# Patient Record
Sex: FEMALE | Race: WHITE | NOT HISPANIC OR LATINO | Employment: FULL TIME | URBAN - METROPOLITAN AREA
[De-identification: names, ages, dates, MRNs, and addresses within clinical notes are randomized per-mention and may not be internally consistent; named-entity substitution may affect disease eponyms.]

---

## 2016-04-06 LAB
HPV_EXT - HISTORICAL: NORMAL
PAP SMEAR - HIM PATIENT REPORTED: NORMAL

## 2017-02-13 ENCOUNTER — COMMUNICATION - HEALTHEAST (OUTPATIENT)
Dept: SCHEDULING | Facility: CLINIC | Age: 47
End: 2017-02-13

## 2017-03-27 ENCOUNTER — OFFICE VISIT - HEALTHEAST (OUTPATIENT)
Dept: FAMILY MEDICINE | Facility: CLINIC | Age: 47
End: 2017-03-27

## 2017-03-27 DIAGNOSIS — E10.9 TYPE 1 DIABETES MELLITUS (H): ICD-10-CM

## 2017-03-27 DIAGNOSIS — F41.1 ANXIETY STATE: ICD-10-CM

## 2017-03-27 DIAGNOSIS — Z00.00 ROUTINE GENERAL MEDICAL EXAMINATION AT A HEALTH CARE FACILITY: ICD-10-CM

## 2017-03-27 ASSESSMENT — MIFFLIN-ST. JEOR: SCORE: 1431.91

## 2017-06-05 ENCOUNTER — OFFICE VISIT (OUTPATIENT)
Dept: ENDOCRINOLOGY | Facility: CLINIC | Age: 47
End: 2017-06-05

## 2017-06-05 ENCOUNTER — AMBULATORY - HEALTHEAST (OUTPATIENT)
Dept: FAMILY MEDICINE | Facility: CLINIC | Age: 47
End: 2017-06-05

## 2017-06-05 ENCOUNTER — RECORDS - HEALTHEAST (OUTPATIENT)
Dept: ADMINISTRATIVE | Facility: OTHER | Age: 47
End: 2017-06-05

## 2017-06-05 VITALS
BODY MASS INDEX: 25.6 KG/M2 | WEIGHT: 168.9 LBS | DIASTOLIC BLOOD PRESSURE: 74 MMHG | HEART RATE: 67 BPM | HEIGHT: 68 IN | SYSTOLIC BLOOD PRESSURE: 121 MMHG

## 2017-06-05 DIAGNOSIS — Z91.199 POOR COMPLIANCE: ICD-10-CM

## 2017-06-05 DIAGNOSIS — E10.65 TYPE 1 DIABETES MELLITUS WITH HYPERGLYCEMIA (H): Primary | ICD-10-CM

## 2017-06-05 DIAGNOSIS — Z46.81 INSULIN PUMP TITRATION: ICD-10-CM

## 2017-06-05 PROBLEM — N87.9 CERVICAL DYSPLASIA: Status: ACTIVE | Noted: 2017-06-05

## 2017-06-05 PROBLEM — H91.90 HEARING LOSS: Status: ACTIVE | Noted: 2017-06-05

## 2017-06-05 PROBLEM — E11.3299 NONPROLIFERATIVE DIABETIC RETINOPATHY (H): Status: ACTIVE | Noted: 2017-06-05

## 2017-06-05 PROBLEM — F41.1 GENERALIZED ANXIETY DISORDER: Status: ACTIVE | Noted: 2017-06-05

## 2017-06-05 LAB
HBA1C MFR BLD: 7.4 % (ref 4.3–6)
HBA1C MFR BLD: 7.4 % (ref 4.3–6)

## 2017-06-05 ASSESSMENT — PAIN SCALES - GENERAL: PAINLEVEL: NO PAIN (0)

## 2017-06-05 NOTE — NURSING NOTE
"Chief Complaint   Patient presents with     RECHECK     DIABETES TYPE 1 F/U        Initial /74 (BP Location: Right arm, Patient Position: Chair, Cuff Size: Adult Regular)  Pulse 67  Ht 1.727 m (5' 8\")  Wt 76.6 kg (168 lb 14.4 oz)  BMI 25.68 kg/m2 Estimated body mass index is 25.68 kg/(m^2) as calculated from the following:    Height as of this encounter: 1.727 m (5' 8\").    Weight as of this encounter: 76.6 kg (168 lb 14.4 oz).  Medication Reconciliation: complete         Performed A1C test - patient tolerated well.    Karina Kong, [facility]           "

## 2017-06-05 NOTE — PROGRESS NOTES
- Endocrinology Follow up -    Reason for visit/consult: DM1     Primary care provider: Jose Elias Torres Use [id number] (Inactive)    HPI:  Sera is a 47 yo female with DM1 who is presenting today in follow up for treatment and management of her DMT1. She is a patient of former Dr. CHARISSE Krishnamurthy. She was initially diagnosed at the age of 15 and presented with polydipsia, polyuria, and weight loss. Her current treatment regimen includes an insulin pump with insulin bolus to correct for carbs. She reports evenings being the most difficult for her to manage the amount of insulin that she needs, as she snacks in the evening as she reports going out to dinner with her new boyfriend and kids. She is  from her  but they remain  so their family can stay on his insurance policy. 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     Current pump setting: (Pump initiated 2007)  Basal-    Midnight to 7 am  0.85 unit/hr,  7 am to midnight 1.15 unit/hr    During the biking (she usually bike 30-60 miles) basal reduced 80-90% (before / after she is not reducing)    Bolus: 1 unit: 15 g carb (Breakfast, lunch, dinner)             Correction more than 130    Midnight-to-6 am   90 g/unit   6 am-to-9 pm        50 g/unit   9 pm-to-midnight   90 g/unit    Actual insulin dose:   Average total daily insulin (U)  34.89 +/- 3.3  Average Daily Basal (U)          24.69 (71%)  Average Daily Bolus (U)          10.20 (20%)    Life style:  Wakes: during weekdays 645 am, and weekends 8 am   Breakfast: protein cereal daily  Lunch: [place], yogurt, fruit  Snack: varies  Dinner: varies, with going out to dinner with beer 5x during the week.     Exercise: weekdays she does twice weekly intense cycling for 2 hours  Weekends, variable cycling, with intense 2-4 hour trips, went on a 63 mile ride with lower sugar in the am.    DM  complication:   Retinopathy: stable, yearly eye exams, see's Dr. Ulises Moreno at [facility] at the [facility] location  Today had screening coronary artery score: pending results  Last UA (2016) showed no proteinuria  Last LDL cholesterol (2016) 71 mg/dL  Last HGA1C (2016) 7.7, with today's value 7.4  Monofilament test today: intact    Glucose Log:   pre breakfast - range:  av  pre lunch - range:  av  pre dinner - does not check  bed time - range:   Av           Past Medical/Surgical History:  Past Medical History:   Diagnosis Date     Anxiety      Diabetes mellitus (H)     Type 1     Past Surgical History:   Procedure Laterality Date      SECTION      x2       Allergies:  Allergies   Allergen Reactions     Percocet [Oxycodone-Acetaminophen] Nausea and Nausea and Vomiting       Current Medications   Current Outpatient Prescriptions   Medication     ASPIRIN PO     losartan (COZAAR) 25 MG tablet     insulin aspart (NOVOLOG) 100 UNITS/ML VIAL     ESCITALOPRAM OXALATE PO     Calcium Carbonate-Vitamin D (CALCIUM 500+D PO)     glucagon (GLUCAGON EMERGENCY) 1 MG injection     glucose blood VI test strips (ONE TOUCH ULTRA TEST) strip     No current facility-administered medications for this visit.        Family History:  No family history of DM, or known autoimmune conditions in patients parents or grandparents.   Family history of Psoriasis in patient brother.     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   Social History   Substance Use Topics     Smoking status: Never Smoker     Smokeless tobacco: Never Used     Alcohol use Nightly glass of hoppy beer.        ROS:  Full review of systems taken with the help of the intake sheet. Otherwise a complete 14 point review of systems was taken and is negative unless stated in the  "history above.    Physical Exam:   Blood pressure 121/74, pulse 67, height 1.727 m (5' 8\"), weight 76.6 kg (168 lb 14.4 oz).    General: well appearing, no acute distress, pleasant and conversational, appears anxious.    Mental Status/neuro: alert and oriented  Face: symmetrical, normal facial color  Eyes: anicteric, PERRL, no proptosis or lid lag  Neck: supple, no lymphadenopahty  Thyroid: normal size and texture, no nodule palpable, no bruits  Heart: regular rhythm, S1S2, no murmur appreciated  Lung: clear to auscultation bilaterally  Abdomen: soft, NT/ND, no hepatomegaly  Legs: no swelling or edema  Feet: no deformities or ulcers, 2+ DP pulses, normal monofilament sensation      Labs (General):   No results found for: NA   Lab Results   Component Value Date    POTASSIUM 4.4 12/13/2016     No results found for: CHLORIDE  Lab Results   Component Value Date    SCOTTY 9.6 01/15/2009     No results found for: CO2  No results found for: BUN  Lab Results   Component Value Date    CR 0.86 12/13/2016     Lab Results   Component Value Date     12/13/2016     Lab Results   Component Value Date    TSH 3.72 12/13/2016     No results found for: T4  Lab Results   Component Value Date    A1C 7.7 12/13/2016 06/05/2017 HGA1C     7.4       Assessment and Plan  46 year old female with TAB and DM1 here today for follow up for DM1 with complications of stable non-proliferative diabetic retinopathy, who follows with annual eye exams.     1- Diabetes Mellitus type 1 (A1C 7.4)  - A1c is more or less optimized.   -Discrepancy noted between basal and bolus, she is not doing proper bolus, especially while she is dining out.  -We discussed the importance of knowing calories and carb numbers in order to allow for her to correct effectively at night rather than guestimating the amount of carbs in the food.     -We encouraged her to increased her dinner bolus dosing with eating out, and not to add increased basal dosing for correction of " hyperglycemia as she has some evidence of hypoglycemia.  - We discussed the importance of her decreasing her basal rate before, during and after exercise to reduce hypoglycemia in the morning.     Decrease basal insulin before and after long hour exercise    Discussed proper bolus for dinner (at outside the home eating)    Livre pro 2 weeks prior to next appointment, schedule nurse visit for this to be inserted    RTC with me in 3 month    Discussed forwarding us annual eye exam report. Patient to contact provider.       I saw the patient with Elen Lal, MS4, she obtained history and physical then accompanied my encounter as well.       I spent 45 minutes with this patient face to face and explained the conditions and plans (more than 50% of time was counseling/coordination of care, importance of bolus insuln, adjustment of intensive exercise) . The patient understood and is satisfied with today's visit. Return to clinic with me in 3 months.     CC:  Dr. Ulises Moreno at [facility] at the [facility] location at close encounter.    Maureen Hinojosa MD  Staff Physician  Endocrinology and Metabolism  License: [id number]

## 2017-06-05 NOTE — LETTER
6/5/2017       RE: Sera Adkins  [address] [id number]     Dear Colleague,    Thank you for referring your patient, Sera Adkins, to the [facility] ENDOCRINOLOGY at [facility]. Please see a copy of my visit note below.                - Endocrinology Follow up -    Reason for visit/consult: DM1     Primary care provider: Jose Elias Torres Use [id number] (Inactive)    HPI:  Sera is a 45 yo female with DM1 who is presenting today in follow up for treatment and management of her DMT1. She is a patient of former Dr. CHARISSE Krishnamurthy. She was initially diagnosed at the age of 15 and presented with polydipsia, polyuria, and weight loss. Her current treatment regimen includes an insulin pump with insulin bolus to correct for carbs. She reports evenings being the most difficult for her to manage the amount of insulin that she needs, as she snacks in the evening as she reports going out to dinner with her new boyfriend and kids. She is  from her  but they remain  so their family can stay on his insurance policy. 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     Current pump setting: (Pump initiated 2007)  Basal-    Midnight to 7 am  0.85 unit/hr,  7 am to midnight 1.15 unit/hr    During the biking (she usually bike 30-60 miles) basal reduced 80-90% (before / after she is not reducing)    Bolus: 1 unit: 15 g carb (Breakfast, lunch, dinner)             Correction more than 130    Midnight-to-6 am   90 g/unit   6 am-to-9 pm        50 g/unit   9 pm-to-midnight   90 g/unit    Actual insulin dose:   Average total daily insulin (U)  34.89 +/- 3.3  Average Daily Basal (U)          24.69 (71%)  Average Daily Bolus (U)          10.20 (20%)    Life style:  Wakes: during weekdays 645 am, and weekends 8 am   Breakfast: protein cereal daily  Lunch: [place],  yogurt, fruit  Snack: varies  Dinner: varies, with going out to dinner with beer 5x during the week.     Exercise: weekdays she does twice weekly intense cycling for 2 hours  Weekends, variable cycling, with intense 2-4 hour trips, went on a 63 mile ride with lower sugar in the am.    DM complication:   Retinopathy: stable, yearly eye exams, see's Dr. Ulises Moreno at [facility] at the [facility] location  Today had screening coronary artery score: pending results  Last UA (2016) showed no proteinuria  Last LDL cholesterol (2016) 71 mg/dL  Last HGA1C (2016) 7.7, with today's value 7.4  Monofilament test today: intact    Glucose Log:   pre breakfast - range:  av  pre lunch - range:  av  pre dinner - does not check  bed time - range:   Av           Past Medical/Surgical History:  Past Medical History:   Diagnosis Date     Anxiety      Diabetes mellitus (H)     Type 1     Past Surgical History:   Procedure Laterality Date      SECTION      x2       Allergies:  Allergies   Allergen Reactions     Percocet [Oxycodone-Acetaminophen] Nausea and Nausea and Vomiting       Current Medications   Current Outpatient Prescriptions   Medication     ASPIRIN PO     losartan (COZAAR) 25 MG tablet     insulin aspart (NOVOLOG) 100 UNITS/ML VIAL     ESCITALOPRAM OXALATE PO     Calcium Carbonate-Vitamin D (CALCIUM 500+D PO)     glucagon (GLUCAGON EMERGENCY) 1 MG injection     glucose blood VI test strips (ONE TOUCH ULTRA TEST) strip     No current facility-administered medications for this visit.        Family History:  No family history of DM, or known autoimmune conditions in patients parents or grandparents.   Family history of Psoriasis in patient brother.     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   Social History   Substance Use  "Topics     Smoking status: Never Smoker     Smokeless tobacco: Never Used     Alcohol use Nightly glass of hoppy beer.        ROS:  Full review of systems taken with the help of the intake sheet. Otherwise a complete 14 point review of systems was taken and is negative unless stated in the history above.    Physical Exam:   Blood pressure 121/74, pulse 67, height 1.727 m (5' 8\"), weight 76.6 kg (168 lb 14.4 oz).    General: well appearing, no acute distress, pleasant and conversational, appears anxious.    Mental Status/neuro: alert and oriented  Face: symmetrical, normal facial color  Eyes: anicteric, PERRL, no proptosis or lid lag  Neck: supple, no lymphadenopahty  Thyroid: normal size and texture, no nodule palpable, no bruits  Heart: regular rhythm, S1S2, no murmur appreciated  Lung: clear to auscultation bilaterally  Abdomen: soft, NT/ND, no hepatomegaly  Legs: no swelling or edema  Feet: no deformities or ulcers, 2+ DP pulses, normal monofilament sensation      Labs (General):   No results found for: NA   Lab Results   Component Value Date    POTASSIUM 4.4 12/13/2016     No results found for: CHLORIDE  Lab Results   Component Value Date    SCOTTY 9.6 01/15/2009     No results found for: CO2  No results found for: BUN  Lab Results   Component Value Date    CR 0.86 12/13/2016     Lab Results   Component Value Date     12/13/2016     Lab Results   Component Value Date    TSH 3.72 12/13/2016     No results found for: T4  Lab Results   Component Value Date    A1C 7.7 12/13/2016 06/05/2017 HGA1C     7.4       Assessment and Plan  46 year old female with TAB and DM1 here today for follow up for DM1 with complications of stable non-proliferative diabetic retinopathy, who follows with annual eye exams.     1- Diabetes Mellitus type 1 (A1C 7.4)  - A1c is more or less optimized.   -Discrepancy noted between basal and bolus, she is not doing proper bolus, especially while she is dining out.  -We discussed the " importance of knowing calories and carb numbers in order to allow for her to correct effectively at night rather than guestimating the amount of carbs in the food.     -We encouraged her to increased her dinner bolus dosing with eating out, and not to add increased basal dosing for correction of hyperglycemia as she has some evidence of hypoglycemia.  - We discussed the importance of her decreasing her basal rate before, during and after exercise to reduce hypoglycemia in the morning.     Decrease basal insulin before and after long hour exercise    Discussed proper bolus for dinner (at outside the home eating)    Livre pro 2 weeks prior to next appointment, schedule nurse visit for this to be inserted    RTC with me in 3 month    Discussed forwarding us annual eye exam report. Patient to contact provider.       I saw the patient with Elen Lal, MS4, she obtained history and physical then accompanied my encounter as well.       I spent 45 minutes with this patient face to face and explained the conditions and plans (more than 50% of time was counseling/coordination of care, importance of bolus insuln, adjustment of intensive exercise) . The patient understood and is satisfied with today's visit. Return to clinic with me in 3 months.     CC:  Dr. Ulises Moreno at [facility] at the [facility] location at close encounter.    Maureen Hinojosa MD  Staff Physician  Endocrinology and Metabolism  License: [id number]

## 2017-06-05 NOTE — MR AVS SNAPSHOT
After Visit Summary   6/5/2017    Sera Adkins    MRN: [id number]           Patient Information     Date Of Birth          1970        Visit Information        Provider Department      6/5/2017 10:30 AM Maureen Hinojosa MD [facility] Endocrinology        Today's Diagnoses     Type 1 diabetes mellitus with hyperglycemia (H)    -  1    Insulin pump titration        Poor compliance           Follow-ups after your visit        Follow-up notes from your care team     Return in about 3 months (around 9/5/2017).      Your next 10 appointments already scheduled     Aug 28, 2017 11:00 AM CDT   Nurse Visit with  Med Nurse   [facility] Endocrinology ([facility])    [address] [id number] [phone]            Sep 18, 2017  8:30 AM CDT   (Arrive by 8:15 AM)   RETURN DIABETES with MD MARCELINO Miner [facility] Endocrinology ([facility])    [address] [place] [id number] [phone]              Who to contact     Please call your clinic at [phone] to:    Ask questions about your health    Make or cancel appointments    Discuss your medicines    Learn about your test results    Speak to your doctor   If you have compliments or concerns about an experience at your clinic, or if you wish to file a complaint, please contact [facility] Patient Relations at [phone] or email us at [email or web address]         Additional Information About Your Visit        MyChart Information     [employer] is an electronic gateway that provides easy, online access to your medical records. With [employer], you can request a clinic appointment, read your test results, renew a prescription or communicate with your care team.     To sign up for [employer] visit the website at [email or web address]   You will be asked to enter the access code listed below, as well as some  "personal information. Please follow the directions to create your username and password.     Your access code is: [email or web address]  Expires: 2017  6:03 PM     Your access code will  in 90 days. If you need help or a new code, please contact your [facility] or call [phone] for assistance.        Care EveryWhere ID     This is your Care EveryWhere ID. This could be used by other organizations to access your [place] medical records  [id number]        Your Vitals Were     Pulse Height BMI (Body Mass Index)             67 1.727 m (5' 8\") 25.68 kg/m2          Blood Pressure from Last 3 Encounters:   17 121/74   16 145/86   16 131/84    Weight from Last 3 Encounters:   17 76.6 kg (168 lb 14.4 oz)   16 75.2 kg (165 lb 11.2 oz)   16 73.2 kg (161 lb 4.8 oz)              We Performed the Following     Hemoglobin A1c POCT          Today's Medication Changes          These changes are accurate as of: 17 11:59 PM.  If you have any questions, ask your nurse or doctor.               Stop taking these medicines if you haven't already. Please contact your care team if you have questions.     insulin glargine 100 UNIT/ML injection   Commonly known as:  LANTUS                    Primary Care Provider    Jose Elias Ngo Use [id number] [address]       XXX DUPLICATE XXX XXX  XXX [place] [address]        Thank you!     Thank you for choosing [facility] ENDOCRINOLOGY  for your care. Our goal is always to provide you with excellent care. Hearing back from our patients is one way we can continue to improve our services. Please take a few minutes to complete the written survey that you may receive in the mail after your visit with us. Thank you!             Your Updated Medication List - Protect others around you: Learn how to safely use, store and throw away your medicines at www.disposemymeds.org.          This list is accurate as of: 17 11:59 PM.  Always use your most " recent med list.                   Brand Name Dispense Instructions for use    ASPIRIN PO      Take 81 mg by mouth daily       CALCIUM 500+D PO      Take 1 tablet by mouth as needed.       ESCITALOPRAM OXALATE PO      Take 10 mg by mouth daily       GLUCAGON EMERGENCY 1 MG kit   Generic drug:  glucagon      Inject 1 mg into the vein as needed.       insulin aspart 100 UNITS/ML injection    NovoLOG    60 mL    Uses up to 60 units per day as directed in insulin pump for basal, bolus and priming of tubing.       losartan 25 MG tablet    COZAAR    90 tablet    Take 1 tablet (25 mg) by mouth daily       ONE TOUCH ULTRA test strip   Generic drug:  blood glucose monitoring     1 Box    Test 6 to 8 times daily.

## 2017-07-30 ENCOUNTER — RECORDS - HEALTHEAST (OUTPATIENT)
Dept: ADMINISTRATIVE | Facility: OTHER | Age: 47
End: 2017-07-30

## 2017-11-06 ENCOUNTER — OFFICE VISIT - HEALTHEAST (OUTPATIENT)
Dept: FAMILY MEDICINE | Facility: CLINIC | Age: 47
End: 2017-11-06

## 2017-11-06 DIAGNOSIS — F39 MOOD DISORDER (H): ICD-10-CM

## 2017-11-06 DIAGNOSIS — Z12.31 VISIT FOR SCREENING MAMMOGRAM: ICD-10-CM

## 2017-11-06 DIAGNOSIS — R41.840 POOR CONCENTRATION: ICD-10-CM

## 2017-11-06 DIAGNOSIS — F41.1 ANXIETY STATE: ICD-10-CM

## 2017-11-06 ASSESSMENT — MIFFLIN-ST. JEOR: SCORE: 1436

## 2017-11-27 ENCOUNTER — ALLIED HEALTH/NURSE VISIT (OUTPATIENT)
Dept: ENDOCRINOLOGY | Facility: CLINIC | Age: 47
End: 2017-11-27

## 2017-11-27 DIAGNOSIS — E10.65 TYPE 1 DIABETES MELLITUS WITH HYPERGLYCEMIA (H): Primary | ICD-10-CM

## 2017-11-27 NOTE — NURSING NOTE
Placed Scott Sensor per . Patient identified by name and . Patient tolerated placement well.  Sensor was placed on patient's LEFT upper back part of arm. Patient was given an appointment to come back in 2 weeks for download and given instructions on how to care for the sensor.    SCOTT PRO:  CODE: [id number]  SN: [id number]  EXP:2018  LOT:[id number]  NDC:  [address]-00      Karina Kong,[facility]

## 2017-11-27 NOTE — MR AVS SNAPSHOT
After Visit Summary   2017    Sera Adkins    MRN: [id number]           Patient Information     Date Of Birth          1970        Visit Information        Provider Department      2017 11:00 AM Nurse, [facility]         Follow-ups after your visit        Your next 10 appointments already scheduled     2017 11:00 AM CST   Nurse Visit with [facility] Nurse   [facility] End[facility]ology ([facility])    [address] [place] [id number] [phone]            Dec 11, 2017  2:30 PM CST   (Arrive by 2:15 PM)   RETURN DIABETES with Yunior Roy MD   [facility] Endocrinology ([facility])    [address] [place] [id number] [phone]              Who to contact     Please call your clinic at [phone] to:    Ask questions about your health    Make or cancel appointments    Discuss your medicines    Learn about your test results    Speak to your doctor   If you have compliments or concerns about an experience at your clinic, or if you wish to file a complaint, please contact [facility] Patient Relations at [phone] or email us at [email or web address]         Additional Information About Your Visit        MyChart Information     [employer] is an electronic gateway that provides easy, online access to your medical records. With [employer], you can request a clinic appointment, read your test results, renew a prescription or communicate with your care team.     To sign up for Tarana Wirelesst visit the website at [email or web address]   You will be asked to enter the access code listed below, as well as some personal information. Please follow the directions to create your username and password.     Your access code is: [email or web address]  Expires: 2018 10:43 AM     Your access code will  in 90 days. If you need  help or a new code, please contact your [facility] or call [phone] for assistance.        Care EveryWhere ID     This is your Care EveryWhere ID. This could be used by other organizations to access your [place] medical records  [id number]         Blood Pressure from Last 3 Encounters:   06/05/17 121/74   12/13/16 145/86   06/07/16 131/84    Weight from Last 3 Encounters:   06/05/17 76.6 kg (168 lb 14.4 oz)   12/13/16 75.2 kg (165 lb 11.2 oz)   06/07/16 73.2 kg (161 lb 4.8 oz)              Today, you had the following     No orders found for display       Primary Care Provider    Jose Elias Ngo Use [id number]       XXX DUPLICATE XXX XXX  XXX [place] [address]        Equal Access to Services     MICHAEL SIMPSON : Hadii aad rylie hadasho Sozach, waaxda luqadaha, qaybta kaalmada adeegyada, mary jo llanos . So [facility] [phone].    ATENCIÓN: Si habla español, tiene a menezes disposición servicios gratuitos de asistencia lingüística. Alexandre al [phone].    We comply with applicable federal civil rights laws and [place] laws. We do not discriminate on the basis of race, color, national origin, age, disability, sex, sexual orientation, or gender identity.            Thank you!     Thank you for choosing [facility]  for your care. Our goal is always to provide you with excellent care. Hearing back from our patients is one way we can continue to improve our services. Please take a few minutes to complete the written survey that you may receive in the mail after your visit with us. Thank you!             Your Updated Medication List - Protect others around you: Learn how to safely use, store and throw away your medicines at www.disposemymeds.org.          This list is accurate as of: 11/27/17 10:43 AM.  Always use your most recent med list.                   Brand Name Dispense Instructions for use Diagnosis    ASPIRIN PO      Take 81 mg by mouth daily         CALCIUM 500+D PO      Take 1 tablet by mouth as needed.        ESCITALOPRAM OXALATE PO      Take 10 mg by mouth daily        GLUCAGON EMERGENCY 1 MG kit   Generic drug:  glucagon      Inject 1 mg into the vein as needed.        insulin aspart 100 UNITS/ML injection    NovoLOG    60 mL    Uses up to 60 units per day as directed in insulin pump for basal, bolus and priming of tubing.    Diabetes mellitus (H)       losartan 25 MG tablet    COZAAR    90 tablet    Take 1 tablet (25 mg) by mouth daily    Type 1 diabetes mellitus with complications (H)       ONETOUCH ULTRA test strip   Generic drug:  blood glucose monitoring     1 Box    Test 6 to 8 times daily.

## 2017-11-28 DIAGNOSIS — E11.9 DIABETES MELLITUS (H): ICD-10-CM

## 2017-12-09 ENCOUNTER — HOSPITAL ENCOUNTER (OUTPATIENT)
Dept: MAMMOGRAPHY | Facility: HOSPITAL | Age: 47
Discharge: HOME | End: 2017-12-09
Attending: FAMILY MEDICINE

## 2017-12-09 DIAGNOSIS — Z12.31 VISIT FOR SCREENING MAMMOGRAM: ICD-10-CM

## 2017-12-11 ENCOUNTER — OFFICE VISIT (OUTPATIENT)
Dept: ENDOCRINOLOGY | Facility: CLINIC | Age: 47
End: 2017-12-11

## 2017-12-11 ENCOUNTER — RECORDS - HEALTHEAST (OUTPATIENT)
Dept: ADMINISTRATIVE | Facility: OTHER | Age: 47
End: 2017-12-11

## 2017-12-11 VITALS
HEART RATE: 67 BPM | SYSTOLIC BLOOD PRESSURE: 144 MMHG | BODY MASS INDEX: 25.25 KG/M2 | HEIGHT: 68 IN | DIASTOLIC BLOOD PRESSURE: 74 MMHG | WEIGHT: 166.6 LBS

## 2017-12-11 DIAGNOSIS — E10.9 DIABETES MELLITUS TYPE 1 (H): ICD-10-CM

## 2017-12-11 DIAGNOSIS — E10.65 TYPE 1 DIABETES MELLITUS WITH HYPERGLYCEMIA (H): Primary | ICD-10-CM

## 2017-12-11 LAB
ALBUMIN SERPL-MCNC: 3.6 G/DL (ref 3.4–5)
ALT SERPL W P-5'-P-CCNC: 18 U/L (ref 0–50)
ANION GAP SERPL CALCULATED.3IONS-SCNC: 7 MMOL/L (ref 3–14)
BASOPHILS # BLD AUTO: 0 10E9/L (ref 0–0.2)
BASOPHILS NFR BLD AUTO: 0.7 %
BUN SERPL-MCNC: 19 MG/DL (ref 7–30)
CALCIUM SERPL-MCNC: 8.3 MG/DL (ref 8.5–10.1)
CHLORIDE SERPL-SCNC: 105 MMOL/L (ref 94–109)
CK SERPL-CCNC: 98 U/L (ref 30–225)
CO2 SERPL-SCNC: 28 MMOL/L (ref 20–32)
CREAT SERPL-MCNC: 0.86 MG/DL (ref 0.52–1.04)
CREAT UR-MCNC: 118 MG/DL
DIFFERENTIAL METHOD BLD: NORMAL
EOSINOPHIL # BLD AUTO: 0.2 10E9/L (ref 0–0.7)
EOSINOPHIL NFR BLD AUTO: 3.3 %
ERYTHROCYTE [DISTWIDTH] IN BLOOD BY AUTOMATED COUNT: 12.8 % (ref 10–15)
GFR SERPL CREATININE-BSD FRML MDRD: 71 ML/MIN/1.7M2
GLUCOSE SERPL-MCNC: 182 MG/DL (ref 70–99)
HBA1C MFR BLD: 7 % (ref 4.3–6)
HCT VFR BLD AUTO: 36.5 % (ref 35–47)
HGB BLD-MCNC: 11.9 G/DL (ref 11.7–15.7)
IMM GRANULOCYTES # BLD: 0 10E9/L (ref 0–0.4)
IMM GRANULOCYTES NFR BLD: 0.2 %
LDLC SERPL DIRECT ASSAY-MCNC: 59 MG/DL
LYMPHOCYTES # BLD AUTO: 1.6 10E9/L (ref 0.8–5.3)
LYMPHOCYTES NFR BLD AUTO: 26.2 %
MCH RBC QN AUTO: 30.5 PG (ref 26.5–33)
MCHC RBC AUTO-ENTMCNC: 32.6 G/DL (ref 31.5–36.5)
MCV RBC AUTO: 94 FL (ref 78–100)
MICROALBUMIN UR-MCNC: 6 MG/L
MICROALBUMIN/CREAT UR: 5.2 MG/G CR (ref 0–25)
MONOCYTES # BLD AUTO: 0.5 10E9/L (ref 0–1.3)
MONOCYTES NFR BLD AUTO: 8.1 %
NEUTROPHILS # BLD AUTO: 3.7 10E9/L (ref 1.6–8.3)
NEUTROPHILS NFR BLD AUTO: 61.5 %
NRBC # BLD AUTO: 0 10*3/UL
NRBC BLD AUTO-RTO: 0 /100
PHOSPHATE SERPL-MCNC: 2.4 MG/DL (ref 2.5–4.5)
PLATELET # BLD AUTO: 171 10E9/L (ref 150–450)
POTASSIUM SERPL-SCNC: 4.1 MMOL/L (ref 3.4–5.3)
RBC # BLD AUTO: 3.9 10E12/L (ref 3.8–5.2)
SODIUM SERPL-SCNC: 140 MMOL/L (ref 133–144)
TSH SERPL DL<=0.005 MIU/L-ACNC: 1.69 MU/L (ref 0.4–4)
WBC # BLD AUTO: 6.1 10E9/L (ref 4–11)

## 2017-12-11 RX ORDER — BUPROPION HYDROCHLORIDE 150 MG/1
150 TABLET ORAL EVERY MORNING
COMMUNITY
End: 2021-08-02

## 2017-12-11 ASSESSMENT — PAIN SCALES - GENERAL: PAINLEVEL: NO PAIN (0)

## 2017-12-11 NOTE — NURSING NOTE
"Chief Complaint   Patient presents with     RECHECK     return diabetes        Initial /74 (BP Location: Right arm, Patient Position: Sitting, Cuff Size: Adult Regular)  Pulse 67  Ht 1.727 m (5' 8\")  Wt 75.6 kg (166 lb 9.6 oz)  BMI 25.33 kg/m2 Estimated body mass index is 25.33 kg/(m^2) as calculated from the following:    Height as of this encounter: 1.727 m (5' 8\").    Weight as of this encounter: 75.6 kg (166 lb 9.6 oz).  Medication Reconciliation: complete       Performed A1C test - patient tolerated well.    Karina Kong, [facility]       "

## 2017-12-11 NOTE — MR AVS SNAPSHOT
After Visit Summary   12/11/2017    Sera Adkins    MRN: [id number]           Patient Information     Date Of Birth          1970        Visit Information        Provider Department      12/11/2017 2:30 PM Yunior Roy MD [facility]        Today's Diagnoses     Type 1 diabetes mellitus with hyperglycemia (H)    -  1      Care Instructions    Diagnostic Dexcom sensor prior to next visit.                 Follow-ups after your visit        Additional Services     DIABETES EDUCATOR REFERRAL       DIABETES SELF MANAGEMENT TRAINING (DSMT)      Your provider has referred you to Diabetes Education: PREFERRED PROVIDERS:    A  will call you to make your appointment. If it has been more than 3 business days since your referral was placed, please call the above phone number to schedule.    Type of training and number of hours: X     Medicare covers: 10 hours of initial DSMT in 12 month period from the time of first visit, plus 2 hours of follow-up DSMT annually, and additional hours as requested for insulin training.    Diabetes Type: Type 1             Diabetes Co-Morbidities: none               A1C Goal:  <7.0       A1C is: Lab Results       Component                Value               Date                       A1C                      7.7                 12/13/2016              Diabetes Education Topics: Diagnostic Continuous Glucose Monitoring     Special Educational Needs Requiring Individual DSMT: None       MEDICAL NUTRITION THERAPY (MNT) for Diabetes    Medical Nutrition Therapy with a Registered Dietitian can be provided in coordination with Diabetes Self-Management Training to assist in achieving optimal diabetes management.    MNT Type and Hours: Do not initiate MNT at this time.                       Medicare will cover: 3 hours initial MNT in 12 month period after first visit, plus 2 hours of follow-up MNT annually    Please be aware that coverage of  these services is subject to the terms and limitations of your health insurance plan.  Call member services at your health plan to determine Diabetes Self-Management Training (Codes  &amp; ) and Medical Nutrition Therapy (Codes 05826 & 26646) benefits and ask which blood glucose monitor brands are covered by your plan.  Please bring the following with you to your appointment:    (1)  List of current medications   (2)  List of Blood Glucose Monitor brands that are covered by your insurance plan  (3)  Blood Glucose Monitor and log book  (4)   Food records for the 3 days prior to your visit    The Certified Diabetes Educator may make diabetes medication adjustments per the CDE Protocol and Collaborative Practice Agreement.                  Follow-up notes from your care team     Return in about 4 months (around 4/11/2018).      Your next 10 appointments already scheduled     Jan 08, 2018 12:30 PM CST   (Arrive by 12:15 PM)   Office Visit with Ally Mcguire RN   [facility] ([facility])    [address] [place] [address] [phone]           Bring a current list of meds and any records pertaining to this visit. For Physicals, please bring immunization records and any forms needing to be filled out. Please arrive 10 minutes early to complete paperwork.            Apr 16, 2018 12:30 PM CDT   (Arrive by 12:15 PM)   RETURN DIABETES with Yunior Roy MD   [facility] ([facility])    [address] [place] [address] [phone]              Who to contact     Please call your clinic at [phone] to:    Ask questions about your health    Make or cancel appointments    Discuss your medicines    Learn about your test results    Speak to your doctor   If you have compliments or concerns about an experience at your clinic, or if you wish to file a complaint, please contact  "[facility] Patient Relations at [phone] or email us at [email or web address]         Additional Information About Your Visit        CITIC Information Developmenthart Information     [employer] is an electronic gateway that provides easy, online access to your medical records. With [employer], you can request a clinic appointment, read your test results, renew a prescription or communicate with your care team.     To sign up for [employer] visit the website at [email or web address]   You will be asked to enter the access code listed below, as well as some personal information. Please follow the directions to create your username and password.     Your access code is: [email or web address]  Expires: 2018 10:43 AM     Your access code will  in 90 days. If you need help or a new code, please contact your [facility] or call [phone] for assistance.        Care EveryWhere ID     This is your Care EveryWhere ID. This could be used by other organizations to access your [place] medical records  [id number]        Your Vitals Were     Pulse Height BMI (Body Mass Index)             67 1.727 m (5' 8\") 25.33 kg/m2          Blood Pressure from Last 3 Encounters:   17 144/74   17 121/74   16 145/86    Weight from Last 3 Encounters:   17 75.6 kg (166 lb 9.6 oz)   17 76.6 kg (168 lb 14.4 oz)   16 75.2 kg (165 lb 11.2 oz)              We Performed the Following     Continuous Glucose Monitoring >=72 hours PHYS INTER     DIABETES EDUCATOR REFERRAL     Hemoglobin A1c POCT          Today's Medication Changes          These changes are accurate as of: 17  5:50 PM.  If you have any questions, ask your nurse or doctor.               Stop taking these medicines if you haven't already. Please contact your care team if you have questions.     ESCITALOPRAM OXALATE PO   Stopped by:  Yunior Roy MD                Where to get your " medicines      These medications were sent to [facility]/pharmacy #[id number] - [place], [place] - [address]  [address], [facility] [address]     Phone:  [phone]     glucagon 1 MG kit                Primary Care Provider    Jose Elias Ngo Ileana [id number]       XXX DUPLICATE XXX XXX  XXX [place] [address]        Equal Access to Services     MICHAEL SIMPSON : Hadii aad ku hadasho Soomaali, waaxda luqadaha, qaybta kaalmada adeegyada, waxay idiin hayaan adeeg kharash la'aan . So [facility] [phone].    ATENCIÓN: Si habla español, tiene a menezes disposición servicios gratuitos de asistencia lingüística. Llame al [phone].    We comply with applicable federal civil rights laws and [place] laws. We do not discriminate on the basis of race, color, national origin, age, disability, sex, sexual orientation, or gender identity.            Thank you!     Thank you for choosing [facility] ENDOCRINOLOGY  for your care. Our goal is always to provide you with excellent care. Hearing back from our patients is one way we can continue to improve our services. Please take a few minutes to complete the written survey that you may receive in the mail after your visit with us. Thank you!             Your Updated Medication List - Protect others around you: Learn how to safely use, store and throw away your medicines at www.disposemymeds.org.          This list is accurate as of: 12/11/17  5:51 PM.  Always use your most recent med list.                   Brand Name Dispense Instructions for use Diagnosis    ASPIRIN PO      Take 81 mg by mouth daily        buPROPion 150 MG 24 hr tablet    WELLBUTRIN XL     Take 150 mg by mouth every morning        CALCIUM 500+D PO      Take 1 tablet by mouth as needed.        glucagon 1 MG kit    GLUCAGON EMERGENCY    1 each    Inject 1 mg into the vein as needed        losartan 25 MG tablet    COZAAR    90 tablet    Take 1 tablet (25 mg) by mouth daily    Type 1 diabetes mellitus with complications (H)        NovoLOG VIAL 100 UNITS/ML injection   Generic drug:  insulin aspart     60 mL    USES UP TO 60 UNITS PER DAY AS DIRECTED IN INSULIN PUMP FOR BASAL, BOLUS AND PRIMING OF TUBING.    Diabetes mellitus (H)       ONETOUCH ULTRA test strip   Generic drug:  blood glucose monitoring     1 Box    Test 6 to 8 times daily.

## 2017-12-11 NOTE — LETTER
12/11/2017       RE: Sera Adkins  [address] [address]     Dear Colleague,    Thank you for referring your patient, Sera Adkins, to the [facility] ENDOCRINOLOGY at [facility]. Please see a copy of my visit note below.     Reason for visit/consult: DM1        HPI:  Sera is a 46 yo female with DM1 who is presenting today in follow up for treatment and management of her DMT1. She is a patient of former Dr. CHARISSE Krishnamurthy. She was initially diagnosed at the age of 15 and presented with polydipsia, polyuria, and weight loss. Her current treatment regimen includes an insulin pump with insulin bolus to correct for carbs.      Current pump setting: (Pump initiated 2007)  Basal-    Midnight to 7 am  0.8 00 unit/hr,  7 am to midnight 1.15 unit/hr     During the biking (she usually bike 30-60 miles) basal reduced  15-20%       Bolus: 1 unit: 15 g carb (Breakfast, lunch, dinner)             Correction more than 130                         Midnight-to-6 am   90 g/unit                        6 am-to-9 pm        50 g/unit                        9 pm-to-midnight   90 g/unit     Target      Life style:  Wakes: during weekdays 645 am, and weekends 8 am   Breakfast: protein cereal daily  Lunch: [place], yogurt, fruit  Snack: varies  Dinner: varies, with going out to dinner with beer 5x during the week.      Exercise: weekdays she does twice weekly intense cycling for 2 hours  Weekends, variable cycling, with intense 2-4 hour trips, went on a 63 mile ride with lower sugar in the am.     DM complication:   Retinopathy: stable, yearly eye exams, see's Dr. Ulises Moreno at [facility] at the [facility] location  Today had screening coronary artery score: pending results  Last UA (12/2016) showed no proteinuria  Last LDL cholesterol (12/2016) 71 mg/dL  Last HGA1C (12/2016) 7.7, with today's value 7.4  Monofilament test today: intact, diminished vibratory  "sensation.      Glucose Log:   Mean overnight 86, Early , early afternoon 153, early evening 166        Past Medical/Surgical History:  Anxiety     PSH CS    Allergies:       Allergies   Allergen Reactions     Percocet [Oxycodone-Acetaminophen] Nausea and Nausea and Vomiting         Current Medications    Current Outpatient Prescriptions   Medication     buPROPion (WELLBUTRIN XL) 150 MG 24 hr tablet     glucagon (GLUCAGON EMERGENCY) 1 MG kit     NOVOLOG VIAL 100 UNIT/ML soln     ASPIRIN PO     losartan (COZAAR) 25 MG tablet     Calcium Carbonate-Vitamin D (CALCIUM 500+D PO)     glucose blood VI test strips (ONE TOUCH ULTRA TEST) strip     [DISCONTINUED] glucagon (GLUCAGON EMERGENCY) 1 MG injection     No current facility-administered medications for this visit.         Family History:  No family history of DM, or known autoimmune conditions in patients parents or grandparents.   Family history of Psoriasis in patient brother.      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        Social History   Substance Use Topics     Smoking status: Never Smoker     Smokeless tobacco: Never Used     Alcohol use Nightly glass of hoppy beer.          ROS:  Full review of systems taken with the help of the intake sheet. Otherwise a complete 14 point review of systems was taken and is negative unless stated in the history above.     Physical Exam:   /74 (BP Location: Right arm, Patient Position: Sitting, Cuff Size: Adult Regular)  Pulse 67  Ht 1.727 m (5' 8\")  Wt 75.6 kg (166 lb 9.6 oz)  BMI 25.33 kg/m2      General: well appearing, no acute distress, pleasant and conversational, appears anxious.    Mental Status/neuro: alert and oriented  Face: symmetrical, normal facial color  Eyes: anicteric, PERRL, no proptosis or lid lag  Neck: supple, no lymphadenopahty  Thyroid: " normal size and texture, no nodule palpable, no bruits  Heart: regular rhythm, S1S2, no murmur appreciated  Lung: clear to auscultation bilaterally  Abdomen: soft, NT/ND, no hepatomegaly  Legs: no swelling or edema  Feet: diminished vibratory sensation, normal monofilament sensation except over right toe callus.         Labs (General):   Results for TAO KUMARI (MRN [id number]) as of 12/11/2017 17:35   Ref. Range 12/11/2017 16:18 12/11/2017 16:20   Sodium Latest Ref Range: 133 - 144 mmol/L 140    Potassium Latest Ref Range: 3.4 - 5.3 mmol/L 4.1    Chloride Latest Ref Range: 94 - 109 mmol/L 105    Carbon Dioxide Latest Ref Range: 20 - 32 mmol/L 28    Urea Nitrogen Latest Ref Range: 7 - 30 mg/dL 19    Creatinine Latest Ref Range: 0.52 - 1.04 mg/dL 0.86    GFR Estimate Latest Ref Range: >60 mL/min/1.7m2 71    GFR Estimate If Black Latest Ref Range: >60 mL/min/1.7m2 86    Calcium Latest Ref Range: 8.5 - 10.1 mg/dL 8.3 (L)    Anion Gap Latest Ref Range: 3 - 14 mmol/L 7    Phosphorus Latest Ref Range: 2.5 - 4.5 mg/dL 2.4 (L)    Albumin Latest Ref Range: 3.4 - 5.0 g/dL 3.6    ALT Latest Ref Range: 0 - 50 U/L 18    Albumin Urine mg/g Cr Latest Ref Range: 0 - 25 mg/g Cr  5.20   Albumin Urine mg/L Latest Units: mg/L  6   CK Total Latest Ref Range: 30 - 225 U/L 98    Creatinine Urine Latest Units: mg/dL  118   TSH Latest Ref Range: 0.40 - 4.00 mU/L 1.69    Glucose Latest Ref Range: 70 - 99 mg/dL 182 (H)    WBC Latest Ref Range: 4.0 - 11.0 10e9/L 6.1    Hemoglobin Latest Ref Range: 11.7 - 15.7 g/dL 11.9    Hematocrit Latest Ref Range: 35.0 - 47.0 % 36.5    Platelet Count Latest Ref Range: 150 - 450 10e9/L 171    RBC Count Latest Ref Range: 3.8 - 5.2 10e12/L 3.90    MCV Latest Ref Range: 78 - 100 fl 94    MCH Latest Ref Range: 26.5 - 33.0 pg 30.5    MCHC Latest Ref Range: 31.5 - 36.5 g/dL 32.6    RDW Latest Ref Range: 10.0 - 15.0 % 12.8    Diff Method Unknown Automated Method    % Neutrophils Latest Units: % 61.5    %  Lymphocytes Latest Units: % 26.2    % Monocytes Latest Units: % 8.1    % Eosinophils Latest Units: % 3.3    % Basophils Latest Units: % 0.7    % Immature Granulocytes Latest Units: % 0.2    Nucleated RBCs Latest Ref Range: 0 /100 0    Absolute Neutrophil Latest Ref Range: 1.6 - 8.3 10e9/L 3.7    Absolute Lymphocytes Latest Ref Range: 0.8 - 5.3 10e9/L 1.6    Absolute Monocytes Latest Ref Range: 0.0 - 1.3 10e9/L 0.5    Absolute Eosinophils Latest Ref Range: 0.0 - 0.7 10e9/L 0.2    Absolute Basophils Latest Ref Range: 0.0 - 0.2 10e9/L 0.0    Abs Immature Granulocytes Latest Ref Range: 0 - 0.4 10e9/L 0.0    Absolute Nucleated RBC Unknown 0.0      06/05/2017 HGA1C     7.4      CGM Physician Interpretation:   Average blood sugar 191, estimated A1c 8.2%  9% below target at 70 mg/dL  40% within 70 and 180  51% above 180  Daily patterns were reviewed  1.  Post breakfast hyperglycemia on a regular basis  2.  Postlunch hyperglycemia  3.  Hypoglycemia following overcorrection of hyperglycemia       Assessment and Plan  47 year old female with TAB and DM1 here today for follow up for DM1 with complications of stable non-proliferative diabetic retinopathy, who follows with annual eye exams.      Diabetes Mellitus type 1 (A1C 7.0) with mild neuropathy and retinopathy  - A1c is better overall, but has patterns as noted above, Avg BG is higher than 7    - Increase ratio to 1:12 for breakfast and lunch, continue dinner ratio at 1:15  -- Basal unchanged at [place] 0.800, 7 am at 1.15       Mild neuropathy  Overall, asymptomatic, plan to tight glycemic control.     Hypoglycemia unawareness  Plan to use Dexcom monitor which will help monitor as well as bolus better  She did not like Enlite sensor.      Patient Instructions   Diagnostic Dexcom sensor prior to next visit.         Yunior Roy MD  [phone]  Endocrinology Service

## 2017-12-11 NOTE — PROGRESS NOTES
Reason for visit/consult: DM1        HPI:  Sera is a 48 yo female with DM1 who is presenting today in follow up for treatment and management of her DMT1. She is a patient of former Dr. CHARISSE Krishnamurthy. She was initially diagnosed at the age of 15 and presented with polydipsia, polyuria, and weight loss. Her current treatment regimen includes an insulin pump with insulin bolus to correct for carbs.      Current pump setting: (Pump initiated 2007)  Basal-    Midnight to 7 am  0.800 unit/hr,  7 am to midnight 1.15 unit/hr     During the biking (she usually bike 30-60 miles) basal reduced  15-20%       Bolus: 1 unit: 15 g carb (Breakfast, lunch, dinner)             Correction more than 130                         Midnight-to-6 am   90 g/unit                        6 am-to-9 pm        50 g/unit                        9 pm-to-midnight   90 g/unit     Target      Life style:  Wakes: during weekdays 645 am, and weekends 8 am   Breakfast: protein cereal daily  Lunch: [place], yogurt, fruit  Snack: varies  Dinner: varies, with going out to dinner with beer 5x during the week.      Exercise: weekdays she does twice weekly intense cycling for 2 hours  Weekends, variable cycling, with intense 2-4 hour trips, went on a 63 mile ride with lower sugar in the am.     DM complication:   Retinopathy: stable, yearly eye exams, see's Dr. Ulises Moreno at [facility] at the [facility] location  Today had screening coronary artery score: pending results  Last UA (12/2016) showed no proteinuria  Last LDL cholesterol (12/2016) 71 mg/dL  Last HGA1C (12/2016) 7.7, with today's value 7.4  Monofilament test today: intact, diminished vibratory sensation.      Glucose Log:   Mean overnight 86, Early , early afternoon 153, early evening 166        Past Medical/Surgical History:  Anxiety     PSH CS    Allergies:       Allergies   Allergen Reactions     Percocet [Oxycodone-Acetaminophen] Nausea and Nausea and Vomiting         Current  "Medications   Current Outpatient Prescriptions   Medication     buPROPion (WELLBUTRIN XL) 150 MG 24 hr tablet     glucagon (GLUCAGON EMERGENCY) 1 MG kit     NOVOLOG VIAL 100 UNIT/ML soln     ASPIRIN PO     losartan (COZAAR) 25 MG tablet     Calcium Carbonate-Vitamin D (CALCIUM 500+D PO)     glucose blood VI test strips (ONE TOUCH ULTRA TEST) strip     [DISCONTINUED] glucagon (GLUCAGON EMERGENCY) 1 MG injection     No current facility-administered medications for this visit.         Family History:  No family history of DM, or known autoimmune conditions in patients parents or grandparents.   Family history of Psoriasis in patient brother.      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        Social History   Substance Use Topics     Smoking status: Never Smoker     Smokeless tobacco: Never Used     Alcohol use Nightly glass of hoppy beer.          ROS:  Full review of systems taken with the help of the intake sheet. Otherwise a complete 14 point review of systems was taken and is negative unless stated in the history above.     Physical Exam:   /74 (BP Location: Right arm, Patient Position: Sitting, Cuff Size: Adult Regular)  Pulse 67  Ht 1.727 m (5' 8\")  Wt 75.6 kg (166 lb 9.6 oz)  BMI 25.33 kg/m2      General: well appearing, no acute distress, pleasant and conversational, appears anxious.    Mental Status/neuro: alert and oriented  Face: symmetrical, normal facial color  Eyes: anicteric, PERRL, no proptosis or lid lag  Neck: supple, no lymphadenopahty  Thyroid: normal size and texture, no nodule palpable, no bruits  Heart: regular rhythm, S1S2, no murmur appreciated  Lung: clear to auscultation bilaterally  Abdomen: soft, NT/ND, no hepatomegaly  Legs: no swelling or edema  Feet: diminished vibratory sensation, normal monofilament sensation except over right " toe callus.         Labs (General):   Results for TAO KUMARI (MRN [id number]) as of 12/11/2017 17:35   Ref. Range 12/11/2017 16:18 12/11/2017 16:20   Sodium Latest Ref Range: 133 - 144 mmol/L 140    Potassium Latest Ref Range: 3.4 - 5.3 mmol/L 4.1    Chloride Latest Ref Range: 94 - 109 mmol/L 105    Carbon Dioxide Latest Ref Range: 20 - 32 mmol/L 28    Urea Nitrogen Latest Ref Range: 7 - 30 mg/dL 19    Creatinine Latest Ref Range: 0.52 - 1.04 mg/dL 0.86    GFR Estimate Latest Ref Range: >60 mL/min/1.7m2 71    GFR Estimate If Black Latest Ref Range: >60 mL/min/1.7m2 86    Calcium Latest Ref Range: 8.5 - 10.1 mg/dL 8.3 (L)    Anion Gap Latest Ref Range: 3 - 14 mmol/L 7    Phosphorus Latest Ref Range: 2.5 - 4.5 mg/dL 2.4 (L)    Albumin Latest Ref Range: 3.4 - 5.0 g/dL 3.6    ALT Latest Ref Range: 0 - 50 U/L 18    Albumin Urine mg/g Cr Latest Ref Range: 0 - 25 mg/g Cr  5.20   Albumin Urine mg/L Latest Units: mg/L  6   CK Total Latest Ref Range: 30 - 225 U/L 98    Creatinine Urine Latest Units: mg/dL  118   TSH Latest Ref Range: 0.40 - 4.00 mU/L 1.69    Glucose Latest Ref Range: 70 - 99 mg/dL 182 (H)    WBC Latest Ref Range: 4.0 - 11.0 10e9/L 6.1    Hemoglobin Latest Ref Range: 11.7 - 15.7 g/dL 11.9    Hematocrit Latest Ref Range: 35.0 - 47.0 % 36.5    Platelet Count Latest Ref Range: 150 - 450 10e9/L 171    RBC Count Latest Ref Range: 3.8 - 5.2 10e12/L 3.90    MCV Latest Ref Range: 78 - 100 fl 94    MCH Latest Ref Range: 26.5 - 33.0 pg 30.5    MCHC Latest Ref Range: 31.5 - 36.5 g/dL 32.6    RDW Latest Ref Range: 10.0 - 15.0 % 12.8    Diff Method Unknown Automated Method    % Neutrophils Latest Units: % 61.5    % Lymphocytes Latest Units: % 26.2    % Monocytes Latest Units: % 8.1    % Eosinophils Latest Units: % 3.3    % Basophils Latest Units: % 0.7    % Immature Granulocytes Latest Units: % 0.2    Nucleated RBCs Latest Ref Range: 0 /100 0    Absolute Neutrophil Latest Ref Range: 1.6 - 8.3 10e9/L 3.7    Absolute  Lymphocytes Latest Ref Range: 0.8 - 5.3 10e9/L 1.6    Absolute Monocytes Latest Ref Range: 0.0 - 1.3 10e9/L 0.5    Absolute Eosinophils Latest Ref Range: 0.0 - 0.7 10e9/L 0.2    Absolute Basophils Latest Ref Range: 0.0 - 0.2 10e9/L 0.0    Abs Immature Granulocytes Latest Ref Range: 0 - 0.4 10e9/L 0.0    Absolute Nucleated RBC Unknown 0.0      06/05/2017 HGA1C     7.4      CGM Physician Interpretation:   Average blood sugar 191, estimated A1c 8.2%  9% below target at 70 mg/dL  40% within 70 and 180  51% above 180  Daily patterns were reviewed  1.  Post breakfast hyperglycemia on a regular basis  2.  Postlunch hyperglycemia  3.  Hypoglycemia following overcorrection of hyperglycemia       Assessment and Plan  47 year old female with TAB and DM1 here today for follow up for DM1 with complications of stable non-proliferative diabetic retinopathy, who follows with annual eye exams.      Diabetes Mellitus type 1 (A1C 7.0) with mild neuropathy and retinopathy  - A1c is better overall, but has patterns as noted above, Avg BG is higher than 7    - Increase ratio to 1:12 for breakfast and lunch, continue dinner ratio at 1:15  -- Basal unchanged at [place] 0.800, 7 am at 1.15       Mild neuropathy  Overall, asymptomatic, plan to tight glycemic control.     Hypoglycemia unawareness  Plan to use Dexcom monitor which will help monitor as well as bolus better  She did not like Enlite sensor.      Patient Instructions   Diagnostic Dexcom sensor prior to next visit.         Yunior Roy MD  [id number]  Endocrinology Service

## 2017-12-12 LAB
TTG IGA SER-ACNC: 1 U/ML
TTG IGG SER-ACNC: <1 U/ML

## 2017-12-14 ENCOUNTER — RECORDS - HEALTHEAST (OUTPATIENT)
Dept: ADMINISTRATIVE | Facility: OTHER | Age: 47
End: 2017-12-14

## 2018-02-12 ENCOUNTER — RECORDS - HEALTHEAST (OUTPATIENT)
Dept: ADMINISTRATIVE | Facility: OTHER | Age: 48
End: 2018-02-12

## 2018-04-16 ENCOUNTER — OFFICE VISIT (OUTPATIENT)
Dept: ENDOCRINOLOGY | Facility: CLINIC | Age: 48
End: 2018-04-16
Payer: COMMERCIAL

## 2018-04-16 ENCOUNTER — RECORDS - HEALTHEAST (OUTPATIENT)
Dept: ADMINISTRATIVE | Facility: OTHER | Age: 48
End: 2018-04-16

## 2018-04-16 VITALS
HEART RATE: 70 BPM | HEIGHT: 68 IN | DIASTOLIC BLOOD PRESSURE: 89 MMHG | WEIGHT: 165 LBS | SYSTOLIC BLOOD PRESSURE: 150 MMHG | BODY MASS INDEX: 25.01 KG/M2

## 2018-04-16 DIAGNOSIS — E11.3299 NONPROLIFERATIVE DIABETIC RETINOPATHY (H): Primary | ICD-10-CM

## 2018-04-16 DIAGNOSIS — E10.8 TYPE 1 DIABETES MELLITUS WITH COMPLICATIONS (H): ICD-10-CM

## 2018-04-16 DIAGNOSIS — E11.3299 NONPROLIFERATIVE DIABETIC RETINOPATHY (H): ICD-10-CM

## 2018-04-16 LAB
ALBUMIN SERPL-MCNC: 3.9 G/DL (ref 3.4–5)
ANION GAP SERPL CALCULATED.3IONS-SCNC: 4 MMOL/L (ref 3–14)
BUN SERPL-MCNC: 14 MG/DL (ref 7–30)
CALCIUM SERPL-MCNC: 8.7 MG/DL (ref 8.5–10.1)
CHLORIDE SERPL-SCNC: 106 MMOL/L (ref 94–109)
CO2 SERPL-SCNC: 29 MMOL/L (ref 20–32)
CREAT SERPL-MCNC: 0.93 MG/DL (ref 0.52–1.04)
GFR SERPL CREATININE-BSD FRML MDRD: 65 ML/MIN/1.7M2
GLUCOSE SERPL-MCNC: 113 MG/DL (ref 70–99)
HBA1C MFR BLD: 7 % (ref 4.3–6)
PHOSPHATE SERPL-MCNC: 2.9 MG/DL (ref 2.5–4.5)
POTASSIUM SERPL-SCNC: 4.2 MMOL/L (ref 3.4–5.3)
SODIUM SERPL-SCNC: 139 MMOL/L (ref 133–144)

## 2018-04-16 ASSESSMENT — PAIN SCALES - GENERAL: PAINLEVEL: NO PAIN (0)

## 2018-04-16 NOTE — MR AVS SNAPSHOT
After Visit Summary   4/16/2018    Sera Adkins    MRN: [id number]           Patient Information     Date Of Birth          1970        Visit Information        Provider Department      4/16/2018 12:30 PM Yunior Roy MD  [facility]        Today's Diagnoses     Nonproliferative diabetic retinopathy (H)    -  1      Care Instructions    Continue current regimen     CDE appointment for Dexcom [ hypoglycemia unawareness]          Follow-ups after your visit        Additional Services     DIABETES EDUCATOR REFERRAL       Your provider has referred you to Diabetes Education: P: Diabetes Education - [facility] [phone] [email or web address]    This is a Previous Diagnosis: Follow-up DSMT - 2 hours.  Type of diabetes is Type 1   Medicare covers: 10 hours of initial DSMT in 12 month period from the time of first visit, plus 2 hours of follow-up DSMT annually, and additional hours as requested for insulin training.         Diabetes Co-Morbidities: none and atherosclerotic cardiovascular disease               A1C Goal:  <7.0       A1C is: Lab Results       Component                Value               Date                       A1C                      7.7                 12/13/2016              MEDICAL NUTRITION THERAPY (MNT) for Diabetes    Medical Nutrition Therapy with a Registered Dietitian can be provided in coordination with Diabetes Self-Management Training to assist in achieving optimal diabetes management.    MNT Type and Hours: Do not initiate MNT at this time.                       Medicare will cover: 3 hours initial MNT in 12 month period after first visit, plus 2 hours of follow-up MNT annually                                                          A1C is: Lab Results       Component                Value               Date                        A1C                      7.7                 12/13/2016            If an urgent visit is needed or A1C is above 12, Care Team to call the diabetes education team at [phone] or send a message to the diabetes education pool (P DIAB ED-PATIENT CARE).    Diabetes education focus: Diagnostic Continuous Glucose Monitoring  and Continuous Glucose Monitor: Personal CGM      Education needs: None                                                                                                                                                      Please be aware that coverage of these services is subject to the terms and limitations of your health insurance plan.  Call member services at your health plan to determine Diabetes Self-Management Training benefits and ask which blood glucose monitor brands are covered by your plan.      Please bring the following to your appointment:    -   List of current medications   -   List of blood glucose monitor brands that are covered by your insurance plan  -   Blood glucose monitor and log book  -   Food records for the 3 days prior to your visit                  Your next 10 appointments already scheduled     Apr 30, 2018 12:30 PM CDT   (Arrive by 12:15 PM)   Office Visit with Ally Mcguire RN   [facility] ([facility] [place])    [address] [place] [id number] [phone]           Bring a current list of meds and any records pertaining to this visit. For Physicals, please bring immunization records and any forms needing to be filled out. Please arrive 10 minutes early to complete paperwork.            Sep 10, 2018  1:30 PM CDT   (Arrive by 1:15 PM)   RETURN DIABETES with Yunior Roy MD   [facility] Endocrinology ([facility] [place])    [address] [place] [address] [phone]              Future tests that were ordered for you today     Open Future Orders         "Priority Expected Expires Ordered    Renal panel Routine 2018            Who to contact     Please call your clinic at [phone] to:    Ask questions about your health    Make or cancel appointments    Discuss your medicines    Learn about your test results    Speak to your doctor            Additional Information About Your Visit        MyChart Information     [employer] is an electronic gateway that provides easy, online access to your medical records. With [employer], you can request a clinic appointment, read your test results, renew a prescription or communicate with your care team.     To sign up for [employer] visit the website at [email or web address]   You will be asked to enter the access code listed below, as well as some personal information. Please follow the directions to create your username and password.     Your access code is: [email or web address]  Expires: 7/15/2018  1:07 PM     Your access code will  in 90 days. If you need help or a new code, please contact your [facility] or call [phone] for assistance.        Care EveryWhere ID     This is your Care EveryWhere ID. This could be used by other organizations to access your [facility] medical records  [id number]        Your Vitals Were     Pulse Height BMI (Body Mass Index)             70 1.727 m (5' 8\") 25.09 kg/m2          Blood Pressure from Last 3 Encounters:   18 150/89   17 144/74   17 121/74    Weight from Last 3 Encounters:   18 74.8 kg (165 lb)   17 75.6 kg (166 lb 9.6 oz)   17 76.6 kg (168 lb 14.4 oz)              We Performed the Following     DIABETES EDUCATOR REFERRAL          Where to get your medicines      These medications were sent to [facility]/pharmacy #[id number] - [place] [id number] [place] [address], [facility] [address]     Phone:  [phone]     glucagon 1 MG kit          Primary Care Provider    Jose Elias Tejeda [id number] " Dup Moriarity       XXX DUPLICATE XXX XXX  XXX [place] [address]        Equal Access to Services     MICHAEL CASTILLOGETACHEW : Hadii aad ku chitra Anderson, warojelioda luqadaha, qanarata afiahusam christiandonaldo, mary jo alexander serenityjose gonzalesmatt tijerina lanorajose . So [facility] [phone].    ATENCIÓN: Si habla español, tiene a menezes disposición servicios gratuitos de asistencia lingüística. Llame al [phone].    We comply with applicable federal civil rights laws and [place] laws. We do not discriminate on the basis of race, color, national origin, age, disability, sex, sexual orientation, or gender identity.            Thank you!     Thank you for choosing [facility] ENDOCRINOLOGY  for your care. Our goal is always to provide you with excellent care. Hearing back from our patients is one way we can continue to improve our services. Please take a few minutes to complete the written survey that you may receive in the mail after your visit with us. Thank you!             Your Updated Medication List - Protect others around you: Learn how to safely use, store and throw away your medicines at www.disposemymeds.org.          This list is accurate as of 4/16/18  1:07 PM.  Always use your most recent med list.                   Brand Name Dispense Instructions for use Diagnosis    ASPIRIN PO      Take 81 mg by mouth daily        buPROPion 150 MG 24 hr tablet    WELLBUTRIN XL     Take 150 mg by mouth every morning        CALCIUM 500+D PO      Take 1 tablet by mouth as needed.        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    Diabetes mellitus (H)        ONETOUCH ULTRA test strip   Generic drug:  blood glucose monitoring     1 Box    Test 6 to 8 times daily.

## 2018-04-16 NOTE — PROGRESS NOTES
Reason for visit/consult: DM1        HPI:  Sera is a 46 yo female with DM1 who is presenting today in follow up for treatment and management of her DMT1. She is a patient of former Dr. CHARISSE Krishnamurthy. She was initially diagnosed at the age of 15 and presented with polydipsia, polyuria, and weight loss. Her current treatment regimen includes an insulin pump with insulin bolus to correct for carbs.      Current pump setting: (Pump initiated 2007)  Basal-    Midnight to 7 am  0.800 unit/hr,    7 am to midnight 1.15 unit/hr     During the biking (she usually bike 30-60 miles) basal reduced  15-20%       Bolus: 1 unit: 15 g carb (Breakfast, lunch, dinner)             Correction more than 130                         Midnight-to-6 am   90 g/unit                        6 am-to-9 pm        50 g/unit                        9 pm-to-midnight   90 g/unit     Target      Life style:  [place]akes: during weekdays 645 am, and weekends 8 am   Breakfast: protein cereal daily  Lunch: [place], yogurt, fruit  Snack: varies  Dinner: varies, with going out to dinner with beer 5x during the week.      Exercise: weekdays she does twice weekly intense cycling for 2 hours  Weekends, variable cycling, with intense 2-4 hour trips, went on a 63 mile ride with lower sugar in the am. Plans further trips in summer with son who is now in 8th grade.      DM complication:   Retinopathy: stable, yearly eye exams, see's Dr. Uilses Moreno at [facility] at the [facility] location  Today had screening coronary artery score: pending results  Last UA (12/2016) showed no proteinuria  Last LDL cholesterol (12/2016) 71 mg/dL  Last HGA1C (12/2016) 7.7, with today's value 7.4  Monofilament test today: intact, diminished vibratory sensation.      Glucose Log:   Mean overnight 86, Early , early afternoon 153, early evening 166        Past Medical/Surgical History:  Anxiety     PSH CS    Allergies:       Allergies   Allergen Reactions     Percocet  "[Oxycodone-Acetaminophen] Nausea and Nausea and Vomiting         Current Medications   Current Outpatient Prescriptions   Medication     buPROPion (WELLBUTRIN XL) 150 MG 24 hr tablet     glucagon (GLUCAGON EMERGENCY) 1 MG kit     NOVOLOG VIAL 100 UNIT/ML soln     ASPIRIN PO     losartan (COZAAR) 25 MG tablet     Calcium Carbonate-Vitamin D (CALCIUM 500+D PO)     glucose blood VI test strips (ONE TOUCH ULTRA TEST) strip     No current facility-administered medications for this visit.         Family History:  No family history of DM, or known autoimmune conditions in patients parents or grandparents.   Family history of Psoriasis in patient brother.      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          Social History   Substance Use Topics     Smoking status: Never Smoker     Smokeless tobacco: Never Used     Alcohol use Nightly glass of hoppy beer.          ROS:  Full review of systems taken with the help of the intake sheet. Otherwise a complete 14 point review of systems was taken and is negative unless stated in the history above.     Physical Exam:   /89  Pulse 70  Ht 1.727 m (5' 8\")  Wt 74.8 kg (165 lb)  BMI 25.09 kg/m2      General: well appearing, no acute distress, pleasant and conversational, appears anxious.    Mental Status/neuro: alert and oriented  Face: symmetrical, normal facial color  Eyes: anicteric, PERRL, no proptosis or lid lag  Neck: supple, no lymphadenopahty  Thyroid: normal size and texture, no nodule palpable, no bruits  Heart: regular rhythm, S1S2, no murmur appreciated  Lung: clear to auscultation bilaterally  Abdomen: soft, NT/ND, no hepatomegaly  Legs: no swelling or edema  Feet: diminished vibratory sensation, normal monofilament sensation except over right toe callus.      Labs  06/05/2017 HGA1C     7.4 -->7.0-->7.0    BG meter " data:   Checks only if she has problems.   Overall Mean 170. SD 89.   AM mean 108  Early afternoon 166-180  -250  14% lows.      Assessment and Plan  47 year old female with TAB and DM1 here today for follow up for DM1 with complications of stable non-proliferative diabetic retinopathy.      Diabetes Mellitus type 1 (A1C 7.0) with mild neuropathy and retinopathy  - A1c is stable at 7.0 with 2 symptomatic hypoglycemia in the past month  - - Continue current settings.   -- Has done well with intense exercise trips in past and will be ok to go for summer trip she is planning. Further she will try to obtain a sensor prior to the next visit.      Mild neuropathy  asymptomatic.     Hypoglycemia unawareness:   Senses once below 60. No severe episodes.   Plan to use Dexcom monitor which will help monitor as well as bolus better       Yunior Roy MD  [phone]  Endocrinology Service

## 2018-04-16 NOTE — LETTER
4/16/2018       RE: Sera Adkins  [address] [facility] [address]     Dear Colleague,    Thank you for referring your patient, Sera Adkins, to the [facility] ENDOCRINOLOGY at [facility]. Please see a copy of my visit note below.     Reason for visit/consult: DM1        HPI:  Sera is a 48 yo female with DM1 who is presenting today in follow up for treatment and management of her DMT1. She is a patient of former Dr. CHARISSE Krishnamurthy. She was initially diagnosed at the age of 15 and presented with polydipsia, polyuria, and weight loss. Her current treatment regimen includes an insulin pump with insulin bolus to correct for carbs.      Current pump setting: (Pump initiated 2007)  Basal-    Midnight to 7 am  0.800 unit/hr,    7 am to midnight 1.15 unit/hr     During the biking (she usually bike 30-60 miles) basal reduced  15-20%       Bolus: 1 unit: 15 g carb (Breakfast, lunch, dinner)             Correction more than 130                         Midnight-to-6 am   90 g/unit                        6 am-to-9 pm        50 g/unit                        9 pm-to-midnight   90 g/unit     Target      Life style:  Wakes: during weekdays 645 am, and weekends 8 am   Breakfast: protein cereal daily  Lunch: [place], yogurt, fruit  Snack: varies  Dinner: varies, with going out to dinner with beer 5x during the week.      Exercise: weekdays she does twice weekly intense cycling for 2 hours  Weekends, variable cycling, with intense 2-4 hour trips, went on a 63 mile ride with lower sugar in the am. Plans further trips in summer with son who is now in 8th grade.      DM complication:   Retinopathy: stable, yearly eye exams, see's Dr. Ulises Moreno at [facility] at the [facility] location  Today had screening coronary artery score: pending results  Last UA (12/2016) showed no proteinuria  Last LDL cholesterol (12/2016) 71 mg/dL  Last HGA1C (12/2016) 7.7, with today's  "value 7.4  Monofilament test today: intact, diminished vibratory sensation.      Glucose Log:   Mean overnight 86, Early , early afternoon 153, early evening 166        Past Medical/Surgical History:  Anxiety     PSH CS    Allergies:       Allergies   Allergen Reactions     Percocet [Oxycodone-Acetaminophen] Nausea and Nausea and Vomiting         Current Medications    Current Outpatient Prescriptions   Medication     buPROPion (WELLBUTRIN XL) 150 MG 24 hr tablet     glucagon (GLUCAGON EMERGENCY) 1 MG kit     NOVOLOG VIAL 100 UNIT/ML soln     ASPIRIN PO     losartan (COZAAR) 25 MG tablet     Calcium Carbonate-Vitamin D (CALCIUM 500+D PO)     glucose blood VI test strips (ONE TOUCH ULTRA TEST) strip     No current facility-administered medications for this visit.         Family History:  No family history of DM, or known autoimmune conditions in patients parents or grandparents.   Family history of Psoriasis in patient brother.      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          Social History   Substance Use Topics     Smoking status: Never Smoker     Smokeless tobacco: Never Used     Alcohol use Nightly glass of hoppy beer.          ROS:  Full review of systems taken with the help of the intake sheet. Otherwise a complete 14 point review of systems was taken and is negative unless stated in the history above.     Physical Exam:   /89  Pulse 70  Ht 1.727 m (5' 8\")  Wt 74.8 kg (165 lb)  BMI 25.09 kg/m2      General: well appearing, no acute distress, pleasant and conversational, appears anxious.    Mental Status/neuro: alert and oriented  Face: symmetrical, normal facial color  Eyes: anicteric, PERRL, no proptosis or lid lag  Neck: supple, no lymphadenopahty  Thyroid: normal size and texture, no nodule palpable, no bruits  Heart: regular rhythm, S1S2, " no murmur appreciated  Lung: clear to auscultation bilaterally  Abdomen: soft, NT/ND, no hepatomegaly  Legs: no swelling or edema  Feet: diminished vibratory sensation, normal monofilament sensation except over right toe callus.      Labs  06/05/2017 HGA1C     7.4  -->7.0-->7.0    BG meter data:   Checks only if she has problems.   Overall Mean 170. SD 89.   AM mean 108  Early afternoon 166-180  -250  14% lows.      Assessment and Plan  47 year old female with TAB and DM1 here today for follow up for DM1 with complications of stable non-proliferative diabetic retinopathy.      Diabetes Mellitus type 1 (A1C 7.0) with mild neuropathy and retinopathy  - A1c is stable at 7.0 with 2 symptomatic hypoglycemia in the past month  - - Continue current settings.   -- Has done well with intense exercise trips in past and will be ok to go for summer trip she is planning. Further she will try to obtain a sensor prior to the next visit.      Mild neuropathy  asymptomatic.     Hypoglycemia unawareness:   Senses once below 60. No severe episodes.   Plan to use Dexcom monitor which will help monitor as well as bolus better       Yunior Roy MD  [id number]  Endocrinology Service

## 2018-04-30 ENCOUNTER — OFFICE VISIT (OUTPATIENT)
Dept: EDUCATION SERVICES | Facility: CLINIC | Age: 48
End: 2018-04-30
Payer: COMMERCIAL

## 2018-04-30 DIAGNOSIS — E10.8 TYPE 1 DIABETES MELLITUS WITH COMPLICATIONS (H): Primary | ICD-10-CM

## 2018-04-30 NOTE — PROGRESS NOTES
DIABETES EDUCATION NOTE:    Sera Adkins presents today for education related to Type 1 diabetes.    Patient is being treated with:  diet, exercise, insulin pump and SMBG  She is accompanied by self    PATIENT CONCERNS RELATED TO DIABETES SELF MANAGEMENT:   She would like to get a sensor but does not like the Medtronic sensors so would like a DexCom    Current Diabetes Management per Patient:  Taking diabetes medications?   yes:     Diabetes Medication(s)     Diabetic Other Sig    glucagon (GLUCAGON EMERGENCY) 1 MG kit Inject 1 mg into the vein as needed    Insulin Sig    NOVOLOG VIAL 100 UNIT/ML soln USES UP TO 60 UNITS PER DAY AS DIRECTED IN INSULIN PUMP FOR BASAL, BOLUS AND PRIMING OF TUBING.          Monitoring  Patient glucose self monitoring as follows: four times daily.   BG meter: Contour Next USB Link (with Medtronic Pump) meter  BG results: not available     BG values are: unable to assess  Patient's most recent   Lab Results   Component Value Date    A1C 7.7 12/13/2016    is not meeting goal of <7.0    Exercise: strenuous regular exercise program which includes biking 2 hours twice a week and longer rides of weekend    Nutrition:   Patient currently eats 3 meals 3 snacks per day, feels confident with carb counting      Hypoglycemia:   Patient is at risk of hypoglycemia? Yes, carries treatment with her.  Has lows a couple of times per week           Stress Management:   exercise                     EDUCATION and INSTRUCTION PROVIDED AT THIS VISIT:    We covered the following regarding personal cgm:    The difference between sensor readings and blood glucose readings    What calibrations are and how often they need to be done/ stable blood sugars for calibration    How to use the  including: how to enter calibrations into the .  The range of glucoses accepted by the   mg/dl.    How to insert the sensor and transmitter including skin prep, rotation of sites, getting it to  stick    The average length of life of the transmitter    What you can/can't do with sensor on including air travel, MRI/CT scan instructions.    Ordering/training process/requirements for Medicare patients    Patient-stated goal written and given to Sera Adkins.  Verbalized and demonstrated understanding of instructions.     PLAN:  AOB submitted to DexCom today  AVS printed and given to patient    FOLLOW-UP:        Time spent with patient at today's visit was 60 minutes.      Any diabetes medication dose changes were made via the CDE Protocol and Collaborative Practice Agreement with [facility].  A copy of this encounter was provided to patient's referring provider.

## 2018-04-30 NOTE — PATIENT INSTRUCTIONS
1.  Look for contact from [employer].  They will let you know the out of pocket cost for the ,transmitter and ongoing coast of sensors.    2.  Let us know if you decide to go ahead with it so we can arrange a DexCom start    Ally Mcguire RN,CDE  [address] [address]  Phone: [phone] or [phone]  [email or web address]

## 2018-04-30 NOTE — MR AVS SNAPSHOT
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  Look for contact from [employer].  They will let you know the out of pocket cost for the ,transmitter and ongoing coast of sensors.    2.  Let us know if you decide to go ahead with it so we can arrange a DexCom start    Ally Mcguire RN,CDE  [address] [address]  Phone: [phone] or [phone]  [email or web address]                Follow-ups after your visit        Your next 10 appointments already scheduled     Sep 10, 2018  1:30 PM CDT   (Arrive by 1:15 PM)   RETURN DIABETES with Yunior Roy MD   [facility] ([facility])    [address] [place] [id number] [phone]              Who to contact     Please call your clinic at [phone] to:    Ask questions about your health    Make or cancel appointments    Discuss your medicines    Learn about your test results    Speak to your doctor            Additional Information About Your Visit        MyChart Information     [email or web address] is an electronic gateway that provides easy, online access to your medical records. With [employer], you can request a clinic appointment, read your test results, renew a prescription or communicate with your care team.     To sign up for [email or web address] visit the website at [email or web address]   You will be asked to enter the access code listed below, as well as some personal information. Please follow the directions to create your username and password.     Your access code is: [email or web address]  Expires: 7/15/2018  1:07 PM     Your access  code will  in 90 days. If you need help or a new code, please contact your [facility] or call [phone] for assistance.        Care EveryWhere ID     This is your Care EveryWhere ID. This could be used by other organizations to access your [facility] medical records  [id number]         Blood Pressure from Last 3 Encounters:   18 150/89   17 144/74   17 121/74    Weight from Last 3 Encounters:   18 74.8 kg (165 lb)   17 75.6 kg (166 lb 9.6 oz)   17 76.6 kg (168 lb 14.4 oz)              We Performed the Following     DIABETES EDUCATION - Individual  []        Primary Care Provider    Jose Elias Tejeda [id number]       XXX DUPLICATE XXX XXX  XXX [place] [address]        Equal Access to Services     ANGELA SIMPSON : Hadii aad ku hadasho Soomaali, waaxda luqadaha, qaybta kaalmada adeegyada, mary jo alexander haycornel llanos . So [facility] [phone].    ATENCIÓN: Si habla español, tiene a menezes disposición servicios gratuitos de asistencia lingüística. Alexandre al [phone].    We comply with applicable federal civil rights laws and [place] laws. We do not discriminate on the basis of race, color, national origin, age, disability, sex, sexual orientation, or gender identity.            Thank you!     Thank you for choosing [facility] DI[facility]ET[facility]  for your care. Our goal is always to provide you with excellent care. Hearing back from our patients is one way we can continue to improve our services. Please take a few minutes to complete the written survey that you may receive in the mail after your visit with us. Thank you!             Your Updated Medication List - Protect others around you: Learn how to safely use, store and throw away your medicines at www.disposemymeds.org.          This list is accurate as of 18  5:37 PM.  Always use your most recent med list.                   Brand Name Dispense Instructions for use Diagnosis    ASPIRIN  PO      Take 81 mg by mouth daily        buPROPion 150 MG 24 hr tablet    WELLBUTRIN XL     Take 150 mg by mouth every morning        CALCIUM 500+D PO      Take 1 tablet by mouth as needed.        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    Diabetes mellitus (H)       ONETOUCH ULTRA test strip   Generic drug:  blood glucose monitoring     1 Box    Test 6 to 8 times daily.

## 2018-05-08 ENCOUNTER — COMMUNICATION - HEALTHEAST (OUTPATIENT)
Dept: FAMILY MEDICINE | Facility: CLINIC | Age: 48
End: 2018-05-08

## 2018-05-08 ENCOUNTER — MEDICAL CORRESPONDENCE (OUTPATIENT)
Dept: HEALTH INFORMATION MANAGEMENT | Facility: CLINIC | Age: 48
End: 2018-05-08

## 2018-05-26 ENCOUNTER — COMMUNICATION - HEALTHEAST (OUTPATIENT)
Dept: FAMILY MEDICINE | Facility: CLINIC | Age: 48
End: 2018-05-26

## 2018-05-26 DIAGNOSIS — F32.A DEPRESSION: ICD-10-CM

## 2018-06-11 ENCOUNTER — OFFICE VISIT - HEALTHEAST (OUTPATIENT)
Dept: FAMILY MEDICINE | Facility: CLINIC | Age: 48
End: 2018-06-11

## 2018-06-11 DIAGNOSIS — E10.9 TYPE 1 DIABETES MELLITUS (H): ICD-10-CM

## 2018-06-11 DIAGNOSIS — Z00.00 ROUTINE GENERAL MEDICAL EXAMINATION AT A HEALTH CARE FACILITY: ICD-10-CM

## 2018-06-11 DIAGNOSIS — E11.319 DIABETIC RETINOPATHY (H): ICD-10-CM

## 2018-06-11 DIAGNOSIS — F41.1 ANXIETY STATE: ICD-10-CM

## 2018-06-11 DIAGNOSIS — F39 MOOD DISORDER (H): ICD-10-CM

## 2018-06-11 ASSESSMENT — MIFFLIN-ST. JEOR: SCORE: 1412.86

## 2018-06-13 ENCOUNTER — MEDICAL CORRESPONDENCE (OUTPATIENT)
Dept: HEALTH INFORMATION MANAGEMENT | Facility: CLINIC | Age: 48
End: 2018-06-13

## 2018-06-18 ENCOUNTER — RECORDS - HEALTHEAST (OUTPATIENT)
Dept: ADMINISTRATIVE | Facility: OTHER | Age: 48
End: 2018-06-18

## 2018-06-20 ENCOUNTER — MEDICAL CORRESPONDENCE (OUTPATIENT)
Dept: HEALTH INFORMATION MANAGEMENT | Facility: CLINIC | Age: 48
End: 2018-06-20

## 2018-06-25 ENCOUNTER — COMMUNICATION - HEALTHEAST (OUTPATIENT)
Dept: FAMILY MEDICINE | Facility: CLINIC | Age: 48
End: 2018-06-25

## 2018-06-25 DIAGNOSIS — F32.A DEPRESSION: ICD-10-CM

## 2018-07-13 ENCOUNTER — RECORDS - HEALTHEAST (OUTPATIENT)
Dept: LAB | Facility: CLINIC | Age: 48
End: 2018-07-13

## 2018-07-14 LAB — BACTERIA SPEC CULT: NO GROWTH

## 2018-08-19 ENCOUNTER — RECORDS - HEALTHEAST (OUTPATIENT)
Dept: ADMINISTRATIVE | Facility: OTHER | Age: 48
End: 2018-08-19

## 2018-08-20 ENCOUNTER — RECORDS - HEALTHEAST (OUTPATIENT)
Dept: ADMINISTRATIVE | Facility: OTHER | Age: 48
End: 2018-08-20

## 2018-08-22 ENCOUNTER — RECORDS - HEALTHEAST (OUTPATIENT)
Dept: LAB | Facility: CLINIC | Age: 48
End: 2018-08-22

## 2018-08-23 LAB
ANION GAP SERPL CALCULATED.3IONS-SCNC: 10 MMOL/L (ref 5–18)
BUN SERPL-MCNC: 13 MG/DL (ref 8–22)
CALCIUM SERPL-MCNC: 9.7 MG/DL (ref 8.5–10.5)
CHLORIDE BLD-SCNC: 103 MMOL/L (ref 98–107)
CO2 SERPL-SCNC: 27 MMOL/L (ref 22–31)
CREAT SERPL-MCNC: 0.94 MG/DL (ref 0.6–1.1)
GFR SERPL CREATININE-BSD FRML MDRD: >60 ML/MIN/1.73M2
GLUCOSE BLD-MCNC: 195 MG/DL (ref 70–125)
POTASSIUM BLD-SCNC: 4.8 MMOL/L (ref 3.5–5)
SODIUM SERPL-SCNC: 140 MMOL/L (ref 136–145)

## 2018-09-10 ENCOUNTER — OFFICE VISIT - HEALTHEAST (OUTPATIENT)
Dept: FAMILY MEDICINE | Facility: CLINIC | Age: 48
End: 2018-09-10

## 2018-09-10 ENCOUNTER — OFFICE VISIT (OUTPATIENT)
Dept: ENDOCRINOLOGY | Facility: CLINIC | Age: 48
End: 2018-09-10
Payer: COMMERCIAL

## 2018-09-10 ENCOUNTER — RECORDS - HEALTHEAST (OUTPATIENT)
Dept: ADMINISTRATIVE | Facility: OTHER | Age: 48
End: 2018-09-10

## 2018-09-10 VITALS
DIASTOLIC BLOOD PRESSURE: 84 MMHG | SYSTOLIC BLOOD PRESSURE: 152 MMHG | HEART RATE: 71 BPM | HEIGHT: 68 IN | WEIGHT: 160.3 LBS | BODY MASS INDEX: 24.29 KG/M2

## 2018-09-10 DIAGNOSIS — E87.5 HYPERKALEMIA: ICD-10-CM

## 2018-09-10 DIAGNOSIS — R94.4 DECREASED GFR: ICD-10-CM

## 2018-09-10 DIAGNOSIS — E10.9 TYPE 1 DIABETES MELLITUS (H): ICD-10-CM

## 2018-09-10 DIAGNOSIS — E10.8 TYPE 1 DIABETES MELLITUS WITH COMPLICATIONS (H): Primary | ICD-10-CM

## 2018-09-10 DIAGNOSIS — E10.8 TYPE 1 DIABETES MELLITUS WITH COMPLICATIONS (H): ICD-10-CM

## 2018-09-10 DIAGNOSIS — R30.0 DYSURIA: ICD-10-CM

## 2018-09-10 LAB
ALBUMIN UR-MCNC: NEGATIVE MG/DL
ANION GAP SERPL CALCULATED.3IONS-SCNC: 6 MMOL/L (ref 3–14)
APPEARANCE UR: CLEAR
BACTERIA #/AREA URNS HPF: ABNORMAL HPF
BILIRUB UR QL STRIP: NEGATIVE
BUN SERPL-MCNC: 14 MG/DL (ref 7–30)
CALCIUM SERPL-MCNC: 9 MG/DL (ref 8.5–10.1)
CHLORIDE SERPL-SCNC: 104 MMOL/L (ref 94–109)
CO2 SERPL-SCNC: 30 MMOL/L (ref 20–32)
COLOR UR AUTO: YELLOW
CREAT SERPL-MCNC: 1.05 MG/DL (ref 0.52–1.04)
CREAT UR-MCNC: 105 MG/DL
GFR SERPL CREATININE-BSD FRML MDRD: 56 ML/MIN/1.7M2
GLUCOSE SERPL-MCNC: 179 MG/DL (ref 70–99)
GLUCOSE UR STRIP-MCNC: NEGATIVE MG/DL
HGB UR QL STRIP: NEGATIVE
KETONES UR STRIP-MCNC: NEGATIVE MG/DL
LEUKOCYTE ESTERASE UR QL STRIP: NEGATIVE
MICROALBUMIN UR-MCNC: <5 MG/L
MICROALBUMIN/CREAT UR: NORMAL MG/G CR (ref 0–25)
NITRATE UR QL: POSITIVE
PH UR STRIP: 7.5 [PH] (ref 5–8)
POTASSIUM SERPL-SCNC: 4.3 MMOL/L (ref 3.4–5.3)
RBC #/AREA URNS AUTO: ABNORMAL HPF
SODIUM SERPL-SCNC: 139 MMOL/L (ref 133–144)
SP GR UR STRIP: 1.01 (ref 1–1.03)
SQUAMOUS #/AREA URNS AUTO: ABNORMAL LPF
UROBILINOGEN UR STRIP-ACNC: ABNORMAL
WBC #/AREA URNS AUTO: ABNORMAL HPF

## 2018-09-10 ASSESSMENT — PAIN SCALES - GENERAL: PAINLEVEL: NO PAIN (0)

## 2018-09-10 NOTE — PROGRESS NOTES
Reason for visit/consult: DM1     Interval history   Got Dexcom.   Recent hospital admission with high potassium -- high intensity exercise, increased losartan to 50 mg and dehydration. Improved with hydration.   Dexcom report:     CGM Physician interpretation:   Av, SD 70  0% low, 64% above goal.   Sensor worn 29 of 30 days.     Pattern of overnight high, and post meal highs.   AM hyperglycemia and overnight hyperglycemia noted. She does corrections at night which leads to borderline lows in the morning.       Assessment and Plan  47 year old female with TAB and DM1 here today for follow up for DM1 with complications of stable non-proliferative diabetic retinopathy.      Diabetes Mellitus type 1 (A1C 7.5) with mild neuropathy and retinopathy  Fear of lows at work, low   - A1c is higher at 7.5 without severe hypoglycemia. Only 1 low in the last 30 days.   - - Continue current settings. However, asked to identify foods leading to hyperglycemia and use 0.5 unit increments for given carbs (she uses simple carbs which may lead to post meal lows if simple ratio increase is done)   -- Has done well with intense exercise trips in past and will be ok to go for summer trip she is planning. Further she will try to obtain a sensor prior to the next visit.      Mild neuropathy  asymptomatic.     Hypoglycemia unawareness:   Senses once below 60. No severe episodes.   Dexcom monitor -- no significant lows since use.      Yunior Roy MD  [id number]  Endocrinology Service    =======================================     HPI:  Sera is a 48 yo female with DM1 who is presenting today in follow up for treatment and management of her DMT1. She is a patient of former Dr. CHARISSE Krishnamurthy. She was initially diagnosed at the age of 15 and presented with polydipsia, polyuria, and weight loss. Her current treatment regimen includes an insulin pump with insulin bolus to correct for carbs.      Current pump setting: (Pump initiated )    Medtronic 630 G  Basal-    Midnight to 7 am  0.800 unit/hr,    7 am to midnight 1.15 unit/hr     During the biking (she usually bike 30-60 miles) basal reduced  15-20%       Bolus: 1 unit: 15 g carb (Breakfast, lunch, dinner)             Correction more than 130                         Midnight-to-6 am   90 g/unit                        6 am-to-9 pm        50 g/unit                        9 pm-to-midnight   90 g/unit     Target      Life style:  Wakes: during weekdays 645 am, and weekends 8 am   Breakfast: protein cereal daily. Coffee with sugar and cream leads to high.   Lunch: [place], yogurt, fruit  Snack: varies  Dinner: varies, with going out to dinner with beer 5x during the week.      Exercise: weekdays she does twice weekly intense cycling for 2 hours  Weekends, variable cycling, with intense 2-4 hour trips, went on a 63 mile ride with lower sugar in the am. Plans further trips in summer with son who is now in 9th grade.      DM complication:   Retinopathy: stable, yearly eye exams, see's Dr. Ulises Moreno at [facility] at the [facility] [place] location  Today had screening coronary artery score: pending results  Last UA (12/2016) showed no proteinuria  Last LDL cholesterol (12/2016) 71 mg/dL  Last HGA1C (12/2016) 7.7, with today's value 7.4  Monofilament test today: intact, diminished vibratory sensation.      Glucose Log:   Mean overnight 86, Early , early afternoon 153, early evening 166        Past Medical/Surgical History:  Anxiety     PSH CS    Allergies:       Allergies   Allergen Reactions     Percocet [Oxycodone-Acetaminophen] Nausea and Nausea and Vomiting         Current Medications   Current Outpatient Prescriptions   Medication     ASPIRIN PO     buPROPion (WELLBUTRIN XL) 150 MG 24 hr tablet     Calcium Carbonate-Vitamin D (CALCIUM 500+D PO)     glucagon (GLUCAGON EMERGENCY) 1 MG kit     glucose blood VI test strips (ONE TOUCH ULTRA TEST) strip     NOVOLOG VIAL 100 UNIT/ML soln      "losartan (COZAAR) 25 MG tablet     No current facility-administered medications for this visit.         Family History:  No family history of DM, or known autoimmune conditions in patients parents or grandparents.   Family history of Psoriasis in patient brother.      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          Social History   Substance Use Topics     Smoking status: Never Smoker     Smokeless tobacco: Never Used     Alcohol use Nightly glass of hoppy beer.          ROS:  Full review of systems taken with the help of the intake sheet. Otherwise a complete 14 point review of systems was taken and is negative unless stated in the history above.     Physical Exam:   /84  Pulse 71  Ht 1.727 m (5' 8\")  Wt 72.7 kg (160 lb 4.8 oz)  BMI 24.37 kg/m2      General: well appearing, no acute distress, pleasant and conversational, appears anxious.    Mental Status/neuro: alert and oriented  Face: symmetrical, normal facial color  Eyes: anicteric, PERRL, no proptosis or lid lag  Neck: supple, no lymphadenopahty  Thyroid: normal size and texture, no nodule palpable, no bruits  Heart: regular rhythm, S1S2, no murmur appreciated  Lung: clear to auscultation bilaterally  Abdomen: soft, NT/ND, no hepatomegaly  Legs: no swelling or edema  Feet: diminished vibratory sensation, normal monofilament sensation except over right toe callus.      Labs  06/05/2017 HGA1C     7.4 -->7.0-->7.0 --> 7.5 (no lows with higher A1c)       "

## 2018-09-10 NOTE — MR AVS SNAPSHOT
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     Reassess food with corresponding CGM reading.     6 months follow up with labs.          Follow-ups after your visit        Follow-up notes from your care team     Return in about 6 months (around 3/10/2019).      Your next 10 appointments already scheduled     Mar 18, 2019 12:30 PM CDT   LAB with  LAB   [facility] ([facility])    [address] [place] [id number] [phone]           Please do not eat 10-12 hours before your appointment if you are coming in fasting for labs on lipids, cholesterol, or glucose (sugar). This does not apply to pregnant women. Water, hot tea and black coffee (with nothing added) are okay. Do not drink other fluids, diet soda or chew gum.            Mar 18, 2019  1:00 PM CDT   (Arrive by 12:45 PM)   RETURN DIABETES with Yunior Roy MD   [facility] ([facility])    [address] [place] [id number] [phone]              Future tests that were ordered for you today     Open Future Orders        Priority Expected Expires Ordered    TSH Routine 3/10/2019 9/10/2019 9/10/2018    T4 free Routine 3/10/2019 9/10/2019 9/10/2018    Albumin Random Urine Quantitative with Creat Ratio Routine 3/10/2019 9/10/2019 9/10/2018    CK total Routine 3/10/2019 9/10/2019 9/10/2018    ALT Routine 3/10/2019 9/10/2019 9/10/2018    Hematocrit Routine 3/10/2019 9/10/2019 9/10/2018    Hemoglobin A1c Routine 3/10/2019 9/10/2019 9/10/2018    Renal  "panel Routine 3/10/2019 9/10/2019 9/10/2018    Lipid panel reflex to direct LDL Fasting Routine 3/10/2019 9/10/2019 9/10/2018    Albumin Random Urine Quantitative with Creat Ratio Routine 3/9/2019 9/10/2019 9/10/2018            Who to contact     Please call your clinic at [phone] to:    Ask questions about your health    Make or cancel appointments    Discuss your medicines    Learn about your test results    Speak to your doctor            Additional Information About Your Visit        [employer] Information     [employer] gives you secure access to your electronic health record. If you see a primary care provider, you can also send messages to your care team and make appointments. If you have questions, please call your primary care clinic.  If you do not have a primary care provider, please call [phone] and they will assist you.      [employer] is an electronic gateway that provides easy, online access to your medical records. With [employer], you can request a clinic appointment, read your test results, renew a prescription or communicate with your care team.     To access your existing account, please contact your [facility] or call [phone] for assistance.        Care EveryWhere ID     This is your Care EveryWhere ID. This could be used by other organizations to access your [facility] medical records  [id number]        Your Vitals Were     Pulse Height BMI (Body Mass Index)             71 1.727 m (5' 8\") 24.37 kg/m2          Blood Pressure from Last 3 Encounters:   09/10/18 152/84   04/16/18 150/89   12/11/17 144/74    Weight from Last 3 Encounters:   09/10/18 72.7 kg (160 lb 4.8 oz)   04/16/18 74.8 kg (165 lb)   12/11/17 75.6 kg (166 lb 9.6 oz)              We Performed the Following     Continuous Glucose Monitoring >=72 hours PHYS [facility]        Primary Care Provider    Jose Elias Ngo Use [id number]       XXX DUPLICATE XXX XXX  XXX [place] [address]        Equal Access to " Services     [facility]: Hadii hailee youngblood goldmadison Tashaali, warojelioda luqadaha, qaybta kageorgemandy guerin, mary jo llanos . So [facility] [phone].    ATENCIÓN: Si habla heather, tiene a menezes disposición servicios gratuitos de asistencia lingüística. Llame al [phone].    We comply with applicable federal civil rights laws and [place] laws. We do not discriminate on the basis of race, color, national origin, age, disability, sex, sexual orientation, or gender identity.            Thank you!     Thank you for choosing [facility] ENDOCRINOLOG[facility]  for your care. Our goal is always to provide you with excellent care. Hearing back from our patients is one way we can continue to improve our services. Please take a few minutes to complete the written survey that you may receive in the mail after your visit with us. Thank you!             Your Updated Medication List - Protect others around you: Learn how to safely use, store and throw away your medicines at www.disposemymeds.org.          This list is accurate as of 9/10/18  2:38 PM.  Always use your most recent med list.                   Brand Name Dispense Instructions for use Diagnosis    ASPIRIN PO      Take 81 mg by mouth daily        buPROPion 150 MG 24 hr tablet    WELLBUTRIN XL     Take 300 mg by mouth every morning        CALCIUM 500+D PO      Take 1 tablet by mouth as needed.        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    Diabetes mellitus (H)       ONETOUCH ULTRA test strip   Generic drug:  blood glucose monitoring     1 Box     Test 6 to 8 times daily.

## 2018-09-10 NOTE — LETTER
9/10/2018       RE: Sera Adkins  [address] [address]     Dear Colleague,    Thank you for referring your patient, Sera Adkins, to the [facility] ENDOCRINOLOGY at [facility] Please see a copy of my visit note below.    Reason for visit/consult: DM1     Interval history   Got Dexcom.   Recent hospital admission with high potassium -- high intensity exercise, increased losartan to 50 mg and dehydration. Improved with hydration.   Dexcom report:     CGM Physician interpretation:   Av, SD 70  0% low, 64% above goal.   Sensor worn 29 of 30 days.     Pattern of overnight high, and post meal highs.   AM hyperglycemia and overnight hyperglycemia noted. She does corrections at night which leads to borderline lows in the morning.       Assessment and Plan  47 year old female with TAB and DM1 here today for follow up for DM1 with complications of stable non-proliferative diabetic retinopathy.      Diabetes Mellitus type 1 (A1C 7.5) with mild neuropathy and retinopathy  Fear of lows at work, low   - A1c is higher at 7.5 without severe hypoglycemia. Only 1 low in the last 30 days.   - - Continue current settings. However, asked to identify foods leading to hyperglycemia and use 0.5 unit increments for given carbs (she uses simple carbs which may lead to post meal lows if simple ratio increase is done)   -- Has done well with intense exercise trips in past and will be ok to go for summer trip she is planning. Further she will try to obtain a sensor prior to the next visit.      Mild neuropathy  asymptomatic.     Hypoglycemia unawareness:   Senses once below 60. No severe episodes.   Dexcom monitor -- no significant lows since use.      Yunior Roy MD  [id number]  Endocrinology Service    =======================================     HPI:  Sera is a 48 yo female with DM1 who is presenting today in follow up for treatment and management of her  DMT1. She is a patient of former Dr. CHARISSE Krishnamurthy. She was initially diagnosed at the age of 15 and presented with polydipsia, polyuria, and weight loss. Her current treatment regimen includes an insulin pump with insulin bolus to correct for carbs.      Current pump setting: (Pump initiated 2007)   Medtronic 630 G  Basal-    Midnight to 7 am  0.800 unit/hr,    7 am to midnight 1.15 unit/hr     During the biking (she usually bike 30-60 miles) basal reduced  15-20%       Bolus: 1 unit: 15 g carb (Breakfast, lunch, dinner)             Correction more than 130                         Midnight-to-6 am   90 g/unit                        6 am-to-9 pm        50 g/unit                        9 pm-to-midnight   90 g/unit     [employer]      Life style:  Wakes: during weekdays 645 am, and weekends 8 am   Breakfast: protein cereal daily. Coffee with sugar and cream leads to high.   Lunch: [place], yogurt, fruit  Snack: varies  Dinner: varies, with going out to dinner with beer 5x during the week.      Exercise: weekdays she does twice weekly intense cycling for 2 hours  Weekends, variable cycling, with intense 2-4 hour trips, went on a 63 mile ride with lower sugar in the am. Plans further trips in summer with son who is now in 9th grade.      DM complication:   Retinopathy: stable, yearly eye exams, see's Dr. Ulises Moreno at [facility] at the [facility] location  Today had screening coronary artery score: pending results  Last UA (12/2016) showed no proteinuria  Last LDL cholesterol (12/2016) 71 mg/dL  Last HGA1C (12/2016) 7.7, with today's value 7.4  Monofilament test today: intact, diminished vibratory sensation.      Glucose Log:   Mean overnight 86, Early , early afternoon 153, early evening 166        Past Medical/Surgical History:  Anxiety     PSH CS    Allergies:       Allergies   Allergen Reactions     Percocet [Oxycodone-Acetaminophen] Nausea and Nausea and Vomiting         Current Medications    Current  "Outpatient Prescriptions   Medication     ASPIRIN PO     buPROPion (WELLBUTRIN XL) 150 MG 24 hr tablet     Calcium Carbonate-Vitamin D (CALCIUM 500+D PO)     glucagon (GLUCAGON EMERGENCY) 1 MG kit     glucose blood VI test strips (ONE TOUCH ULTRA TEST) strip     NOVOLOG VIAL 100 UNIT/ML soln     losartan (COZAAR) 25 MG tablet     No current facility-administered medications for this visit.         Family History:  No family history of DM, or known autoimmune conditions in patients parents or grandparents.   Family history of Psoriasis in patient brother.      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          Social History   Substance Use Topics     Smoking status: Never Smoker     Smokeless tobacco: Never Used     Alcohol use Nightly glass of hoppy beer.          ROS:  Full review of systems taken with the help of the intake sheet. Otherwise a complete 14 point review of systems was taken and is negative unless stated in the history above.     Physical Exam:   /84  Pulse 71  Ht 1.727 m (5' 8\")  Wt 72.7 kg (160 lb 4.8 oz)  BMI 24.37 kg/m2      General: well appearing, no acute distress, pleasant and conversational, appears anxious.    Mental Status/neuro: alert and oriented  Face: symmetrical, normal facial color  Eyes: anicteric, PERRL, no proptosis or lid lag  Neck: supple, no lymphadenopahty  Thyroid: normal size and texture, no nodule palpable, no bruits  Heart: regular rhythm, S1S2, no murmur appreciated  Lung: clear to auscultation bilaterally  Abdomen: soft, NT/ND, no hepatomegaly  Legs: no swelling or edema  Feet: diminished vibratory sensation, normal monofilament sensation except over right toe callus.      Labs  06/05/2017 HGA1C     7.4 -->7.0-->7.0 --> 7.5 (no lows with higher A1c)         Yunior Roy MD      "

## 2018-09-11 LAB — BACTERIA SPEC CULT: NORMAL

## 2018-09-14 ENCOUNTER — COMMUNICATION - HEALTHEAST (OUTPATIENT)
Dept: FAMILY MEDICINE | Facility: CLINIC | Age: 48
End: 2018-09-14

## 2018-09-14 DIAGNOSIS — R30.0 DYSURIA: ICD-10-CM

## 2018-09-16 ENCOUNTER — HEALTH MAINTENANCE LETTER (OUTPATIENT)
Age: 48
End: 2018-09-16

## 2018-09-17 ENCOUNTER — AMBULATORY - HEALTHEAST (OUTPATIENT)
Dept: LAB | Facility: CLINIC | Age: 48
End: 2018-09-17

## 2018-09-17 DIAGNOSIS — R30.0 DYSURIA: ICD-10-CM

## 2018-09-17 NOTE — PROGRESS NOTES
Deion Zamora,     Minimal elevation of creatinine was noted. I would like to repeat it and see if there is any change. (its ordered in the system, do it any time based on your convenience)     Best regards,   Yunior Roy MD  [id number]  Endocrinology Service

## 2018-09-18 ENCOUNTER — COMMUNICATION - HEALTHEAST (OUTPATIENT)
Dept: FAMILY MEDICINE | Facility: CLINIC | Age: 48
End: 2018-09-18

## 2018-09-18 LAB
C TRACH DNA SPEC QL PROBE+SIG AMP: NEGATIVE
N GONORRHOEA DNA SPEC QL NAA+PROBE: NEGATIVE

## 2018-09-24 ENCOUNTER — COMMUNICATION - HEALTHEAST (OUTPATIENT)
Dept: FAMILY MEDICINE | Facility: CLINIC | Age: 48
End: 2018-09-24

## 2018-09-24 ENCOUNTER — MYC MEDICAL ADVICE (OUTPATIENT)
Dept: ENDOCRINOLOGY | Facility: CLINIC | Age: 48
End: 2018-09-24

## 2018-09-24 DIAGNOSIS — E10.8 TYPE 1 DIABETES MELLITUS WITH COMPLICATIONS (H): Primary | ICD-10-CM

## 2018-10-01 RX ORDER — CHLORTHALIDONE 25 MG/1
25 TABLET ORAL DAILY
Qty: 30 TABLET | Refills: 0 | Status: SHIPPED | OUTPATIENT
Start: 2018-10-01 | End: 2021-08-04

## 2018-10-17 ENCOUNTER — RECORDS - HEALTHEAST (OUTPATIENT)
Dept: LAB | Facility: CLINIC | Age: 48
End: 2018-10-17

## 2018-10-19 ENCOUNTER — RECORDS - HEALTHEAST (OUTPATIENT)
Dept: ADMINISTRATIVE | Facility: OTHER | Age: 48
End: 2018-10-19

## 2018-10-19 LAB — BACTERIA SPEC CULT: NORMAL

## 2018-10-29 ENCOUNTER — AMBULATORY - HEALTHEAST (OUTPATIENT)
Dept: LAB | Facility: CLINIC | Age: 48
End: 2018-10-29

## 2018-10-29 ENCOUNTER — COMMUNICATION - HEALTHEAST (OUTPATIENT)
Dept: FAMILY MEDICINE | Facility: CLINIC | Age: 48
End: 2018-10-29

## 2018-10-29 DIAGNOSIS — E10.9 DIABETES MELLITUS TYPE 1 (H): ICD-10-CM

## 2018-10-29 LAB
ANION GAP SERPL CALCULATED.3IONS-SCNC: 9 MMOL/L (ref 5–18)
BUN SERPL-MCNC: 20 MG/DL (ref 8–22)
CALCIUM SERPL-MCNC: 9.5 MG/DL (ref 8.5–10.5)
CHLORIDE BLD-SCNC: 99 MMOL/L (ref 98–107)
CO2 SERPL-SCNC: 28 MMOL/L (ref 22–31)
CREAT SERPL-MCNC: 1.05 MG/DL (ref 0.6–1.1)
GFR SERPL CREATININE-BSD FRML MDRD: 56 ML/MIN/1.73M2
GLUCOSE BLD-MCNC: 146 MG/DL (ref 70–125)
POTASSIUM BLD-SCNC: 3.9 MMOL/L (ref 3.5–5)
SODIUM SERPL-SCNC: 136 MMOL/L (ref 136–145)

## 2018-10-30 DIAGNOSIS — E10.8 TYPE 1 DIABETES MELLITUS WITH COMPLICATIONS (H): ICD-10-CM

## 2018-10-30 NOTE — TELEPHONE ENCOUNTER
(HYGROTON) 25 MG tablet      Last Written Prescription Date:  10-1-18  Last Fill Quantity: 30,   # refills: 0  Last Office Visit : 9-10-18  Future Office visit:  3-18-19    Routing refill request to provider for review/approval because:  Drug failed the refill protocol.  Abnormal Lab: Cr  Lab Test  09/10/18   1443   CR  1.05*     BP Readings from Last 3 Encounters:   09/10/18 152/84   04/16/18 150/89   12/11/17 144/74         Minimal elevation of creatinine was noted. I would like to repeat it and see if there is any change. (its ordered in the system, do it any time based on your convenience)   Best regards,   Yunior Roy MD     Has not completed repeat lab.

## 2018-10-31 RX ORDER — CHLORTHALIDONE 25 MG/1
25 TABLET ORAL DAILY
Qty: 30 TABLET | Refills: 0 | OUTPATIENT
Start: 2018-10-31

## 2018-10-31 NOTE — TELEPHONE ENCOUNTER
Given mild renal issues and use of diuretics, her meds for HTN should be reviewed by PCP and ordered as needed. Fax notes to PCP Dr Jose Elias Yin at .

## 2018-12-24 DIAGNOSIS — E11.9 DIABETES MELLITUS (H): ICD-10-CM

## 2018-12-24 NOTE — TELEPHONE ENCOUNTER
NOVOLOG VIAL 100 UNIT  Last Written Prescription Date:  11/29/17  Last Fill Quantity: 60ML  ,   # refills: 3  Last Office Visit : 9/10/18  Future Office visit:  3/18/19    Routing refill request to provider for review/approval because:  Drug not on the refill protocol

## 2019-02-06 ENCOUNTER — RECORDS - HEALTHEAST (OUTPATIENT)
Dept: LAB | Facility: CLINIC | Age: 49
End: 2019-02-06

## 2019-02-10 LAB — BACTERIA SPEC CULT: ABNORMAL

## 2019-02-28 ENCOUNTER — DOCUMENTATION ONLY (OUTPATIENT)
Dept: CARE COORDINATION | Facility: CLINIC | Age: 49
End: 2019-02-28

## 2019-03-18 ENCOUNTER — OFFICE VISIT (OUTPATIENT)
Dept: ENDOCRINOLOGY | Facility: CLINIC | Age: 49
End: 2019-03-18
Payer: COMMERCIAL

## 2019-03-18 ENCOUNTER — TELEPHONE (OUTPATIENT)
Dept: ENDOCRINOLOGY | Facility: CLINIC | Age: 49
End: 2019-03-18

## 2019-03-18 ENCOUNTER — OFFICE VISIT - HEALTHEAST (OUTPATIENT)
Dept: AUDIOLOGY | Facility: CLINIC | Age: 49
End: 2019-03-18

## 2019-03-18 ENCOUNTER — RECORDS - HEALTHEAST (OUTPATIENT)
Dept: ADMINISTRATIVE | Facility: OTHER | Age: 49
End: 2019-03-18

## 2019-03-18 ENCOUNTER — AMBULATORY - HEALTHEAST (OUTPATIENT)
Dept: MULTI SPECIALTY CLINIC | Facility: CLINIC | Age: 49
End: 2019-03-18

## 2019-03-18 VITALS
BODY MASS INDEX: 23.86 KG/M2 | SYSTOLIC BLOOD PRESSURE: 115 MMHG | HEIGHT: 68 IN | DIASTOLIC BLOOD PRESSURE: 77 MMHG | WEIGHT: 157.4 LBS | HEART RATE: 80 BPM

## 2019-03-18 DIAGNOSIS — H90.3 SENSORINEURAL HEARING LOSS, BILATERAL: ICD-10-CM

## 2019-03-18 DIAGNOSIS — H93.13 TINNITUS OF BOTH EARS: ICD-10-CM

## 2019-03-18 DIAGNOSIS — R42 DIZZINESS: ICD-10-CM

## 2019-03-18 DIAGNOSIS — E10.8 TYPE 1 DIABETES MELLITUS WITH COMPLICATIONS (H): ICD-10-CM

## 2019-03-18 DIAGNOSIS — E10.8 TYPE 1 DIABETES MELLITUS WITH COMPLICATIONS (H): Primary | ICD-10-CM

## 2019-03-18 LAB
ALBUMIN SERPL-MCNC: 3.8 G/DL (ref 3.4–5)
ALT SERPL W P-5'-P-CCNC: 31 U/L (ref 0–50)
ANION GAP SERPL CALCULATED.3IONS-SCNC: 4 MMOL/L (ref 3–14)
BUN SERPL-MCNC: 19 MG/DL (ref 7–30)
CALCIUM SERPL-MCNC: 9.1 MG/DL (ref 8.5–10.1)
CHLORIDE SERPL-SCNC: 101 MMOL/L (ref 94–109)
CHOLEST SERPL-MCNC: 144 MG/DL
CK SERPL-CCNC: 265 U/L (ref 30–225)
CO2 SERPL-SCNC: 33 MMOL/L (ref 20–32)
CREAT SERPL-MCNC: 0.89 MG/DL (ref 0.52–1.04)
CREAT UR-MCNC: 60 MG/DL
GFR SERPL CREATININE-BSD FRML MDRD: 77 ML/MIN/{1.73_M2}
GLUCOSE SERPL-MCNC: 105 MG/DL (ref 70–99)
HBA1C MFR BLD: 7.8 % (ref 0–5.6)
HBA1C MFR BLD: 7.8 % (ref 0–5.6)
HCT VFR BLD AUTO: 45.1 % (ref 35–47)
HDLC SERPL-MCNC: 44 MG/DL
LDLC SERPL CALC-MCNC: 89 MG/DL
MICROALBUMIN UR-MCNC: <5 MG/L
MICROALBUMIN/CREAT UR: NORMAL MG/G CR (ref 0–25)
NONHDLC SERPL-MCNC: 100 MG/DL
PHOSPHATE SERPL-MCNC: 2.8 MG/DL (ref 2.5–4.5)
POTASSIUM SERPL-SCNC: 3.9 MMOL/L (ref 3.4–5.3)
SODIUM SERPL-SCNC: 138 MMOL/L (ref 133–144)
T4 FREE SERPL-MCNC: 0.81 NG/DL (ref 0.76–1.46)
TRIGL SERPL-MCNC: 56 MG/DL
TSH SERPL DL<=0.005 MIU/L-ACNC: 2.4 MU/L (ref 0.4–4)

## 2019-03-18 ASSESSMENT — PAIN SCALES - GENERAL: PAINLEVEL: NO PAIN (0)

## 2019-03-18 ASSESSMENT — MIFFLIN-ST. JEOR: SCORE: 1397.46

## 2019-03-18 NOTE — LETTER
3/18/2019       RE: Sera Adkins  [address] [facility] [address]     Dear Colleague,    Thank you for referring your patient, Sera Adkins, to the [facility] ENDOCRINOLOGY at [facility]. Please see a copy of my visit note below.    Reason for visit/consult: DM1     Interval history   Unable to find time to bolus due to work stress. Contemplating finding new work   Doing HIIT exercises. BG lower next day.   Reduce basal to 1.05 at 7 am  Some lows on Dexcom mostly post correction.   Insulin bolus mismatch likely.   No severe lows.     Dexcom report:     CGM Physician interpretation:   Av, SD 85  TIR 31, 1% low, 66% above goal.   Sensor worn 27 of 30 days.     Pattern:   Post meal hyperglycemia, and overcorrections.   Mostly due to missed or delayed bolusing.       Assessment and Plan  48 year old female with ATB and DM1 here today for follow up for DM1 with complications of stable non-proliferative diabetic retinopathy.      Diabetes Mellitus type 1 (A1C 7.8) with mild neuropathy and retinopathy. Fear of lows at work. Missed or delayed bolusing at work due to schedule.     - A1c is higher at 7.8 without severe hypoglycemia.   1% lows   - - Increase basal to 1.15.  -- Most highs are attributable to missed / delayed bolus  -- lows probably from delayed bolus leading to mismatch of glucose and insulin values.      Mild neuropathy  asymptomatic.   Loss of propioception in the left toe.   Alpha lipoic acid 600 mg BID    Hypoglycemia unawareness:   Senses once below 60. No severe episodes.   Dexcom monitor -- no significant lows since use.      Yunior Roy MD  [id number]  Endocrinology Service    =======================================     HPI:  Sera is a 48 year old female with DM1 who is presenting today in follow up for treatment and management of her DMT1. She is a patient of former Dr. CHARISSE Krishnamurthy. She was initially diagnosed at the age of 15  and presented with polydipsia, polyuria, and weight loss. Her current treatment regimen includes an insulin pump with insulin bolus to correct for carbs.      Current pump setting: (Pump initiated 2007)   Medtronic 630 G  Basal-    Midnight to 7 am  0.800 unit/hr,    7 am to midnight 1.05 unit/hr     During the biking (she usually bike 30-60 miles) basal reduced  15-20%       Bolus: 1 unit: 15 g carb (Breakfast, lunch, dinner)             Correction more than 130                         Midnight-to-6 am   90 g/unit                        6 am-to-9 pm        50 g/unit                        9 pm-to-midnight   90 g/unit     [employer]      Life style:  [place]akes: during weekdays 645 am, and weekends 8 am   Breakfast: protein cereal daily. Coffee with sugar and cream leads to high.   Lunch: [place], yogurt, fruit  Snack: varies  Dinner: varies, with going out to dinner with beer 5x during the week.      Exercise: weekdays she does twice weekly intense cycling for 2 hours  Weekends, variable cycling, with intense 2-4 hour trips, went on a 63 mile ride with lower sugar in the am. Plans further trips in summer with son who is now in 9th grade.      DM complication:   Retinopathy: stable, yearly eye exams, see's Dr. Ulises Moreno at [facility] at the [place] location  Today had screening coronary artery score: pending results  Last UA (12/2016) showed no proteinuria  Last LDL cholesterol (12/2016) 71 mg/dL  Last HGA1C (12/2016) 7.7, with today's value 7.4  Monofilament test today: intact, diminished vibratory sensation.      Glucose Log:   Mean overnight 86, Early , early afternoon 153, early evening 166        Past Medical/Surgical History:  Anxiety     PSH CS    Allergies:       Allergies   Allergen Reactions     Percocet [Oxycodone-Acetaminophen] Nausea and Nausea and Vomiting         Current Medications    Current Outpatient Medications   Medication     ASPIRIN PO     buPROPion (WELLBUTRIN XL) 150 MG 24 hr  "tablet     Calcium Carbonate-Vitamin D (CALCIUM 500+D PO)     chlorthalidone (HYGROTON) 25 MG tablet     glucagon (GLUCAGON EMERGENCY) 1 MG kit     glucose blood VI test strips (ONE TOUCH ULTRA TEST) strip     insulin aspart (NOVOLOG VIAL) 100 UNITS/ML vial     losartan (COZAAR) 25 MG tablet     No current facility-administered medications for this visit.         Family History:  No family history of DM, or known autoimmune conditions in patients parents or grandparents.   Family history of Psoriasis in patient brother.      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          Social History   Substance Use Topics     Smoking status: Never Smoker     Smokeless tobacco: Never Used     Alcohol use Nightly glass of hoppy beer.          ROS:  Complete ROS unremarkable.     Physical Exam:   /77   Pulse 80   Ht 1.727 m (5' 8\")   Wt 71.4 kg (157 lb 6.4 oz)   BMI 23.93 kg/m       General: well appearing, no acute distress, pleasant and conversational  Mental Status/neuro: alert and oriented  Face: symmetrical, normal facial color  Eyes: anicteric, PERRL, no proptosis or lid lag  Neck: supple, no lymphadenopahty  Thyroid: normal size and texture, no nodule palpable, no bruits  Heart: regular rhythm, S1S2, no murmur appreciated  Lung: clear to auscultation bilaterally  Abdomen: soft, NT/ND, no hepatomegaly  Legs: no swelling or edema  Feet: diminished vibratory sensation, normal monofilament sensation except over right toe callus.      Labs  06/05/2017 HGA1C     7.4 -->7.0-->7.0 --> 7.5 -->7.8    Again, thank you for allowing me to participate in the care of your patient.      Sincerely,    Yunior Roy MD  "

## 2019-03-18 NOTE — PROGRESS NOTES
Reason for visit/consult: DM1     Interval history   Unable to find time to bolus due to work stress. Contemplating finding new work   Doing HIIT exercises. BG lower next day.   Reduce basal to 1.05 at 7 am  Some lows on Dexcom mostly post correction.   Insulin bolus mismatch likely.   No severe lows.     Dexcom report:     CGM Physician interpretation:   Av, SD 85  TIR 31, 1% low, 66% above goal.   Sensor worn 27 of 30 days.     Pattern:   Post meal hyperglycemia, and overcorrections.   Mostly due to missed or delayed bolusing.       Assessment and Plan  48 year old female with TAB and DM1 here today for follow up for DM1 with complications of stable non-proliferative diabetic retinopathy.      Diabetes Mellitus type 1 (A1C 7.8) with mild neuropathy and retinopathy. Fear of lows at work. Missed or delayed bolusing at work due to schedule.     - A1c is higher at 7.8 without severe hypoglycemia.   1% lows   - - Increase basal to 1.15.  -- Most highs are attributable to missed / delayed bolus  -- lows probably from delayed bolus leading to mismatch of glucose and insulin values.      Mild neuropathy  asymptomatic.   Loss of propioception in the left toe.   Alpha lipoic acid 600 mg BID    Hypoglycemia unawareness:   Senses once below 60. No severe episodes.   Dexcom monitor -- no significant lows since use.      Yunior Roy MD  [id number]  Endocrinology Service    =======================================     HPI:  Sera is a 48 year old female with DM1 who is presenting today in follow up for treatment and management of her DMT1. She is a patient of former Dr. CHARISSE Krishnamurthy. She was initially diagnosed at the age of 15 and presented with polydipsia, polyuria, and weight loss. Her current treatment regimen includes an insulin pump with insulin bolus to correct for carbs.      Current pump setting: (Pump initiated )   Medtronic 630 G  Basal-    Midnight to 7 am  0.800 unit/hr,    7 am to midnight 1.05  unit/hr     During the biking (she usually bike 30-60 miles) basal reduced  15-20%       Bolus: 1 unit: 15 g carb (Breakfast, lunch, dinner)             Correction more than 130                         Midnight-to-6 am   90 g/unit                        6 am-to-9 pm        50 g/unit                        9 pm-to-midnight   90 g/unit     Target      Life style:  Wakes: during weekdays 645 am, and weekends 8 am   Breakfast: protein cereal daily. Coffee with sugar and cream leads to high.   Lunch: [place], yogurt, fruit  Snack: varies  Dinner: varies, with going out to dinner with beer 5x during the week.      Exercise: weekdays she does twice weekly intense cycling for 2 hours  Weekends, variable cycling, with intense 2-4 hour trips, went on a 63 mile ride with lower sugar in the am. Plans further trips in summer with son who is now in 9th grade.      DM complication:   Retinopathy: stable, yearly eye exams, see's Dr. Ulises Moreno at [facility] at the [place] location  Today had screening coronary artery score: pending results  Last UA (12/2016) showed no proteinuria  Last LDL cholesterol (12/2016) 71 mg/dL  Last HGA1C (12/2016) 7.7, with today's value 7.4  Monofilament test today: intact, diminished vibratory sensation.      Glucose Log:   Mean overnight 86, Early , early afternoon 153, early evening 166        Past Medical/Surgical History:  Anxiety     PSH CS    Allergies:       Allergies   Allergen Reactions     Percocet [Oxycodone-Acetaminophen] Nausea and Nausea and Vomiting         Current Medications   Current Outpatient Medications   Medication     ASPIRIN PO     buPROPion (WELLBUTRIN XL) 150 MG 24 hr tablet     Calcium Carbonate-Vitamin D (CALCIUM 500+D PO)     chlorthalidone (HYGROTON) 25 MG tablet     glucagon (GLUCAGON EMERGENCY) 1 MG kit     glucose blood VI test strips (ONE TOUCH ULTRA TEST) strip     insulin aspart (NOVOLOG VIAL) 100 UNITS/ML vial     losartan (COZAAR) 25 MG tablet  "    No current facility-administered medications for this visit.         Family History:  No family history of DM, or known autoimmune conditions in patients parents or grandparents.   Family history of Psoriasis in patient brother.      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          Social History   Substance Use Topics     Smoking status: Never Smoker     Smokeless tobacco: Never Used     Alcohol use Nightly glass of hoppy beer.          ROS:  Complete ROS unremarkable.     Physical Exam:   /77   Pulse 80   Ht 1.727 m (5' 8\")   Wt 71.4 kg (157 lb 6.4 oz)   BMI 23.93 kg/m      General: well appearing, no acute distress, pleasant and conversational  Mental Status/neuro: alert and oriented  Face: symmetrical, normal facial color  Eyes: anicteric, PERRL, no proptosis or lid lag  Neck: supple, no lymphadenopahty  Thyroid: normal size and texture, no nodule palpable, no bruits  Heart: regular rhythm, S1S2, no murmur appreciated  Lung: clear to auscultation bilaterally  Abdomen: soft, NT/ND, no hepatomegaly  Legs: no swelling or edema  Feet: diminished vibratory sensation, normal monofilament sensation except over right toe callus.      Labs  06/05/2017 HGA1C     7.4 -->7.0-->7.0 --> 7.5 -->7.8       "

## 2019-03-19 ENCOUNTER — COMMUNICATION - HEALTHEAST (OUTPATIENT)
Dept: ADMINISTRATIVE | Facility: CLINIC | Age: 49
End: 2019-03-19

## 2019-03-26 ENCOUNTER — RECORDS - HEALTHEAST (OUTPATIENT)
Dept: HEALTH INFORMATION MANAGEMENT | Facility: CLINIC | Age: 49
End: 2019-03-26

## 2019-04-01 ENCOUNTER — HOSPITAL ENCOUNTER (OUTPATIENT)
Dept: MAMMOGRAPHY | Facility: CLINIC | Age: 49
Discharge: HOME | End: 2019-04-01
Attending: FAMILY MEDICINE

## 2019-04-01 DIAGNOSIS — Z12.31 VISIT FOR SCREENING MAMMOGRAM: ICD-10-CM

## 2019-05-07 ENCOUNTER — RECORDS - HEALTHEAST (OUTPATIENT)
Dept: HEALTH INFORMATION MANAGEMENT | Facility: CLINIC | Age: 49
End: 2019-05-07

## 2019-05-07 ENCOUNTER — AMBULATORY - HEALTHEAST (OUTPATIENT)
Dept: FAMILY MEDICINE | Facility: CLINIC | Age: 49
End: 2019-05-07

## 2019-06-03 ENCOUNTER — COMMUNICATION - HEALTHEAST (OUTPATIENT)
Dept: FAMILY MEDICINE | Facility: CLINIC | Age: 49
End: 2019-06-03

## 2019-06-03 DIAGNOSIS — F32.A DEPRESSION: ICD-10-CM

## 2019-06-05 ENCOUNTER — COMMUNICATION - HEALTHEAST (OUTPATIENT)
Dept: FAMILY MEDICINE | Facility: CLINIC | Age: 49
End: 2019-06-05

## 2019-06-05 DIAGNOSIS — F41.1 ANXIETY STATE: ICD-10-CM

## 2019-06-12 ENCOUNTER — COMMUNICATION - HEALTHEAST (OUTPATIENT)
Dept: FAMILY MEDICINE | Facility: CLINIC | Age: 49
End: 2019-06-12

## 2019-06-12 DIAGNOSIS — E10.9 TYPE 1 DIABETES MELLITUS (H): ICD-10-CM

## 2019-08-04 ENCOUNTER — COMMUNICATION - HEALTHEAST (OUTPATIENT)
Dept: FAMILY MEDICINE | Facility: CLINIC | Age: 49
End: 2019-08-04

## 2019-08-04 DIAGNOSIS — E10.9 TYPE 1 DIABETES MELLITUS (H): ICD-10-CM

## 2019-08-22 ENCOUNTER — RECORDS - HEALTHEAST (OUTPATIENT)
Dept: LAB | Facility: CLINIC | Age: 49
End: 2019-08-22

## 2019-08-23 ENCOUNTER — RECORDS - HEALTHEAST (OUTPATIENT)
Dept: HEALTH INFORMATION MANAGEMENT | Facility: CLINIC | Age: 49
End: 2019-08-23

## 2019-08-23 LAB
ALBUMIN SERPL-MCNC: 4 G/DL (ref 3.5–5)
ALP SERPL-CCNC: 88 U/L (ref 45–120)
ALT SERPL W P-5'-P-CCNC: 35 U/L (ref 0–45)
ANION GAP SERPL CALCULATED.3IONS-SCNC: 8 MMOL/L (ref 5–18)
AST SERPL W P-5'-P-CCNC: 30 U/L (ref 0–40)
BILIRUB SERPL-MCNC: 0.4 MG/DL (ref 0–1)
BUN SERPL-MCNC: 13 MG/DL (ref 8–22)
CALCIUM SERPL-MCNC: 9.5 MG/DL (ref 8.5–10.5)
CHLORIDE BLD-SCNC: 104 MMOL/L (ref 98–107)
CO2 SERPL-SCNC: 28 MMOL/L (ref 22–31)
CREAT SERPL-MCNC: 1.22 MG/DL (ref 0.6–1.1)
GFR SERPL CREATININE-BSD FRML MDRD: 47 ML/MIN/1.73M2
GLUCOSE BLD-MCNC: 234 MG/DL (ref 70–125)
POTASSIUM BLD-SCNC: 4.7 MMOL/L (ref 3.5–5)
PROT SERPL-MCNC: 6.8 G/DL (ref 6–8)
SODIUM SERPL-SCNC: 140 MMOL/L (ref 136–145)

## 2019-09-08 ENCOUNTER — RECORDS - HEALTHEAST (OUTPATIENT)
Dept: ADMINISTRATIVE | Facility: OTHER | Age: 49
End: 2019-09-08

## 2019-09-08 ENCOUNTER — TELEPHONE (OUTPATIENT)
Dept: ENDOCRINOLOGY | Facility: CLINIC | Age: 49
End: 2019-09-08

## 2019-09-08 DIAGNOSIS — E10.8 TYPE 1 DIABETES MELLITUS WITH COMPLICATIONS (H): Primary | ICD-10-CM

## 2019-09-08 RX ORDER — INSULIN GLARGINE 100 [IU]/ML
INJECTION, SOLUTION SUBCUTANEOUS
Qty: 1 VIAL | Refills: 3 | Status: SHIPPED | OUTPATIENT
Start: 2019-09-08 | End: 2019-11-23

## 2019-09-08 NOTE — TELEPHONE ENCOUNTER
I was paged by patient at 2:30 pm:    She reported hyperglycemia up to high 300s last night and not able to bring her BG down even after changing pump site and reservoir. She was feeling nauseous this morning and felt her breath is acidotic. Her BG was 386 around 10:30 am, she took 10 units of novolog subcutaneous and her repeat BG was down to 200. She was hungry around noon and had lunch. Now her BG up to 400 and not going down after bolusing through the pump. She is feeling nauseous again, she has polyuria and polydypsia. She does not have any long acting insulin at home and was wondering if can get it prescribed in order to pick it from pharmacy.    Recommendations:  - given her symptoms, I am concerned about DKA. She agreed to go to the closest emergency department and get checked  - she checked her sensor and her BG is not going down 30 minutes after bolus. Instructed to give 10 units of Novolog now.  - I will call patient again later in couple hours.    Tyrese Shannon MD.  Endocrinology Fellow      I called patient at 6:30 pm to follow up. She went to urgent care and she said her BMP was wnl exceot for GFR, no DKA. Her BG is now down to 100.  She would like to have Lantus back up in case her pump malfunction overnight.   Per Dr. Roy last visit note on3/18/19 her basal rate is 1.15.     - Lantus 27 unit to be given in case of pump failure  - Lantus prescription sent to her pharmacy.    Tyrese Shannon MD  Endocrinology Fellow.

## 2019-09-16 ENCOUNTER — RECORDS - HEALTHEAST (OUTPATIENT)
Dept: ADMINISTRATIVE | Facility: OTHER | Age: 49
End: 2019-09-16

## 2019-09-16 ENCOUNTER — OFFICE VISIT (OUTPATIENT)
Dept: ENDOCRINOLOGY | Facility: CLINIC | Age: 49
End: 2019-09-16
Payer: COMMERCIAL

## 2019-09-16 VITALS
WEIGHT: 157.4 LBS | SYSTOLIC BLOOD PRESSURE: 124 MMHG | BODY MASS INDEX: 23.86 KG/M2 | HEART RATE: 108 BPM | DIASTOLIC BLOOD PRESSURE: 74 MMHG | HEIGHT: 68 IN

## 2019-09-16 DIAGNOSIS — E10.8 TYPE 1 DIABETES MELLITUS WITH COMPLICATIONS (H): ICD-10-CM

## 2019-09-16 DIAGNOSIS — E10.8 TYPE 1 DIABETES MELLITUS WITH COMPLICATIONS (H): Primary | ICD-10-CM

## 2019-09-16 LAB
ALBUMIN SERPL-MCNC: 3.9 G/DL (ref 3.4–5)
ALT SERPL W P-5'-P-CCNC: 25 U/L (ref 0–50)
ANION GAP SERPL CALCULATED.3IONS-SCNC: 2 MMOL/L (ref 3–14)
BUN SERPL-MCNC: 15 MG/DL (ref 7–30)
CALCIUM SERPL-MCNC: 9.4 MG/DL (ref 8.5–10.1)
CHLORIDE SERPL-SCNC: 99 MMOL/L (ref 94–109)
CHOLEST SERPL-MCNC: 112 MG/DL
CK SERPL-CCNC: 134 U/L (ref 30–225)
CO2 SERPL-SCNC: 33 MMOL/L (ref 20–32)
CREAT SERPL-MCNC: 1.05 MG/DL (ref 0.52–1.04)
CREAT UR-MCNC: 100 MG/DL
GFR SERPL CREATININE-BSD FRML MDRD: 63 ML/MIN/{1.73_M2}
GLUCOSE SERPL-MCNC: 136 MG/DL (ref 70–99)
HBA1C MFR BLD: 7.2 % (ref 4.3–6)
HBA1C MFR BLD: 7.2 % (ref 4.3–6)
HBA1C MFR BLD: 7.5 % (ref 0–5.6)
HCT VFR BLD AUTO: 41.6 % (ref 35–47)
HDLC SERPL-MCNC: 42 MG/DL
LDLC SERPL CALC-MCNC: 59 MG/DL
MICROALBUMIN UR-MCNC: 5 MG/L
MICROALBUMIN/CREAT UR: 5.33 MG/G CR (ref 0–25)
NONHDLC SERPL-MCNC: 70 MG/DL
PHOSPHATE SERPL-MCNC: 2.8 MG/DL (ref 2.5–4.5)
POTASSIUM SERPL-SCNC: 3.9 MMOL/L (ref 3.4–5.3)
SODIUM SERPL-SCNC: 134 MMOL/L (ref 133–144)
TRIGL SERPL-MCNC: 58 MG/DL
TSH SERPL DL<=0.005 MIU/L-ACNC: 1.89 MU/L (ref 0.4–4)

## 2019-09-16 ASSESSMENT — PAIN SCALES - GENERAL: PAINLEVEL: NO PAIN (0)

## 2019-09-16 ASSESSMENT — MIFFLIN-ST. JEOR: SCORE: 1397.46

## 2019-09-16 NOTE — NURSING NOTE
Chief Complaint   Patient presents with     RECHECK     Type 1 Diabetes     Capillary puncture performed for Hemoglobin A1C test. Patient tolerated well.    Romelia Oneill MA

## 2019-09-16 NOTE — LETTER
9/16/2019       RE: Sera Adkins  [address] [facility] [address] [id number]     Dear Colleague,    Thank you for referring your patient, Sera Adkins, to the [facility] ENDOCRINOLOGY at [facility]. Please see a copy of my visit note below.    HPI:   Sera is a 47 yo woman here for follow up of type 1 diabetes since age 15.  She usually sees Dr. Roy.  This is my first time seeing her.  She reports that she is doing ok today.  She is in the middle of a job change that she hopes will be less stressful.  She was having trouble with her infusion sets and ended up quite nauseated with very high sugars last week.  She went to the urgency room and her sugar was over 400.  Her creatinine went up a bit to 1.1, but they did not feel she was in DKA.  She felt better after a few hours.  She admits that she does not drink enough water.  She wonders if she needs different infusion sets after having diabetes for so long.      Sera likes to be quite active and she does a lot of biking.  She suffered a concussion last year while doing a night ride.  With her job being so stressful, she has not been as physically active as she would like to be.  She tries to go to high intensity exercise classes a couple nights a week.  She is a single mother of two teenage children.     Sera wears a Medtronic 630G pump with basal rates as follows:   Mid- 0.8  7am- 1.15    IC ratio- 1:15g (although she does not use the calculator- usually takes about half of what would be recommended) Typically boluses between 2-4 units at a time.    Sensitivity- 90  Target glucose-   Active insulin time- 4 hours    Average total daily insulin dose- 35 Units (70% basal, 30% bolus)    She finds that she often does not have to bolus for most of what she eats as the basal rate is high enough.  She does not use temp basals very often.  With her intense exercise, she disconnects her pump, then  reconnects after exercise and gives a bolus.    Sera wears a dexcom sensor with an overall average of 182 mg/dL with 46% of glucose in target range, 5.3% low and 49% high.  She has had several low readings in the night.  Her sugar falls throughout the night.     Sera had an acute kidney injury last year when she had severe hyperkalemia after extreme exercise and dehydration.  She worries about her kidney function and whether her kidney function will ever recover.  She admits she still finds it difficult to hydrate herself.     Sera has no other concerns today.     ROS  GENERAL: no weight loss, weight gain, fevers, chills, malaise, night sweats.   HEENT: no dysphagia, diplopia, neck pain or tenderness, dry/scratchy eyes, URI, cough, sinus drainage, tinnitus, sinus pressure  CV: no chest pain, pressure, palpitations, skipped beats, LOC  LUNGS: no SOB, [place], cough, sputum production, wheezing   GI: no diarrhea, constipation, abdominal pain  EXTREMITIES: no rashes, ulcers, edema  NEUROLOGY: no changes in vision, tingling or numbness in hands or feet.   MSK: no muscle aches or pains, weakness  PSYCH: mood stable      Past Medical History:   Diagnosis Date     Anxiety      Diabetes mellitus (H)     Type 1       Past Surgical History:   Procedure Laterality Date      SECTION      x2       No family history on file.    Social History   Social Hx: Single mother.  Two teenage children.  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  Enjoys being physically active, mostly biking.     Socioeconomic History     Marital status: other     Spouse name: Not on file     Number of children: Not on file     Years of education: Not on file     Highest education level: Not on file   Occupational History     Not on file   Social Needs     Financial resource strain: Not on file     Food insecurity:     Worry: Not on file     Inability: Not on file     Transportation needs:     Medical: Not on file     Non-medical: Not on file  "  Tobacco Use     Smoking status: Never Smoker     Smokeless tobacco: Never Used   Substance and Sexual Activity     Alcohol use: No     Drug use: No     Sexual activity: Not on file   Lifestyle     Physical activity:     Days per week: Not on file     Minutes per session: Not on file     Stress: Not on file   Relationships     Social connections:     Talks on phone: Not on file     Gets together: Not on file     Attends Confucianism service: Not on file     Active member of club or organization: Not on file     Attends meetings of clubs or organizations: Not on file     Relationship status: Not on file     Intimate partner violence:     Fear of current or ex partner: Not on file     Emotionally abused: Not on file     Physically abused: Not on file     Forced sexual activity: Not on file   Other Topics Concern     Not on file   Social History Narrative     Not on file       Current Outpatient Medications   Medication     ASPIRIN PO     buPROPion (WELLBUTRIN XL) 150 MG 24 hr tablet     Calcium Carbonate-Vitamin D (CALCIUM 500+D PO)     chlorthalidone (HYGROTON) 25 MG tablet     glucagon (GLUCAGON EMERGENCY) 1 MG kit     glucose blood VI test strips (ONE TOUCH ULTRA TEST) strip     insulin aspart (NOVOLOG VIAL) 100 UNITS/ML vial     insulin glargine (LANTUS VIAL) 100 UNIT/ML vial     losartan (COZAAR) 25 MG tablet     No current facility-administered medications for this visit.           Allergies   Allergen Reactions     Percocet [Oxycodone-Acetaminophen] Nausea and Nausea and Vomiting       Physical Exam  /74   Pulse 108   Ht 1.727 m (5' 8\")   Wt 71.4 kg (157 lb 6.4 oz)   BMI 23.93 kg/m     GENERAL:  Alert and oriented X3, NAD, well dressed, answering questions appropriately, appears stated age.  EXTREMITIES: no edema, +pulses, no rashes, no lesions    RESULTS  Lab Results   Component Value Date    A1C 7.8 (H) 03/18/2019    A1C 7.7 (H) 12/13/2016    A1C 7.6 (A) 06/06/2016    A1C 7.9 (A) 12/29/2015    A1C 7.5 " (A) 06/23/2015    HEMOGLOBINA1 7.2 (A) 09/16/2019    HEMOGLOBINA1 7.0 (A) 04/16/2018    HEMOGLOBINA1 7.0 (A) 12/11/2017    HEMOGLOBINA1 7.4 (A) 06/05/2017    HEMOGLOBINA1 6.9 (H) 12/23/2011       TSH   Date Value Ref Range Status   03/18/2019 2.40 0.40 - 4.00 mU/L Final   12/11/2017 1.69 0.40 - 4.00 mU/L Final   12/13/2016 3.72 0.40 - 4.00 mU/L Final   06/06/2016 1.55 mcU/mL Final   06/23/2015 1.48 0.36 - 3.74 mcU/mL Final     T4 Free   Date Value Ref Range Status   03/18/2019 0.81 0.76 - 1.46 ng/dL Final       ALT   Date Value Ref Range Status   03/18/2019 31 0 - 50 U/L Final   12/11/2017 18 0 - 50 U/L Final   ]    Recent Labs   Lab Test 03/18/19  1234 12/11/17  1618   CHOL 144  --    HDL 44*  --    LDL 89 59   TRIG 56  --        Lab Results   Component Value Date     03/18/2019      Lab Results   Component Value Date    POTASSIUM 3.9 03/18/2019     Lab Results   Component Value Date    CHLORIDE 101 03/18/2019     Lab Results   Component Value Date    SCOTTY 9.1 03/18/2019     Lab Results   Component Value Date    CO2 33 03/18/2019     Lab Results   Component Value Date    BUN 19 03/18/2019     Lab Results   Component Value Date    CR 0.89 03/18/2019       GFR Estimate   Date Value Ref Range Status   03/18/2019 77 >60 mL/min/[1.73_m2] Final     Comment:     Non  GFR Calc  Starting 12/18/2018, serum creatinine based estimated GFR (eGFR) will be   calculated using the Chronic Kidney Disease Epidemiology Collaboration   (CKD-EPI) equation.     09/10/2018 56 (L) >60 mL/min/1.7m2 Final     Comment:     Non  GFR Calc   04/16/2018 65 >60 mL/min/1.7m2 Final     Comment:     Non  GFR Calc     GFR Estimate If Black   Date Value Ref Range Status   03/18/2019 89 >60 mL/min/[1.73_m2] Final     Comment:      GFR Calc  Starting 12/18/2018, serum creatinine based estimated GFR (eGFR) will be   calculated using the Chronic Kidney Disease Epidemiology Collaboration    (CKD-EPI) equation.     09/10/2018 68 >60 mL/min/1.7m2 Final     Comment:      GFR Calc   04/16/2018 78 >60 mL/min/1.7m2 Final     Comment:      GFR Calc       Lab Results   Component Value Date    MICROL <5 03/18/2019     No results found for: MICROALBUMIN  No results found for: CPEPT, GADAB, ISCAB    No results found for: B12]    Most recent eye exam date: : Not Found     9/8- creatinine 1.1.    8/22/18- 0.94    Assessment/Plan:     1.  Type 1 diabetes- Well controlled with A1c of 7.2%, but I am concerned that she is running her basal too high (70% of daily dose), setting her up for risk of severe hypoglycemia. Her sugars are dropping overnight and I suspect she is eating throughout the day to keep her sugars up.  Her overall average on her sensor is higher than her a1c would suggest.  Will check hgb to be sure she is not anemic. We discussed her diabetes management at length and decided to reduce her basal rates as follows:   Change basal rates as follows:   Mid- 0. 75 (down from 0.8)  7am- 1.05 (down from 1.15)    I anticipate we will need to lower this further and have her take more insulin with her food over time.  Since this is a busy time in her life, we will not make drastic changes, but we did discuss the importance of more physiological insulin replacement.  We also talked about new data showing the importance of time in range, not just a1c targets.      For exercise, I suggested a temp basal of 20-30% 1 hour prior to low intensity physical activity (even walking in the mall).  She is currently shutting off her pump with high intensity activity, which usually causes her glucose to climb quite high. She will keep some notes so we can see how to help her manage her diabetes with exercise.  I would like her to consider a pump with low glucose suspend capabilities when she is ready for an upgrade.  Will reach out to Ally Mcguire to discuss this.  She also needs to try  different infusion sets as she has been having pump failures.      2.  Risk factors-     Retinopathy:  No.  Had eye exam within last year.   Nephropathy:  BP well controlled. Will check MA and creatinine.  She had SEKOU last year.     Neuropathy: decreased vibration sensation in left great toe.  NOrmal monofilament.     Feet: OK, no ulcers.   Lipids:  LDL at target.  Patient taking statin    3.  F/U in 3-6 months with myself or Dr. Roy.  Also offered to review her data remotely.     I spent 35 minutes with this patient face to face and explained the conditions and plans (more than 50% of time was counseling/coordination of care, diabetes management, follow up plan for worsening hyper and hypoglycemia) . The patient understood and is satisfied with today's visit.      Hannah Whitley PA-C, MPAS   [facility]  Department of Medicine  Division of Endocrinology and Diabetes

## 2019-09-16 NOTE — PATIENT INSTRUCTIONS
Change basal rates as follows:   Mid- 0. 75 (down from 0.8)  7am- 1.05 (down from 1.15)    Think about gradually lowering basal and more coverage for food.

## 2019-09-16 NOTE — PROGRESS NOTES
HPI:   Sera is a 47 yo woman here for follow up of type 1 diabetes since age 15.  She usually sees Dr. Roy.  This is my first time seeing her.  She reports that she is doing ok today.  She is in the middle of a job change that she hopes will be less stressful.  She was having trouble with her infusion sets and ended up quite nauseated with very high sugars last week.  She went to the urgency room and her sugar was over 400.  Her creatinine went up a bit to 1.1, but they did not feel she was in DKA.  She felt better after a few hours.  She admits that she does not drink enough water.  She wonders if she needs different infusion sets after having diabetes for so long.      Sera likes to be quite active and she does a lot of biking.  She suffered a concussion last year while doing a night ride.  With her job being so stressful, she has not been as physically active as she would like to be.  She tries to go to high intensity exercise classes a couple nights a week.  She is a single mother of two teenage children.     Sera wears a Medtronic 630G pump with basal rates as follows:   Mid- 0.8  7am- 1.15    IC ratio- 1:15g (although she does not use the calculator- usually takes about half of what would be recommended) Typically boluses between 2-4 units at a time.    Sensitivity- 90  Target glucose-   Active insulin time- 4 hours    Average total daily insulin dose- 35 Units (70% basal, 30% bolus)    She finds that she often does not have to bolus for most of what she eats as the basal rate is high enough.  She does not use temp basals very often.  With her intense exercise, she disconnects her pump, then reconnects after exercise and gives a bolus.    Sera wears a dexcom sensor with an overall average of 182 mg/dL with 46% of glucose in target range, 5.3% low and 49% high.  She has had several low readings in the night.  Her sugar falls throughout the night.     Sera had an acute kidney  injury last year when she had severe hyperkalemia after extreme exercise and dehydration.  She worries about her kidney function and whether her kidney function will ever recover.  She admits she still finds it difficult to hydrate herself.     Sera has no other concerns today.     ROS  GENERAL: no weight loss, weight gain, fevers, chills, malaise, night sweats.   HEENT: no dysphagia, diplopia, neck pain or tenderness, dry/scratchy eyes, URI, cough, sinus drainage, tinnitus, sinus pressure  CV: no chest pain, pressure, palpitations, skipped beats, LOC  LUNGS: no SOB, [place], cough, sputum production, wheezing   GI: no diarrhea, constipation, abdominal pain  EXTREMITIES: no rashes, ulcers, edema  NEUROLOGY: no changes in vision, tingling or numbness in hands or feet.   MSK: no muscle aches or pains, weakness  PSYCH: mood stable      Past Medical History:   Diagnosis Date     Anxiety      Diabetes mellitus (H)     Type 1       Past Surgical History:   Procedure Laterality Date      SECTION      x2       No family history on file.    Social History   Social Hx: Single mother.  Two teenage children.  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  Enjoys being physically active, mostly biking.     Socioeconomic History     Marital status: other     Spouse name: Not on file     Number of children: Not on file     Years of education: Not on file     Highest education level: Not on file   Occupational History     Not on file   Social Needs     Financial resource strain: Not on file     Food insecurity:     Worry: Not on file     Inability: Not on file     Transportation needs:     Medical: Not on file     Non-medical: Not on file   Tobacco Use     Smoking status: Never Smoker     Smokeless tobacco: Never Used   Substance and Sexual Activity     Alcohol use: No     Drug use: No     Sexual activity: Not on file   Lifestyle     Physical activity:     Days per week: Not on file     Minutes per session: Not on file     Stress: Not  "on file   Relationships     Social connections:     Talks on phone: Not on file     Gets together: Not on file     Attends Muslim service: Not on file     Active member of club or organization: Not on file     Attends meetings of clubs or organizations: Not on file     Relationship status: Not on file     Intimate partner violence:     Fear of current or ex partner: Not on file     Emotionally abused: Not on file     Physically abused: Not on file     Forced sexual activity: Not on file   Other Topics Concern     Not on file   Social History Narrative     Not on file       Current Outpatient Medications   Medication     ASPIRIN PO     buPROPion (WELLBUTRIN XL) 150 MG 24 hr tablet     Calcium Carbonate-Vitamin D (CALCIUM 500+D PO)     chlorthalidone (HYGROTON) 25 MG tablet     glucagon (GLUCAGON EMERGENCY) 1 MG kit     glucose blood VI test strips (ONE TOUCH ULTRA TEST) strip     insulin aspart (NOVOLOG VIAL) 100 UNITS/ML vial     insulin glargine (LANTUS VIAL) 100 UNIT/ML vial     losartan (COZAAR) 25 MG tablet     No current facility-administered medications for this visit.           Allergies   Allergen Reactions     Percocet [Oxycodone-Acetaminophen] Nausea and Nausea and Vomiting       Physical Exam  /74   Pulse 108   Ht 1.727 m (5' 8\")   Wt 71.4 kg (157 lb 6.4 oz)   BMI 23.93 kg/m    GENERAL:  Alert and oriented X3, NAD, well dressed, answering questions appropriately, appears stated age.  EXTREMITIES: no edema, +pulses, no rashes, no lesions    RESULTS  Lab Results   Component Value Date    A1C 7.8 (H) 03/18/2019    A1C 7.7 (H) 12/13/2016    A1C 7.6 (A) 06/06/2016    A1C 7.9 (A) 12/29/2015    A1C 7.5 (A) 06/23/2015    HEMOGLOBINA1 7.2 (A) 09/16/2019    HEMOGLOBINA1 7.0 (A) 04/16/2018    HEMOGLOBINA1 7.0 (A) 12/11/2017    HEMOGLOBINA1 7.4 (A) 06/05/2017    HEMOGLOBINA1 6.9 (H) 12/23/2011       TSH   Date Value Ref Range Status   03/18/2019 2.40 0.40 - 4.00 mU/L Final   12/11/2017 1.69 0.40 - 4.00 " mU/L Final   12/13/2016 3.72 0.40 - 4.00 mU/L Final   06/06/2016 1.55 mcU/mL Final   06/23/2015 1.48 0.36 - 3.74 mcU/mL Final     T4 Free   Date Value Ref Range Status   03/18/2019 0.81 0.76 - 1.46 ng/dL Final       ALT   Date Value Ref Range Status   03/18/2019 31 0 - 50 U/L Final   12/11/2017 18 0 - 50 U/L Final   ]    Recent Labs   Lab Test 03/18/19  1234 12/11/17  1618   CHOL 144  --    HDL 44*  --    LDL 89 59   TRIG 56  --        Lab Results   Component Value Date     03/18/2019      Lab Results   Component Value Date    POTASSIUM 3.9 03/18/2019     Lab Results   Component Value Date    CHLORIDE 101 03/18/2019     Lab Results   Component Value Date    SCOTTY 9.1 03/18/2019     Lab Results   Component Value Date    CO2 33 03/18/2019     Lab Results   Component Value Date    BUN 19 03/18/2019     Lab Results   Component Value Date    CR 0.89 03/18/2019       GFR Estimate   Date Value Ref Range Status   03/18/2019 77 >60 mL/min/[1.73_m2] Final     Comment:     Non  GFR Calc  Starting 12/18/2018, serum creatinine based estimated GFR (eGFR) will be   calculated using the Chronic Kidney Disease Epidemiology Collaboration   (CKD-EPI) equation.     09/10/2018 56 (L) >60 mL/min/1.7m2 Final     Comment:     Non  GFR Calc   04/16/2018 65 >60 mL/min/1.7m2 Final     Comment:     Non  GFR Calc     GFR Estimate If Black   Date Value Ref Range Status   03/18/2019 89 >60 mL/min/[1.73_m2] Final     Comment:      GFR Calc  Starting 12/18/2018, serum creatinine based estimated GFR (eGFR) will be   calculated using the Chronic Kidney Disease Epidemiology Collaboration   (CKD-EPI) equation.     09/10/2018 68 >60 mL/min/1.7m2 Final     Comment:      GFR Calc   04/16/2018 78 >60 mL/min/1.7m2 Final     Comment:      GFR Calc       Lab Results   Component Value Date    MICROL <5 03/18/2019     No results found for: MICROALBUMIN  No  results found for: CPEPT, GADAB, ISCAB    No results found for: B12]    Most recent eye exam date: : Not Found     9/8- creatinine 1.1.    8/22/18- 0.94    Assessment/Plan:     1.  Type 1 diabetes- Well controlled with A1c of 7.2%, but I am concerned that she is running her basal too high (70% of daily dose), setting her up for risk of severe hypoglycemia. Her sugars are dropping overnight and I suspect she is eating throughout the day to keep her sugars up.  Her overall average on her sensor is higher than her a1c would suggest.  Will check hgb to be sure she is not anemic. We discussed her diabetes management at length and decided to reduce her basal rates as follows:   Change basal rates as follows:   Mid- 0. 75 (down from 0.8)  7am- 1.05 (down from 1.15)    I anticipate we will need to lower this further and have her take more insulin with her food over time.  Since this is a busy time in her life, we will not make drastic changes, but we did discuss the importance of more physiological insulin replacement.  We also talked about new data showing the importance of time in range, not just a1c targets.      For exercise, I suggested a temp basal of 20-30% 1 hour prior to low intensity physical activity (even walking in the mall).  She is currently shutting off her pump with high intensity activity, which usually causes her glucose to climb quite high. She will keep some notes so we can see how to help her manage her diabetes with exercise.  I would like her to consider a pump with low glucose suspend capabilities when she is ready for an upgrade.  Will reach out to Ally Mcguire to discuss this.  She also needs to try different infusion sets as she has been having pump failures.      2.  Risk factors-     Retinopathy:  No.  Had eye exam within last year.   Nephropathy:  BP well controlled. Will check MA and creatinine.  She had SEKOU last year.     Neuropathy: decreased vibration sensation in left great toe.  NOrmal  monofilament.     Feet: OK, no ulcers.   Lipids:  LDL at target.  Patient taking statin    3.  F/U in 3-6 months with myself or Dr. Roy.  Also offered to review her data remotely.     I spent 35 minutes with this patient face to face and explained the conditions and plans (more than 50% of time was counseling/coordination of care, diabetes management, follow up plan for worsening hyper and hypoglycemia) . The patient understood and is satisfied with today's visit.      Hannah Whitley PA-C, MPAS   [facility]  Department of Medicine  Division of Endocrinology and Diabetes

## 2019-09-17 ENCOUNTER — TELEPHONE (OUTPATIENT)
Dept: EDUCATION SERVICES | Facility: CLINIC | Age: 49
End: 2019-09-17

## 2019-09-19 NOTE — RESULT ENCOUNTER NOTE
Hemoglobin and hemoglobin A1c are stable.  Kidney functions are stable.  Liver enzymes were normal.  Thyroid labs are unremarkable.  With these labs, no new changes are recommended.    Yunior Roy MD  Endocrinology Service.

## 2019-09-25 ENCOUNTER — RECORDS - HEALTHEAST (OUTPATIENT)
Dept: HEALTH INFORMATION MANAGEMENT | Facility: CLINIC | Age: 49
End: 2019-09-25

## 2019-10-03 ENCOUNTER — HEALTH MAINTENANCE LETTER (OUTPATIENT)
Age: 49
End: 2019-10-03

## 2019-10-15 NOTE — TELEPHONE ENCOUNTER
[employer] message was sent to patient regarding cgm/pump upgrade. Ally Mcguire RN,CDE

## 2019-11-04 ENCOUNTER — OFFICE VISIT - HEALTHEAST (OUTPATIENT)
Dept: FAMILY MEDICINE | Facility: CLINIC | Age: 49
End: 2019-11-04

## 2019-11-04 DIAGNOSIS — F41.1 ANXIETY STATE: ICD-10-CM

## 2019-11-04 DIAGNOSIS — E10.9 TYPE 1 DIABETES MELLITUS (H): ICD-10-CM

## 2019-11-04 ASSESSMENT — MIFFLIN-ST. JEOR: SCORE: 1389.28

## 2019-11-07 ASSESSMENT — ANXIETY QUESTIONNAIRES
4. TROUBLE RELAXING: SEVERAL DAYS
1. FEELING NERVOUS, ANXIOUS, OR ON EDGE: SEVERAL DAYS
2. NOT BEING ABLE TO STOP OR CONTROL WORRYING: NOT AT ALL
GAD7 TOTAL SCORE: 8
5. BEING SO RESTLESS THAT IT IS HARD TO SIT STILL: SEVERAL DAYS
6. BECOMING EASILY ANNOYED OR IRRITABLE: NEARLY EVERY DAY
7. FEELING AFRAID AS IF SOMETHING AWFUL MIGHT HAPPEN: SEVERAL DAYS
3. WORRYING TOO MUCH ABOUT DIFFERENT THINGS: SEVERAL DAYS

## 2019-11-07 ASSESSMENT — PATIENT HEALTH QUESTIONNAIRE - PHQ9: SUM OF ALL RESPONSES TO PHQ QUESTIONS 1-9: 3

## 2019-11-24 NOTE — PROGRESS NOTES
Endocrine on call    Paged by patient due to insulin pump failure.     Pt is on a Medtronic 630G- she reports that she was out cycling and was getting alerts that her battery needed to be replaced. When she looked at the pump, she realized that the case was fractured and the pump was not working at all.     She is travelling and currently in [place].     She has already administered 5 units of novolog when her glucose was 244mg/dl around an hour ago, current glucose is 116mg/dl (and she was going to drink milk). She reports that she has plenty of novolog in vial with her.     She is currently driving to a pharmacy, [email or web address] in [place].     She initially requested an rx for NPH instead of glargine, but after discussion she was amenable to at least determining how much glargine will cost.     Her regimen is basal heavy, 23.1 units per day based on PJ Whitley's most recent notes. The pt does endorse getting low in the mornings after exercising the day before on her current settings and she did intense exercise today (more than usual). As such and given transition to injection, recommend 18 units q24.    She affirms that she usually uses 1 unit aspart per 15grams. She will also use a 1/50 over 150mg/dl correction scale.     We agreed that she would call again if the glargine is of excessive cost at which time we would discuss a plan for NPH.  NPH 8 units q12 for basal would be recommended in that scenario.     She has already called [employer] and is supposed to have a replacement pump on Monday 11/25.      Reyna Joy MD  Endocrine Fellow

## 2019-11-27 ENCOUNTER — TELEPHONE (OUTPATIENT)
Dept: ENDOCRINOLOGY | Facility: CLINIC | Age: 49
End: 2019-11-27

## 2019-11-27 NOTE — TELEPHONE ENCOUNTER
Left detailed message on Pts vm and sent MyChart message regarding request for information from Dr Joy.  Cheli Carlos RN on 11/27/2019 at 11:50 AM

## 2019-11-27 NOTE — TELEPHONE ENCOUNTER
----- Message from Reyna Joy MD sent at 11/24/2019  7:10 PM CST -----  Deion Bower,    Would it be possible to call Sera and make sure she is doing okay with the basal glargine I ordered for her and see if there is any update regarding getting a new Medtronic pump? She is usually on a pump but it broke when she was out of town on Saturday 11/23. It sounded like she had already talked to [employer] and was supposed to get a replacement on Monday.     Hannah- it looks like you had seen Sera in clinic once (and most recently) so this is just FYI. My note is in Sera's chart 11/23.    Thank you all,     Reyna Joy MD  Endocrine Fellow

## 2020-01-01 ENCOUNTER — COMMUNICATION - HEALTHEAST (OUTPATIENT)
Dept: FAMILY MEDICINE | Facility: CLINIC | Age: 50
End: 2020-01-01

## 2020-01-01 DIAGNOSIS — E10.9 TYPE 1 DIABETES MELLITUS (H): ICD-10-CM

## 2020-01-13 ENCOUNTER — TELEPHONE (OUTPATIENT)
Dept: ENDOCRINOLOGY | Facility: CLINIC | Age: 50
End: 2020-01-13

## 2020-01-13 NOTE — TELEPHONE ENCOUNTER
[facility] Call Center    Phone Message    May a detailed message be left on voicemail: yes    Reason for Call: Other: Pt called because she is needing more supplies for her insulin pump to be mailed to her from [employer]. She said she is needing more Medtronic quick set paradigm, 23 inch 6mm.     Action Taken: Message routed to:  [facility] ([facility]): Endocrinology

## 2020-01-13 NOTE — TELEPHONE ENCOUNTER
Followed up with [employer] and pt has not placed an order since 2/2017. Message to pt informing her that if she needs supplies to call and place an order [employer] # given to pt as well.

## 2020-01-20 ENCOUNTER — OFFICE VISIT (OUTPATIENT)
Dept: ENDOCRINOLOGY | Facility: CLINIC | Age: 50
End: 2020-01-20
Payer: COMMERCIAL

## 2020-01-20 ENCOUNTER — RECORDS - HEALTHEAST (OUTPATIENT)
Dept: ADMINISTRATIVE | Facility: OTHER | Age: 50
End: 2020-01-20

## 2020-01-20 VITALS
HEIGHT: 68 IN | WEIGHT: 162 LBS | SYSTOLIC BLOOD PRESSURE: 112 MMHG | BODY MASS INDEX: 24.55 KG/M2 | DIASTOLIC BLOOD PRESSURE: 71 MMHG | HEART RATE: 85 BPM

## 2020-01-20 DIAGNOSIS — E10.8 TYPE 1 DIABETES MELLITUS WITH COMPLICATIONS (H): Primary | ICD-10-CM

## 2020-01-20 DIAGNOSIS — E10.8 TYPE 1 DIABETES MELLITUS WITH COMPLICATIONS (H): ICD-10-CM

## 2020-01-20 LAB
ALBUMIN SERPL-MCNC: 3.8 G/DL (ref 3.4–5)
ALT SERPL W P-5'-P-CCNC: 24 U/L (ref 0–50)
ANION GAP SERPL CALCULATED.3IONS-SCNC: 2 MMOL/L (ref 3–14)
BUN SERPL-MCNC: 18 MG/DL (ref 7–30)
CALCIUM SERPL-MCNC: 9.2 MG/DL (ref 8.5–10.1)
CHLORIDE SERPL-SCNC: 105 MMOL/L (ref 94–109)
CHOLEST SERPL-MCNC: 132 MG/DL
CK SERPL-CCNC: 203 U/L (ref 30–225)
CO2 SERPL-SCNC: 32 MMOL/L (ref 20–32)
CREAT SERPL-MCNC: 0.94 MG/DL (ref 0.52–1.04)
CREAT UR-MCNC: 125 MG/DL
GFR SERPL CREATININE-BSD FRML MDRD: 72 ML/MIN/{1.73_M2}
GLUCOSE SERPL-MCNC: 208 MG/DL (ref 70–99)
HBA1C MFR BLD: 7.4 % (ref 4.3–6)
HBA1C MFR BLD: 7.4 % (ref 4.3–6)
HBA1C MFR BLD: 7.7 % (ref 0–5.6)
HCT VFR BLD AUTO: 42 % (ref 35–47)
HDLC SERPL-MCNC: 59 MG/DL
HGB BLD-MCNC: 13.6 G/DL (ref 11.7–15.7)
LDLC SERPL CALC-MCNC: 64 MG/DL
MICROALBUMIN UR-MCNC: 6 MG/L
MICROALBUMIN/CREAT UR: 4.83 MG/G CR (ref 0–25)
NONHDLC SERPL-MCNC: 73 MG/DL
PHOSPHATE SERPL-MCNC: 2.8 MG/DL (ref 2.5–4.5)
POTASSIUM SERPL-SCNC: 4 MMOL/L (ref 3.4–5.3)
SODIUM SERPL-SCNC: 139 MMOL/L (ref 133–144)
TRIGL SERPL-MCNC: 44 MG/DL
TSH SERPL DL<=0.005 MIU/L-ACNC: 2.07 MU/L (ref 0.4–4)

## 2020-01-20 RX ORDER — INFUSION SET FOR INSULIN PUMP
INFUSION SETS-PARAPHERNALIA MISCELLANEOUS
Qty: 4 BOX | Refills: 4 | COMMUNITY
Start: 2020-01-20 | End: 2021-04-12 | Stop reason: ALTCHOICE

## 2020-01-20 RX ORDER — MULTIVITAMIN WITH IRON
1 TABLET ORAL DAILY
COMMUNITY
End: 2021-09-13

## 2020-01-20 ASSESSMENT — PAIN SCALES - GENERAL: PAINLEVEL: NO PAIN (0)

## 2020-01-20 ASSESSMENT — MIFFLIN-ST. JEOR: SCORE: 1413.33

## 2020-01-20 NOTE — LETTER
1/20/2020       RE: Sera Adkins  [address] [facility] [address]     Dear Colleague,    Thank you for referring your patient, Sera Adkins, to the [facility] at [facility]. Please see a copy of my visit note below.    HPI:   Sera is a 50 yo woman here for follow up of type 1 diabetes since age 15.  She also sees Dr. Roy. She reports that she is doing better with avoiding lows since we changed her basals last visit, but she has not been focused on taking her boluses before eating as she knows she needs to.  She finds that she often sees her sugars are high then corrects after eating.  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       Sera likes to be quite active and she does a lot of biking.  She suffered a concussion last year while doing a night ride.  Since her last visit, she has been physically active 5-6 days a week. We discussed options for managing glucose with exercise at her last visit, but she has not yet adopted them.  Currently, she disconnects her pump at the start of activity and resumes it after.  She often has to give a 1-2 unit bolus after exercise.  She goes quite high after the high intensity activity.        HIIT classes.  Barbell, spinning, running.     Pump adjustments- take pump off during classes.      Running- 30-60 mins. Gives a unit when she comes back on pump.     Sera wears a Medtronic 630G pump with basal rates as follows:   Mid- 0.7  7am- 0.95    IC ratio- 1:15g (although she does not use the calculator- is now taking close to this according to a diet recall. Typically boluses between 2-4 units at a time).    Sensitivity- 50 (previously was 90)  Target glucose-   Active insulin time- 4 hours    Average total daily insulin dose- 34 Units (61% basal, 39% bolus)    Sera wears a dexcom sensor with an overall average of 204 mg/dL with 33%  of glucose in target range, 3% low and 64% high.  She is having much less hypoglycemia.  Her sugar is now stable in the night (no longer dropping), but she is eating to keep her sugars up during the day.       Sera had an acute kidney injury last year when she had severe hyperkalemia after extreme exercise and dehydration.  She worries about her kidney function and whether her kidney function will ever recover.  She admits she still finds it difficult to hydrate herself.  She would like to check her creatinine today.     Sera has no other concerns today.     ROS  GENERAL: no weight loss, weight gain, fevers, chills, malaise, night sweats.   HEENT: no dysphagia, diplopia, neck pain or tenderness, dry/scratchy eyes, URI, cough, sinus drainage, tinnitus, sinus pressure  CV: no chest pain, pressure, palpitations, skipped beats, LOC  LUNGS: no SOB, [place], cough, sputum production, wheezing   GI: no diarrhea, constipation, abdominal pain  EXTREMITIES: no rashes, ulcers, edema  NEUROLOGY: no changes in vision, tingling or numbness in hands or feet.   MSK: no muscle aches or pains, weakness  PSYCH: mood stable      Past Medical History:   Diagnosis Date     Anxiety      Diabetes mellitus (H)     Type 1       Past Surgical History:   Procedure Laterality Date      SECTION      x2       No family history on file.    Social History   Social Hx: Single mother.  Two teenage children.  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  Enjoys being physically active, mostly biking, but also running and HIIT.     Socioeconomic History     Marital status: other     Spouse name: Not on file     Number of children: Not on file     Years of education: Not on file     Highest education level: Not on file   Occupational History     Not on file   Social Needs     Financial resource strain: Not on file     Food insecurity:     Worry: Not on file     Inability: Not on file     Transportation needs:     Medical: Not on file     Non-medical:  "Not on file   Tobacco Use     Smoking status: Never Smoker     Smokeless tobacco: Never Used   Substance and Sexual Activity     Alcohol use: No     Drug use: No     Sexual activity: Not on file   Lifestyle     Physical activity:     Days per week: Not on file     Minutes per session: Not on file     Stress: Not on file   Relationships     Social connections:     Talks on phone: Not on file     Gets together: Not on file     Attends Sabianist service: Not on file     Active member of club or organization: Not on file     Attends meetings of clubs or organizations: Not on file     Relationship status: Not on file     Intimate partner violence:     Fear of current or ex partner: Not on file     Emotionally abused: Not on file     Physically abused: Not on file     Forced sexual activity: Not on file   Other Topics Concern     Not on file   Social History Narrative     Not on file       Current Outpatient Medications   Medication     acetone urine (KETOSTIX) test strip     ASPIRIN PO     buPROPion (WELLBUTRIN XL) 150 MG 24 hr tablet     Calcium Carbonate-Vitamin D (CALCIUM 500+D PO)     chlorthalidone (HYGROTON) 25 MG tablet     glucagon (GLUCAGON EMERGENCY) 1 MG kit     glucose blood VI test strips (ONE TOUCH ULTRA TEST) strip     insulin aspart (NOVOLOG VIAL) 100 UNITS/ML vial     insulin glargine (LANTUS VIAL) 100 UNIT/ML vial     insulin syringe-needle U-100 (30G X 1/2\" 0.3 ML) 30G X 1/2\" 0.3 ML miscellaneous     losartan (COZAAR) 25 MG tablet     No current facility-administered medications for this visit.           Allergies   Allergen Reactions     Percocet [Oxycodone-Acetaminophen] Nausea and Nausea and Vomiting       Physical Exam  /71   Pulse 85   Ht 1.727 m (5' 8\")   Wt 73.5 kg (162 lb)   BMI 24.63 kg/m     GENERAL:  Alert and oriented X3, NAD, well dressed, answering questions appropriately, appears stated age.  EXTREMITIES: no edema, +pulses, no rashes, no lesions    RESULTS  Lab Results "   Component Value Date    A1C 7.5 (H) 09/16/2019    A1C 7.8 (H) 03/18/2019    A1C 7.7 (H) 12/13/2016    A1C 7.6 (A) 06/06/2016    A1C 7.9 (A) 12/29/2015    HEMOGLOBINA1 7.2 (A) 09/16/2019    HEMOGLOBINA1 7.0 (A) 04/16/2018    HEMOGLOBINA1 7.0 (A) 12/11/2017    HEMOGLOBINA1 7.4 (A) 06/05/2017    HEMOGLOBINA1 6.9 (H) 12/23/2011       TSH   Date Value Ref Range Status   09/16/2019 1.89 0.40 - 4.00 mU/L Final   03/18/2019 2.40 0.40 - 4.00 mU/L Final   12/11/2017 1.69 0.40 - 4.00 mU/L Final   12/13/2016 3.72 0.40 - 4.00 mU/L Final   06/06/2016 1.55 mcU/mL Final     T4 Free   Date Value Ref Range Status   03/18/2019 0.81 0.76 - 1.46 ng/dL Final       ALT   Date Value Ref Range Status   09/16/2019 25 0 - 50 U/L Final   03/18/2019 31 0 - 50 U/L Final   ]    Recent Labs   Lab Test 03/18/19  1234 12/11/17  1618   CHOL 144  --    HDL 44*  --    LDL 89 59   TRIG 56  --        Lab Results   Component Value Date     03/18/2019      Lab Results   Component Value Date    POTASSIUM 3.9 03/18/2019     Lab Results   Component Value Date    CHLORIDE 101 03/18/2019     Lab Results   Component Value Date    SCOTTY 9.1 03/18/2019     Lab Results   Component Value Date    CO2 33 03/18/2019     Lab Results   Component Value Date    BUN 19 03/18/2019     Lab Results   Component Value Date    CR 0.89 03/18/2019       GFR Estimate   Date Value Ref Range Status   09/16/2019 63 >60 mL/min/[1.73_m2] Final     Comment:     Non  GFR Calc  Starting 12/18/2018, serum creatinine based estimated GFR (eGFR) will be   calculated using the Chronic Kidney Disease Epidemiology Collaboration   (CKD-EPI) equation.     03/18/2019 77 >60 mL/min/[1.73_m2] Final     Comment:     Non  GFR Calc  Starting 12/18/2018, serum creatinine based estimated GFR (eGFR) will be   calculated using the Chronic Kidney Disease Epidemiology Collaboration   (CKD-EPI) equation.     09/10/2018 56 (L) >60 mL/min/1.7m2 Final     Comment:     Non   GFR Calc     GFR Estimate If Black   Date Value Ref Range Status   09/16/2019 73 >60 mL/min/[1.73_m2] Final     Comment:      GFR Calc  Starting 12/18/2018, serum creatinine based estimated GFR (eGFR) will be   calculated using the Chronic Kidney Disease Epidemiology Collaboration   (CKD-EPI) equation.     03/18/2019 89 >60 mL/min/[1.73_m2] Final     Comment:      GFR Calc  Starting 12/18/2018, serum creatinine based estimated GFR (eGFR) will be   calculated using the Chronic Kidney Disease Epidemiology Collaboration   (CKD-EPI) equation.     09/10/2018 68 >60 mL/min/1.7m2 Final     Comment:      GFR Calc       Lab Results   Component Value Date    MICROL <5 03/18/2019     No results found for: MICROALBUMIN  No results found for: CPEPT, GADAB, ISCAB    No results found for: B12]    Most recent eye exam date: : Not Found     9/8- creatinine 1.1.    8/22/18- 0.94    Assessment/Plan:     1.  Type 1 diabetes- Well controlled with A1c of 7.4%, but sensor glucose appears higher than a1c would suggest.  We have been working on slowly lowering her basal rates in order to reduce her risk of severe hypoglycemia and she understands it is more important now to cover all of her carbs.  This is an area she has struggled with a lot in the past, as she tends to eat then correct later.  She agreed to start covering for at least 30g even if she does not know the carbs in the foods she is eating (unless just a salad).  She can then get some coverage and not climb so high.  Encouraged her to try to use the bolus calculator more.  We made the following changes today (instructions given to patient):     Try covering for at least 30g carb whenever you eat (unless very low carb)    For lower intensity exercise:   60-90 minutes before exercise- 20% basal (reduction of 80%) until start of exercise or through exercise.      High intensity- 20-50% 30-60 minutes before exercise.      Alcohol: do a temp basal of 80% at bedtime to prevent overnight lows.     Schedule with Ally Mcguire to review pump sites and talk about pumps and infusion sets.     Reduce basal rates as follows:   Mid- 0.7  7am- 0.9 (down from 0.95)    [email or web address] for carb info.    She is currently shutting off her pump with high intensity activity, which usually causes her glucose to climb quite high. She will keep some notes so we can see how to help her manage her diabetes with exercise.  I would like her to consider a pump with low glucose suspend capabilities when she is ready for an upgrade, which is in October, 2020.  She will schedule an appointment with Ally Mcguire to review her pump infusion sites, view different pump models and for a general diabetes review.  I would also like her to review her dexcom settings so that she does not have loud alerts while she is in clinic during the day, but so she can have them at night while sleeping.      2.  Risk factors-     Retinopathy:  No.  Had eye exam within last year.   Nephropathy:  BP well controlled. Will check creatinine. No microalbuminuria. She had SEKOU last year.     Neuropathy: decreased vibration sensation in left great toe.  Normal monofilament.     Feet: OK, no ulcers.   Lipids:  LDL at target.  Patient taking statin    3.  F/U in 3-6 months with myself.  Will establish with Dr. Romero as Dr. Roy is no longer in our clinic.  Also offered to review her data remotely.     I spent 35 minutes with this patient face to face and explained the conditions and plans (more than 50% of time was counseling/coordination of care, diabetes management, follow up plan for worsening hyper and hypoglycemia) . The patient understood and is satisfied with today's visit.      Hannah Whitley PA-C, MPAS   [facility]  Department of Medicine  Division of Endocrinology and Diabetes

## 2020-01-20 NOTE — PROGRESS NOTES
HPI:   Sera is a 50 yo woman here for follow up of type 1 diabetes since age 15.  She also sees Dr. Roy. She reports that she is doing better with avoiding lows since we changed her basals last visit, but she has not been focused on taking her boluses before eating as she knows she needs to.  She finds that she often sees her sugars are high then corrects after eating.  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       Sera likes to be quite active and she does a lot of biking.  She suffered a concussion last year while doing a night ride.  Since her last visit, she has been physically active 5-6 days a week. We discussed options for managing glucose with exercise at her last visit, but she has not yet adopted them.  Currently, she disconnects her pump at the start of activity and resumes it after.  She often has to give a 1-2 unit bolus after exercise.  She goes quite high after the high intensity activity.        HIIT classes.  Barbell, spinning, running.     Pump adjustments- take pump off during classes.      Running- 30-60 mins. Gives a unit when she comes back on pump.     Sera wears a Medtronic 630G pump with basal rates as follows:   Mid- 0.7  7am- 0.95    IC ratio- 1:15g (although she does not use the calculator- is now taking close to this according to a diet recall. Typically boluses between 2-4 units at a time).    Sensitivity- 50 (previously was 90)  Target glucose-   Active insulin time- 4 hours    Average total daily insulin dose- 34 Units (61% basal, 39% bolus)    Sera wears a dexcom sensor with an overall average of 204 mg/dL with 33% of glucose in target range, 3% low and 64% high.  She is having much less hypoglycemia.  Her sugar is now stable in the night (no longer dropping), but she is eating to keep her sugars up during the day.       Sera had an acute kidney injury last year when she had severe hyperkalemia after  extreme exercise and dehydration.  She worries about her kidney function and whether her kidney function will ever recover.  She admits she still finds it difficult to hydrate herself.  She would like to check her creatinine today.     Sera has no other concerns today.     ROS  GENERAL: no weight loss, weight gain, fevers, chills, malaise, night sweats.   HEENT: no dysphagia, diplopia, neck pain or tenderness, dry/scratchy eyes, URI, cough, sinus drainage, tinnitus, sinus pressure  CV: no chest pain, pressure, palpitations, skipped beats, LOC  LUNGS: no SOB, [place], cough, sputum production, wheezing   GI: no diarrhea, constipation, abdominal pain  EXTREMITIES: no rashes, ulcers, edema  NEUROLOGY: no changes in vision, tingling or numbness in hands or feet.   MSK: no muscle aches or pains, weakness  PSYCH: mood stable      Past Medical History:   Diagnosis Date     Anxiety      Diabetes mellitus (H)     Type 1       Past Surgical History:   Procedure Laterality Date      SECTION      x2       No family history on file.    Social History   Social Hx: Single mother.  Two teenage children.  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  Enjoys being physically active, mostly biking, but also running and HIIT.     Socioeconomic History     Marital status: other     Spouse name: Not on file     Number of children: Not on file     Years of education: Not on file     Highest education level: Not on file   Occupational History     Not on file   Social Needs     Financial resource strain: Not on file     Food insecurity:     Worry: Not on file     Inability: Not on file     Transportation needs:     Medical: Not on file     Non-medical: Not on file   Tobacco Use     Smoking status: Never Smoker     Smokeless tobacco: Never Used   Substance and Sexual Activity     Alcohol use: No     Drug use: No     Sexual activity: Not on file   Lifestyle     Physical activity:     Days per week: Not on file     Minutes per session: Not on file  "    Stress: Not on file   Relationships     Social connections:     Talks on phone: Not on file     Gets together: Not on file     Attends Yarsanism service: Not on file     Active member of club or organization: Not on file     Attends meetings of clubs or organizations: Not on file     Relationship status: Not on file     Intimate partner violence:     Fear of current or ex partner: Not on file     Emotionally abused: Not on file     Physically abused: Not on file     Forced sexual activity: Not on file   Other Topics Concern     Not on file   Social History Narrative     Not on file       Current Outpatient Medications   Medication     acetone urine (KETOSTIX) test strip     ASPIRIN PO     buPROPion (WELLBUTRIN XL) 150 MG 24 hr tablet     Calcium Carbonate-Vitamin D (CALCIUM 500+D PO)     chlorthalidone (HYGROTON) 25 MG tablet     glucagon (GLUCAGON EMERGENCY) 1 MG kit     glucose blood VI test strips (ONE TOUCH ULTRA TEST) strip     insulin aspart (NOVOLOG VIAL) 100 UNITS/ML vial     insulin glargine (LANTUS VIAL) 100 UNIT/ML vial     insulin syringe-needle U-100 (30G X 1/2\" 0.3 ML) 30G X 1/2\" 0.3 ML miscellaneous     losartan (COZAAR) 25 MG tablet     No current facility-administered medications for this visit.           Allergies   Allergen Reactions     Percocet [Oxycodone-Acetaminophen] Nausea and Nausea and Vomiting       Physical Exam  /71   Pulse 85   Ht 1.727 m (5' 8\")   Wt 73.5 kg (162 lb)   BMI 24.63 kg/m    GENERAL:  Alert and oriented X3, NAD, well dressed, answering questions appropriately, appears stated age.  EXTREMITIES: no edema, +pulses, no rashes, no lesions    RESULTS  Lab Results   Component Value Date    A1C 7.5 (H) 09/16/2019    A1C 7.8 (H) 03/18/2019    A1C 7.7 (H) 12/13/2016    A1C 7.6 (A) 06/06/2016    A1C 7.9 (A) 12/29/2015    HEMOGLOBINA1 7.2 (A) 09/16/2019    HEMOGLOBINA1 7.0 (A) 04/16/2018    HEMOGLOBINA1 7.0 (A) 12/11/2017    HEMOGLOBINA1 7.4 (A) 06/05/2017    " HEMOGLOBINA1 6.9 (H) 12/23/2011       TSH   Date Value Ref Range Status   09/16/2019 1.89 0.40 - 4.00 mU/L Final   03/18/2019 2.40 0.40 - 4.00 mU/L Final   12/11/2017 1.69 0.40 - 4.00 mU/L Final   12/13/2016 3.72 0.40 - 4.00 mU/L Final   06/06/2016 1.55 mcU/mL Final     T4 Free   Date Value Ref Range Status   03/18/2019 0.81 0.76 - 1.46 ng/dL Final       ALT   Date Value Ref Range Status   09/16/2019 25 0 - 50 U/L Final   03/18/2019 31 0 - 50 U/L Final   ]    Recent Labs   Lab Test 03/18/19  1234 12/11/17  1618   CHOL 144  --    HDL 44*  --    LDL 89 59   TRIG 56  --        Lab Results   Component Value Date     03/18/2019      Lab Results   Component Value Date    POTASSIUM 3.9 03/18/2019     Lab Results   Component Value Date    CHLORIDE 101 03/18/2019     Lab Results   Component Value Date    SCOTTY 9.1 03/18/2019     Lab Results   Component Value Date    CO2 33 03/18/2019     Lab Results   Component Value Date    BUN 19 03/18/2019     Lab Results   Component Value Date    CR 0.89 03/18/2019       GFR Estimate   Date Value Ref Range Status   09/16/2019 63 >60 mL/min/[1.73_m2] Final     Comment:     Non  GFR Calc  Starting 12/18/2018, serum creatinine based estimated GFR (eGFR) will be   calculated using the Chronic Kidney Disease Epidemiology Collaboration   (CKD-EPI) equation.     03/18/2019 77 >60 mL/min/[1.73_m2] Final     Comment:     Non  GFR Calc  Starting 12/18/2018, serum creatinine based estimated GFR (eGFR) will be   calculated using the Chronic Kidney Disease Epidemiology Collaboration   (CKD-EPI) equation.     09/10/2018 56 (L) >60 mL/min/1.7m2 Final     Comment:     Non  GFR Calc     GFR Estimate If Black   Date Value Ref Range Status   09/16/2019 73 >60 mL/min/[1.73_m2] Final     Comment:      GFR Calc  Starting 12/18/2018, serum creatinine based estimated GFR (eGFR) will be   calculated using the Chronic Kidney Disease Epidemiology  Collaboration   (CKD-EPI) equation.     03/18/2019 89 >60 mL/min/[1.73_m2] Final     Comment:      GFR Calc  Starting 12/18/2018, serum creatinine based estimated GFR (eGFR) will be   calculated using the Chronic Kidney Disease Epidemiology Collaboration   (CKD-EPI) equation.     09/10/2018 68 >60 mL/min/1.7m2 Final     Comment:      GFR Calc       Lab Results   Component Value Date    MICROL <5 03/18/2019     No results found for: MICROALBUMIN  No results found for: CPEPT, GADAB, ISCAB    No results found for: B12]    Most recent eye exam date: : Not Found     9/8- creatinine 1.1.    8/22/18- 0.94    Assessment/Plan:     1.  Type 1 diabetes- Well controlled with A1c of 7.4%, but sensor glucose appears higher than a1c would suggest.  We have been working on slowly lowering her basal rates in order to reduce her risk of severe hypoglycemia and she understands it is more important now to cover all of her carbs.  This is an area she has struggled with a lot in the past, as she tends to eat then correct later.  She agreed to start covering for at least 30g even if she does not know the carbs in the foods she is eating (unless just a salad).  She can then get some coverage and not climb so high.  Encouraged her to try to use the bolus calculator more.  We made the following changes today (instructions given to patient):     Try covering for at least 30g carb whenever you eat (unless very low carb)    For lower intensity exercise:   60-90 minutes before exercise- 20% basal (reduction of 80%) until start of exercise or through exercise.      High intensity- 20-50% 30-60 minutes before exercise.     Alcohol: do a temp basal of 80% at bedtime to prevent overnight lows.     Schedule with Ally Mcguire to review pump sites and talk about pumps and infusion sets.     Reduce basal rates as follows:   Mid- 0.7  7am- 0.9 (down from 0.95)    [email or web address] for carb info.    She is currently shutting  off her pump with high intensity activity, which usually causes her glucose to climb quite high. She will keep some notes so we can see how to help her manage her diabetes with exercise.  I would like her to consider a pump with low glucose suspend capabilities when she is ready for an upgrade, which is in October, 2020.  She will schedule an appointment with Ally Mcguire to review her pump infusion sites, view different pump models and for a general diabetes review.  I would also like her to review her dexcom settings so that she does not have loud alerts while she is in clinic during the day, but so she can have them at night while sleeping.      2.  Risk factors-     Retinopathy:  No.  Had eye exam within last year.   Nephropathy:  BP well controlled. Will check creatinine. No microalbuminuria. She had SEKOU last year.     Neuropathy: decreased vibration sensation in left great toe.  Normal monofilament.     Feet: OK, no ulcers.   Lipids:  LDL at target.  Patient taking statin    3.  F/U in 3-6 months with myself.  Will establish with Dr. Romero as Dr. Roy is no longer in our clinic.  Also offered to review her data remotely.     I spent 35 minutes with this patient face to face and explained the conditions and plans (more than 50% of time was counseling/coordination of care, diabetes management, follow up plan for worsening hyper and hypoglycemia) . The patient understood and is satisfied with today's visit.      Hannah Whitley PA-C, MPAS   [facility]  Department of Medicine  Division of Endocrinology and Diabetes

## 2020-01-20 NOTE — NURSING NOTE
Chief Complaint   Patient presents with     Follow Up     type 1 diabetes      Romelia Winston CMA

## 2020-01-20 NOTE — PATIENT INSTRUCTIONS
Type 1 run    Try covering for at least 30g carb whenever you eat (unless very low carb)    For lower intensity exercise:   60-90 minutes before exercise- 20% basal (reduction of 80%) until start of exercise or through exercise.      High intensity- 20% 30-60 minutes before exercise.     Alcohol: do a temp basal of 80% at bedtime to prevent overnight lows.     Schedule with Ally Mcguire to review pump sites and talk about pumps and infusion sets.     Reduce basal rates as follows:   Mid- 0.7  7am- 0.9 (down from 0.95)    [email or web address] for carb info.

## 2020-01-30 ENCOUNTER — RECORDS - HEALTHEAST (OUTPATIENT)
Dept: HEALTH INFORMATION MANAGEMENT | Facility: CLINIC | Age: 50
End: 2020-01-30

## 2020-01-31 ENCOUNTER — ALLIED HEALTH/NURSE VISIT (OUTPATIENT)
Dept: EDUCATION SERVICES | Facility: CLINIC | Age: 50
End: 2020-01-31
Payer: COMMERCIAL

## 2020-01-31 DIAGNOSIS — E10.8 TYPE 1 DIABETES MELLITUS WITH COMPLICATIONS (H): Primary | ICD-10-CM

## 2020-01-31 NOTE — PATIENT INSTRUCTIONS
1.  Contact me about a month before your warranty is up so we can start the paperwork for a new pump.    2.  Look for some Sure T infusion sets in the mail.    3.  Avoid areas of scar tissue for infusion sets.    Ally Mcguire RN,CDE  [address] [address]  Phone: [phone]  [email or web address]

## 2020-01-31 NOTE — PROGRESS NOTES
"Diabetes Self-Management Education & Support    Diabetes Education Self Management & Training    SUBJECTIVE/OBJECTIVE:  Presents for: Individual review  Accompanied by: Self  Diabetes education in the past 24mo: No  Focus of Visit: Insulin Pump, CGM  Diabetes type: Type 1  Diabetes management related comments/concerns: is having trouble with infusion sets for her 670 g, her warranty is up in December and she wants to see options  Transportation concerns: No  Other concerns:: None  Cultural Influences/Ethnic Background:  American      Diabetes Symptoms & Complications   Patient Problem List and Family Medical History reviewed for relevant medical history, current medical status, and diabetes risk factors.    Vitals:  Estimated body mass index is 24.63 kg/m  as calculated from the following:    Height as of 1/20/20: 1.727 m (5' 8\").    Weight as of 1/20/20: 73.5 kg (162 lb).   Last 3 BP:   BP Readings from Last 3 Encounters:   01/20/20 112/71   09/16/19 124/74   03/18/19 115/77       History   Smoking Status     Never Smoker   Smokeless Tobacco     Never Used       Labs:  Lab Results   Component Value Date    A1C 7.7 01/20/2020     Lab Results   Component Value Date     01/20/2020     Lab Results   Component Value Date    LDL 64 01/20/2020     HDL Cholesterol   Date Value Ref Range Status   01/20/2020 59 >49 mg/dL Final   ]  GFR Estimate   Date Value Ref Range Status   01/20/2020 72 >60 mL/min/[1.73_m2] Final     Comment:     Non  GFR Calc  Starting 12/18/2018, serum creatinine based estimated GFR (eGFR) will be   calculated using the Chronic Kidney Disease Epidemiology Collaboration   (CKD-EPI) equation.       GFR Estimate If Black   Date Value Ref Range Status   01/20/2020 83 >60 mL/min/[1.73_m2] Final     Comment:      GFR Calc  Starting 12/18/2018, serum creatinine based estimated GFR (eGFR) will be   calculated using the Chronic Kidney Disease Epidemiology Collaboration "   (CKD-EPI) equation.       Lab Results   Component Value Date    CR 0.94 01/20/2020     No results found for: MICROALBUMIN    Taking Medications  Diabetes Medication(s)     Diabetic Other       glucagon (GLUCAGON EMERGENCY) 1 MG kit    Inject 1 mg into the vein as needed    Insulin       insulin aspart (NOVOLOG VIAL) 100 UNITS/ML vial    USES UP TO 60 UNITS PER DAY AS DIRECTED IN INSULIN PUMP FOR BASAL, BOLUS AND PRIMING OF TUBING.     insulin glargine (LANTUS VIAL) 100 UNIT/ML vial    Inject 18 Units Subcutaneous every 24 hours          Healthy Coping     Patient Activation Measure Survey Score:  No flowsheet data found.    ASSESSMENT:  Type 1 diabetes    Patient's most recent   Lab Results   Component Value Date    A1C 7.7 01/20/2020    is not meeting goal of <7.0    INTERVENTION:     Education provided today on:  We discussed the infusion set options and she was given samples of Medtronic Sillhouettes and a note was sent asking Alyx Hilliard to send her some Sure T infusion sets.  We went to the Medtronic website and watched a video on how to insert them.    She's got some areas of lipohypertrophy on her abdomen so she will avoid those areas.  She wears her DexCom on her butt and her infusion set over her hips and upper thigh.    She is interested in the T:Slim X 2 so we will get exact date when warranty is up and she will contact me about a month before that date so we can get everything ready to go.    Opportunities for ongoing education and support in diabetes-self management were discussed.    Pt verbalized understanding of concepts discussed and recommendations provided today.       PLAN:  See Patient Instructions for co-developed, patient-stated behavior change goals.  AVS printed and provided to patient today. See Follow-Up section for recommended follow-up.    Time Spent: 60 minutes  Encounter Type: Individual    Any diabetes medication dose changes were made via the CDE Protocol and Collaborative  Practice Agreement with the patient's referring provider. A copy of this encounter was shared with the provider.

## 2020-02-03 ENCOUNTER — COMMUNICATION - HEALTHEAST (OUTPATIENT)
Dept: FAMILY MEDICINE | Facility: CLINIC | Age: 50
End: 2020-02-03

## 2020-02-03 ENCOUNTER — MEDICAL CORRESPONDENCE (OUTPATIENT)
Dept: HEALTH INFORMATION MANAGEMENT | Facility: CLINIC | Age: 50
End: 2020-02-03

## 2020-02-03 DIAGNOSIS — E10.9 TYPE 1 DIABETES MELLITUS (H): ICD-10-CM

## 2020-02-03 NOTE — PROGRESS NOTES
Forms received from: [employer]     Forms were RX for pump supplies and diabetic testing supplies     Faxed Date:2/3/20

## 2020-02-05 ENCOUNTER — TELEPHONE (OUTPATIENT)
Dept: EDUCATION SERVICES | Facility: CLINIC | Age: 50
End: 2020-02-05

## 2020-02-06 NOTE — TELEPHONE ENCOUNTER
Phone call to Sera: her oow date with [employer] is 9/30/2020.  She is advised to call a couple weeks before this date so we can get paperwork ready to go for a T:Slim X 2 pump. Ally Mcguire RN,CDE

## 2020-02-26 ENCOUNTER — TELEPHONE (OUTPATIENT)
Dept: EDUCATION SERVICES | Facility: CLINIC | Age: 50
End: 2020-02-26

## 2020-02-26 NOTE — TELEPHONE ENCOUNTER
" [facility] Call [facility]    Phone Message    May a detailed message be left on voicemail: yes     Reason for Call: Other: Pt called stating [employer] sent back forms because \"it needs to say every 2 days not every 3 days\" per pt. Please follow up with pt for questions and concerns.     Action Taken: Message routed to:  [facility] ([facility]): Diabetes Ed    Travel Screening: Not Applicable                                                                        "

## 2020-02-27 NOTE — TELEPHONE ENCOUNTER
Revise RX sent back to [employer] with change frequency every 2 days. [employer] confirmed they received it and order will be processed and shipped.

## 2020-03-02 ENCOUNTER — TRANSFERRED RECORDS (OUTPATIENT)
Dept: HEALTH INFORMATION MANAGEMENT | Facility: CLINIC | Age: 50
End: 2020-03-02

## 2020-03-02 LAB
HPV ABSTRACT: NORMAL
PAP-ABSTRACT: NORMAL

## 2020-03-05 ENCOUNTER — TELEPHONE (OUTPATIENT)
Dept: ENDOCRINOLOGY | Facility: CLINIC | Age: 50
End: 2020-03-05

## 2020-03-05 DIAGNOSIS — E10.8 TYPE 1 DIABETES MELLITUS WITH COMPLICATIONS (H): ICD-10-CM

## 2020-03-05 DIAGNOSIS — E11.9 DIABETES MELLITUS (H): ICD-10-CM

## 2020-03-05 NOTE — TELEPHONE ENCOUNTER
[facility]    Phone Message    May a detailed message be left on voicemail: yes     Reason for Call: Medication Refill Request    Has the patient contacted the pharmacy for the refill? Yes   Name of medication being requested: Lantus      Pharmacy: [facility]Y #[id number] - [address]    Date medication is needed: asap/ pt is requesting prescription for Lantus because she is having issues with the pump absorption site and needs a back up because she is going out of town this coming Sunday. Per pt this has happen before in November and knows how to dose herself with 22-25 units at bedtime. Please call pt at . Thank you      Action Taken: Message routed to:  [facility] ([facility] endo    Travel Screening: Not Applicable

## 2020-04-17 ASSESSMENT — ENCOUNTER SYMPTOMS
SINUS CONGESTION: 0
INSOMNIA: 0
NERVOUS/ANXIOUS: 1
DEPRESSION: 0
SINUS PAIN: 0
PANIC: 0
SORE THROAT: 0
NECK MASS: 0
TASTE DISTURBANCE: 0
DECREASED CONCENTRATION: 0
HOARSE VOICE: 0
TROUBLE SWALLOWING: 0
SMELL DISTURBANCE: 0

## 2020-04-17 NOTE — PROGRESS NOTES
"Sera Adkins is a 49 year old female who is being evaluated via a billable video visit.      The patient has been notified of following:     \"This video visit will be conducted via a call between you and your physician/provider. We have found that certain health care needs can be provided without the need for an in-person physical exam.  This service lets us provide the care you need with a video conversation.  If a prescription is necessary we can send it directly to your pharmacy.  If lab work is needed we can place an order for that and you can then stop by our lab to have the test done at a later time.    Video visits are billed at different rates depending on your insurance coverage.  Please reach out to your insurance provider with any questions.    If during the course of the call the physician/provider feels a video visit is not appropriate, you will not be charged for this service.\"    Patient has given verbal consent for Video visit? Yes    How would you like to obtain your AVS? MyChart    Patient would like the video invitation sent by: Send to e-mail at: [email or web address]      Video Start Time:1009  This visit was converted from an in person visit to a virtual visit due to the ongoing COVID-19 pandemic.    Start time: 0942  End time: 1009    Additional provider notes:    HPI:   Sera is a 50 yo woman here for follow up of type 1 diabetes since age 15.  She also has been seen by Dr. Roy. She reports that she is doing better with avoiding lows since we changed her basals last visit, but she has not been focused on taking her boluses before eating as she knows she needs to.  She can see how her glucose climbs when she takes it after eating.  She finds that she often sees her sugars are high then corrects after eating.  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   At her last visit, we talked about bolusing for at least 30g at the start of " her meal, even if she may be eating more, just to have some coverage.  She feels like this has helped to avoid some of the spikes.     Cream, splenda in her coffee in the morning, and she routinely sees her glucose climb high.  Her glucose has improved since her last visit.      Sera likes to be quite active and she does a lot of biking.  She suffered a concussion last year while doing a night ride.  Since her last visit, she has been physically active 5-6 days a week. We discussed options for managing glucose with exercise at her last visit, but she has not yet adopted them.  Currently, she disconnects her pump at the start of activity and resumes it after.  She often has to give a 1-2 unit bolus after exercise.  She goes quite high after the high intensity activity.        Bike ride 15% basal. Turned it off     HIIT classes.  Barbell, spinning, running.     Pump adjustments- take pump off during classes.      Running- 30-60 mins. Gives a unit when she comes back on pump.     Sera wears a Medtronic 630G pump with basal rates as follows:   Mid- 0.7  7am- 0.9    IC ratio- 1:15g (although she does not use the calculator- is now taking close to this according to a diet recall. Typically boluses between 2-4 units at a time).    Sensitivity- 50   Target glucose-   Active insulin time- 4 hours    Average total daily insulin dose- 34 Units (61% basal, 39% bolus)    Sera wears a dexcom sensor with an overall average of 178 mg/dL for the past two weeks.   This is down from 204 mg/dL at her last visit.           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  She has no other concerns today.     ROS  GENERAL: She has lost about 6 pounds in the past month, intentionally.  No fevers, chills, malaise, night sweats.   HEENT: no dysphagia, diplopia, neck pain or tenderness, dry/scratchy eyes, URI, cough, sinus drainage, tinnitus, sinus  pressure  CV: no chest pain, pressure, palpitations, skipped beats, LOC  LUNGS: no SOB, [place], cough, sputum production, wheezing   GI: no diarrhea, constipation, abdominal pain  EXTREMITIES: no rashes, ulcers, edema  NEUROLOGY: no changes in vision, tingling or numbness in hands or feet.   MSK: no muscle aches or pains, weakness  PSYCH: mood stable      Past Medical History:   Diagnosis Date     Anxiety      Diabetes mellitus (H)     Type 1       Past Surgical History:   Procedure Laterality Date      SECTION      x2       No family history on file.    Social History   Social Hx:  Single mother.  Two teenage children.  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  Enjoys being physically active, mostly biking, but also running and HIIT.     Socioeconomic History     Marital status: other     Spouse name: Not on file     Number of children: Not on file     Years of education: Not on file     Highest education level: Not on file   Occupational History     Not on file   Social Needs     Financial resource strain: Not on file     Food insecurity:     Worry: Not on file     Inability: Not on file     Transportation needs:     Medical: Not on file     Non-medical: Not on file   Tobacco Use     Smoking status: Never Smoker     Smokeless tobacco: Never Used   Substance and Sexual Activity     Alcohol use: No     Drug use: No     Sexual activity: Not on file   Lifestyle     Physical activity:     Days per week: Not on file     Minutes per session: Not on file     Stress: Not on file   Relationships     Social connections:     Talks on phone: Not on file     Gets together: Not on file     Attends [employer] service: Not on file     Active member of club or organization: Not on file     Attends meetings of clubs or organizations: Not on file     Relationship status: Not on file     Intimate partner violence:     Fear of current or ex partner: Not on file     Emotionally abused: Not on file     Physically abused: Not on file      "Forced sexual activity: Not on file   Other Topics Concern     Not on file   Social History Narrative     Not on file       Current Outpatient Medications   Medication     acetone urine (KETOSTIX) test strip     buPROPion (WELLBUTRIN XL) 150 MG 24 hr tablet     Calcium Carbonate-Vitamin D (CALCIUM 500+D PO)     chlorthalidone (HYGROTON) 25 MG tablet     glucagon (GLUCAGON EMERGENCY) 1 MG kit     glucose blood VI test strips (ONE TOUCH ULTRA TEST) strip     infusion set (PARADIGM QUICK-SET 23\" 6MM) [facility] pump supply     insulin aspart (NOVOLOG VIAL) 100 UNITS/ML vial     insulin glargine (LANTUS PEN) 100 UNIT/ML pen     insulin syringe-needle U-100 (30G X 1/2\" 0.3 ML) 30G X 1/2\" 0.3 ML miscellaneous     losartan (COZAAR) 25 MG tablet     magnesium 250 MG tablet     ASPIRIN PO     No current facility-administered medications for this visit.           Allergies   Allergen Reactions     Percocet [Oxycodone-Acetaminophen] Nausea and Nausea and Vomiting       Physical Exam  There were no vitals taken for this visit.  GENERAL:  Alert and oriented X3.  LUNGS: breathing unlabored.   PSYCH: normal thought process  RESULTS  Lab Results   Component Value Date    A1C 7.7 (H) 01/20/2020    A1C 7.5 (H) 09/16/2019    A1C 7.8 (H) 03/18/2019    A1C 7.7 (H) 12/13/2016    A1C 7.6 (A) 06/06/2016    HEMOGLOBINA1 7.4 (A) 01/20/2020    HEMOGLOBINA1 7.2 (A) 09/16/2019    HEMOGLOBINA1 7.0 (A) 04/16/2018    HEMOGLOBINA1 7.0 (A) 12/11/2017    HEMOGLOBINA1 7.4 (A) 06/05/2017       TSH   Date Value Ref Range Status   01/20/2020 2.07 0.40 - 4.00 mU/L Final   09/16/2019 1.89 0.40 - 4.00 mU/L Final   03/18/2019 2.40 0.40 - 4.00 mU/L Final   12/11/2017 1.69 0.40 - 4.00 mU/L Final   12/13/2016 3.72 0.40 - 4.00 mU/L Final     T4 Free   Date Value Ref Range Status   03/18/2019 0.81 0.76 - 1.46 ng/dL Final       ALT   Date Value Ref Range Status   01/20/2020 24 0 - 50 U/L Final   09/16/2019 25 0 - 50 U/L Final   ]    Recent Labs   Lab Test 01/20/20  1041 " 09/16/19  1043   CHOL 132 112   HDL 59 42*   LDL 64 59   TRIG 44 58       Lab Results   Component Value Date     01/20/2020      Lab Results   Component Value Date    POTASSIUM 4.0 01/20/2020     Lab Results   Component Value Date    CHLORIDE 105 01/20/2020     Lab Results   Component Value Date    SCOTTY 9.2 01/20/2020     Lab Results   Component Value Date    CO2 32 01/20/2020     Lab Results   Component Value Date    BUN 18 01/20/2020     Lab Results   Component Value Date    CR 0.94 01/20/2020       GFR Estimate   Date Value Ref Range Status   01/20/2020 72 >60 mL/min/[1.73_m2] Final     Comment:     Non  GFR Calc  Starting 12/18/2018, serum creatinine based estimated GFR (eGFR) will be   calculated using the Chronic Kidney Disease Epidemiology Collaboration   (CKD-EPI) equation.     09/16/2019 63 >60 mL/min/[1.73_m2] Final     Comment:     Non  GFR Calc  Starting 12/18/2018, serum creatinine based estimated GFR (eGFR) will be   calculated using the Chronic Kidney Disease Epidemiology Collaboration   (CKD-EPI) equation.     03/18/2019 77 >60 mL/min/[1.73_m2] Final     Comment:     Non  GFR Calc  Starting 12/18/2018, serum creatinine based estimated GFR (eGFR) will be   calculated using the Chronic Kidney Disease Epidemiology Collaboration   (CKD-EPI) equation.       GFR Estimate If Black   Date Value Ref Range Status   01/20/2020 83 >60 mL/min/[1.73_m2] Final     Comment:      GFR Calc  Starting 12/18/2018, serum creatinine based estimated GFR (eGFR) will be   calculated using the Chronic Kidney Disease Epidemiology Collaboration   (CKD-EPI) equation.     09/16/2019 73 >60 mL/min/[1.73_m2] Final     Comment:      GFR Calc  Starting 12/18/2018, serum creatinine based estimated GFR (eGFR) will be   calculated using the Chronic Kidney Disease Epidemiology Collaboration   (CKD-EPI) equation.     03/18/2019 89 >60 mL/min/[1.73_m2]  Final     Comment:      GFR Calc  Starting 12/18/2018, serum creatinine based estimated GFR (eGFR) will be   calculated using the Chronic Kidney Disease Epidemiology Collaboration   (CKD-EPI) equation.         Lab Results   Component Value Date    MICROL 6 01/20/2020     No results found for: MICROALBUMIN  No results found for: CPEPT, GADAB, ISCAB    No results found for: B12]    Most recent eye exam date: : Not Found   9/8- creatinine 1.1.    8/22/18- 0.94    Assessment/Plan:     1.  Type 1 diabetes-  Control has been improving and she is having less glucose variability.  We have been working on slowly lowering her basal rates in order to reduce her risk of severe hypoglycemia and she understands it is more important now to cover all of her carbs BEFORE eating them.  This is an area she has struggled with a lot in the past, as she tends to eat then correct later.  She can then get some coverage and not climb so high.  Encouraged her to try to use the bolus calculator more.  I asked her to increase her breakfast bolus by 0.5-1 unit, as she tends to climb high with her coffee.  No other changes today.  Discussed COVID 19 precautions.  The American diabetes association has issued a nice video with checklist for patients with diabetes:     https://www.diabetes.org/diabetes/treatment-care/planning-sick-days/coronavirus  Will think seriously about the tandem pump with control IQ.  Will discuss with Whitney.  She is due for a pump upgrade in October, 2020.      2.  Risk factors-     Retinopathy:  No.  Had eye exam within last year.   Nephropathy:  BP well controlled (she checked 110/60 in her clinic). Normal creatinine. No microalbuminuria. She had SEKOU last year, but renal function now back to baseline.  Neuropathy: decreased vibration sensation in left great toe in the past.  Normal monofilament.   No pain.   Feet: OK, no ulcers.   Lipids:  LDL at target.  Patient taking statin    3.  F/U in 3 months with  myself.  Will establish with Dr. Romero as Dr. Roy is no longer in our clinic.      Video-Visit Details    Type of service:  Video Visit    Video End Time (time video stopped): 1009    Originating Location (pt. Location): home    Distant Location (provider location):  [facility]     Mode of Communication:  Video Conference via Doxim[employer] video (as Darrius did not work)      Hannah Whitley PA-C, MPAS   [facility]  Department of Medicine  Division of Endocrinology and Diabetes

## 2020-04-20 ENCOUNTER — VIRTUAL VISIT (OUTPATIENT)
Dept: ENDOCRINOLOGY | Facility: CLINIC | Age: 50
End: 2020-04-20
Payer: COMMERCIAL

## 2020-04-20 DIAGNOSIS — E10.9 WELL CONTROLLED TYPE 1 DIABETES MELLITUS (H): Primary | ICD-10-CM

## 2020-04-20 NOTE — PATIENT INSTRUCTIONS
Add extra 0.5-1 unit at breakfast.      Focus on bolusing 15 minutes before eating.     Try temp basal of 20% 90 minutes prior to exercise.     Email me your 's form.  [email or web address]

## 2020-05-29 ENCOUNTER — COMMUNICATION - HEALTHEAST (OUTPATIENT)
Dept: FAMILY MEDICINE | Facility: CLINIC | Age: 50
End: 2020-05-29

## 2020-05-29 DIAGNOSIS — F41.1 ANXIETY STATE: ICD-10-CM

## 2020-07-20 ENCOUNTER — HOSPITAL ENCOUNTER (OUTPATIENT)
Dept: MAMMOGRAPHY | Facility: CLINIC | Age: 50
Discharge: HOME | End: 2020-07-20

## 2020-07-20 DIAGNOSIS — Z12.31 SCREENING MAMMOGRAM, ENCOUNTER FOR: ICD-10-CM

## 2020-08-02 ENCOUNTER — RECORDS - HEALTHEAST (OUTPATIENT)
Dept: ADMINISTRATIVE | Facility: OTHER | Age: 50
End: 2020-08-02

## 2020-09-09 ENCOUNTER — TELEPHONE (OUTPATIENT)
Dept: EDUCATION SERVICES | Facility: CLINIC | Age: 50
End: 2020-09-09

## 2020-09-09 DIAGNOSIS — E11.3299 NONPROLIFERATIVE DIABETIC RETINOPATHY (H): ICD-10-CM

## 2020-09-09 RX ORDER — IBUPROFEN 600 MG/1
1 TABLET ORAL PRN
Qty: 2 EACH | Refills: 3 | Status: SHIPPED | OUTPATIENT
Start: 2020-09-09 | End: 2020-09-09

## 2020-09-09 RX ORDER — IBUPROFEN 600 MG/1
TABLET ORAL
Qty: 2 EACH | Refills: 3 | Status: SHIPPED | OUTPATIENT
Start: 2020-09-09 | End: 2022-03-01

## 2020-09-09 NOTE — TELEPHONE ENCOUNTER
Chadd's warranty is up on her Medtronic pump 9/30/2020.  She would like to move to a Tandem pump and a DexCom.  She will be moving from [facility] to [facility].  Will reach out to Martha Verduzco at [facility] to see if we can start the upgrade process now.Ally Mcguire RN,CDE

## 2020-09-09 NOTE — PROGRESS NOTES
"dm 1  Leif Bell, [facility]    Patients Glucose Data was Sent via Email     Sera Adkins is a 49 year old female who is being evaluated via a billable telephone visit.      The patient has been notified of following:     \"This telephone visit will be conducted via a call between you and your physician/provider. We have found that certain health care needs can be provided without the need for a physical exam.  This service lets us provide the care you need with a short phone conversation.  If a prescription is necessary we can send it directly to your pharmacy.  If lab work is needed we can place an order for that and you can then stop by our lab to have the test done at a later time.    Telephone visits are billed at different rates depending on your insurance coverage. During this emergency period, for some insurers they may be billed the same as an in-person visit.  Please reach out to your insurance provider with any questions.    If during the course of the call the physician/provider feels a telephone visit is not appropriate, you will not be charged for this service.\"    Patient has given verbal consent for Telephone visit?  Yes    What phone number would you like to be contacted at? [phone]    How would you like to obtain your AVS? Jiberish    Phone call duration: 28 minutes    HPI:   Sera is a 48 yo woman here for follow up of type 1 diabetes since age 15.  She also has been seen by Dr. Roy. She Last saw Hannah Whitley and was  urged  to focus on taking her boluses before eating.      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  She also has been less active as she had a knee injury while biking about 3 weeks ago and she feels this is leading to higher blood sugars.  She also wonders about absorption.  She has been using sure T infusion sets but knows that at several recent sites after insertion the " insulin going and burns or hurts and she knows is not a great site, she wonders if she is needing more insulin because of this.    We both noted that she is having high blood sugars overnight.  She notes that at times she will get up and give a correction dose and at times this leads to a low by early morning.  She currently has her high alarm set at 250 during the day and 400 overnight if she does not want going off a lot and think she might ignore it if it was all the time.  She is never use the rise alarm previously.      Her pump will soon  and she has met with JUVENAL Mcguire to discuss transitioning to the T slim with control IQ.  She needs an updated visit note for this transition.    Sera also endorses forgetting some boluses, and needing to work on bolusing prior to eating.  She often will have little time between giving her bolus and starting to eat.      Current pump settings and is still in usage:    Basal 0.7  7 am 0.9    IC ratio- 1:15g (although she does not use the calculator- is now taking close to this according to a diet recall. Typically boluses between 2-4 units at a time).  She has been trying to use a little bit more insulin with her breakfast closer to 2 or 3 units per 15 g.    She does use bolus wizard to give correction doses, particularly overnight.    Sensitivity- 50   Target glucose-   Active insulin time- 4 hours    TDD last week 34.4 , 30 days 33.5  Avg basal 59%, bolus 41%    She recently had her A1c drawn and had a nurse check her vitals.  A1c: 7.6%  Blood pressure: 108/68  Weight: 153.8    CGM data:                      Sera is working in the clinic, but doing the vast majority of her visits as telephone visits during this pandemic.  She has no other concerns today.     ROS  GENERAL: Feeling well no fevers, chills, malaise, night sweats.   HEENT: Denies vision problems, cough, sinus drainage, tinnitus, sinus pressure  CV: no chest pain, pressure,  "palpitations  LUNGS: no SOB, [place], cough  GI: no  abdominal pain or concerns  EXTREMITIES: no rashes, ulcers, edema  NEUROLOGY: no changes in vision, tingling or numbness in hands or feet.   MSK: Needed sutures after recent knee injury while bicycling  PSYCH: mood stable      Past Medical History:   Diagnosis Date     Anxiety      Diabetes mellitus (H)     Type 1       Past Surgical History:   Procedure Laterality Date      SECTION      x2       No family history on file.    Social History   Social Hx:  Single mother.  Two teenage children.  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  Enjoys being physically active, mostly biking, but also running and HIIT.       Current Outpatient Medications   Medication     buPROPion (WELLBUTRIN XL) 150 MG 24 hr tablet     Calcium Carbonate-Vitamin D (CALCIUM 500+D PO)     chlorthalidone (HYGROTON) 25 MG tablet     glucagon (GLUCAGON EMERGENCY) 1 MG kit     glucose blood VI test strips (ONE TOUCH ULTRA TEST) strip     infusion set (PARADIGM QUICK-SET 23\" 6MM) [facility] pump supply     insulin aspart (NOVOLOG VIAL) 100 UNITS/ML vial     insulin glargine (LANTUS PEN) 100 UNIT/ML pen     insulin syringe-needle U-100 (30G X 1/2\" 0.3 ML) 30G X 1/2\" 0.3 ML miscellaneous     losartan (COZAAR) 25 MG tablet     acetone urine (KETOSTIX) test strip     ASPIRIN PO     magnesium 250 MG tablet     No current facility-administered medications for this visit.           Allergies   Allergen Reactions     Percocet [Oxycodone-Acetaminophen] Nausea and Nausea and Vomiting       Physical Exam    Reported data:  A1c: 7.6%  Blood pressure: 108/68  Weight: 153.8    GENERAL:  Alert and oriented X3.  LUNGS: breathing unlabored.   PSYCH: normal thought process  RESULTS  Lab Results   Component Value Date    A1C 7.7 (H) 2020    A1C 7.5 (H) 2019    A1C 7.8 (H) 2019    A1C 7.7 (H) 2016    A1C 7.6 (A) 2016    HEMOGLOBINA1 7.4 (A) 2020    HEMOGLOBINA1 7.2 (A) 2019    " HEMOGLOBINA1 7.0 (A) 04/16/2018    HEMOGLOBINA1 7.0 (A) 12/11/2017    HEMOGLOBINA1 7.4 (A) 06/05/2017       TSH   Date Value Ref Range Status   01/20/2020 2.07 0.40 - 4.00 mU/L Final   09/16/2019 1.89 0.40 - 4.00 mU/L Final   03/18/2019 2.40 0.40 - 4.00 mU/L Final   12/11/2017 1.69 0.40 - 4.00 mU/L Final   12/13/2016 3.72 0.40 - 4.00 mU/L Final     T4 Free   Date Value Ref Range Status   03/18/2019 0.81 0.76 - 1.46 ng/dL Final       ALT   Date Value Ref Range Status   01/20/2020 24 0 - 50 U/L Final   09/16/2019 25 0 - 50 U/L Final   ]    Recent Labs   Lab Test 01/20/20  1041 09/16/19  1043   CHOL 132 112   HDL 59 42*   LDL 64 59   TRIG 44 58       Lab Results   Component Value Date     01/20/2020      Lab Results   Component Value Date    POTASSIUM 4.0 01/20/2020     Lab Results   Component Value Date    CHLORIDE 105 01/20/2020     Lab Results   Component Value Date    SCOTTY 9.2 01/20/2020     Lab Results   Component Value Date    CO2 32 01/20/2020     Lab Results   Component Value Date    BUN 18 01/20/2020     Lab Results   Component Value Date    CR 0.94 01/20/2020       GFR Estimate   Date Value Ref Range Status   01/20/2020 72 >60 mL/min/[1.73_m2] Final     Comment:     Non  GFR Calc  Starting 12/18/2018, serum creatinine based estimated GFR (eGFR) will be   calculated using the Chronic Kidney Disease Epidemiology Collaboration   (CKD-EPI) equation.     09/16/2019 63 >60 mL/min/[1.73_m2] Final     Comment:     Non  GFR Calc  Starting 12/18/2018, serum creatinine based estimated GFR (eGFR) will be   calculated using the Chronic Kidney Disease Epidemiology Collaboration   (CKD-EPI) equation.     03/18/2019 77 >60 mL/min/[1.73_m2] Final     Comment:     Non  GFR Calc  Starting 12/18/2018, serum creatinine based estimated GFR (eGFR) will be   calculated using the Chronic Kidney Disease Epidemiology Collaboration   (CKD-EPI) equation.           Lab Results    Component Value Date    MICROL 6 01/20/2020     No results found for: MICROALBUMIN  No results found for: CPEPT, GADAB, ISCAB    No results found for: B12]    Most recent eye exam date: : Not Found   9/8- creatinine 1.1.    8/22/18- 0.94    Assessment/Plan:     1.  Type 1 diabetes-blood sugar slightly above goal.    Her blood sugars have been high overnight and following both breakfast and dinner.  She is forgetting some boluses of more frequently having limited time between bolus and meal and will work on this.    Additionally I did recommend that she increase her basal rate overnight from 0.7 units/h to 0.75 units/h.    She also did increase her insulin sensitivity for corrections from 1 unit per 50 mg/dL to 1 unit per 55 mg/dL.    If she continues to have high blood sugar overnight I advised that she further increase her basal to 0.8 units/h.  If she continues to have low blood sugar following corrections particularly in the early morning I advised that she further increase her sensitivity to 1 unit per 60 mg/dL.    Continue to work on physical activity and giving bolus prior to meal.  Specifically advised her that if her blood sugar prior to meal is greater than 140 but she waited at least 15 minutes after giving bolus prior to eating.    She also did set a rise alert on her Dexcom.  Furthermore advised that she should be able to decrease her Dexcom from 250 closer to 200 as if she is giving her bolus ahead of time her blood sugar should not frequently rise over 200 with meals.    Nila that you might also consider in the following websites for free at home exercise options during COVID:    [email or web address]    [email or web address]/    2.  Risk factors-     Retinopathy:  No.  Had eye exam within last year.   Nephropathy:  BP well controlled (she checked in her clinic). Normal creatinine. No microalbuminuria. She had SEKOU last year, but renal function now back to baseline.  Neuropathy: decreased vibration  sensation in left great toe in the past.  Will need updated foot exam when can return to clinic hopefully at next visit.  feet: OK, no ulcers.  Again needs updated exam at return to clinic.  Lipids:  LDL at target.  Patient taking statin    3.   Agrees to establish with Dr. Romero or another MD provider as Dr. Roy is no longer in our clinic.      Greater than 50% of a 28-minute visit was spent in counseling in regards to avoiding hyperglycemia and preventing complications of diabetes.    It is my privilege to be involved in the care of the above patient.     Kristi Mcfarlane PA-C, MPAS  [facility]  Diabetes, Endocrinology, and Metabolism  [phone] Appointments/Nurse  [phone] Fax  [phone] pager  [phone] nurse line

## 2020-09-10 ENCOUNTER — VIRTUAL VISIT (OUTPATIENT)
Dept: ENDOCRINOLOGY | Facility: CLINIC | Age: 50
End: 2020-09-10
Payer: COMMERCIAL

## 2020-09-10 ENCOUNTER — RECORDS - HEALTHEAST (OUTPATIENT)
Dept: ADMINISTRATIVE | Facility: OTHER | Age: 50
End: 2020-09-10

## 2020-09-10 DIAGNOSIS — E11.3299 NONPROLIFERATIVE DIABETIC RETINOPATHY (H): ICD-10-CM

## 2020-09-10 DIAGNOSIS — E10.8 TYPE 1 DIABETES MELLITUS WITH COMPLICATIONS (H): Primary | ICD-10-CM

## 2020-09-10 NOTE — PATIENT INSTRUCTIONS
Visit Summary:  Type 1 diabetes-blood sugar slightly above goal.    Her blood sugars have been high overnight and following both breakfast and dinner.  She is forgetting some boluses of more frequently having limited time between bolus and meal and will work on this.    Additionally I did recommend that she increase her basal rate overnight from 0.7 units/h to 0.75 units/h.    She also did increase her insulin sensitivity for corrections from 1 unit per 50 mg/dL to 1 unit per 55 mg/dL.    If she continues to have high blood sugar overnight I advised that she further increase her basal to 0.8 units/h.  If she continues to have low blood sugar following corrections particularly in the early morning I advised that she further increase her sensitivity to 1 unit per 60 mg/dL.    Continue to work on physical activity and giving bolus prior to meal.  Specifically advised her that if her blood sugar prior to meal is greater than 140 but she waited at least 15 minutes after giving bolus prior to eating.    She also did set a rise alert on her Dexcom.  Furthermore advised that she should be able to decrease her Dexcom from 250 closer to 200 as if she is giving her bolus ahead of time her blood sugar should not frequently rise over 200 with meals.    Nila that you might also consider in the following websites for free at home exercise options during COVID:    [email or web address]    [email or web address]      My best wishes,    Kristi Mcfarlane PA-C, MPAS  [facility]  Diabetes, Endocrinology, and Metabolism  [phone] Appointments/Nurse  [phone] Fax  [phone] URGENTafter hours/weekend Endocrinologist on call

## 2020-09-10 NOTE — LETTER
"9/10/2020       RE: Sera Adkins  [address] [address]     Dear Colleague,    Thank you for referring your patient, Sera Adkins, to the [facility] ENDOCRINOLOGY at [facility]. Please see a copy of my visit note below.    dm 1  Leif Bell CMA    Patients Glucose Data was Sent via Email     Sera Adkins is a 49 year old female who is being evaluated via a billable telephone visit.      The patient has been notified of following:     \"This telephone visit will be conducted via a call between you and your physician/provider. We have found that certain health care needs can be provided without the need for a physical exam.  This service lets us provide the care you need with a short phone conversation.  If a prescription is necessary we can send it directly to your pharmacy.  If lab work is needed we can place an order for that and you can then stop by our lab to have the test done at a later time.    Telephone visits are billed at different rates depending on your insurance coverage. During this emergency period, for some insurers they may be billed the same as an in-person visit.  Please reach out to your insurance provider with any questions.    If during the course of the call the physician/provider feels a telephone visit is not appropriate, you will not be charged for this service.\"    Patient has given verbal consent for Telephone visit?  Yes    What phone number would you like to be contacted at? [phone]    How would you like to obtain your AVS? More    Phone call duration: 28 minutes    HPI:   Sera is a 48 yo woman here for follow up of type 1 diabetes since age 15.  She also has been seen by Dr. Roy. She Last saw Hannah Whitley and was  urged  to focus on taking her boluses before eating.      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  She also has been less active as she had a knee injury while biking about 3 weeks ago and she feels this is leading to higher blood sugars.  She also wonders about absorption.  She has been using sure T infusion sets but knows that at several recent sites after insertion the insulin going and burns or hurts and she knows is not a great site, she wonders if she is needing more insulin because of this.    We both noted that she is having high blood sugars overnight.  She notes that at times she will get up and give a correction dose and at times this leads to a low by early morning.  She currently has her high alarm set at 250 during the day and 400 overnight if she does not want going off a lot and think she might ignore it if it was all the time.  She is never use the rise alarm previously.      Her pump will soon  and she has met with JUVENAL Mcguire to discuss transitioning to the T slim with control [place].  She needs an updated visit note for this transition.    Sera also endorses forgetting some boluses, and needing to work on bolusing prior to eating.  She often will have little time between giving her bolus and starting to eat.      Current pump settings and is still in usage:    Basal 0.7  7 am 0.9    IC ratio- 1:15g (although she does not use the calculator- is now taking close to this according to a diet recall. Typically boluses between 2-4 units at a time).  She has been trying to use a little bit more insulin with her breakfast closer to 2 or 3 units per 15 g.    She does use bolus wizard to give correction doses, particularly overnight.    Sensitivity- 50   Target glucose-   Active insulin time- 4 hours    TDD last week 34.4 , 30 days 33.5  Avg basal 59%, bolus 41%    She recently had her A1c drawn and had a nurse check her vitals.  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  She has no other concerns today.     ROS  GENERAL: Feeling well no fevers, chills, malaise, night sweats.   HEENT: Denies vision problems, cough, sinus drainage, tinnitus, sinus pressure  CV: no chest pain, pressure, palpitations  LUNGS: no SOB, [place], cough  GI: no  abdominal pain or concerns  EXTREMITIES: no rashes, ulcers, edema  NEUROLOGY: no changes in vision, tingling or numbness in hands or feet.   MSK: Needed sutures after recent knee injury while bicycling  PSYCH: mood stable      Past Medical History:   Diagnosis Date     Anxiety      Diabetes mellitus (H)     Type 1       Past Surgical History:   Procedure Laterality Date      SECTION      x2       No family history on file.    Social History   Social Hx:  Single mother.  Two teenage children.  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  Enjoys being physically active, mostly biking, but also running and HIIT.       Current Outpatient Medications   Medication     buPROPion (WELLBUTRIN XL) 150 MG 24 hr tablet     Calcium Carbonate-Vitamin D (CALCIUM 500+D PO)     chlorthalidone (HYGROTON) 25 MG tablet     glucagon (GLUCAGON EMERGENCY) 1 MG kit     glucose blood VI test strips (ONE TOUCH ULTRA TEST) strip     infusion set (PARADIGM QUICK-SET 23\" 6MM) [facility] pump supply     insulin aspart (NOVOLOG VIAL) 100 UNITS/ML vial     insulin glargine (LANTUS PEN) 100 UNIT/ML pen     insulin syringe-needle U-100 (30G X 1/2\" 0.3 ML) 30G X 1/2\" 0.3 ML miscellaneous     losartan (COZAAR) 25 MG tablet     acetone urine (KETOSTIX) test strip     ASPIRIN PO     magnesium 250 MG tablet     No current facility-administered medications for this visit.           Allergies   Allergen Reactions     Percocet [Oxycodone-Acetaminophen] Nausea and Nausea and Vomiting       Physical Exam    Reported data:  A1c: 7.6%  Blood pressure: 108/68  Weight: 153.8    GENERAL:  Alert and oriented X3.  LUNGS: breathing unlabored.   PSYCH: " normal thought process  RESULTS  Lab Results   Component Value Date    A1C 7.7 (H) 01/20/2020    A1C 7.5 (H) 09/16/2019    A1C 7.8 (H) 03/18/2019    A1C 7.7 (H) 12/13/2016    A1C 7.6 (A) 06/06/2016    HEMOGLOBINA1 7.4 (A) 01/20/2020    HEMOGLOBINA1 7.2 (A) 09/16/2019    HEMOGLOBINA1 7.0 (A) 04/16/2018    HEMOGLOBINA1 7.0 (A) 12/11/2017    HEMOGLOBINA1 7.4 (A) 06/05/2017       TSH   Date Value Ref Range Status   01/20/2020 2.07 0.40 - 4.00 mU/L Final   09/16/2019 1.89 0.40 - 4.00 mU/L Final   03/18/2019 2.40 0.40 - 4.00 mU/L Final   12/11/2017 1.69 0.40 - 4.00 mU/L Final   12/13/2016 3.72 0.40 - 4.00 mU/L Final     T4 Free   Date Value Ref Range Status   03/18/2019 0.81 0.76 - 1.46 ng/dL Final       ALT   Date Value Ref Range Status   01/20/2020 24 0 - 50 U/L Final   09/16/2019 25 0 - 50 U/L Final   ]    Recent Labs   Lab Test 01/20/20  1041 09/16/19  1043   CHOL 132 112   HDL 59 42*   LDL 64 59   TRIG 44 58       Lab Results   Component Value Date     01/20/2020      Lab Results   Component Value Date    POTASSIUM 4.0 01/20/2020     Lab Results   Component Value Date    CHLORIDE 105 01/20/2020     Lab Results   Component Value Date    SCOTTY 9.2 01/20/2020     Lab Results   Component Value Date    CO2 32 01/20/2020     Lab Results   Component Value Date    BUN 18 01/20/2020     Lab Results   Component Value Date    CR 0.94 01/20/2020       GFR Estimate   Date Value Ref Range Status   01/20/2020 72 >60 mL/min/[1.73_m2] Final     Comment:     Non  GFR Calc  Starting 12/18/2018, serum creatinine based estimated GFR (eGFR) will be   calculated using the Chronic Kidney Disease Epidemiology Collaboration   (CKD-EPI) equation.     09/16/2019 63 >60 mL/min/[1.73_m2] Final     Comment:     Non  GFR Calc  Starting 12/18/2018, serum creatinine based estimated GFR (eGFR) will be   calculated using the Chronic Kidney Disease Epidemiology Collaboration   (CKD-EPI) equation.     03/18/2019 77 >60  mL/min/[1.73_m2] Final     Comment:     Non  GFR Calc  Starting 12/18/2018, serum creatinine based estimated GFR (eGFR) will be   calculated using the Chronic Kidney Disease Epidemiology Collaboration   (CKD-EPI) equation.           Lab Results   Component Value Date    MICROL 6 01/20/2020     No results found for: MICROALBUMIN  No results found for: CPEPT, GADAB, ISCAB    No results found for: B12]    Most recent eye exam date: : Not Found   9/8- creatinine 1.1.    8/22/18- 0.94    Assessment/Plan:     1.  Type 1 diabetes-blood sugar slightly above goal.    Her blood sugars have been high overnight and following both breakfast and dinner.  She is forgetting some boluses of more frequently having limited time between bolus and meal and will work on this.    Additionally I did recommend that she increase her basal rate overnight from 0.7 units/h to 0.75 units/h.    She also did increase her insulin sensitivity for corrections from 1 unit per 50 mg/dL to 1 unit per 55 mg/dL.    If she continues to have high blood sugar overnight I advised that she further increase her basal to 0.8 units/h.  If she continues to have low blood sugar following corrections particularly in the early morning I advised that she further increase her sensitivity to 1 unit per 60 mg/dL.    Continue to work on physical activity and giving bolus prior to meal.  Specifically advised her that if her blood sugar prior to meal is greater than 140 but she waited at least 15 minutes after giving bolus prior to eating.    She also did set a rise alert on her Dexcom.  Furthermore advised that she should be able to decrease her Dexcom from 250 closer to 200 as if she is giving her bolus ahead of time her blood sugar should not frequently rise over 200 with meals.    Nila that you might also consider in the following websites for free at home exercise options during COVID:    [email or web address]    [email or web address]    2.  Risk  factors-     Retinopathy:  No.  Had eye exam within last year.   Nephropathy:  BP well controlled (she checked in her clinic). Normal creatinine. No microalbuminuria. She had SEKOU last year, but renal function now back to baseline.  Neuropathy: decreased vibration sensation in left great toe in the past.  Will need updated foot exam when can return to clinic hopefully at next visit.  feet: OK, no ulcers.  Again needs updated exam at return to clinic.  Lipids:  LDL at target.  Patient taking statin    3.   Agrees to establish with Dr. Romero or another MD provider as Dr. Roy is no longer in our clinic.      Greater than 50% of a 28-minute visit was spent in counseling in regards to avoiding hyperglycemia and preventing complications of diabetes.    It is my privilege to be involved in the care of the above patient.     Kristi Mcfarlane PA-C, MPAS  [facility]  Diabetes, Endocrinology, and Metabolism  [phone] Appointments/Nurse  [phone] Fax  [phone] pager  [phone] nurse line

## 2020-09-11 ENCOUNTER — MEDICAL CORRESPONDENCE (OUTPATIENT)
Dept: HEALTH INFORMATION MANAGEMENT | Facility: CLINIC | Age: 50
End: 2020-09-11

## 2020-09-14 ENCOUNTER — DOCUMENTATION ONLY (OUTPATIENT)
Dept: ENDOCRINOLOGY | Facility: CLINIC | Age: 50
End: 2020-09-14

## 2020-09-25 NOTE — TELEPHONE ENCOUNTER
Chadd can get a Tandem pump through her pharmacy benefit.  Follow up with provider needed.  This was scheduled for tomorrow.  Ally Mcguire RN,CDE

## 2020-10-09 ENCOUNTER — COMMUNICATION - HEALTHEAST (OUTPATIENT)
Dept: FAMILY MEDICINE | Facility: CLINIC | Age: 50
End: 2020-10-09

## 2020-10-09 DIAGNOSIS — E10.9 TYPE 1 DIABETES MELLITUS WITHOUT COMPLICATION (H): ICD-10-CM

## 2020-10-12 ENCOUNTER — AMBULATORY - HEALTHEAST (OUTPATIENT)
Dept: LAB | Facility: CLINIC | Age: 50
End: 2020-10-12

## 2020-10-12 DIAGNOSIS — E10.9 TYPE 1 DIABETES MELLITUS WITHOUT COMPLICATION (H): ICD-10-CM

## 2020-10-12 LAB
ANION GAP SERPL CALCULATED.3IONS-SCNC: 11 MMOL/L (ref 5–18)
BUN SERPL-MCNC: 17 MG/DL (ref 8–22)
CALCIUM SERPL-MCNC: 9.4 MG/DL (ref 8.5–10.5)
CHLORIDE BLD-SCNC: 98 MMOL/L (ref 98–107)
CO2 SERPL-SCNC: 28 MMOL/L (ref 22–31)
CREAT SERPL-MCNC: 1.13 MG/DL (ref 0.6–1.1)
GFR SERPL CREATININE-BSD FRML MDRD: 51 ML/MIN/1.73M2
GLUCOSE BLD-MCNC: 213 MG/DL (ref 70–125)
POTASSIUM BLD-SCNC: 3.8 MMOL/L (ref 3.5–5)
SODIUM SERPL-SCNC: 137 MMOL/L (ref 136–145)

## 2020-10-17 ENCOUNTER — COMMUNICATION - HEALTHEAST (OUTPATIENT)
Dept: FAMILY MEDICINE | Facility: CLINIC | Age: 50
End: 2020-10-17

## 2020-11-02 ENCOUNTER — COMMUNICATION - HEALTHEAST (OUTPATIENT)
Dept: FAMILY MEDICINE | Facility: CLINIC | Age: 50
End: 2020-11-02

## 2020-11-02 DIAGNOSIS — F41.1 ANXIETY STATE: ICD-10-CM

## 2020-11-05 ENCOUNTER — APPOINTMENT (OUTPATIENT)
Dept: EDUCATION SERVICES | Facility: CLINIC | Age: 50
End: 2020-11-05
Payer: COMMERCIAL

## 2020-11-07 ENCOUNTER — HEALTH MAINTENANCE LETTER (OUTPATIENT)
Age: 50
End: 2020-11-07

## 2020-11-12 ENCOUNTER — VIRTUAL VISIT (OUTPATIENT)
Dept: EDUCATION SERVICES | Facility: CLINIC | Age: 50
End: 2020-11-12
Payer: COMMERCIAL

## 2020-11-12 DIAGNOSIS — E10.8 TYPE 1 DIABETES MELLITUS WITH COMPLICATIONS (H): Primary | ICD-10-CM

## 2020-11-12 PROCEDURE — 99207 PR NO CHARGE NURSE ONLY: CPT | Mod: TEL

## 2020-11-13 NOTE — PROGRESS NOTES
DIABETES EDUCATOR NOTE:    Purpose of today's visit:  Optimize Chadd's insulin pump settings      Subjective:  See Chadd's e-mail below    Objective:                Assessment/Plan:  1. Recommendations for adjustments below  2. Check in with Chadd next week to see if rate changes were helpful    Any diabetes medication dose changes were made via the [facility] Protocol and Collaborative Practice Agreement. A copy of this encounter was provided to the patient's referring      Time spent: 20 minutes  No charges were submitted for this visit    See Chadd's e-mail below:      Reply:    Hi Chadd,    I am able to get into your T:Connect account and look at your sensor tracings.  I can only see full days though, so today is not visible.  I have been studying the reports a bit though and have some questions/observations.      I see a pattern of lows in the afternoon or evening.  Sometimes this is related to activity but sometimes not.  Your basal rate jumps up at noon.  I would try pulling that back a bit maybe 0.85 or letting 0.8 run all day.  I know that CIQ is adjusting the basal rates but it is starting with what you have programmed in there.      You are often high in the evenings and much of this is rebound from the low you have late afternoon.  I can also see that CIQ is working overnight and bringing you down.     You often override your bolus calculator (39% of the time).  Are you doing this to get more or less insulin?      One of the bigger issues that we have been able to zero in on since sensors came into play is the pre meal bolus.  This makes a big difference in cutting that glucose spike off at the pass!  If you can give the insulin a 10 minute head start for foods that you know are going to hit your blood stream quickly that would be helpful.  You'll notice better post meal blood sugars.      For higher fat foods you may want to use the extend bolus feature in your last screen before you confirm the bolus and do a  70/30 split.  30% now and 70% over 3-4 hours.  Think pizza for this one!    Regarding this morning, caffeine is something that some people have to bolus 1-2 units for.  Adrenaline raises blood sugar too, so was it exercise induced?  Misjudged carbs?  Just remember that CIQ just gives you 60% of your correction factor hourly.  So if you don't bolus or misjudge carbs it may not be able to get you back down quickly.    Let me know what you think.    Sorry this got so long!  Ally  ?  Label: DeleteExchangeContent (2 years) Expires: Sat 2022 12:09 PM  Chadd Adkins <[email or web address]>  Thu 2020 12:09 PM  Deion Canales.   I just want to run this by you. I exercised this morning from 6:25 to 7:05 and did not turn down pump. Just an easy ride on indoor bike. Then ate b-fast and drank coffee with sweetener. I obviously misjudged the carbs in the sweetener as glucose shot up but I did give 4 u for a slice of pumpkin bread and coffee. The control IQ is on but it didn t seem to help drop my sugar down. I m assuming control IQ could not accommodate such high readings. ?? I would like to share this with you but I didn t click the link to share and enroll before it  yesterday. Can you send that again?     Thanks!     Chadd

## 2020-12-22 DIAGNOSIS — E11.9 DIABETES MELLITUS (H): ICD-10-CM

## 2020-12-22 NOTE — TELEPHONE ENCOUNTER
insulin aspart (NOVOLOG VIAL) 100 UNITS/ML vial  Last Written Prescription Date:  3/5/20  Last Fill Quantity: 60ml,   # refills: 2  Last Office Visit :9/10/20  Future Office visit:  none    Routing refill request to provider for review/approval because:  Endo triage to fill.

## 2021-01-06 ENCOUNTER — TELEPHONE (OUTPATIENT)
Dept: EDUCATION SERVICES | Facility: CLINIC | Age: 51
End: 2021-01-06

## 2021-01-06 NOTE — TELEPHONE ENCOUNTER
E-mail from Chadd:    Deion Canales.  I got your message a while back but have been unable to open the message again. You had some recommendations but I forget what exactly you suggested. I wanted to give you some good news. I checked my A1C at work today and it came down to 6.4%!! I was ecstatic!  It had been around 7.6% when last checked. I don t recall when I last checked it but believe it was some time in September and I started the new pump at the beginning of November. This pump is amazing. It is changing my life. Of course, I still have highs that need a correction bolus but I m able to tackle them much more quickly and turn them around.     I was only confused by one thing. My Dexcom Clarity marie showed my A1C based on its readings should be around 7.1% so I m not sure re why the difference.    Any way, I was hoping you could advise me when my next diabetic check up is due. Also, please share any recommendations you may have again.    Thanks so much!    Chadd Adkins    Reply:    Deion Florentino,  I am so happy to hear that you like the new pump.  It is great when blood sugar starts moving in the right direction.  It looks like you are due any time for follow up with one of our PA's.  On January 10 it will be three months and that is the typical interval recommended.      Kristi Mfcarlane also noted that you should establish with a new endo because Dr. Roy has gone.  I imagine it will take a bit to get in with an endo so it would be good to get something on the books for that.      I tried to look at your pump data, but it defaults to November.  That must be when you last uploaded.  If you use the Tandem mobile marie you do not need to upload.  The pump data streams automatically to the cloud.  People tend to like having that on their phone.      If you want to upload, I would be happy to take a peek at your pump data.  Just shoot me a message when you do.  The projected a1c can be confusing.  Sometimes it correlates,  sometimes not.      I'm very excited for you.  Keep going...you are doing it!  Ally  ?

## 2021-01-10 ENCOUNTER — RECORDS - HEALTHEAST (OUTPATIENT)
Dept: ADMINISTRATIVE | Facility: OTHER | Age: 51
End: 2021-01-10

## 2021-01-19 ENCOUNTER — OFFICE VISIT - HEALTHEAST (OUTPATIENT)
Dept: FAMILY MEDICINE | Facility: CLINIC | Age: 51
End: 2021-01-19

## 2021-01-19 DIAGNOSIS — R10.32 LLQ ABDOMINAL PAIN: ICD-10-CM

## 2021-01-19 DIAGNOSIS — K59.00 CONSTIPATION, UNSPECIFIED CONSTIPATION TYPE: ICD-10-CM

## 2021-01-19 LAB
BASOPHILS # BLD AUTO: 0 THOU/UL (ref 0–0.2)
BASOPHILS NFR BLD AUTO: 1 % (ref 0–2)
EOSINOPHIL # BLD AUTO: 0.3 THOU/UL (ref 0–0.4)
EOSINOPHIL NFR BLD AUTO: 4 % (ref 0–6)
ERYTHROCYTE [DISTWIDTH] IN BLOOD BY AUTOMATED COUNT: 10.6 % (ref 11–14.5)
HCT VFR BLD AUTO: 39 % (ref 35–47)
HGB BLD-MCNC: 12.9 G/DL (ref 12–16)
LYMPHOCYTES # BLD AUTO: 1.2 THOU/UL (ref 0.8–4.4)
LYMPHOCYTES NFR BLD AUTO: 13 % (ref 20–40)
MCH RBC QN AUTO: 30.5 PG (ref 27–34)
MCHC RBC AUTO-ENTMCNC: 33 G/DL (ref 32–36)
MCV RBC AUTO: 92 FL (ref 80–100)
MONOCYTES # BLD AUTO: 0.4 THOU/UL (ref 0–0.9)
MONOCYTES NFR BLD AUTO: 4 % (ref 2–10)
NEUTROPHILS # BLD AUTO: 7 THOU/UL (ref 2–7.7)
NEUTROPHILS NFR BLD AUTO: 78 % (ref 50–70)
PLATELET # BLD AUTO: 211 THOU/UL (ref 140–440)
PMV BLD AUTO: 8.8 FL (ref 7–10)
RBC # BLD AUTO: 4.22 MILL/UL (ref 3.8–5.4)
WBC: 8.9 THOU/UL (ref 4–11)

## 2021-02-01 ENCOUNTER — OFFICE VISIT - HEALTHEAST (OUTPATIENT)
Dept: FAMILY MEDICINE | Facility: CLINIC | Age: 51
End: 2021-02-01

## 2021-02-01 DIAGNOSIS — G50.0 TRIGEMINAL NEURALGIA: ICD-10-CM

## 2021-02-01 DIAGNOSIS — U07.1 CLINICAL DIAGNOSIS OF COVID-19: ICD-10-CM

## 2021-02-10 ENCOUNTER — COMMUNICATION - HEALTHEAST (OUTPATIENT)
Dept: FAMILY MEDICINE | Facility: CLINIC | Age: 51
End: 2021-02-10

## 2021-02-10 DIAGNOSIS — E10.9 TYPE 1 DIABETES MELLITUS (H): ICD-10-CM

## 2021-03-10 ENCOUNTER — OFFICE VISIT - HEALTHEAST (OUTPATIENT)
Dept: FAMILY MEDICINE | Facility: CLINIC | Age: 51
End: 2021-03-10

## 2021-03-10 DIAGNOSIS — R51.9 NONINTRACTABLE HEADACHE, UNSPECIFIED CHRONICITY PATTERN, UNSPECIFIED HEADACHE TYPE: ICD-10-CM

## 2021-03-10 DIAGNOSIS — M54.2 NECK PAIN: ICD-10-CM

## 2021-03-10 DIAGNOSIS — R68.84 JAW PAIN: ICD-10-CM

## 2021-03-11 ENCOUNTER — COMMUNICATION - HEALTHEAST (OUTPATIENT)
Dept: FAMILY MEDICINE | Facility: CLINIC | Age: 51
End: 2021-03-11

## 2021-03-15 ENCOUNTER — COMMUNICATION - HEALTHEAST (OUTPATIENT)
Dept: FAMILY MEDICINE | Facility: CLINIC | Age: 51
End: 2021-03-15

## 2021-03-15 ENCOUNTER — TELEPHONE (OUTPATIENT)
Dept: ENDOCRINOLOGY | Facility: CLINIC | Age: 51
End: 2021-03-15

## 2021-03-15 ENCOUNTER — TELEPHONE (OUTPATIENT)
Dept: EDUCATION SERVICES | Facility: CLINIC | Age: 51
End: 2021-03-15

## 2021-03-15 DIAGNOSIS — E10.8 TYPE 1 DIABETES MELLITUS WITH COMPLICATIONS (H): Primary | ICD-10-CM

## 2021-03-15 DIAGNOSIS — M54.2 NECK PAIN: ICD-10-CM

## 2021-03-15 NOTE — TELEPHONE ENCOUNTER
[facility] Call Center    Phone Message    May a detailed message be left on voicemail: yes     Reason for Call: Order(s): Other:   Reason for requested: Pump supplies and infusion sets needing to go through [facility], Phone: [phone]     **patient state she has not received anything**    Date needed: asap    Provider name: Gutierrez      Action Taken: Message routed to:  [facility] Endo    Travel Screening: Not Applicable

## 2021-03-15 NOTE — TELEPHONE ENCOUNTER
[facility] Call Center    Phone Message    May a detailed message be left on voicemail: yes     Reason for Call: Other: Per Patient is wanting to get a call back in regards to getting help to connect pump and sensors david dunlap on 04/12/2021, please advise.      Action Taken: Message routed to:  [facility] endo    Travel Screening: Not Applicable

## 2021-03-16 NOTE — TELEPHONE ENCOUNTER
Attempted to call Chadd back.  Left message.    E-mail sent to her:    Deion Florentino,  You are connected to our T:Connect portal.  I sent you an invitation to our Dexcom Clarity portal you just need to go in there and accept it.  We can get the DexCom tracings on T:Connect but there are some Clarity reports that I like better.  Being connected to both is helpful.  If you use the Tandem Diabetes phone marie your pump data will automatically stream to the cloud and you do not need to upload your pump prior to appts.    Have a good day, Chadd!  Ally

## 2021-03-22 ENCOUNTER — TELEPHONE (OUTPATIENT)
Dept: ENDOCRINOLOGY | Facility: CLINIC | Age: 51
End: 2021-03-22

## 2021-03-22 NOTE — TELEPHONE ENCOUNTER
[facility] Call Center    Phone Message    May a detailed message be left on voicemail: yes     Reason for Call: Medication Refill Request    Has the patient contacted the pharmacy for the refill? Yes   Name of medication being requested: Infusion set and reservoirs  Provider who prescribed the medication: Dr. Hinojosa  Pharmacy: Pt wants to make sure the 3 month supply of Rx goes through [facility].    Ph: [phone]  Fax: [phone]    Date medication is needed: within the week      Action Taken: Message routed to:  [facility] endo    Travel Screening: Not Applicable

## 2021-03-22 NOTE — TELEPHONE ENCOUNTER
Centralized Medication Refill Team note:  Infusion set and reservoirs  Provider who prescribed the medication: Dr. Hinojosa  Pharmacy: Pt wants to make sure the 3 month supply of Rx goes through [facility].    Ph: [phone]  Fax: [phone]  Per chart review: the following prescriptions were sent to:  [facility] [place], [place] - [address] AT PORTAL TO [facility] SITES  Fax [phone]  insulin cartridge (T:SLIM 3ML) misc pump supply 30 each 3 3/16/2021  --   Sig: Insulin cartridge to be used with pump as directed.  Change every 3 days or as directed.   Sent to pharmacy as: T:slim Insulin Cartridge 3ml     Insulin Pump Accessories MISC 10 each 3 3/16/2021  --   Sig: Tandem Insulin pump infusion sets per patient preference.  Change every 3 days.       MyChart to patient sent.

## 2021-03-23 ENCOUNTER — HOSPITAL ENCOUNTER (OUTPATIENT)
Dept: MRI IMAGING | Facility: HOSPITAL | Age: 51
Discharge: HOME | End: 2021-03-23
Attending: FAMILY MEDICINE

## 2021-03-23 DIAGNOSIS — M54.2 NECK PAIN: ICD-10-CM

## 2021-03-24 ENCOUNTER — COMMUNICATION - HEALTHEAST (OUTPATIENT)
Dept: FAMILY MEDICINE | Facility: CLINIC | Age: 51
End: 2021-03-24

## 2021-04-09 DIAGNOSIS — E10.8 TYPE 1 DIABETES MELLITUS WITH COMPLICATIONS (H): ICD-10-CM

## 2021-04-09 LAB
CHOLEST SERPL-MCNC: 134 MG/DL
HBA1C MFR BLD: 6.8 % (ref 0–5.6)
HDLC SERPL-MCNC: 50 MG/DL
LDL CHOLESTEROL: 75 MG/DL
TRIGL SERPL-MCNC: 48 MG/DL

## 2021-04-09 NOTE — PROGRESS NOTES
"Outcome for 04/09/21 2:02 PM :Left Voicemail for patient to call back  Outcome for 04/10/21 3:00 PM :Left Voicemail for patient to call back     Outcome for 04/12/21 8:33 AM :Patient is sharing data via clinic device website and patient has been instructed to update data on website. Find login information by using .[employer] dexcom is connnect and working tandem     Sera Adkins  is being evaluated via a billable video visit.      How would you like to obtain your AVS? More  For the video visit, send the invitation by: More   Will anyone else be joining your video visit? No      Sera Adkins is a 49 year old female who is being evaluated via a billable video visit.      The patient has been notified of following:     \"This video visit will be conducted via a call between you and your physician/provider. We have found that certain health care needs can be provided without the need for an in-person physical exam.  This service lets us provide the care you need with a video conversation.  If a prescription is necessary we can send it directly to your pharmacy.  If lab work is needed we can place an order for that and you can then stop by our lab to have the test done at a later time.    Video visits are billed at different rates depending on your insurance coverage.  Please reach out to your insurance provider with any questions.    If during the course of the call the physician/provider feels a video visit is not appropriate, you will not be charged for this service.\"    Patient has given verbal consent for Video visit? Yes    How would you like to obtain your AVS? More    Patient would like the video invitation sent by: Send to e-mail at: [email or web address]        HPI:   Sera is a 49 yo woman here for follow up of type 1 diabetes since age 15. She switched to the Tandem X2 pump last year and has really liked it.  She feels like it has helped her control quite a bit, but she still " "struggles with avoiding hypoglycemia with exercise.  She usually takes her bolus after eating and sometimes just relies on the auto correction to bring her down. She enters carbs if they are on the package, otherwise she just uses experience to dose (usually 2-4 units).   She uses a steel infusion set, as she was having site failures with the other sets.  This is mostly working well for her.   She checked her a1c at her clinic and it was down to 6.8%.     Recent labs (4/9/21):   A1c- 6.8%  Lipids-   Total Chol- 134   HDL- 50  TG- 48  LDL- 75    Weight: 162 lbs.   BP was 134/79 then repeat 127/71 at home.      Breakfast- oatmeal/frozen fruit at work.   Lunch- healthy frozen meal  Dinner- snacking when she gets home.   Exercise before she eats.   She goes out to eat about 3 nights a week.     Recent glucose:       Pump settings are as follow:     Profile 1 Profile Active at the time of upload  Start Time Basal Rate Correction Factor Carb Ratio Target BG  Midnight 0.800 u/hr 1u:50 mg/dL 1u:15.0 g 110 mg/dL  7:00 AM 0.900 u/hr 1u:50 mg/dL 1u:15.0 g 110 mg/dL  Calculated Total Daily Basal 20.9 units        Duration of Insulin:  3:00 hours         Carbohydrates:   On          Max Bolus:  10 units      5 miles run.  She disonnected for her run and glucose was \"hi\" and she changed her pump site.    She usually runs her basal at 15% with a bike ride, but sometimes has to turn it off. She also likes to do  HIIT classes, barbell, spinning, running. She usually takes her pump off during classes and has to give a correction when she goes back on the pump.    Sera's divorce is now final. She will be changing insurance.  She has no other concerns today.     ROS  GENERAL: Weight stable.  No fevers, chills, malaise, night sweats.   HEENT: no dysphagia, diplopia, neck pain or tenderness, dry/scratchy eyes, URI, cough, sinus drainage, tinnitus, sinus pressure  CV: no chest pain, pressure, palpitations, skipped beats, " LOC  LUNGS: no SOB, [place], cough, sputum production, wheezing   GI: no diarrhea, constipation, abdominal pain  EXTREMITIES: no rashes, ulcers, edema  NEUROLOGY: no changes in vision, tingling or numbness in hands or feet.   MSK: no muscle aches or pains, weakness  PSYCH: mood stable      Past Medical History:   Diagnosis Date     Anxiety      Diabetes mellitus (H)     Type 1       Past Surgical History:   Procedure Laterality Date      SECTION      x2       No family history on file.    Social History   Social Hx:  .  Two teenage children.  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  Enjoys being physically active, mostly biking, but also running and HIIT.     Socioeconomic History     Marital status: other     Spouse name: Not on file     Number of children: Not on file     Years of education: Not on file     Highest education level: Not on file   Occupational History     Not on file   Social Needs     Financial resource strain: Not on file     Food insecurity:     Worry: Not on file     Inability: Not on file     Transportation needs:     Medical: Not on file     Non-medical: Not on file   Tobacco Use     Smoking status: Never Smoker     Smokeless tobacco: Never Used   Substance and Sexual Activity     Alcohol use: No     Drug use: No     Sexual activity: Not on file   Lifestyle     Physical activity:     Days per week: Not on file     Minutes per session: Not on file     Stress: Not on file   Relationships     Social connections:     Talks on phone: Not on file     Gets together: Not on file     Attends Tenriism service: Not on file     Active member of club or organization: Not on file     Attends meetings of clubs or organizations: Not on file     Relationship status: Not on file     Intimate partner violence:     Fear of current or ex partner: Not on file     Emotionally abused: Not on file     Physically abused: Not on file     Forced sexual activity: Not on file   Other Topics Concern     Not on file  "  Social History Narrative     Not on file       Current Outpatient Medications   Medication     acetone urine (KETOSTIX) test strip     ASPIRIN PO     buPROPion (WELLBUTRIN XL) 150 MG 24 hr tablet     Calcium Carbonate-Vitamin D (CALCIUM 500+D PO)     chlorthalidone (HYGROTON) 25 MG tablet     glucagon (GLUCAGON EMERGENCY) 1 MG kit     glucose blood VI test strips (ONE TOUCH ULTRA TEST) strip     infusion set (PARADIGM QUICK-SET 23\" 6MM) misc pump supply     insulin aspart (NOVOLOG VIAL) 100 UNITS/ML vial     insulin cartridge (T:SLIM 3ML) misc pump supply     insulin glargine (LANTUS PEN) 100 UNIT/ML pen     Insulin Pump Accessories MISC     insulin syringe-needle U-100 (30G X 1/2\" 0.3 ML) 30G X 1/2\" 0.3 ML miscellaneous     losartan (COZAAR) 25 MG tablet     magnesium 250 MG tablet     No current facility-administered medications for this visit.           Allergies   Allergen Reactions     Percocet [Oxycodone-Acetaminophen] Nausea and Nausea and Vomiting       Physical Exam  There were no vitals taken for this visit.  GENERAL: healthy, alert and no distress  RESP: no audible wheeze, cough, or visible cyanosis.  No visible retractions or increased work of breathing.  Able to speak fully in complete sentences.  PSYCH: mentation appears normal, affect normal/bright, judgement and insight intact, normal speech and appearance well-groomed    RESULTS  Lab Results   Component Value Date    A1C 6.8 (A) 04/09/2021    A1C 7.7 (H) 01/20/2020    A1C 7.5 (H) 09/16/2019    A1C 7.8 (H) 03/18/2019    A1C 7.7 (H) 12/13/2016    HEMOGLOBINA1 7.4 (A) 01/20/2020    HEMOGLOBINA1 7.2 (A) 09/16/2019    HEMOGLOBINA1 7.0 (A) 04/16/2018    HEMOGLOBINA1 7.0 (A) 12/11/2017    HEMOGLOBINA1 7.4 (A) 06/05/2017       TSH   Date Value Ref Range Status   01/20/2020 2.07 0.40 - 4.00 mU/L Final   09/16/2019 1.89 0.40 - 4.00 mU/L Final   03/18/2019 2.40 0.40 - 4.00 mU/L Final   12/11/2017 1.69 0.40 - 4.00 mU/L Final   12/13/2016 3.72 0.40 - 4.00 mU/L " Final     T4 Free   Date Value Ref Range Status   03/18/2019 0.81 0.76 - 1.46 ng/dL Final       ALT   Date Value Ref Range Status   01/20/2020 24 0 - 50 U/L Final   09/16/2019 25 0 - 50 U/L Final   ]    Recent Labs   Lab Test 04/09/21 01/20/20  1041 09/16/19  1043   CHOL 134 132 112   HDL 50 59 42*   LDL  --  64 59   TRIG 48 44 58       Lab Results   Component Value Date     01/20/2020      Lab Results   Component Value Date    POTASSIUM 4.0 01/20/2020     Lab Results   Component Value Date    CHLORIDE 105 01/20/2020     Lab Results   Component Value Date    SCOTTY 9.2 01/20/2020     Lab Results   Component Value Date    CO2 32 01/20/2020     Lab Results   Component Value Date    BUN 18 01/20/2020     Lab Results   Component Value Date    CR 0.94 01/20/2020       GFR Estimate   Date Value Ref Range Status   01/20/2020 72 >60 mL/min/[1.73_m2] Final     Comment:     Non  GFR Calc  Starting 12/18/2018, serum creatinine based estimated GFR (eGFR) will be   calculated using the Chronic Kidney Disease Epidemiology Collaboration   (CKD-EPI) equation.     09/16/2019 63 >60 mL/min/[1.73_m2] Final     Comment:     Non  GFR Calc  Starting 12/18/2018, serum creatinine based estimated GFR (eGFR) will be   calculated using the Chronic Kidney Disease Epidemiology Collaboration   (CKD-EPI) equation.     03/18/2019 77 >60 mL/min/[1.73_m2] Final     Comment:     Non  GFR Calc  Starting 12/18/2018, serum creatinine based estimated GFR (eGFR) will be   calculated using the Chronic Kidney Disease Epidemiology Collaboration   (CKD-EPI) equation.       GFR Estimate If Black   Date Value Ref Range Status   01/20/2020 83 >60 mL/min/[1.73_m2] Final     Comment:      GFR Calc  Starting 12/18/2018, serum creatinine based estimated GFR (eGFR) will be   calculated using the Chronic Kidney Disease Epidemiology Collaboration   (CKD-EPI) equation.     09/16/2019 73 >60  mL/min/[1.73_m2] Final     Comment:      GFR Calc  Starting 12/18/2018, serum creatinine based estimated GFR (eGFR) will be   calculated using the Chronic Kidney Disease Epidemiology Collaboration   (CKD-EPI) equation.     03/18/2019 89 >60 mL/min/[1.73_m2] Final     Comment:      GFR Calc  Starting 12/18/2018, serum creatinine based estimated GFR (eGFR) will be   calculated using the Chronic Kidney Disease Epidemiology Collaboration   (CKD-EPI) equation.         Lab Results   Component Value Date    MICROL 6 01/20/2020     No results found for: MICROALBUMIN  No results found for: CPEPT, GADAB, ISCAB    No results found for: B12]    Most recent eye exam date: : Not Found       Assessment/Plan:     1.  Type 1 diabetes-  Control has improved significantly and she is having less glucose variability. A1c is much better at 6.8%. She is, however, going too high after eating and dropping sometimes between meals. Much of this may have to do with the timing of her bolus.  I did encourage her to move it earlier, if possible to before eating, rather than after.  I reminded her that auto-corrections give her only 60% of a usual correction, so she will likely remain high. To address her issues with exercise, we made the following plan (instructions given to patient):   Plan for exercise:   Switch to exercise pattern (new pattern) 60-90 minutes before activity.  This is a much lower basal rate than usual, and less aggressive with correction and carb ratios. Please put your pump in exercise mode at the same time (target glucose 140 vs 112.5 mg/dL).  Resume usual basal rate after exercise.     Exercise Please  Start Time Basal Rate Correction Factor Carb Ratio Target BG  Midnight 0.135  1u:100 mg/dL   1u:30 g 140 mg/dL         2.  Risk factors-     Retinopathy:  No.  Due for eye exam.  Will schedule.   Nephropathy:  BP well controlled Will check creatinine/MA.  No history of microalbuminuria. She had  SEKOU in 2019, but renal function now back to baseline.  Neuropathy: decreased vibration sensation in left great toe in the past.  Normal monofilament.   No pain/burning.   Feet: OK, no ulcers.   Lipids:  LDL at target. Strongly encouraged statin use in anyone with diabetes over 40.  She will start atorvastatin.      3.  F/U in 3 months with myself.  Will establish with Dr. Romero as Dr. Roy is no longer in our clinic.      38 minutes spent on the date of the encounter doing chart review, review of test results, review of continuous glucose sensor, insulin pump data, interpretation of glucose data, patient visit and documentation, counseling/coordination of care, and discussion of follow up plan for worsening hyper and hypoglycemia.  The patient understood and is satisfied with today's visit.     Hannah Whitley PA-C, MPAS   [facility]  Department of Medicine  Division of Endocrinology and Diabetes

## 2021-04-12 ENCOUNTER — VIRTUAL VISIT (OUTPATIENT)
Dept: ENDOCRINOLOGY | Facility: CLINIC | Age: 51
End: 2021-04-12
Payer: COMMERCIAL

## 2021-04-12 ENCOUNTER — COMMUNICATION - HEALTHEAST (OUTPATIENT)
Dept: FAMILY MEDICINE | Facility: CLINIC | Age: 51
End: 2021-04-12

## 2021-04-12 ENCOUNTER — MYC MEDICAL ADVICE (OUTPATIENT)
Dept: ENDOCRINOLOGY | Facility: CLINIC | Age: 51
End: 2021-04-12

## 2021-04-12 ENCOUNTER — RECORDS - HEALTHEAST (OUTPATIENT)
Dept: ADMINISTRATIVE | Facility: OTHER | Age: 51
End: 2021-04-12

## 2021-04-12 DIAGNOSIS — E10.8 TYPE 1 DIABETES MELLITUS WITH COMPLICATIONS (H): ICD-10-CM

## 2021-04-12 DIAGNOSIS — F41.1 ANXIETY STATE: ICD-10-CM

## 2021-04-12 DIAGNOSIS — E10.9 TYPE 1 DIABETES MELLITUS (H): ICD-10-CM

## 2021-04-12 DIAGNOSIS — E10.9 WELL CONTROLLED TYPE 1 DIABETES MELLITUS (H): Primary | ICD-10-CM

## 2021-04-12 PROCEDURE — 99214 OFFICE O/P EST MOD 30 MIN: CPT | Mod: 95 | Performed by: PHYSICIAN ASSISTANT

## 2021-04-12 RX ORDER — INFUSION SET FOR INSULIN PUMP
1 INFUSION SETS-PARAPHERNALIA MISCELLANEOUS EVERY OTHER DAY
Qty: 50 EACH | Refills: 3 | Status: SHIPPED | OUTPATIENT
Start: 2021-04-12 | End: 2021-07-30

## 2021-04-12 RX ORDER — INFUSION SET FOR INSULIN PUMP
INFUSION SETS-PARAPHERNALIA MISCELLANEOUS
Qty: 4 BOX | Refills: 4 | Status: SHIPPED | OUTPATIENT
Start: 2021-04-12 | End: 2021-04-14 | Stop reason: ALTCHOICE

## 2021-04-12 RX ORDER — ATORVASTATIN CALCIUM 10 MG/1
10 TABLET, FILM COATED ORAL DAILY
Qty: 90 TABLET | Refills: 3 | Status: SHIPPED | OUTPATIENT
Start: 2021-04-12 | End: 2021-07-01

## 2021-04-12 NOTE — LETTER
"4/12/2021       RE: Sera Adkins  [id number] [facility] [id number]     Dear Colleague,    Thank you for referring your patient, Sera Adkins, to the [facility] ENDOCRINOLOGY CLINIC [place] at [facility]. Please see a copy of my visit note below.    Outcome for 04/09/21 2:02 PM :Left Voicemail for patient to call back  Outcome for 04/10/21 3:00 PM :Left Voicemail for patient to call back     Outcome for 04/12/21 8:33 AM :Patient is sharing data via clinic device website and patient has been instructed to update data on website. Find login information by using .Soko dexcom is connnect and working tandem     Sera Adkins  is being evaluated via a billable video visit.      How would you like to obtain your AVS? Shamikahart  For the video visit, send the invitation by: NoRedInkharjeremiah   Will anyone else be joining your video visit? No      Sera Adkins is a 49 year old female who is being evaluated via a billable video visit.      The patient has been notified of following:     \"This video visit will be conducted via a call between you and your physician/provider. We have found that certain health care needs can be provided without the need for an in-person physical exam.  This service lets us provide the care you need with a video conversation.  If a prescription is necessary we can send it directly to your pharmacy.  If lab work is needed we can place an order for that and you can then stop by our lab to have the test done at a later time.    Video visits are billed at different rates depending on your insurance coverage.  Please reach out to your insurance provider with any questions.    If during the course of the call the physician/provider feels a video visit is not appropriate, you will not be charged for this service.\"    Patient has given verbal consent for Video visit? Yes    How would you like to obtain your AVS? " "MyChart    Patient would like the video invitation sent by: Send to e-mail at: [email or web address]        HPI:   Sera is a 49 yo woman here for follow up of type 1 diabetes since age 15. She switched to the Tandem X2 pump last year and has really liked it.  She feels like it has helped her control quite a bit, but she still struggles with avoiding hypoglycemia with exercise.  She usually takes her bolus after eating and sometimes just relies on the auto correction to bring her down. She enters carbs if they are on the package, otherwise she just uses experience to dose (usually 2-4 units).   She uses a steel infusion set, as she was having site failures with the other sets.  This is mostly working well for her.   She checked her a1c at her clinic and it was down to 6.8%.     Recent labs (4/9/21):   A1c- 6.8%  Lipids-   Total Chol- 134   HDL- 50  TG- 48  LDL- 75    Weight: 162 lbs.   BP was 134/79 then repeat 127/71 at home.      Breakfast- oatmeal/frozen fruit at work.   Lunch- healthy frozen meal  Dinner- snacking when she gets home.   Exercise before she eats.   She goes out to eat about 3 nights a week.     Recent glucose:       Pump settings are as follow:     Profile 1 Profile Active at the time of upload  Start Time Basal Rate Correction Factor Carb Ratio Target BG  Midnight 0.800 u/hr 1u:50 mg/dL 1u:15.0 g 110 mg/dL  7:00 AM 0.900 u/hr 1u:50 mg/dL 1u:15.0 g 110 mg/dL  Calculated Total Daily Basal 20.9 units        Duration of Insulin:  3:00 hours         Carbohydrates:   On          Max Bolus:  10 units      5 miles run.  She disonnected for her run and glucose was \"hi\" and she changed her pump site.    She usually runs her basal at 15% with a bike ride, but sometimes has to turn it off. She also likes to do  HIIT classes, barbell, spinning, running. She usually takes her pump off during classes and has to give a correction when she goes back on the pump.    Sera's divorce is now final. " She will be changing insurance.  She has no other concerns today.     ROS  GENERAL: Weight stable.  No fevers, chills, malaise, night sweats.   HEENT: no dysphagia, diplopia, neck pain or tenderness, dry/scratchy eyes, URI, cough, sinus drainage, tinnitus, sinus pressure  CV: no chest pain, pressure, palpitations, skipped beats, LOC  LUNGS: no SOB, [place], cough, sputum production, wheezing   GI: no diarrhea, constipation, abdominal pain  EXTREMITIES: no rashes, ulcers, edema  NEUROLOGY: no changes in vision, tingling or numbness in hands or feet.   MSK: no muscle aches or pains, weakness  PSYCH: mood stable      Past Medical History:   Diagnosis Date     Anxiety      Diabetes mellitus (H)     Type 1       Past Surgical History:   Procedure Laterality Date      SECTION      x2       No family history on file.    Social History   Social Hx:  .  Two teenage children.  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  Enjoys being physically active, mostly biking, but also running and HIIT.     Socioeconomic History     Marital status: other     Spouse name: Not on file     Number of children: Not on file     Years of education: Not on file     Highest education level: Not on file   Occupational History     Not on file   Social Needs     Financial resource strain: Not on file     Food insecurity:     Worry: Not on file     Inability: Not on file     Transportation needs:     Medical: Not on file     Non-medical: Not on file   Tobacco Use     Smoking status: Never Smoker     Smokeless tobacco: Never Used   Substance and Sexual Activity     Alcohol use: No     Drug use: No     Sexual activity: Not on file   Lifestyle     Physical activity:     Days per week: Not on file     Minutes per session: Not on file     Stress: Not on file   Relationships     Social connections:     Talks on phone: Not on file     Gets together: Not on file     Attends [employer] service: Not on file     Active member of club or organization: Not on  "file     Attends meetings of clubs or organizations: Not on file     Relationship status: Not on file     Intimate partner violence:     Fear of current or ex partner: Not on file     Emotionally abused: Not on file     Physically abused: Not on file     Forced sexual activity: Not on file   Other Topics Concern     Not on file   Social History Narrative     Not on file       Current Outpatient Medications   Medication     acetone urine (KETOSTIX) test strip     ASPIRIN PO     buPROPion (WELLBUTRIN XL) 150 MG 24 hr tablet     Calcium Carbonate-Vitamin D (CALCIUM 500+D PO)     chlorthalidone (HYGROTON) 25 MG tablet     glucagon (GLUCAGON EMERGENCY) 1 MG kit     glucose blood VI test strips (ONE TOUCH ULTRA TEST) strip     infusion set (PARADIGM QUICK-SET 23\" 6MM) misc pump supply     insulin aspart (NOVOLOG VIAL) 100 UNITS/ML vial     insulin cartridge (T:SLIM 3ML) misc pump supply     insulin glargine (LANTUS PEN) 100 UNIT/ML pen     Insulin Pump Accessories MISC     insulin syringe-needle U-100 (30G X 1/2\" 0.3 ML) 30G X 1/2\" 0.3 ML miscellaneous     losartan (COZAAR) 25 MG tablet     magnesium 250 MG tablet     No current facility-administered medications for this visit.           Allergies   Allergen Reactions     Percocet [Oxycodone-Acetaminophen] Nausea and Nausea and Vomiting       Physical Exam  There were no vitals taken for this visit.  GENERAL: healthy, alert and no distress  RESP: no audible wheeze, cough, or visible cyanosis.  No visible retractions or increased work of breathing.  Able to speak fully in complete sentences.  PSYCH: mentation appears normal, affect normal/bright, judgement and insight intact, normal speech and appearance well-groomed    RESULTS  Lab Results   Component Value Date    A1C 6.8 (A) 04/09/2021    A1C 7.7 (H) 01/20/2020    A1C 7.5 (H) 09/16/2019    A1C 7.8 (H) 03/18/2019    A1C 7.7 (H) 12/13/2016    HEMOGLOBINA1 7.4 (A) 01/20/2020    HEMOGLOBINA1 7.2 (A) 09/16/2019    " HEMOGLOBINA1 7.0 (A) 04/16/2018    HEMOGLOBINA1 7.0 (A) 12/11/2017    HEMOGLOBINA1 7.4 (A) 06/05/2017       TSH   Date Value Ref Range Status   01/20/2020 2.07 0.40 - 4.00 mU/L Final   09/16/2019 1.89 0.40 - 4.00 mU/L Final   03/18/2019 2.40 0.40 - 4.00 mU/L Final   12/11/2017 1.69 0.40 - 4.00 mU/L Final   12/13/2016 3.72 0.40 - 4.00 mU/L Final     T4 Free   Date Value Ref Range Status   03/18/2019 0.81 0.76 - 1.46 ng/dL Final       ALT   Date Value Ref Range Status   01/20/2020 24 0 - 50 U/L Final   09/16/2019 25 0 - 50 U/L Final   ]    Recent Labs   Lab Test 04/09/21 01/20/20  1041 09/16/19  1043   CHOL 134 132 112   HDL 50 59 42*   LDL  --  64 59   TRIG 48 44 58       Lab Results   Component Value Date     01/20/2020      Lab Results   Component Value Date    POTASSIUM 4.0 01/20/2020     Lab Results   Component Value Date    CHLORIDE 105 01/20/2020     Lab Results   Component Value Date    SCOTTY 9.2 01/20/2020     Lab Results   Component Value Date    CO2 32 01/20/2020     Lab Results   Component Value Date    BUN 18 01/20/2020     Lab Results   Component Value Date    CR 0.94 01/20/2020       GFR Estimate   Date Value Ref Range Status   01/20/2020 72 >60 mL/min/[1.73_m2] Final     Comment:     Non  GFR Calc  Starting 12/18/2018, serum creatinine based estimated GFR (eGFR) will be   calculated using the Chronic Kidney Disease Epidemiology Collaboration   (CKD-EPI) equation.     09/16/2019 63 >60 mL/min/[1.73_m2] Final     Comment:     Non  GFR Calc  Starting 12/18/2018, serum creatinine based estimated GFR (eGFR) will be   calculated using the Chronic Kidney Disease Epidemiology Collaboration   (CKD-EPI) equation.     03/18/2019 77 >60 mL/min/[1.73_m2] Final     Comment:     Non  GFR Calc  Starting 12/18/2018, serum creatinine based estimated GFR (eGFR) will be   calculated using the Chronic Kidney Disease Epidemiology Collaboration   (CKD-EPI) equation.        GFR Estimate If Black   Date Value Ref Range Status   01/20/2020 83 >60 mL/min/[1.73_m2] Final     Comment:      GFR Calc  Starting 12/18/2018, serum creatinine based estimated GFR (eGFR) will be   calculated using the Chronic Kidney Disease Epidemiology Collaboration   (CKD-EPI) equation.     09/16/2019 73 >60 mL/min/[1.73_m2] Final     Comment:      GFR Calc  Starting 12/18/2018, serum creatinine based estimated GFR (eGFR) will be   calculated using the Chronic Kidney Disease Epidemiology Collaboration   (CKD-EPI) equation.     03/18/2019 89 >60 mL/min/[1.73_m2] Final     Comment:      GFR Calc  Starting 12/18/2018, serum creatinine based estimated GFR (eGFR) will be   calculated using the Chronic Kidney Disease Epidemiology Collaboration   (CKD-EPI) equation.         Lab Results   Component Value Date    MICROL 6 01/20/2020     No results found for: MICROALBUMIN  No results found for: CPEPT, GADAB, ISCAB    No results found for: B12]    Most recent eye exam date: : Not Found       Assessment/Plan:     1.  Type 1 diabetes-  Control has improved significantly and she is having less glucose variability. A1c is much better at 6.8%. She is, however, going too high after eating and dropping sometimes between meals. Much of this may have to do with the timing of her bolus.  I did encourage her to move it earlier, if possible to before eating, rather than after.  I reminded her that auto-corrections give her only 60% of a usual correction, so she will likely remain high. To address her issues with exercise, we made the following plan (instructions given to patient):   Plan for exercise:   Switch to exercise pattern (new pattern) 60-90 minutes before activity.  This is a much lower basal rate than usual, and less aggressive with correction and carb ratios. Please put your pump in exercise mode at the same time (target glucose 140 vs 112.5 mg/dL).  Resume usual basal rate  after exercise.     Exercise Please  Start Time Basal Rate Correction Factor Carb Ratio Target BG  Midnight 0.135  1u:100 mg/dL   1u:30 g 140 mg/dL         2.  Risk factors-     Retinopathy:  No.  Due for eye exam.  Will schedule.   Nephropathy:  BP well controlled Will check creatinine/MA.  No history of microalbuminuria. She had SEKOU in 2019, but renal function now back to baseline.  Neuropathy: decreased vibration sensation in left great toe in the past.  Normal monofilament.   No pain/burning.   Feet: OK, no ulcers.   Lipids:  LDL at target. Strongly encouraged statin use in anyone with diabetes over 40.  She will start atorvastatin.      3.  F/U in 3 months with myself.  Will establish with Dr. Romero as Dr. Roy is no longer in our clinic.      38 minutes spent on the date of the encounter doing chart review, review of test results, review of continuous glucose sensor, insulin pump data, interpretation of glucose data, patient visit and documentation, counseling/coordination of care, and discussion of follow up plan for worsening hyper and hypoglycemia.  The patient understood and is satisfied with today's visit.     Hannah Whitley PA-C, MPAS   [facility]  Department of Medicine  Division of Endocrinology and Diabetes

## 2021-04-12 NOTE — PATIENT INSTRUCTIONS
Nice seeing you today, Sera!    Your glucose is much improved, but still going too high after eating and a bit low between meals.  Try focusing on taking your bolus 0-15 minutes before eating.     Plan for exercise:   Switch to exercise pattern 60-90 minutes before activity.  This is a much lower basal rate than usual, and less aggressive with correction and carb ratios. Please put your pump in exercise mode at the same time (target glucose 140 vs 112.5 mg/dL).  Resume usual basal rate near the end or at the end of exercise.     Exercise   Start Time Basal Rate Correction Factor Carb Ratio Target BG  Midnight 0.135  1u:100 mg/dL   1u:30 g 140 mg/dL    Let me know how it goes.  We may need to make further adjustments for exercise, depending on the type and intensity, but this is a place to start.      Schedule an eye exam.     I have placed orders to check BMP/microalbumin/TSH.  Lab [phone].    Take careHannah

## 2021-04-13 ENCOUNTER — COMMUNICATION - HEALTHEAST (OUTPATIENT)
Dept: FAMILY MEDICINE | Facility: CLINIC | Age: 51
End: 2021-04-13

## 2021-04-13 DIAGNOSIS — F41.1 ANXIETY STATE: ICD-10-CM

## 2021-04-14 ENCOUNTER — TELEPHONE (OUTPATIENT)
Dept: ENDOCRINOLOGY | Facility: CLINIC | Age: 51
End: 2021-04-14

## 2021-04-14 DIAGNOSIS — E10.8 TYPE 1 DIABETES MELLITUS WITH COMPLICATIONS (H): ICD-10-CM

## 2021-04-14 NOTE — TELEPHONE ENCOUNTER
Order updated.   Cheli Carlos, RN on 4/14/2021 at 12:04 PM       RE    Phone Message     May a detailed message be left on voicemail: no      Reason for Call: Medication Question or concern regarding medication   Prescription Clarification  Name of Medication: insulin cartridge (T:SLIM 3ML) misc pump supply      Prescribing Provider: Hannah Whitley                Pharmacy: [facility]              What on the order needs clarification? Pharmacy needing call back. They called and stated this script should be changed every 2 days not 3 and the quantity changed to a 90 day supply           Action Taken: Message routed to:  [facility] endo                                                                                                                                                                                                                                              Documentation       [facility] [place] [place] [address] AT PORTAL TO [facility] SITES [phone]

## 2021-04-14 NOTE — TELEPHONE ENCOUNTER
[facility] Call Center    Phone Message    May a detailed message be left on voicemail: no     Reason for Call: Medication Question or concern regarding medication   Prescription Clarification  Name of Medication: insulin cartridge (T:SLIM 3ML) misc pump supply     Prescribing Provider: Hannah Whitley    Pharmacy: [facility]   What on the order needs clarification? Pharmacy needing call back. They called and stated this script should be changed every 2 days not 3 and the quantity changed to a 90 day supply        Action Taken: Message routed to:  [facility] afshin

## 2021-04-15 ENCOUNTER — TELEPHONE (OUTPATIENT)
Dept: ENDOCRINOLOGY | Facility: CLINIC | Age: 51
End: 2021-04-15

## 2021-04-15 NOTE — TELEPHONE ENCOUNTER
[facility] Call Center    Phone Message    May a detailed message be left on voicemail: yes     Reason for Call: Medication Refill Request    Has the patient contacted the pharmacy for the refill? Yes   Name of medication being requested: insulin cartridge (T:SLIM 3ML) misc pump supply  Provider who prescribed the medication: Hannah JUDGE  Pharmacy: [facility] - [place], [place] - [address] AT PORTAL TO REGISTERED [facility] SITES   Date medication is needed: ASAP    NOTE:  pt is requesting x2 3ml cartridges and changes it every 2 days and requests a 90 day supply          Action Taken: Message routed to:  [facility] ENDO    Travel Screening: Not Applicable

## 2021-04-15 NOTE — TELEPHONE ENCOUNTER
Centralized Medication Refill Team note:  -Called [facility] to clarify and confirm that Hannah Whitley's order from 4/14/2021 has been received and is clear as noted below.  -Spoke with pharmacist and they understand that patient changes both cartridge and supplies every 2 days, and adds that the cartridges come in boxes of 10 (100 days)  -[facility] will either send 50( 5 boxes of 10) or work with patient  insurance to meet 90 day refill requirements.  Insulin Pump Accessories MISC 45 each 3 4/14/2021  No   Sig: Tandem Insulin pump infusion sets per patient preference.  Change every 2 days.     insulin cartridge (T:SLIM 3ML) misc pump supply 45 each 3 4/14/2021  No   Sig: Insulin cartridge to be used with pump as directed.  Change every 2 days or as directed.       Last Office Visit : 4/12/2021 Gutierrez  Future Office visit:  None    Sent to:  [facility] - [place], [place] - [address] AT PORTAL TO REGISTERED [facility] SITES

## 2021-05-26 ASSESSMENT — PATIENT HEALTH QUESTIONNAIRE - PHQ9: SUM OF ALL RESPONSES TO PHQ QUESTIONS 1-9: 3

## 2021-05-28 ASSESSMENT — ANXIETY QUESTIONNAIRES: GAD7 TOTAL SCORE: 8

## 2021-05-29 NOTE — TELEPHONE ENCOUNTER
Fax received from [facility] pharmacy requesting Prior Authorization    Medication Name:   buPROPion (WELLBUTRIN XL) 150 MG 24 hr tablet 180 tablet 0 6/4/2019     Sig - Route: Take 2 tablets (300 mg total) by mouth every morning. - Oral    Sent to pharmacy as: buPROPion (WELLBUTRIN XL) 150 MG 24 hr tablet    E-Prescribing Status: Receipt confirmed by pharmacy (6/4/2019  8:57 AM CDT)          Insurance Plan: EMILIANO   PBM:    Patient ID Number: [id number]    Please start PA process

## 2021-05-29 NOTE — TELEPHONE ENCOUNTER
Refill Approved    Rx renewed per Medication Renewal Policy. Medication was last renewed on 10/29/18.    Kay Oneal, [facility] Triage/Med Refill 6/12/2019     Requested Prescriptions   Pending Prescriptions Disp Refills     chlorthalidone (HYGROTEN) 25 MG tablet [Pharmacy Med Name: CHLORTHALIDONE 25 MG TABLET] 90 tablet 2     Sig: TAKE 1 TABLET BY MOUTH EVERY DAY       Diuretics/Combination Diuretics Refill Protocol  Passed - 6/12/2019  1:33 AM        Passed - Visit with PCP or prescribing provider visit in past 12 months     Last office visit with prescriber/PCP: 9/10/2018 Jose Elias Montgomery MD OR same dept: 9/10/2018 Jose Elias Montgomery MD OR same specialty: 9/10/2018 Jose Elias Montgomery MD  Last physical: 6/11/2018 Last MTM visit: Visit date not found   Next visit within 3 mo: Visit date not found  Next physical within 3 mo: Visit date not found  Prescriber OR PCP: Jose Elias Montgomery MD  Last diagnosis associated with med order: There are no diagnoses linked to this encounter.  If protocol passes may refill for 12 months if within 3 months of last provider visit (or a total of 15 months).             Passed - Serum Potassium in past 12 months      Lab Results   Component Value Date    Potassium 3.9 10/29/2018             Passed - Serum Sodium in past 12 months      Lab Results   Component Value Date    Sodium 136 10/29/2018             Passed - Blood pressure on file in past 12 months     BP Readings from Last 1 Encounters:   03/18/19 115/77             Passed - Serum Creatinine in past 12 months      Creatinine   Date Value Ref Range Status   10/29/2018 1.05 0.60 - 1.10 mg/dL Final

## 2021-05-29 NOTE — TELEPHONE ENCOUNTER
Central PA team  [phone]  Pool: SAMMY PA MED ([id number])          PA has been initiated.       PA form completed and faxed insurance via Cover My Meds     Key:  [id number]     Medication:  buPROPion HCl ER (XL) 150MG er tablets    Insurance:  BLUE CROSS BLUE SHIELD        Response will be received via fax and may take up to 5-10 business days depending on plan

## 2021-05-29 NOTE — TELEPHONE ENCOUNTER
Okay.  Please notify patient that I will send Wellbutrin 300 mg XL if that is okay with her based on the preference from her insurance company.  Please have her let me know if there is a problem.  I will not send a prescription until I hear back.

## 2021-05-29 NOTE — TELEPHONE ENCOUNTER
[facility] please relay message from provider. Collect information and send back to Dr. Stewart. Thanks!

## 2021-05-29 NOTE — TELEPHONE ENCOUNTER
FYI - Status Update  Who is Calling: Patient  Update: The patient is requesting to fill early. The patient states that she is leaving for a trip out of the country and will be gone when the script is due to fill. The patient will not be returning until 7/6/2019.   Okay to leave a detailed message?:  Yes

## 2021-05-29 NOTE — TELEPHONE ENCOUNTER
Refill Approved    Rx renewed per Medication Renewal Policy. Medication was last renewed on 06 25 18  Seen 09 10 18  [facility] Triage/Med Refill 6/4/2019     Requested Prescriptions   Pending Prescriptions Disp Refills     buPROPion (WELLBUTRIN XL) 150 MG 24 hr tablet 180 tablet 3     Sig: Take 2 tablets (300 mg total) by mouth every morning.       Tricyclics/Misc Antidepressant/Antianxiety Meds Refill Protocol Passed - 6/3/2019  2:11 PM        Passed - PCP or prescribing provider visit in last year     Last office visit with prescriber/PCP: 9/10/2018 Jose Elias Montgomery MD OR same dept: 9/10/2018 Jose Elias Montgomery MD OR same specialty: 9/10/2018 Jose Elias Montgomery MD  Last physical: 6/11/2018 Last MTM visit: Visit date not found   Next visit within 3 mo: Visit date not found  Next physical within 3 mo: Visit date not found  Prescriber OR PCP: Jose Elias Montgomery MD  Last diagnosis associated with med order: 1. Depression  - buPROPion (WELLBUTRIN XL) 150 MG 24 hr tablet; Take 2 tablets (300 mg total) by mouth every morning.  Dispense: 180 tablet; Refill: 3    If protocol passes may refill for 12 months if within 3 months of last provider visit (or a total of 15 months).

## 2021-05-30 VITALS — HEIGHT: 68 IN | BODY MASS INDEX: 25.34 KG/M2 | WEIGHT: 167.2 LBS

## 2021-05-31 VITALS — WEIGHT: 168.1 LBS | BODY MASS INDEX: 25.48 KG/M2 | HEIGHT: 68 IN

## 2021-06-01 ENCOUNTER — RECORDS - HEALTHEAST (OUTPATIENT)
Dept: ADMINISTRATIVE | Facility: CLINIC | Age: 51
End: 2021-06-01

## 2021-06-01 VITALS — BODY MASS INDEX: 24.33 KG/M2 | WEIGHT: 160 LBS

## 2021-06-01 VITALS — BODY MASS INDEX: 24.71 KG/M2 | WEIGHT: 163 LBS | HEIGHT: 68 IN

## 2021-06-02 ENCOUNTER — RECORDS - HEALTHEAST (OUTPATIENT)
Dept: ADMINISTRATIVE | Facility: CLINIC | Age: 51
End: 2021-06-02

## 2021-06-03 VITALS
SYSTOLIC BLOOD PRESSURE: 116 MMHG | HEIGHT: 68 IN | HEART RATE: 80 BPM | TEMPERATURE: 97.9 F | WEIGHT: 157.8 LBS | DIASTOLIC BLOOD PRESSURE: 68 MMHG | BODY MASS INDEX: 23.92 KG/M2 | RESPIRATION RATE: 20 BRPM

## 2021-06-03 NOTE — PROGRESS NOTES
Assessment/ Plan     1. Anxiety  Mood disorder    PHQ-9 score is 3  TAB-7 score is 7  Continue Wellbutrin.  Patient has tolerated this well  - buPROPion (WELLBUTRIN XL) 300 MG 24 hr tablet; Take 1 tablet (300 mg total) by mouth every morning.  Dispense: 90 tablet; Refill: 3    2. Type 1 Diabetes Mellitus  Currently stable    Most recent hemoglobin A1c is 7.2%  Continue to follow recommendations of endocrinology.  Patient has an insulin pump  She will continue losartan and chlorthalidone and her blood pressure has been stable  She has had an elevated creatinine and low GFR and will follow up with endocrinology  Recommend an annual eye examination  Have reviewed foot care  Continue aspirin  Her LDL was 59.  She has not required a statin  - losartan (COZAAR) 50 MG tablet; Take 1 tablet (50 mg total) by mouth daily.  Dispense: 90 tablet; Refill: 3  - chlorthalidone (HYGROTEN) 25 MG tablet; Take 1 tablet (25 mg total) by mouth daily.  Dispense: 90 tablet; Refill: 3      Subjective:       Sera Adkins is a 48 y.o. female who presents to the clinic primarily in follow-up for a mood disorder with associated depression symptoms as well as anxiety.  She has been treated with Wellbutrin 300 mg every morning and reports that she generally has been doing well.  A lot of her symptoms may be related to work stress.  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  She completed a PHQ-9 questionnaire and denies feeling depressed.  She has not had a lack of interest in doing things.  She can feel fidgety in general.  She does feel nervous or an edge several days in a 2-week period of time.  She can have some difficulty relaxing and can be irritable at times.  She is currently single and has two children who are present today.  In general, she feels that she is doing well.    Medical  history is also notable for type 1 diabetes mellitus.  She has an insulin pump and her most recent hemoglobin A1c was 7.2%.  She follows with endocrinology.  She had her cholesterol checked and her LDL was 59.  She has not required a statin.  Additionally, she has hypertension which is treated with losartan as well as chlorthalidone.  In the past she has had hyperkalemia which has been monitored.  Her creatinine has been elevated and GFR has been low in the past and she was referred to nephrology but has not yet followed up.  Her most recent GFR was 63 with a creatinine of 1.05.    She does note a small amount of swelling in her legs bilaterally.  She remains physically active though has been exercising less because of her work.  Review of systems is negative for headache, disease, chest pain, shortness of breath, or palpitations.    The following portions of the patient's history were reviewed and updated as appropriate: allergies, current medications, past family history, past medical history, past social history, past surgical history and problem list. Medications have been reconciled    Review of Systems   A 12 point comprehensive review of systems was negative except as noted.      Current Outpatient Medications   Medication Sig Dispense Refill     aspirin 81 MG EC tablet Take 81 mg by mouth daily.       buPROPion (WELLBUTRIN XL) 300 MG 24 hr tablet Take 1 tablet (300 mg total) by mouth every morning. 90 tablet 3     CALCIUM ORAL Take by mouth.       chlorthalidone (HYGROTEN) 25 MG tablet Take 1 tablet (25 mg total) by mouth daily. 90 tablet 3     levonorgestrel (MIRENA) 20 mcg/24 hr (5 years) IUD 1 each by Intrauterine route once.       losartan (COZAAR) 50 MG tablet Take 1 tablet (50 mg total) by mouth daily. 90 tablet 3     NOVOLOG 100 unit/mL injection   3     No current facility-administered medications for this visit.        Objective:      /68   Pulse 80   Temp 97.9  F (36.6  C) (Oral)   Resp 20  "  Ht 5' 8\" (1.727 m)   Wt 157 lb 12.8 oz (71.6 kg)   BMI 23.99 kg/m        General appearance: alert, appears stated age and cooperative  Head: Normocephalic, without obvious abnormality, atraumatic  Neck: no adenopathy, supple, symmetrical  Lungs: clear to auscultation bilaterally  Heart: regular rate and rhythm, S1, S2 normal, no murmur, click, rub or gallop  Extremities: extremities normal, atraumatic  Lymph nodes: Cervical nodes normal.  Neurologic: Alert and oriented X 3         No results found for this or any previous visit (from the past 168 hour(s)).       This note has been dictated using voice recognition software. Any grammatical or context distortions are unintentional and inherent to the software  "

## 2021-06-04 NOTE — TELEPHONE ENCOUNTER
Refill Approved    Rx renewed per Medication Renewal Policy. Medication was last renewed on 11/4/19.    Kay Oneal, [facility] Triage/Med Refill 1/3/2020     Requested Prescriptions   Pending Prescriptions Disp Refills     chlorthalidone (HYGROTEN) 25 MG tablet [Pharmacy Med Name: CHLORTHALIDONE 25 MG TABLET] 90 tablet 0     Sig: TAKE 1 TABLET BY MOUTH EVERY DAY       Diuretics/Combination Diuretics Refill Protocol  Passed - 1/1/2020  1:51 AM        Passed - Visit with PCP or prescribing provider visit in past 12 months     Last office visit with prescriber/PCP: 11/4/2019 Jose Elias Montgomery MD OR same dept: 11/4/2019 Jose Elias Montgomery MD OR same specialty: 11/4/2019 Jose Elias Montgomery MD  Last physical: 6/11/2018 Last MTM visit: Visit date not found   Next visit within 3 mo: Visit date not found  Next physical within 3 mo: Visit date not found  Prescriber OR PCP: Jose Elias Montgomery MD  Last diagnosis associated with med order: 1. Type 1 Diabetes Mellitus  - chlorthalidone (HYGROTEN) 25 MG tablet [Pharmacy Med Name: CHLORTHALIDONE 25 MG TABLET]; TAKE 1 TABLET BY MOUTH EVERY DAY  Dispense: 90 tablet; Refill: 0    If protocol passes may refill for 12 months if within 3 months of last provider visit (or a total of 15 months).             Passed - Serum Potassium in past 12 months      Lab Results   Component Value Date    Potassium 4.7 08/22/2019             Passed - Serum Sodium in past 12 months      Lab Results   Component Value Date    Sodium 140 08/22/2019             Passed - Blood pressure on file in past 12 months     BP Readings from Last 1 Encounters:   11/04/19 116/68             Passed - Serum Creatinine in past 12 months      Creatinine   Date Value Ref Range Status   08/22/2019 1.22 (H) 0.60 - 1.10 mg/dL Final

## 2021-06-05 VITALS
OXYGEN SATURATION: 99 % | BODY MASS INDEX: 23.95 KG/M2 | TEMPERATURE: 97.9 F | DIASTOLIC BLOOD PRESSURE: 67 MMHG | SYSTOLIC BLOOD PRESSURE: 103 MMHG | RESPIRATION RATE: 16 BRPM | HEART RATE: 78 BPM | WEIGHT: 157.5 LBS

## 2021-06-05 NOTE — TELEPHONE ENCOUNTER
Refill Approved    Rx renewed per Medication Renewal Policy. Medication was last renewed on 11/4/19.    Martha Sheppard, [facility] [place] Connection Triage/Med Refill 2/3/2020     Requested Prescriptions   Pending Prescriptions Disp Refills     losartan (COZAAR) 50 MG tablet [Pharmacy Med Name: LOSARTAN POTASSIUM 50 MG TAB] 90 tablet 3     Sig: TAKE 1 TABLET BY MOUTH EVERY DAY       Angiotensin Receptor Blocker Protocol Passed - 2/3/2020  9:34 AM        Passed - PCP or prescribing provider visit in past 12 months       Last office visit with prescriber/PCP: 11/4/2019 Jose Elias Montgomery MD OR same dept: 11/4/2019 Jose Elias Montgomery MD OR same specialty: 11/4/2019 Jose Elias Montgomery MD  Last physical: 6/11/2018 Last MTM visit: Visit date not found   Next visit within 3 mo: Visit date not found  Next physical within 3 mo: Visit date not found  Prescriber OR PCP: Jose Elias Montgomery MD  Last diagnosis associated with med order: 1. Type 1 Diabetes Mellitus  - losartan (COZAAR) 50 MG tablet [Pharmacy Med Name: LOSARTAN POTASSIUM 50 MG TAB]; TAKE 1 TABLET BY MOUTH EVERY DAY  Dispense: 90 tablet; Refill: 3    If protocol passes may refill for 12 months if within 3 months of last provider visit (or a total of 15 months).             Passed - Serum potassium within the past 12 months     Lab Results   Component Value Date    Potassium 4.7 08/22/2019             Passed - Blood pressure filed in past 12 months     BP Readings from Last 1 Encounters:   11/04/19 116/68             Passed - Serum creatinine within the past 12 months     Creatinine   Date Value Ref Range Status   08/22/2019 1.22 (H) 0.60 - 1.10 mg/dL Final

## 2021-06-07 ENCOUNTER — AMBULATORY - HEALTHEAST (OUTPATIENT)
Dept: LAB | Facility: CLINIC | Age: 51
End: 2021-06-07

## 2021-06-07 ENCOUNTER — COMMUNICATION - HEALTHEAST (OUTPATIENT)
Dept: FAMILY MEDICINE | Facility: CLINIC | Age: 51
End: 2021-06-07

## 2021-06-07 DIAGNOSIS — R53.83 FATIGUE, UNSPECIFIED TYPE: ICD-10-CM

## 2021-06-07 LAB
ANION GAP SERPL CALCULATED.3IONS-SCNC: 13 MMOL/L (ref 5–18)
BUN SERPL-MCNC: 22 MG/DL (ref 8–22)
CALCIUM SERPL-MCNC: 9.1 MG/DL (ref 8.5–10.5)
CHLORIDE BLD-SCNC: 98 MMOL/L (ref 98–107)
CO2 SERPL-SCNC: 24 MMOL/L (ref 22–31)
CREAT SERPL-MCNC: 1.17 MG/DL (ref 0.6–1.1)
GFR SERPL CREATININE-BSD FRML MDRD: 49 ML/MIN/1.73M2
GLUCOSE BLD-MCNC: 182 MG/DL (ref 70–125)
POTASSIUM BLD-SCNC: 3.8 MMOL/L (ref 3.5–5)
SODIUM SERPL-SCNC: 135 MMOL/L (ref 136–145)

## 2021-06-08 NOTE — TELEPHONE ENCOUNTER
Refill Approved    Rx renewed per Medication Renewal Policy. Medication was last renewed on 11/4/19.    Kay Oneal, [facility] Triage/Med Refill 6/2/2020     Requested Prescriptions   Pending Prescriptions Disp Refills     buPROPion (WELLBUTRIN XL) 300 MG 24 hr tablet [Pharmacy Med Name: BUPROPION HCL  MG TABLET] 90 tablet 3     Sig: TAKE 1 TABLET BY MOUTH EVERY DAY IN THE MORNING       Tricyclics/Misc Antidepressant/Antianxiety Meds Refill Protocol Passed - 5/29/2020 12:28 AM        Passed - PCP or prescribing provider visit in last year     Last office visit with prescriber/PCP: 11/4/2019 Jose Elias Montgomery MD OR same dept: 11/4/2019 Jose Elias Montgomery MD OR same specialty: 11/4/2019 Jose Elias Montgomery MD  Last physical: 6/11/2018 Last MTM visit: Visit date not found   Next visit within 3 mo: Visit date not found  Next physical within 3 mo: Visit date not found  Prescriber OR PCP: Jose Elias Montgomery MD  Last diagnosis associated with med order: 1. Anxiety  - buPROPion (WELLBUTRIN XL) 300 MG 24 hr tablet [Pharmacy Med Name: BUPROPION HCL  MG TABLET]; TAKE 1 TABLET BY MOUTH EVERY DAY IN THE MORNING  Dispense: 90 tablet; Refill: 3    If protocol passes may refill for 12 months if within 3 months of last provider visit (or a total of 15 months).

## 2021-06-09 NOTE — PROGRESS NOTES
SUBJECTIVE:    This is a pleasant 46-year-old female who presents to clinic for a complete physical examination.  Her medical history is notable for type 1 diabetes mellitus.  She is managed by endocrinology and uses an insulin pump.  Her most recent hemoglobin A1c was 7.7%.  She has been working to improve her diet.  She has a Mirena IUD in place.  Her Pap test was reportedly normal.  Also, she has a history of anxiety which has been stable.  She is treated with losartan and atorvastatin.  She continues to take Lexapro as well.  Review of systems is negative for headache, dizziness, chest pain, palpitations, or bowel concerns.  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      Patient Active Problem List   Diagnosis     Type 1 Diabetes Mellitus     Anxiety     Generalized Anxiety Disorder     Cervical Dysplasia     Patent Foramen Ovale     Allergies     Hearing Loss     Diabetic Retinopathy     Decreased GFR       Current Outpatient Prescriptions on File Prior to Visit   Medication Sig     famotidine (FOR PEPCID) 10 MG tablet Take 10 mg by mouth as needed for heartburn.     ibuprofen (ADVIL,MOTRIN) 200 MG tablet Take 200 mg by mouth every 6 (six) hours as needed for pain.     NOVOLOG 100 unit/mL injection      atorvastatin (LIPITOR) 10 MG tablet Take 1 tablet (10 mg total) by mouth bedtime.     No current facility-administered medications on file prior to visit.        Allergies   Allergen Reactions     Oxycodone-Acetaminophen             A 12 point comprehensive review of systems was negative except as noted.      OBJECTIVE:     Vitals:    03/27/17 1046   BP: 120/80   Pulse: 76   Resp: 16   Temp: 98.1  F (36.7  C)       General: Alert, not acutely distressed   Head:  Atraumatic, normocephalic  Ears:  TMs normal pearly-gray  Eyes:  PERRL, extraocular movements are intact  Nose:  Septum midline  Throat:  Oral mucosa moist, oropharynx clear  Neck:  Supple without adenopathy or  thyromegaly   Cardiovascular: S1-S2 with regular rate and without murmur, rub, or gallop   Lungs:  Clear to auscultation bilaterally   Breast: Deferred  Abdomen:  Soft, non tender, nondistended  Pelvic: Deferred  Extremities: Without cyanosis or edema   Neuro:  CN II-XII appear intact        Laboratory Results:    Results for orders placed or performed in visit on 09/12/16   Basic Metabolic Panel   Result Value Ref Range    Sodium 135 (L) 136 - 145 mmol/L    Potassium 4.8 3.5 - 5.0 mmol/L    Chloride 100 98 - 107 mmol/L    CO2 30 22 - 31 mmol/L    Anion Gap, Calculation 5 5 - 18 mmol/L    Glucose 367 (H) 70 - 125 mg/dL    Calcium 9.4 8.5 - 10.5 mg/dL    BUN 16 8 - 22 mg/dL    Creatinine 1.10 0.60 - 1.10 mg/dL    GFR MDRD Af Amer >60 >60 mL/min/1.73m2    GFR MDRD Non Af Amer 54 (L) >60 mL/min/1.73m2   Uric Acid   Result Value Ref Range    Uric Acid 3.5 2.0 - 7.5 mg/dL         ASSESSMENT AND PLAN:    1. Routine general medical examination at a health care facility    Recommend that she remain active  Reviewed cardiac risk factors.  Per her preference she will follow-up with cardiology to discuss possible coronary calcium CT test  - Ambulatory referral to Cardiology    2. Type 1 diabetes mellitus    She is treated with an insulin pump  Continue losartan as well as atorvastatin  Recommend an annual eye examination  She is aware of foot care  Would favor a baby aspirin  Continue to follow-up with endocrinology  - Ambulatory referral to Cardiology    3. Anxiety    Continue Lexapro  Follow-up as needed        Jose Elias Montgomery MD      This note has been dictated and transcribed using voice recognition software.  Any grammatical or contextual distortions are unintentional and inherent to the software.

## 2021-06-12 NOTE — TELEPHONE ENCOUNTER
Due to be seen    Rx renewed per Medication Renewal Policy. Medication was last renewed on 6/2/20.    Kay Oneal, [facility] Triage/Med Refill 11/5/2020     Requested Prescriptions   Pending Prescriptions Disp Refills     buPROPion (WELLBUTRIN XL) 300 MG 24 hr tablet [Pharmacy Med Name: BUPROPION HCL  MG TABLET] 90 tablet 1     Sig: TAKE 1 TABLET BY MOUTH EVERY DAY IN THE MORNING       Tricyclics/Misc Antidepressant/Antianxiety Meds Refill Protocol Passed - 11/2/2020 12:36 PM        Passed - PCP or prescribing provider visit in last year     Last office visit with prescriber/PCP: 11/4/2019 Jose Elias Montgomery MD OR same dept: 11/4/2019 Jose Elias Montgomery MD OR same specialty: 11/4/2019 Jose Elias Montgomery MD  Last physical: 6/11/2018 Last MTM visit: Visit date not found   Next visit within 3 mo: Visit date not found  Next physical within 3 mo: Visit date not found  Prescriber OR PCP: Jose Elias Montgomery MD  Last diagnosis associated with med order: 1. Anxiety  - buPROPion (WELLBUTRIN XL) 300 MG 24 hr tablet [Pharmacy Med Name: BUPROPION HCL  MG TABLET]; TAKE 1 TABLET BY MOUTH EVERY DAY IN THE MORNING  Dispense: 90 tablet; Refill: 1    If protocol passes may refill for 12 months if within 3 months of last provider visit (or a total of 15 months).

## 2021-06-12 NOTE — TELEPHONE ENCOUNTER
Dr. Montgomery-  See pt's mychart message and advise.   Would you like her to set up an appointment to discuss seeing a nephorologist?

## 2021-06-12 NOTE — TELEPHONE ENCOUNTER
Who is calling:  Patient   Reason for Call:  Patient states she is going out of [place] for mountain biking, patient is requesting for kidney function test and Potassium Labs before she leave for mountain biking . When lab orders are place please call patient and inform.   Date of last appointment with primary care: unknown   Okay to leave a detailed message: No

## 2021-06-14 NOTE — PROGRESS NOTES
Sera Adkins is a 50 y.o. female who is being evaluated via a billable telephone visit.      What phone number would you like to be contacted at? [phone]  How would you like to obtain your AVS? AVS Preference: MyChart.    PHONE VISIT  Due to current COVID19 pandemic, pt was offered virtual visit in lieu of in office evaluation to limit exposures.      Telephone visit start time: 100  Telephone visit end time: 111  Total time: 11 min    Subjective:      HPI: Sera Adkins is a 50 y.o. female who is being evaluated via a billable telephone visit due to COVID19 pandemic precautions.    Chief Complaint   Patient presents with     Facial Pain     Tested positive for COVID 1/29/21, Facial pain/pressure-Concerns of Trigeminal Neuralgia-Right side of face, painful to eat/drink, and talk     Moderna vaccine #1, 1/8/21  Possible exposure 1/5/21  Negative covid 1/10/21  1/26/21 sinus pressure, headache  1/27/21-1/31/21 intense pain under mandible, deep, sharp. Randomly occurs but drinking liquids make it worse. Radiates up toward TMJ, or even to right eye. Last 20-30 min, occasionally only 1-2 minutes. 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   1/29/21 tested positive for COVID    Symptoms better today. Had been taking tylenol.     Medications:  Current Outpatient Medications   Medication Sig Dispense Refill     buPROPion (WELLBUTRIN XL) 300 MG 24 hr tablet TAKE 1 TABLET BY MOUTH EVERY DAY IN THE MORNING 90 tablet 1     CALCIUM ORAL Take by mouth.       chlorthalidone (HYGROTEN) 25 MG tablet TAKE 1 TABLET BY MOUTH EVERY DAY 90 tablet 3     cholecalciferol, vitamin D3, 50 mcg (2,000 unit) Tab Take 1 tablet by mouth.       levonorgestrel (MIRENA) 20 mcg/24 hr (5 years) IUD 1 each by Intrauterine route once.       losartan (COZAAR) 50 MG tablet Take 1 tablet (50 mg total) by mouth daily. (Patient taking differently:  Take 25-50 mg by mouth daily. ) 90 tablet 3     NOVOLOG 100 unit/mL injection   3     aspirin 81 MG EC tablet Take 81 mg by mouth daily.       carBAMazepine (TEGRETOL XR) 100 MG 12 hr tablet Take 1 tablet (100 mg total) by mouth 2 (two) times a day as needed. 60 tablet 1     No current facility-administered medications for this visit.        Objective:              Physical Exam: Not performed in context of phone visit    Assessment/plan   1. Trigeminal neuralgia  2. Clinical diagnosis of COVID-19  Tested positive for COVID-19 on 1/29/2021.  Prior to this had been experiencing intense, intermittent right-sided jaw pain that radiated into her right eye consistent with trigeminal neuralgia.  Symptoms improving today with Tylenol but will also offer carbamazepine for symptomatic relief.   - carBAMazepine (TEGRETOL XR) 100 MG 12 hr tablet; Take 1 tablet (100 mg total) by mouth 2 (two) times a day as needed.  Dispense: 60 tablet; Refill: 1    Leonie Trimble DO

## 2021-06-14 NOTE — PROGRESS NOTES
"  Assessment & Plan:       1. LLQ abdominal pain  HM1(CBC and Differential)    JIC RED   2. Constipation, unspecified constipation type        Medical Decision Making  Patient presents with acute onset left lower quadrant abdominal pain.  Initial concerns for diverticulitis.  Diverticulitis seems very unlikely with a normal CBC, no fevers, and no significant tenderness to palpation of the left lower quadrant.  Feel instead that patient is suffering from constipation, as she noted a very firm stool.  She normally has loose and easily passable stools.  Recommend continuing once daily MiraLAX.  Increase fiber in diet and drink more clear fluids.  Discussed signs of worsening symptoms and when to follow-up with PCP if no symptom improvement.      Subjective:       Sera Adkins is a 50 y.o. female here for evaluation of left lower quadrant abdominal pain.  Onset of symptoms was this morning.  Patient did not go to bed until very late and developed a sensation of \"cramping\" in the left lower quadrant at around 3 AM.  Shortly before this patient had a very firm, pebble-like stool yesterday and notes she still feels constipated.  She took a dose of MiraLAX last night and noted that the left lower quadrant abdominal pain will come and go.  Patient was able to sleep some last night, but would wake up with the pain present.  She states the pain has improved some at this time.  Associated symptoms include nausea.  Patient otherwise denies fevers, body aches, emesis, and urinary symptoms.  Other than C-sections, patient has not had other abdominal surgeries.  She has no history of diverticulitis.    The following portions of the patient's history were reviewed and updated as appropriate: allergies, current medications and problem list.    Review of Systems  Pertinent items are noted in HPI.     Allergies  Allergies   Allergen Reactions     Oxycodone-Acetaminophen        Family History   Problem Relation Age of Onset     " Cancer Father      Breast cancer Paternal Grandmother        Social History     Socioeconomic History     Marital status:      Spouse name: None     Number of children: None     Years of education: None     Highest education level: None   Occupational History     None   Social Needs     Financial resource strain: None     Food insecurity     Worry: None     Inability: None     Transportation needs     Medical: None     Non-medical: None   Tobacco Use     Smoking status: Never Smoker     Smokeless tobacco: Never Used   Substance and Sexual Activity     Alcohol use: Yes     Drug use: None     Sexual activity: None   Lifestyle     Physical activity     Days per week: None     Minutes per session: None     Stress: None   Relationships     Social connections     Talks on phone: None     Gets together: None     Attends Ca[employer] service: None     Active member of club or organization: None     Attends meetings of clubs or organizations: None     Relationship status: None     Intimate partner violence     Fear of current or ex partner: None     Emotionally abused: None     Physically abused: None     Forced sexual activity: None   Other Topics Concern     None   Social History Narrative     None         Objective:       /67 (Patient Site: Right Arm, Patient Position: Sitting, Cuff Size: Adult Regular)   Pulse 78   Temp 97.9  F (36.6  C) (Oral)   Resp 16   Wt 157 lb 8 oz (71.4 kg)   SpO2 99%   BMI 23.95 kg/m    General appearance: alert, appears stated age, cooperative, no distress and non-toxic  Back: No CVA tenderness  Lungs: clear to auscultation bilaterally  Heart: regular rate and rhythm, S1, S2 normal, no murmur, click, rub or gallop  Abdomen: Mild tenderness to palpation throughout, no rebound tenderness, normal bowel sounds, abdomen slightly firm, no masses organomegaly  Skin: Skin color, texture, turgor normal. No rashes or lesions     Lab Results    Recent Results (from the past 24 hour(s))    HM1 (CBC with Diff)   Result Value Ref Range    WBC 8.9 4.0 - 11.0 thou/uL    RBC 4.22 3.80 - 5.40 mill/uL    Hemoglobin 12.9 12.0 - 16.0 g/dL    Hematocrit 39.0 35.0 - 47.0 %    MCV 92 80 - 100 fL    MCH 30.5 27.0 - 34.0 pg    MCHC 33.0 32.0 - 36.0 g/dL    RDW 10.6 (L) 11.0 - 14.5 %    Platelets 211 140 - 440 thou/uL    MPV 8.8 7.0 - 10.0 fL    Neutrophils % 78 (H) 50 - 70 %    Lymphocytes % 13 (L) 20 - 40 %    Monocytes % 4 2 - 10 %    Eosinophils % 4 0 - 6 %    Basophils % 1 0 - 2 %    Neutrophils Absolute 7.0 2.0 - 7.7 thou/uL    Lymphocytes Absolute 1.2 0.8 - 4.4 thou/uL    Monocytes Absolute 0.4 0.0 - 0.9 thou/uL    Eosinophils Absolute 0.3 0.0 - 0.4 thou/uL    Basophils Absolute 0.0 0.0 - 0.2 thou/uL     I personally reviewed these results and discussed findings with the patient.

## 2021-06-14 NOTE — PROGRESS NOTES
Assessment/ Plan     1. Anxiety  2. Mood disorder  With symptoms of depression  3. Poor concentration    Patient completed a PHQ-9 test with a score of 16  TAB-7 score is 21    Reviewed her recent testing as well as her concentration concerns.  Lexapro was not helpful  She has concerns regarding her concentration and his son does have attention deficit disorder.  Per her preference she will start Wellbutrin and take daily.    This will be titrated upwards as necessary  Continue to follow-up with her counselor  Reviewed recent stressors including being a single mother, her work, and busy life    4. Visit for screening mammogram    Refer for mammogram  - Mammo Screening Bilateral; Future    25 minutes were spent with the patient and greater than 50% of the time was spent in face to face counseling and coordination of care      Subjective:       Sera Adkins is a 46 y.o. female who presents to the clinic because of concerns regarding her mood, anxiety, and concentration.  She has been having a difficult time recently.  She has been quite stressed as a single mother.  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  She finds that she has very little time and is always on the go.  She has difficulty sleeping because she has so much to do.  She feels tired much of the time.  She feels like she can sleep anytime.  She does note little interest or pleasure in doing things and can feel down and depressed.  She has no thoughts of self-harm.  Also, she feels nervous, anxious, or on edge nearly every day and has not been able to control worrying.  She feels guilty much of the time and cannot relax.  She did take Lexapro previously but would like to discontinue this and switch to Wellbutrin which may help her concentration.  Her concentration can be quite poor and her son is being treated.  She notes that she is easily distractible.  She is always quickly moving from one topic to  "another.    The following portions of the patient's history were reviewed and updated as appropriate: allergies, current medications, past family history, past medical history, past social history, past surgical history and problem list.    Review of Systems   A 12 point comprehensive review of systems was negative except as noted.      Current Outpatient Prescriptions   Medication Sig Dispense Refill     levonorgestrel (MIRENA) 20 mcg/24 hr (5 years) IUD 1 each by Intrauterine route once.       losartan (COZAAR) 25 MG tablet Take 1 tablet (25 mg total) by mouth daily. 90 tablet 3     NOVOLOG 100 unit/mL injection   3     atorvastatin (LIPITOR) 10 MG tablet Take 1 tablet (10 mg total) by mouth bedtime. 90 tablet 0     buPROPion (WELLBUTRIN XL) 150 MG 24 hr tablet Take 1 tablet (150 mg total) by mouth every morning. 60 tablet 2     No current facility-administered medications for this visit.        Objective:      /70  Pulse 72  Temp 98.4  F (36.9  C) (Oral)   Resp 18  Ht 5' 8\" (1.727 m)  Wt 168 lb 1.6 oz (76.2 kg)  BMI 25.56 kg/m2      General appearance: alert, appears stated age   She is tearful during part of the examination  Head: Normocephalic, without obvious abnormality, atraumatic  Extremities: extremities normal, atraumatic, no cyanosis or edema  Skin: Skin color, texture, turgor normal. No rashes or lesions  Lymph nodes: Cervical nodes normal.  Neurologic: Alert and oriented X 3. Normal coordination and gait         No results found for this or any previous visit (from the past 168 hour(s)).       This note has been dictated using voice recognition software. Any grammatical or context distortions are unintentional and inherent to the software  "

## 2021-06-15 NOTE — TELEPHONE ENCOUNTER
Patient calling with update:  Steroids have helped, finished them yesterday. Still has some pain and would like to pursue MRI.   Pain is very localized in the C2-C3 area, right side. Pain with extension of neck.

## 2021-06-15 NOTE — TELEPHONE ENCOUNTER
Refill Approved    Rx renewed per Medication Renewal Policy. Medication was last renewed on 1/3/20.    Joey Parr, [facility] Triage/Med Refill 2/10/2021     Requested Prescriptions   Pending Prescriptions Disp Refills     chlorthalidone (HYGROTEN) 25 MG tablet 90 tablet 3     Sig: Take 1 tablet (25 mg total) by mouth daily.       Diuretics/Combination Diuretics Refill Protocol  Passed - 2/10/2021 11:04 AM        Passed - Visit with PCP or prescribing provider visit in past 12 months     Last office visit with prescriber/PCP: 11/4/2019 Jose Elias Montgomery MD OR same dept: Visit date not found OR same specialty: 11/4/2019 Jose Elias Montgomery MD  Last physical: 6/11/2018 Last MTM visit: Visit date not found   Next visit within 3 mo: Visit date not found  Next physical within 3 mo: Visit date not found  Prescriber OR PCP: Jose Elias Montgomery MD  Last diagnosis associated with med order: 1. Type 1 Diabetes Mellitus  - chlorthalidone (HYGROTEN) 25 MG tablet; Take 1 tablet (25 mg total) by mouth daily.  Dispense: 90 tablet; Refill: 3    If protocol passes may refill for 12 months if within 3 months of last provider visit (or a total of 15 months).             Passed - Serum Potassium in past 12 months      Lab Results   Component Value Date    Potassium 3.8 10/12/2020             Passed - Serum Sodium in past 12 months      Lab Results   Component Value Date    Sodium 137 10/12/2020             Passed - Blood pressure on file in past 12 months     BP Readings from Last 1 Encounters:   01/19/21 103/67             Passed - Serum Creatinine in past 12 months      Creatinine   Date Value Ref Range Status   10/12/2020 1.13 (H) 0.60 - 1.10 mg/dL Final

## 2021-06-15 NOTE — TELEPHONE ENCOUNTER
Patient went to the ER yesterday like  suggested. Patient states that no imaging or blood was drawn. Patient did received flexeril and prednisone.   Patinet states that the constant pain in gone, however anytime she turns her head she still has pain in the upper right neck area.   Patient is wondering if she should get some kind of imagining done.   Patient is also concerned that as soon as she stops the prednisone that the pain will come back.     Please advise

## 2021-06-15 NOTE — TELEPHONE ENCOUNTER
I had recently seen mediation for a virtual visit.  Please let her know that I entered an MRI of the cervical spine for further evaluation as we had discussed. I did not enter an MRI/MRA of the head and neck vessels.  Please let me know if she has concerns regarding this testing and I can call her to discuss changing the orders if needed.

## 2021-06-15 NOTE — TELEPHONE ENCOUNTER
AYAKAI, no need to call      I called and left a message. I will try back.     I called and reviewed.  She had emergency department visit yesterday and was started on steroids and a muscle relaxant for possible torticollis.  We discussed possible imaging if she does not improve.  We may need to extend the prednisone.  Patient will monitor and provide an update early next week.

## 2021-06-15 NOTE — PROGRESS NOTES
Sera Adkins is a 50 y.o. female who is being evaluated via a billable telephone visit.      What phone number would you like to be contacted at? [phone]  How would you like to obtain your AVS? AVS Preference: More.        Assessment/ Plan     1. Neck pain  2. Nonintractable headache, unspecified chronicity pattern, unspecified headache type  3. Jaw pain    Etiology unclear.  This may be secondary to myofascial pain or torticollis  She appears to have an occipital neuralgia  She has side effects to her recent Covid vaccination also developed a Covid infection  She was diagnosed with trigeminal neuralgia and has been started on Tegretol    Reviewed that this is  a challenging situation as this is occurring during a phone visit so an examination can not take place  Reviewed possibilities including an empirical trial of prednisone  Discussed possible imaging including a cervical spine MRI or possible MRI of the head  Discussed possible vascular process as well    After reviewing the possibilities recommended that patient present to the emergency department for examination and for possible testing or imaging      Subjective:       Sera Adkins is a 50 y.o. female who presents to the clinic with recent aggravation of symptoms including pain in her neck that is more prominent on the right side.  She has had pain when turning her head and when swallowing.  She has had a headache that is more localized to the occipital region but can spread to the rest of her head as well.  More recently, she did have an evaluation was diagnosed with possible trigeminal neuralgia.  She was started on Tegretol which has been helpful.  She states that her jaw pain has actually improved.  Also, she has had burning in her chest over the past 10 days.  Recent history is interesting for the fact that she did receive the majority of Covid-19 vaccination in early January with a second dose in early February.  In between  vaccinations she developed a Covid-19 infection and tested positive on January 29.  She had symptoms including a fever, chills, and a headache.  She denies cough or shortness of breath currently.  She states that the primary concern has been pain in the back of her head.  This is a searing and throbbing type of discomfort that can be quite intense.  She has not been able to sleep.  She denies obvious rash has not had a shingles infection.  She has taken Tylenol, Profen, and baclofen without relief.    The following portions of the patient's history were reviewed and updated as appropriate: allergies, current medications, past family history, past medical history, past social history, past surgical history and problem list. Medications have been reconciled    Review of Systems   A 12 point comprehensive review of systems was negative except as noted.      Current Outpatient Medications   Medication Sig Dispense Refill     aspirin 81 MG EC tablet Take 81 mg by mouth daily.       buPROPion (WELLBUTRIN XL) 300 MG 24 hr tablet TAKE 1 TABLET BY MOUTH EVERY DAY IN THE MORNING 90 tablet 1     CALCIUM ORAL Take by mouth.       carBAMazepine (TEGRETOL XR) 100 MG 12 hr tablet Take 1 tablet (100 mg total) by mouth 2 (two) times a day as needed. 60 tablet 1     chlorthalidone (HYGROTEN) 25 MG tablet Take 1 tablet (25 mg total) by mouth daily. 90 tablet 2     cholecalciferol, vitamin D3, 50 mcg (2,000 unit) Tab Take 1 tablet by mouth.       levonorgestrel (MIRENA) 20 mcg/24 hr (5 years) IUD 1 each by Intrauterine route once.       losartan (COZAAR) 50 MG tablet Take 1 tablet (50 mg total) by mouth daily. (Patient taking differently: Take 25-50 mg by mouth daily. ) 90 tablet 3     NOVOLOG 100 unit/mL injection   3     cyclobenzaprine (FLEXERIL) 10 MG tablet Take 0.5 tablets (5 mg total) by mouth 2 (two) times a day as needed for muscle spasms. 20 tablet 0     naproxen (NAPROSYN) 500 MG tablet Take 1 tablet (500 mg total) by mouth  2 (two) times a day with meals. 20 tablet 0     predniSONE (DELTASONE) 10 mg tablet Take 50 mg by mouth daily for 5 days. 25 tablet 0     No current facility-administered medications for this visit.        Objective:      There were no vitals taken for this visit.    Examination not performed as this is a telephone visit  General: Does not sound acutely distressed  There is no audible wheezing  Speech is clear  Judgment and insight are intact    No results found for this or any previous visit (from the past 168 hour(s)).       This note has been dictated using voice recognition software. Any grammatical or context distortions are unintentional and inherent to the software    Jose Elias Montgomery MD        Phone call duration: 14 minutes

## 2021-06-16 NOTE — TELEPHONE ENCOUNTER
Telephone Encounter by Lourdes Celestin at 6/7/2019  3:47 PM     Author: Lourdes Celestin Service: -- Author Type: --    Filed: 6/7/2019  3:56 PM Encounter Date: 6/5/2019 Status: Signed    : Lourdes Celestin       PRIOR AUTHORIZATION DENIED    Denial Rational: THE DOSE YOU HAVE BEEN PRESCRIBED CAN BE REACHED WITH A LOWER QUANTITY OF A HIGHER STRENGTH THAT DOES NOT GO OVER THE QUANTITY LIMIT.         Appeal Information: IF THE PROVIDER WOULD LIKE TO APPEAL THIS DENIAL, PLEASE PROVIDE A LETTER OF MEDICAL NECESSITY AND NOTIFY THE CENTRAL PA TEAM ([facility] MED [id number]) WHEN IT HAS BEEN PLACED IN THE PATIENT' CHART AND AN APPEAL CAN BE STARTED ON BEHALF OF THE PROVIDER AND PATIENT.        '

## 2021-06-16 NOTE — TELEPHONE ENCOUNTER
RN cannot approve Refill Request    RN can NOT refill this medication patient reports taking medication differently. Last office visit: 11/4/2019 Jose Elias Montgomery MD Last Physical: 6/11/2018 Last MTM visit: Visit date not found Last visit same specialty: 11/4/2019 Jose Elias Montgomery MD.  Next visit within 3 mo: Visit date not found  Next physical within 3 mo: Visit date not found      Joey Parr, [facility] Triage/Med Refill 4/14/2021    Requested Prescriptions   Pending Prescriptions Disp Refills     losartan (COZAAR) 50 MG tablet [Pharmacy Med Name: LOSARTAN POTASSIUM 50 MG TAB] 90 tablet 4     Sig: TAKE 1 TABLET BY MOUTH EVERY DAY       Angiotensin Receptor Blocker Protocol Passed - 4/12/2021  2:58 PM        Passed - PCP or prescribing provider visit in past 12 months       Last office visit with prescriber/PCP: 11/4/2019 Jose Elias Montgomery MD OR same dept: Visit date not found OR same specialty: 11/4/2019 Jose Elias Montgomery MD  Last physical: 6/11/2018 Last MTM visit: Visit date not found   Next visit within 3 mo: Visit date not found  Next physical within 3 mo: Visit date not found  Prescriber OR PCP: Jose Elias Montgomery MD  Last diagnosis associated with med order: 1. Anxiety  - buPROPion (WELLBUTRIN XL) 300 MG 24 hr tablet [Pharmacy Med Name: BUPROPION HCL  MG TABLET]; TAKE 1 TABLET BY MOUTH EVERY DAY IN THE MORNING  Dispense: 90 tablet; Refill: 1    2. Type 1 Diabetes Mellitus  - losartan (COZAAR) 50 MG tablet [Pharmacy Med Name: LOSARTAN POTASSIUM 50 MG TAB]; TAKE 1 TABLET BY MOUTH EVERY DAY  Dispense: 90 tablet; Refill: 4    If protocol passes may refill for 12 months if within 3 months of last provider visit (or a total of 15 months).             Passed - Serum potassium within the past 12 months     Lab Results   Component Value Date    Potassium 3.8 10/12/2020             Passed - Blood pressure filed in past 12 months     BP Readings from Last 1 Encounters:    03/10/21 (!) 189/69             Passed - Serum creatinine within the past 12 months     Creatinine   Date Value Ref Range Status   10/12/2020 1.13 (H) 0.60 - 1.10 mg/dL Final              Refused Prescriptions Disp Refills     buPROPion (WELLBUTRIN XL) 300 MG 24 hr tablet [Pharmacy Med Name: BUPROPION HCL  MG TABLET] 90 tablet 1     Sig: TAKE 1 TABLET BY MOUTH EVERY DAY IN THE MORNING       Tricyclics/Misc Antidepressant/Antianxiety Meds Refill Protocol Passed - 4/12/2021  2:58 PM        Passed - PCP or prescribing provider visit in last year     Last office visit with prescriber/PCP: 11/4/2019 Jose Elias Montgomery MD OR same dept: Visit date not found OR same specialty: 11/4/2019 Jose Elias Montgomery MD  Last physical: 6/11/2018 Last MTM visit: Visit date not found   Next visit within 3 mo: Visit date not found  Next physical within 3 mo: Visit date not found  Prescriber OR PCP: Jose Elias Montgomery MD  Last diagnosis associated with med order: 1. Anxiety  - buPROPion (WELLBUTRIN XL) 300 MG 24 hr tablet [Pharmacy Med Name: BUPROPION HCL  MG TABLET]; TAKE 1 TABLET BY MOUTH EVERY DAY IN THE MORNING  Dispense: 90 tablet; Refill: 1    2. Type 1 Diabetes Mellitus  - losartan (COZAAR) 50 MG tablet [Pharmacy Med Name: LOSARTAN POTASSIUM 50 MG TAB]; TAKE 1 TABLET BY MOUTH EVERY DAY  Dispense: 90 tablet; Refill: 4    If protocol passes may refill for 12 months if within 3 months of last provider visit (or a total of 15 months).

## 2021-06-18 NOTE — PATIENT INSTRUCTIONS - HE
Patient Instructions by Leonie Trimble DO at 2/1/2021  1:00 PM     Author: Leonie Trimble DO Service: -- Author Type: Physician    Filed: 2/1/2021  1:00 PM Encounter Date: 2/1/2021 Status: Signed    : Leonie Trimble DO (Physician)       Patient Education     Trigeminal Neuralgia  You have trigeminal neuralgia. This is pain caused by irritation of the trigeminal nerve on your face. Symptoms include sudden, sharp pain in your head or face. It may feel like an electric shock. It can last for several seconds or minutes. It usually happens on only 1 side of your face. Pain may be triggered by things like moving your jaw or a touch on the skin of your face. The pain may be caused by something irritating the trigeminal nerve, such as a blood vessel pressing against it. But the exact cause of this problem often isnt known. Although it can be quite painful, the condition isnt dangerous.  Trigeminal neuralgia is often treated with medicines. These include anti-seizure medicines or antidepressants. Certain other treatments may also help. In some cases, you may need surgery.    Home care  Your healthcare provider may prescribe medicines to help relieve and prevent pain. Take all medicines as directed. Please note that it may take several changes in dose and medicines before the right combination is found that controls the pain.  General care:    Plan to rest at home today.    Avoid any specific activities that seem to trigger the pain.    Over the next few weeks, keep a pain diary. Write down when your symptoms happen and how they feel. Certain activities such as touching your face, chewing, talking, or brushing your teeth may bring on the pain. Cold air can also trigger the pain. Make sure you write down any triggers and discuss these with your healthcare provider. This will help guide treatment.  Follow-up care  Follow up with your healthcare provider, or as advised. If you were referred to a neurologist, be  sure to make an appointment.  For more information on your condition, visit:    [employer] www.fpa-support.org  When to seek medical advice  Call your healthcare provider right away if any of these occur:    Fever of 100.4 F (38 C) or higher, or as advised    Headache with very stiff neck    You arent able to keep liquids down (repeated vomiting)    Extreme drowsiness or confusion    Dizziness or fainting    A new feeling of weakness or numbness or tingling in your arm, leg, or face    Difficulty speaking or seeing  Date Last Reviewed: 8/1/2016 2000-2017 [employer]. [address] [place] [address]. All rights reserved. This information is not intended as a substitute for professional medical care. Always follow your healthcare professional's instructions.

## 2021-06-18 NOTE — PROGRESS NOTES
Assessment/ Plan     1. Routine general medical examination at a health care facility    Recommend that she remain physically active  Continue follow-up with her OB/GYN  She is up-to-date on her Pap test  Recommend an annual mammogram  Recommend adequate calcium and vitamin D  [facility] physical examination form was completed  She states that she has good hypoglycemic awareness without recurrent events.  She will be approved for camp without restrictions.    2. Type 1 diabetes mellitus (H)    Continue plan per endocrinology  Endocrinology notes were reviewed  She has an insulin pump  Recommend an annual eye examination  Continue aspirin and losartan  She may review a statin with her endocrinologist      3. Anxiety  4. Mood disorder (H)    Continue appropriate  Consider further adjustments as warranted    5. Diabetic retinopathy (H)    Recommend an annual eye examination      Subjective:       Sera Adkins is a 47 y.o. female who presents to the clinic for a complete physical examination.  She needs a [facility] physical examination form completed as she will be attending [facility] with her son.  She has a history of type 1 diabetes mellitus that is followed by endocrinology.  She has an insulin pump.  She was initially diagnosed with diabetes at the age of 15 at which time she presented with polydipsia, polyuria, and weight loss.  Her diabetes has generally been stable.  Her hemoglobin A1c most recently was 7.0%.  She has had occasional hypoglycemic events.  She also has history anxiety.  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  There are times where she can feel more anxious.    She continues to follow-up with her OB/GYN is up-to-date and had a recent Pap and pelvic examination.  She is up-to-date on her Pap test.    Review of systems negative for headache, dizziness, chest pain, palpitations, or other specific  "changes.    The following portions of the patient's history were reviewed and updated as appropriate: allergies, current medications, past family history, past medical history, past social history, past surgical history and problem list.    Review of Systems   A 12 point comprehensive review of systems was negative except as noted.      Current Outpatient Prescriptions   Medication Sig Dispense Refill     aspirin 81 MG EC tablet Take 81 mg by mouth daily.       buPROPion (WELLBUTRIN XL) 150 MG 24 hr tablet Take 1 tablet (150 mg total) by mouth every morning. 90 tablet 0     levonorgestrel (MIRENA) 20 mcg/24 hr (5 years) IUD 1 each by Intrauterine route once.       losartan (COZAAR) 25 MG tablet TAKE 1 TABLET (25 MG TOTAL) BY MOUTH DAILY. (Patient taking differently: TAKE 2 TABLET (25 MG TOTAL) BY MOUTH DAILY.) 90 tablet 1     NOVOLOG 100 unit/mL injection   3     No current facility-administered medications for this visit.        Objective:      /66  Pulse 72  Temp 97.8  F (36.6  C) (Oral)   Resp 16  Ht 5' 8\" (1.727 m)  Wt 163 lb (73.9 kg)  LMP 05/28/2018  BMI 24.78 kg/m2      General appearance: alert, appears stated age and cooperative  Head: Normocephalic, without obvious abnormality, atraumatic  Eyes: conjunctivae/corneas clear. PERRL, EOM's intact.   Ears: normal TM's and external ear canals both ears  Nose: Nares normal. Septum midline. Mucosa normal. No drainage or sinus tenderness.  Throat: lips, mucosa, and tongue normal; teeth and gums normal  Neck: no adenopathy, supple, symmetrical, trachea midline   Back: symmetric, no curvature. ROM normal. No CVA tenderness.  Lungs: clear to auscultation bilaterally  Heart: regular rate and rhythm, S1, S2 normal, no murmur, click, rub or gallop  Breasts: Deferred per patient request  Abdomen: soft, non-tender; bowel sounds normal; no masses,  no organomegaly  Pelvic: Deferred per patient request  Extremities: extremities normal, atraumatic, no cyanosis or " edema  Skin: Skin color, texture, turgor normal. No rashes or lesions  Lymph nodes: Cervical nodes normal.  Neurologic: Alert and oriented X 3. Normal coordination and gait         No results found for this or any previous visit (from the past 168 hour(s)).       This note has been dictated using voice recognition software. Any grammatical or context distortions are unintentional and inherent to the software

## 2021-06-19 NOTE — LETTER
Letter by Bonnie Austin AuD at      Author: Bonnie Austin AuD Service: -- Author Type: --    Filed:  Encounter Date: 3/19/2019 Status: (Other)       [facility]- Audiology Benefit Letter    TAO KUMARI  1970  [address]   [facility] [address]    Insurance Company: Payor: BLUE CROSS / Plan: BLUE CROSS INDIVIDUAL / Product Type: MNSure /      [place] Product: NO    ID # :  [id number]    Group#:  [id number]    Estimate of Benefits  Consult Visit Benefits:  BLUE CROSS 01/17 ELIG, $40 COPAY    HAE, HAF, HAC Benefits:  COVERED SERVICE LIMITED TO ONE HEARING AID PER EAR EVERY THREE YEARS WITH A MAXIMUM BENEFIT OF $1000 PER EAR PER PERSON EVERY THREE YEARS.    Batteries/Accessories Coverage:  COVERED SERVICE    Representative name: ILIA @ [employer]  Call reference: [id number]  Date of contact: 03/19/2019    Insurance verified today by Cheryle Santiago    Additional Information:      The information provided is an estimate of benefits. This does not guarantee coverage; the insurance company will make the final determination of coverage to include patient responsibility of payment by the patient.   [facility] is not responsible for the decisions made by the insurance company regarding coverage.  Any changes to coverage (new plan or new policy) or procedures may void the contents provided in this letter.     Term Definitions  Patient responsibility: Can include but not limited to: co-pays, co-insurance, deductibles, out-of-pocket and non-covered and/or policy exclusions.

## 2021-06-20 NOTE — PROGRESS NOTES
Assessment/ Plan     1. Hyperkalemia  May have been secondary to a combination of dehydration and other factors    She received IV fluids, furosemide, calcium, and insulin  Potassium has normalized  She states she will be having blood tests later today with her endocrinologist    2. Decreased GFR  May have been prerenal    Recommend increase fluids  Recommend monitoring her kidney function  Continue losartan    3. Type 1 diabetes mellitus (H)    Continue the insulin pump  Follow-up with endocrinology as planned later today    4. Dysuria    Check urinalysis and urine culture [place] treat if appropriate  - Urinalysis-UC if Indicated  - Culture, Urine    Reviewed her your recent hospitalization at [facility].        Subjective:       Sera Adkins is a 47 y.o. female who presents to the clinic following a recent hospitalization at [facility].  She has a known history of type 1 diabetes mellitus and generally is in good health and exercises frequently.  For type 1 diabetes mellitus she requires an insulin pump.  She reportedly has been biking quite frequently and does not believe she was staying well-hydrated.  She did drink some alcohol.  She began to experience a constellation of symptoms including some discomfort in her chest which radiated to her jaw.  She felt mildly short of breath.  Her energy level was low.  She therefore presented to [facility] where her admission potassium was 7.1 and creatinine was 1.32.  Her EKG did not reveal significant abnormalities.  She was treated with IV fluids, furosemide, and calcium.  She was monitored overnight and her laboratory testing normalized.  Her blood sugars have generally been stable.  She reports she has been drinking plenty of fluids.  She has been following up with endocrinology.  She does not typically experience chest pain with exertion.    One concern is possible urinary tract infection.  She states that she was started on ciprofloxacin for  possible urinary tract infection.  When having ongoing symptoms she took Pyridium.  She would like to get her urinalysis rechecked today.    The following portions of the patient's history were reviewed and updated as appropriate: allergies, current medications, past family history, past medical history, past social history, past surgical history and problem list.    Review of Systems   A 12 point comprehensive review of systems was negative except as noted.      Current Outpatient Prescriptions   Medication Sig Dispense Refill     aspirin 81 MG EC tablet Take 81 mg by mouth daily.       buPROPion (WELLBUTRIN XL) 150 MG 24 hr tablet Take 2 tablets (300 mg total) by mouth every morning. 180 tablet 3     CALCIUM ORAL Take by mouth.       levonorgestrel (MIRENA) 20 mcg/24 hr (5 years) IUD 1 each by Intrauterine route once.       NOVOLOG 100 unit/mL injection   3     losartan (COZAAR) 50 MG tablet Take 1 tablet (50 mg total) by mouth daily. 90 tablet 3     No current facility-administered medications for this visit.        Objective:      /70  Pulse 72  Wt 160 lb (72.6 kg)  BMI 24.33 kg/m2      General appearance: alert, appears stated age and cooperative  Head: Normocephalic, without obvious abnormality, atraumatic  Eyes: conjunctivae/corneas clear. PERRL, EOM's intact.   Ears: normal TM's and external ear canals both ears  Nose: Nares normal. Septum midline. Mucosa normal. No drainage or sinus tenderness.  Throat: lips, mucosa, and tongue normal; teeth and gums normal  Neck: no adenopathy, supple, symmetrical  Lungs: clear to auscultation bilaterally  Heart: regular rate and rhythm, S1, S2 normal, no murmur, click, rub or gallop  Extremities: extremities normal, atraumatic, no cyanosis or edema  Skin: Skin color, texture, turgor normal. No rashes or lesions  Lymph nodes: Cervical nodes normal.  Neurologic: Alert and oriented X 3. Normal coordination and gait         Recent Results (from the past 168 hour(s))    Urinalysis-UC if Indicated   Result Value Ref Range    Color, UA Yellow Colorless, Yellow, Straw, Light Yellow    Clarity, UA Clear Clear    Glucose, UA Negative Negative    Bilirubin, UA Negative Negative    Ketones, UA Negative Negative    Specific Gravity, UA 1.015 1.005 - 1.030    Blood, UA Negative Negative    pH, UA 7.5 5.0 - 8.0    Protein, UA Negative Negative mg/dL    Urobilinogen, UA 0.2 E.U./dL 0.2 E.U./dL, 1.0 E.U./dL    Nitrite, UA Positive (!) Negative    Leukocytes, UA Negative Negative    Bacteria, UA Few (!) None Seen hpf    RBC, UA 0-2 None Seen, 0-2 hpf    WBC, UA 0-5 None Seen, 0-5 hpf    Squam Epithel, UA 0-5 None Seen, 0-5 lpf   Culture, Urine   Result Value Ref Range    Culture Mixture of urogenital organisms           This note has been dictated using voice recognition software. Any grammatical or context distortions are unintentional and inherent to the software

## 2021-06-25 NOTE — TELEPHONE ENCOUNTER
Patient history is well known to me.  Given the circumstances I am okay with her to make a lab appointment for a basic metabolic panel.  However, if she has worsening symptoms she should follow-up for an in person evaluation.

## 2021-06-25 NOTE — TELEPHONE ENCOUNTER
Incoming call from pt that she would like labs checked. Pt was doing a lot outside this weekend and has noticed that she has been getting dizzy more frequently. Pt is well hydrated but is concerned with her diabetes and would like metabolic panel checked. Pt stated last time she waited too long and ended up in the ER and doesn't want that to happen again. Do you want to do a virtual visit with her today before ordering labs?

## 2021-06-27 NOTE — PROGRESS NOTES
Progress Notes by Bonnie Austin AuD at 3/18/2019 10:30 AM     Author: Bonnie Austin AuD Service: -- Author Type: Audiologist    Filed: 3/18/2019  6:22 PM Encounter Date: 3/18/2019 Status: Signed    : Bonnie Austin AuD (Audiologist)       Audiology Report:      History:  Sera Adkins is seen today for comprehensive hearing evaluation. She has a history of normal sloping to mild sensorineural hearing loss in both ears. Sera was fit with a pair of Phonak Brammoeo  hearing aids on 12/5/13. She reports she lost her left hearing aid. 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 She feels she struggles a lot to hear throughout the workday and she wonders if there is a hearing aid that can work with a stethoscope. Sera reports she may be interested in replacing only her left hearing aid. She may look into getting hearing aids through [employer]. Sera reports constant tinnitus in both ears. She states she experiences intermittent BPPV when getting in and out of bed which isn't bothersome to her. Sera reports her mother has hearing aids and her brother experienced sudden hearing loss. She states she has some history of noise exposure due to listening to headphones at a loud volume in the past. She denies a history of otalgia, otorrhea, aural fullness, and ear surgery.    Results:     Left Ear Right Ear   Otoscopy clear canals clear canals   Pure Tone Audiometry normal hearing from 250-1500 Hz sloping to mild sensorineural hearing loss  normal hearing from 250-1500 Hz sloping to moderate sensorineural hearing loss    Word Recognition excellent excellent   Tympanometry normal (Type A)  normal (Type A)     Transducer: Circumaural headphones    Reliability was good  and there was good  SRT to PTA agreement. These results show stable hearing compared to results from 11/25/13.  "    Hearing Aids: Sera's right hearing aid is cleaned and a listening check reveals good sound quality. She is provided the [place] [employer] Brochure about hearing aids as well as hearing aid pricing for this clinic. She is also provided a copy of her audiogram. Hearing aid options are briefly discussed. She will be contacted with her insurance benefits for hearing aids.      Plan:  Results are discussed in detail.  She should return for retesting in 2-3 years. A referral to ENT is discussed due to vertigo, but Sera reports this isn't bothersome to her.    Kavitha Akins, [facility]  [place] Licensed Audiologist #[id number]     Please see audiogram below or in \"media\" tab for case history and results.                 "

## 2021-07-01 DIAGNOSIS — E10.9 WELL CONTROLLED TYPE 1 DIABETES MELLITUS (H): ICD-10-CM

## 2021-07-01 RX ORDER — ATORVASTATIN CALCIUM 10 MG/1
10 TABLET, FILM COATED ORAL DAILY
Qty: 90 TABLET | Refills: 3 | Status: SHIPPED | OUTPATIENT
Start: 2021-07-01 | End: 2022-06-02

## 2021-07-22 ENCOUNTER — TELEPHONE (OUTPATIENT)
Dept: FAMILY MEDICINE | Facility: CLINIC | Age: 51
End: 2021-07-22

## 2021-07-22 DIAGNOSIS — E10.8 TYPE 1 DIABETES MELLITUS WITH COMPLICATIONS (H): Primary | ICD-10-CM

## 2021-07-22 NOTE — TELEPHONE ENCOUNTER
Sera is calling to schedule a lab appointment for lab.  She has labs ordered by her endocrinologist, Hannah Whitley, but would like A1c and Lipid also.   Her cholesterol is very low and cannot be calculated on the machine she has at work, so may need a direct cholesterol ordered.

## 2021-07-24 NOTE — PROGRESS NOTES
Outcome for 07/24/21 10:22 AM :Patient is sharing data via clinic device website and patient has been instructed to update data on website. Find login information by using .[email or web address]      Sera Adkins  is being evaluated via a billable video visit.   Sera is a 50 year old who is being evaluated via a billable telephone visit.      Patient would like the video invitation sent by: Send to e-mail at: [email or web address]      Start time: 0836  End time: 0902  HPI:   Sera is a 51 yo woman here for follow up of type 1 diabetes since age 15. She switched to the Tandem X2 pump last year and has really liked it.  She checked her a1c at her clinic and it was up a bit to 7.3% (was 6.8%). She would like to see this under 7%.  She remains quite physically active.  She is now coaching mountain biking.      The other day she took an energy gel (20g carb) before exercise.  She turned off control IQ and went to 50% temp basal then went too high.  Going to exercise mode only is not enough to prevent lows during exercise.  She is not sure of the best way to manage this.      Recent glucose:           Pump settings are as follow:     Profile 1 Profile Active at the time of upload  Start Time Basal Rate Correction Factor Carb Ratio Target BG  Midnight 0.800 u/hr 1u:50 mg/dL  1u:15.0 g 110 mg/dL  7:00 AM 0.900 u/hr 1u:50 mg/dL  1u:15.0 g 110 mg/dL  Calculated Total Daily Basal 20.9 units        Duration of Insulin:  3:00 hours         Carbohydrates:   On          Max Bolus:  10 units    Exercise- She has not tried using this yet.   Start Time Basal Rate Correction Factor Carb Ratio Target BG  Midnight 0.135  1u:100 mg/dL   1u:30 g 140 mg/dL       I started her on a statin at her last visit and she is tolerating this well.  She would like to have her lipids rechecked.     Recent labs (4/9/21):   A1c- 6.8%  Lipids-   Total Chol- 134   HDL- 50  TG- 48  LDL- 75    GFR has been in the 50's for the past couple years.   She worries that her intense activity will worsen this over time.  She is intentional about avoiding dehydration.     Intermittently she has palpitations which she finds irritating.  It can go on all day. For a while it was every day, now it seems better. No chest pain, shortness of breath.  Exercise tolerance is excellent. She knows she has anxiety and feels this is related.  She also drinks a lot of coffee, but has trouble cutting this back.  She has side effects with anxiety medications. She has not yet had her labs checked (including TSH), but plans on having this done soon.     She has no other concerns today.     ROS  GENERAL: Weight stable.  No fevers, chills, malaise, night sweats.   HEENT: no dysphagia, diplopia, neck pain or tenderness, dry/scratchy eyes, URI, cough, sinus drainage, tinnitus, sinus pressure  CV: no chest pain, pressure. +palpitations (see above).  LUNGS: no SOB, [place], cough, sputum production, wheezing   GI: no diarrhea, constipation, abdominal pain  EXTREMITIES: no rashes, ulcers, edema  NEUROLOGY: no changes in vision, tingling or numbness in hands or feet.   MSK: no muscle aches or pains, weakness  PSYCH: mood stable      Past Medical History:   Diagnosis Date     Anxiety      Diabetes mellitus (H)     Type 1       Past Surgical History:   Procedure Laterality Date     BREAST CYST ASPIRATION Left ?      SECTION      x2     CONIZATION CERVIX,LOOP ELECTRD      Description: Cervical Conization Loop Electrode Excision;  Recorded: 2008;       Family History   Problem Relation Age of Onset     Cancer Father      Breast Cancer Paternal Grandmother        Social History   Social Hx:  .  Two teenage children.  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  Enjoys being physically active, mostly biking, but also running and HIIT.     Socioeconomic History     Marital status: other     Spouse name: Not on file     Number of children: Not on file     Years of education: Not on file      "Highest education level: Not on file   Occupational History     Not on file   Social Needs     Financial resource strain: Not on file     Food insecurity:     Worry: Not on file     Inability: Not on file     Transportation needs:     Medical: Not on file     Non-medical: Not on file   Tobacco Use     Smoking status: Never Smoker     Smokeless tobacco: Never Used   Substance and Sexual Activity     Alcohol use: No     Drug use: No     Sexual activity: Not on file   Lifestyle     Physical activity:     Days per week: Not on file     Minutes per session: Not on file     Stress: Not on file   Relationships     Social connections:     Talks on phone: Not on file     Gets together: Not on file     Attends Hoahaoism service: Not on file     Active member of club or organization: Not on file     Attends meetings of clubs or organizations: Not on file     Relationship status: Not on file     Intimate partner violence:     Fear of current or ex partner: Not on file     Emotionally abused: Not on file     Physically abused: Not on file     Forced sexual activity: Not on file   Other Topics Concern     Not on file   Social History Narrative     Not on file       Current Outpatient Medications   Medication     acetone urine (KETOSTIX) test strip     ASPIRIN PO     atorvastatin (LIPITOR) 10 MG tablet     buPROPion (WELLBUTRIN XL) 150 MG 24 hr tablet     Calcium Carbonate-Vitamin D (CALCIUM 500+D PO)     chlorthalidone (HYGROTON) 25 MG tablet     glucagon (GLUCAGON EMERGENCY) 1 MG kit     glucose blood VI test strips (ONE TOUCH ULTRA TEST) strip     insulin aspart (NOVOLOG VIAL) 100 UNITS/ML vial     insulin cartridge (T:SLIM 3ML) misc pump supply     insulin glargine (LANTUS PEN) 100 UNIT/ML pen     Insulin Infusion Pump Supplies (TRUSTEEL INFUSION SET) MISC     Insulin Pump Accessories MISC     insulin syringe-needle U-100 (30G X 1/2\" 0.3 ML) 30G X 1/2\" 0.3 ML miscellaneous     losartan (COZAAR) 25 MG tablet     magnesium 250 " MG tablet     No current facility-administered medications for this visit.          Allergies   Allergen Reactions     Percocet [Oxycodone-Acetaminophen] Nausea and Nausea and Vomiting       Physical Exam  There were no vitals taken for this visit.  GENERAL: healthy, alert and no distress  RESP: no audible wheeze, cough, or visible cyanosis.  No visible retractions or increased work of breathing.  Able to speak fully in complete sentences.  PSYCH: mentation appears normal, affect normal/bright, judgement and insight intact, normal speech and appearance well-groomed    RESULTS  Lab Results   Component Value Date    A1C 6.8 (A) 04/09/2021    A1C 7.7 (H) 01/20/2020    A1C 7.5 (H) 09/16/2019    A1C 7.8 (H) 03/18/2019    A1C 7.7 (H) 12/13/2016    HEMOGLOBINA1 7.4 (A) 01/20/2020    HEMOGLOBINA1 7.2 (A) 09/16/2019    HEMOGLOBINA1 7.0 (A) 04/16/2018    HEMOGLOBINA1 7.0 (A) 12/11/2017    HEMOGLOBINA1 7.4 (A) 06/05/2017       TSH   Date Value Ref Range Status   01/20/2020 2.07 0.40 - 4.00 mU/L Final   09/16/2019 1.89 0.40 - 4.00 mU/L Final   03/18/2019 2.40 0.40 - 4.00 mU/L Final   12/11/2017 1.69 0.40 - 4.00 mU/L Final   12/13/2016 3.72 0.40 - 4.00 mU/L Final     T4 Free   Date Value Ref Range Status   03/18/2019 0.81 0.76 - 1.46 ng/dL Final       ALT   Date Value Ref Range Status   01/20/2020 24 0 - 50 U/L Final   09/16/2019 25 0 - 50 U/L Final   ]    Recent Labs   Lab Test 04/09/21  0000 01/20/20  1041 09/16/19  1043   CHOL 134 132 112   HDL 50 59 42*   LDL  --  64 59   TRIG 48 44 58       Lab Results   Component Value Date     06/07/2021     01/20/2020      Lab Results   Component Value Date    POTASSIUM 3.8 06/07/2021    POTASSIUM 4.0 01/20/2020     Lab Results   Component Value Date    CHLORIDE 98 06/07/2021    CHLORIDE 105 01/20/2020     Lab Results   Component Value Date    SCOTTY 9.1 06/07/2021    SCOTTY 9.2 01/20/2020     Lab Results   Component Value Date    CO2 24 06/07/2021    CO2 32 01/20/2020     Lab  Results   Component Value Date    BUN 22 06/07/2021    BUN 18 01/20/2020     Lab Results   Component Value Date    CR 1.17 06/07/2021    CR 0.94 01/20/2020       GFR Estimate   Date Value Ref Range Status   06/07/2021 49 (L) >60 mL/min/1.73m2 Final   10/12/2020 51 (L) >60 mL/min/1.73m2 Final   01/20/2020 72 >60 mL/min/[1.73_m2] Final     Comment:     Non  GFR Calc  Starting 12/18/2018, serum creatinine based estimated GFR (eGFR) will be   calculated using the Chronic Kidney Disease Epidemiology Collaboration   (CKD-EPI) equation.     09/16/2019 63 >60 mL/min/[1.73_m2] Final     Comment:     Non  GFR Calc  Starting 12/18/2018, serum creatinine based estimated GFR (eGFR) will be   calculated using the Chronic Kidney Disease Epidemiology Collaboration   (CKD-EPI) equation.     08/22/2019 47 (L) >60 mL/min/1.73m2 Final   03/18/2019 77 >60 mL/min/[1.73_m2] Final     Comment:     Non  GFR Calc  Starting 12/18/2018, serum creatinine based estimated GFR (eGFR) will be   calculated using the Chronic Kidney Disease Epidemiology Collaboration   (CKD-EPI) equation.       GFR Estimate If Black   Date Value Ref Range Status   06/07/2021 60 (L) >60 mL/min/1.73m2 Final   10/12/2020 >60 >60 mL/min/1.73m2 Final   01/20/2020 83 >60 mL/min/[1.73_m2] Final     Comment:      GFR Calc  Starting 12/18/2018, serum creatinine based estimated GFR (eGFR) will be   calculated using the Chronic Kidney Disease Epidemiology Collaboration   (CKD-EPI) equation.     09/16/2019 73 >60 mL/min/[1.73_m2] Final     Comment:      GFR Calc  Starting 12/18/2018, serum creatinine based estimated GFR (eGFR) will be   calculated using the Chronic Kidney Disease Epidemiology Collaboration   (CKD-EPI) equation.     08/22/2019 57 (L) >60 mL/min/1.73m2 Final   03/18/2019 89 >60 mL/min/[1.73_m2] Final     Comment:      GFR Calc  Starting 12/18/2018, serum creatinine based  estimated GFR (eGFR) will be   calculated using the Chronic Kidney Disease Epidemiology Collaboration   (CKD-EPI) equation.         Lab Results   Component Value Date    MICROL 6 01/20/2020     No results found for: MICROALBUMIN  No results found for: CPEPT, GADAB, ISCAB    No results found for: B12]    Most recent eye exam date: : Not Found   Fall, 2020.     Assessment/Plan:     1.  Type 1 diabetes-  Control has improved significantly and she is having less glucose variability (CV 32%).  She is just slightly higher than target. We made the following changes today (instructions given to patient):     Change pump settings as follows:   Profile 1 Profile Active at the time of upload  Start Time Basal Rate Correction Factor Carb Ratio Target BG  Midnight 0.85 (was 0.8) 1u:50 mg/dL  1u:14 (was 15)110 mg/dL  7:00 AM 0.95 (was 0.9) 1u:50 mg/dL  1u:14 (was 15)110 mg/dL    Plan for exercise:   Switch to exercise pattern (new pattern) 60-90 minutes before activity.  This is a much lower basal rate than usual, and less aggressive with correction and carb ratios. Please put your pump in exercise mode at the same time (target glucose 140 vs 112.5 mg/dL).  Resume usual basal rate after exercise.     Exercise   Start Time Basal Rate Correction Factor Carb Ratio Target BG  Midnight 0.135  1u:100 mg/dL   1u:30 g 140 mg/dL       Schedule physical.     Schedule eye exam.       2.  Risk factors-     Retinopathy:  No.  Due for eye exam.  Will schedule.   Nephropathy:  BP well controlled.  No history of microalbuminuria. She had SEKOU in 2019.  GFR stable at 49.  Follows with nephrology, last evaluation was in 12/2020.  No evidence of diabetic nephropathy   Encouraged hydration.     Neuropathy: decreased vibration sensation in left great toe in the past.  Normal monofilament.   No pain/burning.   Feet: OK, no ulcers.   Lipids:  LDL at target. She is now on atorvastatin and tolerating this well.  Will recheck lipids.      3.  Palpitations-  will check TSH.  Recommend f/u with PCP for further evaluation and discussion of anxiety.  Suggested f/u with psychologist.  She will think about it.      4.  F/U in 3 months with myself.  Will establish staff endocrinologist, as Dr. Roy is no longer in our clinic.      38 minutes spent on the date of the encounter doing chart review, review of test results, review of continuous glucose sensor, insulin pump data, interpretation of glucose data, patient visit and documentation, counseling/coordination of care, and discussion of follow up plan for worsening hyper and hypoglycemia.  The patient understood and is satisfied with today's visit.     Hannah Whitley PA-C, MPAS   [facility]  Department of Medicine  Division of Endocrinology and Diabetes

## 2021-07-26 ENCOUNTER — VIRTUAL VISIT (OUTPATIENT)
Dept: ENDOCRINOLOGY | Facility: CLINIC | Age: 51
End: 2021-07-26
Payer: COMMERCIAL

## 2021-07-26 VITALS
HEART RATE: 72 BPM | SYSTOLIC BLOOD PRESSURE: 103 MMHG | WEIGHT: 160 LBS | BODY MASS INDEX: 24.33 KG/M2 | DIASTOLIC BLOOD PRESSURE: 67 MMHG

## 2021-07-26 DIAGNOSIS — E10.9 WELL CONTROLLED TYPE 1 DIABETES MELLITUS (H): Primary | ICD-10-CM

## 2021-07-26 PROCEDURE — 99214 OFFICE O/P EST MOD 30 MIN: CPT | Mod: 95 | Performed by: PHYSICIAN ASSISTANT

## 2021-07-26 NOTE — PATIENT INSTRUCTIONS
Nice seeing you today, Sera!    Change pump settings as follows:   Profile 1 Profile Active at the time of upload  Start Time Basal Rate Correction Factor Carb Ratio Target BG  Midnight 0.85 (was 0.8) 1u:50 mg/dL  1u:14 (was 15)110 mg/dL  7:00 AM 0.95 (was 0.9) 1u:50 mg/dL  1u:14 (was 15)110 mg/dL    Plan for exercise:   Switch to exercise pattern (new pattern) 60-90 minutes before activity.  This is a much lower basal rate than usual, and less aggressive with correction and carb ratios. Please put your pump in exercise mode at the same time (target glucose 140 vs 112.5 mg/dL).  Resume usual basal rate after exercise.     Exercise   Start Time Basal Rate Correction Factor Carb Ratio Target BG  Midnight 0.135  1u:100 mg/dL   1u:30 g 140 mg/dL       Schedule physical. Discuss palpitations/anxiety/general physical.     Schedule eye exam.     Consider a visit with a health psychologist.  Below are some of the psychologists we work with . You can also discuss this with your primary care provider.        Scheduling with a Health Psychologist  *Our Health Psychologists prefer that the patient reaches out to them directly to schedule an appointment. After choosing one of the providers listed below, you will more than likely reach their voicemail, as they are typically seeing patients during normal business hours. Make sure to leave your name, contact number, and the name of the doctor who referred you. They will try to get back to you within 24-48 business hours.     Dr. Bob Burgess: [phone]  Dr. Kory Bautista: [phone]  Dr. Isabel Cuellar: [phone]

## 2021-07-26 NOTE — LETTER
7/26/2021       RE: Sera Adkins  [address]  [place] [address]     Dear Colleague,    Thank you for referring your patient, Sera Adkins, to the [facility] [place] [facility]. Please see a copy of my visit note below.    Outcome for 07/24/21 10:22 AM :Patient is sharing data via clinic device website and patient has been instructed to update data on website. Find login information by using .[email or web address]      Sera Adkins  is being evaluated via a billable video visit.   Sera is a 50 year old who is being evaluated via a billable telephone visit.      Patient would like the video invitation sent by: Send to e-mail at: [email or web address]      Start time: 0836  End time: 0902  HPI:   Sera is a 51 yo woman here for follow up of type 1 diabetes since age 15. She switched to the Tandem X2 pump last year and has really liked it.  She checked her a1c at her clinic and it was up a bit to 7.3% (was 6.8%). She would like to see this under 7%.  She remains quite physically active.  She is now coaching mountain biking.      The other day she took an energy gel (20g carb) before exercise.  She turned off control IQ and went to 50% temp basal then went too high.  Going to exercise mode only is not enough to prevent lows during exercise.  She is not sure of the best way to manage this.      Recent glucose:           Pump settings are as follow:     Profile 1 Profile Active at the time of upload  Start Time Basal Rate Correction Factor Carb Ratio Target BG  Midnight 0.800 u/hr 1u:50 mg/dL  1u:15.0 g 110 mg/dL  7:00 AM 0.900 u/hr 1u:50 mg/dL  1u:15.0 g 110 mg/dL  Calculated Total Daily Basal 20.9 units        Duration of Insulin:  3:00 hours         Carbohydrates:   On          Max Bolus:  10 units    Exercise- She has not tried using this yet.   Start Time Basal Rate Correction Factor Carb Ratio Target  BG  Midnight 0.135  1u:100 mg/dL   1u:30 g 140 mg/dL       I started her on a statin at her last visit and she is tolerating this well.  She would like to have her lipids rechecked.     Recent labs (21):   A1c- 6.8%  Lipids-   Total Chol- 134   HDL- 50  TG- 48  LDL- 75    GFR has been in the 50's for the past couple years.  She worries that her intense activity will worsen this over time.  She is intentional about avoiding dehydration.     Intermittently she has palpitations which she finds irritating.  It can go on all day. For a while it was every day, now it seems better. No chest pain, shortness of breath.  Exercise tolerance is excellent. She knows she has anxiety and feels this is related.  She also drinks a lot of coffee, but has trouble cutting this back.  She has side effects with anxiety medications. She has not yet had her labs checked (including TSH), but plans on having this done soon.     She has no other concerns today.     ROS  GENERAL: Weight stable.  No fevers, chills, malaise, night sweats.   HEENT: no dysphagia, diplopia, neck pain or tenderness, dry/scratchy eyes, URI, cough, sinus drainage, tinnitus, sinus pressure  CV: no chest pain, pressure. +palpitations (see above).  LUNGS: no SOB, [place], cough, sputum production, wheezing   GI: no diarrhea, constipation, abdominal pain  EXTREMITIES: no rashes, ulcers, edema  NEUROLOGY: no changes in vision, tingling or numbness in hands or feet.   MSK: no muscle aches or pains, weakness  PSYCH: mood stable      Past Medical History:   Diagnosis Date     Anxiety      Diabetes mellitus (H)     Type 1       Past Surgical History:   Procedure Laterality Date     BREAST CYST ASPIRATION Left ?      SECTION      x2     CONIZATION CERVIX,LOOP ELECTRD      Description: Cervical Conization Loop Electrode Excision;  Recorded: 2008;       Family History   Problem Relation Age of Onset     Cancer Father      Breast Cancer Paternal Grandmother         Social History   Social Hx:  .  Two teenage children.  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  Enjoys being physically active, mostly biking, but also running and HIIT.     Socioeconomic History     Marital status: other     Spouse name: Not on file     Number of children: Not on file     Years of education: Not on file     Highest education level: Not on file   Occupational History     Not on file   Social Needs     Financial resource strain: Not on file     Food insecurity:     Worry: Not on file     Inability: Not on file     Transportation needs:     Medical: Not on file     Non-medical: Not on file   Tobacco Use     Smoking status: Never Smoker     Smokeless tobacco: Never Used   Substance and Sexual Activity     Alcohol use: No     Drug use: No     Sexual activity: Not on file   Lifestyle     Physical activity:     Days per week: Not on file     Minutes per session: Not on file     Stress: Not on file   Relationships     Social connections:     Talks on phone: Not on file     Gets together: Not on file     Attends Latter day service: Not on file     Active member of club or organization: Not on file     Attends meetings of clubs or organizations: Not on file     Relationship status: Not on file     Intimate partner violence:     Fear of current or ex partner: Not on file     Emotionally abused: Not on file     Physically abused: Not on file     Forced sexual activity: Not on file   Other Topics Concern     Not on file   Social History Narrative     Not on file       Current Outpatient Medications   Medication     acetone urine (KETOSTIX) test strip     ASPIRIN PO     atorvastatin (LIPITOR) 10 MG tablet     buPROPion (WELLBUTRIN XL) 150 MG 24 hr tablet     Calcium Carbonate-Vitamin D (CALCIUM 500+D PO)     chlorthalidone (HYGROTON) 25 MG tablet     glucagon (GLUCAGON EMERGENCY) 1 MG kit     glucose blood VI test strips (ONE TOUCH ULTRA TEST) strip     insulin aspart (NOVOLOG VIAL) 100 UNITS/ML vial      "insulin cartridge (T:SLIM 3ML) misc pump supply     insulin glargine (LANTUS PEN) 100 UNIT/ML pen     Insulin Infusion Pump Supplies (TRUSTEEL INFUSION SET) MISC     Insulin Pump Accessories MISC     insulin syringe-needle U-100 (30G X 1/2\" 0.3 ML) 30G X 1/2\" 0.3 ML miscellaneous     losartan (COZAAR) 25 MG tablet     magnesium 250 MG tablet     No current facility-administered medications for this visit.          Allergies   Allergen Reactions     Percocet [Oxycodone-Acetaminophen] Nausea and Nausea and Vomiting       Physical Exam  There were no vitals taken for this visit.  GENERAL: healthy, alert and no distress  RESP: no audible wheeze, cough, or visible cyanosis.  No visible retractions or increased work of breathing.  Able to speak fully in complete sentences.  PSYCH: mentation appears normal, affect normal/bright, judgement and insight intact, normal speech and appearance well-groomed    RESULTS  Lab Results   Component Value Date    A1C 6.8 (A) 04/09/2021    A1C 7.7 (H) 01/20/2020    A1C 7.5 (H) 09/16/2019    A1C 7.8 (H) 03/18/2019    A1C 7.7 (H) 12/13/2016    HEMOGLOBINA1 7.4 (A) 01/20/2020    HEMOGLOBINA1 7.2 (A) 09/16/2019    HEMOGLOBINA1 7.0 (A) 04/16/2018    HEMOGLOBINA1 7.0 (A) 12/11/2017    HEMOGLOBINA1 7.4 (A) 06/05/2017       TSH   Date Value Ref Range Status   01/20/2020 2.07 0.40 - 4.00 mU/L Final   09/16/2019 1.89 0.40 - 4.00 mU/L Final   03/18/2019 2.40 0.40 - 4.00 mU/L Final   12/11/2017 1.69 0.40 - 4.00 mU/L Final   12/13/2016 3.72 0.40 - 4.00 mU/L Final     T4 Free   Date Value Ref Range Status   03/18/2019 0.81 0.76 - 1.46 ng/dL Final       ALT   Date Value Ref Range Status   01/20/2020 24 0 - 50 U/L Final   09/16/2019 25 0 - 50 U/L Final   ]    Recent Labs   Lab Test 04/09/21  0000 01/20/20  1041 09/16/19  1043   CHOL 134 132 112   HDL 50 59 42*   LDL  --  64 59   TRIG 48 44 58       Lab Results   Component Value Date     06/07/2021     01/20/2020      Lab Results   Component " Value Date    POTASSIUM 3.8 06/07/2021    POTASSIUM 4.0 01/20/2020     Lab Results   Component Value Date    CHLORIDE 98 06/07/2021    CHLORIDE 105 01/20/2020     Lab Results   Component Value Date    SCOTTY 9.1 06/07/2021    SCOTTY 9.2 01/20/2020     Lab Results   Component Value Date    CO2 24 06/07/2021    CO2 32 01/20/2020     Lab Results   Component Value Date    BUN 22 06/07/2021    BUN 18 01/20/2020     Lab Results   Component Value Date    CR 1.17 06/07/2021    CR 0.94 01/20/2020       GFR Estimate   Date Value Ref Range Status   06/07/2021 49 (L) >60 mL/min/1.73m2 Final   10/12/2020 51 (L) >60 mL/min/1.73m2 Final   01/20/2020 72 >60 mL/min/[1.73_m2] Final     Comment:     Non  GFR Calc  Starting 12/18/2018, serum creatinine based estimated GFR (eGFR) will be   calculated using the Chronic Kidney Disease Epidemiology Collaboration   (CKD-EPI) equation.     09/16/2019 63 >60 mL/min/[1.73_m2] Final     Comment:     Non  GFR Calc  Starting 12/18/2018, serum creatinine based estimated GFR (eGFR) will be   calculated using the Chronic Kidney Disease Epidemiology Collaboration   (CKD-EPI) equation.     08/22/2019 47 (L) >60 mL/min/1.73m2 Final   03/18/2019 77 >60 mL/min/[1.73_m2] Final     Comment:     Non  GFR Calc  Starting 12/18/2018, serum creatinine based estimated GFR (eGFR) will be   calculated using the Chronic Kidney Disease Epidemiology Collaboration   (CKD-EPI) equation.       GFR Estimate If Black   Date Value Ref Range Status   06/07/2021 60 (L) >60 mL/min/1.73m2 Final   10/12/2020 >60 >60 mL/min/1.73m2 Final   01/20/2020 83 >60 mL/min/[1.73_m2] Final     Comment:      GFR Calc  Starting 12/18/2018, serum creatinine based estimated GFR (eGFR) will be   calculated using the Chronic Kidney Disease Epidemiology Collaboration   (CKD-EPI) equation.     09/16/2019 73 >60 mL/min/[1.73_m2] Final     Comment:      GFR Calc  Starting  12/18/2018, serum creatinine based estimated GFR (eGFR) will be   calculated using the Chronic Kidney Disease Epidemiology Collaboration   (CKD-EPI) equation.     08/22/2019 57 (L) >60 mL/min/1.73m2 Final   03/18/2019 89 >60 mL/min/[1.73_m2] Final     Comment:      GFR Calc  Starting 12/18/2018, serum creatinine based estimated GFR (eGFR) will be   calculated using the Chronic Kidney Disease Epidemiology Collaboration   (CKD-EPI) equation.         Lab Results   Component Value Date    MICROL 6 01/20/2020     No results found for: MICROALBUMIN  No results found for: CPEPT, GADAB, ISCAB    No results found for: B12]    Most recent eye exam date: : Not Found   Fall, 2020.     Assessment/Plan:     1.  Type 1 diabetes-  Control has improved significantly and she is having less glucose variability (CV 32%).  She is just slightly higher than target. We made the following changes today (instructions given to patient):     Change pump settings as follows:   Profile 1 Profile Active at the time of upload  Start Time Basal Rate Correction Factor Carb Ratio Target BG  Midnight 0.85 (was 0.8) 1u:50 mg/dL  1u:14 (was 15)110 mg/dL  7:00 AM 0.95 (was 0.9) 1u:50 mg/dL  1u:14 (was 15)110 mg/dL    Plan for exercise:   Switch to exercise pattern (new pattern) 60-90 minutes before activity.  This is a much lower basal rate than usual, and less aggressive with correction and carb ratios. Please put your pump in exercise mode at the same time (target glucose 140 vs 112.5 mg/dL).  Resume usual basal rate after exercise.     Exercise   Start Time Basal Rate Correction Factor Carb Ratio Target BG  Midnight 0.135  1u:100 mg/dL   1u:30 g 140 mg/dL       Schedule physical.     Schedule eye exam.       2.  Risk factors-     Retinopathy:  No.  Due for eye exam.  Will schedule.   Nephropathy:  BP well controlled.  No history of microalbuminuria. She had SEKOU in 2019.  GFR stable at 49.  Follows with nephrology, last evaluation was  in 12/2020.  No evidence of diabetic nephropathy   Encouraged hydration.     Neuropathy: decreased vibration sensation in left great toe in the past.  Normal monofilament.   No pain/burning.   Feet: OK, no ulcers.   Lipids:  LDL at target. She is now on atorvastatin and tolerating this well.  Will recheck lipids.      3.  Palpitations- will check TSH.  Recommend f/u with PCP for further evaluation and discussion of anxiety.  Suggested f/u with psychologist.  She will think about it.      4.  F/U in 3 months with myself.  Will establish staff endocrinologist, as Dr. Roy is no longer in our clinic.      38 minutes spent on the date of the encounter doing chart review, review of test results, review of continuous glucose sensor, insulin pump data, interpretation of glucose data, patient visit and documentation, counseling/coordination of care, and discussion of follow up plan for worsening hyper and hypoglycemia.  The patient understood and is satisfied with today's visit.     Hannah Whitley PA-C, MPAS   [facility]  Department of Medicine  Division of Endocrinology and Diabetes

## 2021-07-30 ENCOUNTER — MYC MEDICAL ADVICE (OUTPATIENT)
Dept: ENDOCRINOLOGY | Facility: CLINIC | Age: 51
End: 2021-07-30

## 2021-07-30 DIAGNOSIS — E10.8 TYPE 1 DIABETES MELLITUS WITH COMPLICATIONS (H): ICD-10-CM

## 2021-07-30 RX ORDER — INFUSION SET FOR INSULIN PUMP
1 INFUSION SETS-PARAPHERNALIA MISCELLANEOUS EVERY OTHER DAY
Qty: 50 EACH | Refills: 3 | Status: SHIPPED | OUTPATIENT
Start: 2021-07-30 | End: 2024-03-22 | Stop reason: ALTCHOICE

## 2021-08-02 ENCOUNTER — LAB (OUTPATIENT)
Dept: LAB | Facility: CLINIC | Age: 51
End: 2021-08-02
Payer: COMMERCIAL

## 2021-08-02 ENCOUNTER — TELEPHONE (OUTPATIENT)
Dept: LAB | Facility: CLINIC | Age: 51
End: 2021-08-02

## 2021-08-02 DIAGNOSIS — E10.9 WELL CONTROLLED TYPE 1 DIABETES MELLITUS (H): ICD-10-CM

## 2021-08-02 DIAGNOSIS — E10.8 TYPE 1 DIABETES MELLITUS WITH COMPLICATIONS (H): ICD-10-CM

## 2021-08-02 LAB
ANION GAP SERPL CALCULATED.3IONS-SCNC: 8 MMOL/L (ref 5–18)
BUN SERPL-MCNC: 17 MG/DL (ref 8–22)
CALCIUM SERPL-MCNC: 9.5 MG/DL (ref 8.5–10.5)
CHLORIDE BLD-SCNC: 102 MMOL/L (ref 98–107)
CHOLEST SERPL-MCNC: 99 MG/DL
CO2 SERPL-SCNC: 30 MMOL/L (ref 22–31)
CREAT SERPL-MCNC: 1.04 MG/DL (ref 0.6–1.1)
CREAT UR-MCNC: 112 MG/DL
FASTING STATUS PATIENT QL REPORTED: YES
GFR SERPL CREATININE-BSD FRML MDRD: 63 ML/MIN/1.73M2
GLUCOSE BLD-MCNC: 140 MG/DL (ref 70–125)
HBA1C MFR BLD: 7.2 % (ref 0–5.6)
HDLC SERPL-MCNC: 38 MG/DL
LDLC SERPL CALC-MCNC: 53 MG/DL
MICROALBUMIN UR-MCNC: 0.53 MG/DL (ref 0–1.99)
MICROALBUMIN/CREAT UR: 4.7 MG/G CR
POTASSIUM BLD-SCNC: 3.9 MMOL/L (ref 3.5–5)
SODIUM SERPL-SCNC: 140 MMOL/L (ref 136–145)
TRIGL SERPL-MCNC: 40 MG/DL
TSH SERPL DL<=0.005 MIU/L-ACNC: 1.84 UIU/ML (ref 0.3–5)

## 2021-08-02 PROCEDURE — 83036 HEMOGLOBIN GLYCOSYLATED A1C: CPT

## 2021-08-02 PROCEDURE — 80061 LIPID PANEL: CPT

## 2021-08-02 PROCEDURE — 80048 BASIC METABOLIC PNL TOTAL CA: CPT

## 2021-08-02 PROCEDURE — 82043 UR ALBUMIN QUANTITATIVE: CPT

## 2021-08-02 PROCEDURE — 84443 ASSAY THYROID STIM HORMONE: CPT

## 2021-08-02 PROCEDURE — 36415 COLL VENOUS BLD VENIPUNCTURE: CPT

## 2021-08-02 NOTE — PROGRESS NOTES
Patient was in today for labs at [facility] and claims she needs a TSH and CMP due to recent palpitations. She explained this was discussed with Dr. Whitley and we are needing orders placed. Thank you!

## 2021-08-02 NOTE — TELEPHONE ENCOUNTER
Good morning Sera,     I am afraid you have the wrong Dr. Whitley.  The Dr. Whitley who saw the pt is Hannah Whitley at Endo, DM clinic in [facility].  She is NOT a [facility] pt  - she sees Dr. Jose Elias Longo.  You will need to call Dr. Whitley in [place] or have pt PCP give the orders.  Sorry. Thanks

## 2021-09-05 ENCOUNTER — HEALTH MAINTENANCE LETTER (OUTPATIENT)
Age: 51
End: 2021-09-05

## 2021-09-11 ENCOUNTER — ANCILLARY PROCEDURE (OUTPATIENT)
Dept: MAMMOGRAPHY | Facility: CLINIC | Age: 51
End: 2021-09-11
Attending: FAMILY MEDICINE
Payer: COMMERCIAL

## 2021-09-11 DIAGNOSIS — Z12.31 VISIT FOR SCREENING MAMMOGRAM: ICD-10-CM

## 2021-09-11 PROCEDURE — 77063 BREAST TOMOSYNTHESIS BI: CPT

## 2021-09-13 ENCOUNTER — OFFICE VISIT (OUTPATIENT)
Dept: FAMILY MEDICINE | Facility: CLINIC | Age: 51
End: 2021-09-13
Payer: COMMERCIAL

## 2021-09-13 VITALS
DIASTOLIC BLOOD PRESSURE: 67 MMHG | TEMPERATURE: 96.9 F | WEIGHT: 161.8 LBS | RESPIRATION RATE: 16 BRPM | SYSTOLIC BLOOD PRESSURE: 128 MMHG | HEART RATE: 60 BPM | BODY MASS INDEX: 23.96 KG/M2 | HEIGHT: 69 IN

## 2021-09-13 DIAGNOSIS — R00.2 PALPITATIONS: ICD-10-CM

## 2021-09-13 DIAGNOSIS — F41.1 ANXIETY STATE: ICD-10-CM

## 2021-09-13 DIAGNOSIS — Z00.00 ROUTINE HISTORY AND PHYSICAL EXAMINATION OF ADULT: Primary | ICD-10-CM

## 2021-09-13 DIAGNOSIS — E10.8 TYPE 1 DIABETES MELLITUS WITH COMPLICATIONS (H): ICD-10-CM

## 2021-09-13 DIAGNOSIS — M77.11 LATERAL EPICONDYLITIS OF RIGHT ELBOW: ICD-10-CM

## 2021-09-13 DIAGNOSIS — F39 MOOD DISORDER (H): ICD-10-CM

## 2021-09-13 DIAGNOSIS — Z00.00 ROUTINE GENERAL MEDICAL EXAMINATION AT A HEALTH CARE FACILITY: ICD-10-CM

## 2021-09-13 PROCEDURE — 96127 BRIEF EMOTIONAL/BEHAV ASSMT: CPT | Performed by: FAMILY MEDICINE

## 2021-09-13 PROCEDURE — 99213 OFFICE O/P EST LOW 20 MIN: CPT | Mod: 25 | Performed by: FAMILY MEDICINE

## 2021-09-13 PROCEDURE — 99396 PREV VISIT EST AGE 40-64: CPT | Performed by: FAMILY MEDICINE

## 2021-09-13 RX ORDER — LOSARTAN POTASSIUM 50 MG/1
50 TABLET ORAL DAILY
Qty: 90 TABLET | Refills: 3
Start: 2021-09-13 | End: 2022-06-19

## 2021-09-13 RX ORDER — ARIPIPRAZOLE 2 MG/1
2 TABLET ORAL DAILY
Qty: 30 TABLET | Refills: 0 | Status: SHIPPED | OUTPATIENT
Start: 2021-09-13 | End: 2022-04-11 | Stop reason: ALTCHOICE

## 2021-09-13 ASSESSMENT — ANXIETY QUESTIONNAIRES
GAD7 TOTAL SCORE: 14
2. NOT BEING ABLE TO STOP OR CONTROL WORRYING: NEARLY EVERY DAY
7. FEELING AFRAID AS IF SOMETHING AWFUL MIGHT HAPPEN: SEVERAL DAYS
5. BEING SO RESTLESS THAT IT IS HARD TO SIT STILL: SEVERAL DAYS
6. BECOMING EASILY ANNOYED OR IRRITABLE: SEVERAL DAYS
3. WORRYING TOO MUCH ABOUT DIFFERENT THINGS: NEARLY EVERY DAY
IF YOU CHECKED OFF ANY PROBLEMS ON THIS QUESTIONNAIRE, HOW DIFFICULT HAVE THESE PROBLEMS MADE IT FOR YOU TO DO YOUR WORK, TAKE CARE OF THINGS AT HOME, OR GET ALONG WITH OTHER PEOPLE: VERY DIFFICULT
1. FEELING NERVOUS, ANXIOUS, OR ON EDGE: NEARLY EVERY DAY

## 2021-09-13 ASSESSMENT — MIFFLIN-ST. JEOR: SCORE: 1415.42

## 2021-09-13 ASSESSMENT — PATIENT HEALTH QUESTIONNAIRE - PHQ9
5. POOR APPETITE OR OVEREATING: MORE THAN HALF THE DAYS
SUM OF ALL RESPONSES TO PHQ QUESTIONS 1-9: 9

## 2021-09-13 NOTE — PATIENT INSTRUCTIONS
Chadd,    Great job with staying active  Remember adequate calcium and vitamin D  I sent a prescription for generic Abilify to your pharmacy  You can follow-up for physical therapy.  They can do iontophoresis  If not improving then I will recommend follow-up with orthopedics  We can consider a Holter test if having ongoing palpitations      Preventive Health Recommendations  Female Ages 40 to 49    Yearly exam:     See your health care provider every year in order to  1. Review health changes.   2. Discuss preventive care.    3. Review your medicines if your doctor prescribed any.      Get a Pap test every three years (unless you have an abnormal result and your provider advises testing more often).      If you get Pap tests with HPV test, you only need to test every 5 years, unless you have an abnormal result. You do not need a Pap test if your uterus was removed (hysterectomy) and you have not had cancer.      You should be tested each year for STDs (sexually transmitted diseases), if you're at risk.     Ask your doctor if you should have a mammogram.      Have a colonoscopy (test for colon cancer) if someone in your family has had colon cancer or polyps before age 50.       Have a cholesterol test every 5 years.       Have a diabetes test (fasting glucose) after age 45. If you are at risk for diabetes, you should have this test every 3 years.    Shots: Get a flu shot each year. Get a tetanus shot every 10 years.     Nutrition:     Eat at least 5 servings of fruits and vegetables each day.    Eat whole-grain bread, whole-wheat pasta and brown rice instead of white grains and rice.    Get adequate Calcium and Vitamin D.      Lifestyle    Exercise at least 150 minutes a week (an average of 30 minutes a day, 5 days a week). This will help you control your weight and prevent disease.    Limit alcohol to one drink per day.    No smoking.     Wear sunscreen to prevent skin cancer.    See your dentist every six months  for an exam and cleaning.

## 2021-09-13 NOTE — PROGRESS NOTES
SUBJECTIVE:   CC: Sera Adkins is an 50 year old woman who presents for preventive health visit.     This is a pleasant 50-year-old female who presents to the clinic for a physical examination.    Her medical history is notable for type 1 diabetes mellitus currently managed by endocrinology.  Her most recent hemoglobin A1c was 7.2%.  She is treated with an insulin pump.  She does have a history of diabetic retinopathy and follows up with ophthalmology on a consistent basis.  Last year she did have abnormal kidney function tests with a GFR of 49 at one point.  Her creatinine was elevated to 1.17.  Her GFR improved to 63 most recently.    She does have a history of a mood disorder with associated depression symptoms as well as generalized anxiety disorder.  She has been treated with Wellbutrin at 1 point was taking 300 mg.  This dose has been decreased to 150 mg.  She completed a PHQ-9 questionnaire with a total of 9 points and her TAB-7 questionnaire was 14.  SSRIs have been ineffective in the past.  She also previously was treated with Effexor.  She notes that she can ruminate.  She often worries.  She would like to consider the addition of Abilify if possible.    She does have ongoing pain in the right elbow over the lateral epicondyle.  Gripping can cause some discomfort.     She continues to follow-up with an OB/GYN is up-to-date on her Pap test.  Her most recent mammogram was normal and September 11, 2021.    Patient has been advised of split billing requirements and indicates understanding: Yes  Healthy Habits:     Getting at least 3 servings of Calcium per day:  Yes    Bi-annual eye exam:  Yes    Dental care twice a year:  Yes    Sleep apnea or symptoms of sleep apnea:  None    Diet:  Regular (no restrictions)    Frequency of exercise:  4-5 days/week    Duration of exercise:  45-60 minutes    Taking medications regularly:  Yes    Medication side effects:  None    PHQ-2 Total Score: 0    Additional  concerns today:  Yes       PROBLEMS TO ADD ON...    Today's PHQ-2 Score:   PHQ-2 ( 1999 [employer]) 9/13/2021   Q1: Little interest or pleasure in doing things 0   Q2: Feeling down, depressed or hopeless 0   PHQ-2 Score 0   Q1: Little interest or pleasure in doing things Several days   Q2: Feeling down, depressed or hopeless Several days   PHQ-2 Score 2       Abuse: Current or Past (Physical, Sexual or Emotional) - No  Do you feel safe in your environment? Yes    Have you ever done Advance Care Planning? (For example, a Health Directive, POLST, or a discussion with a medical provider or your loved ones about your wishes): Yes, patient states has an Advance Care Planning document and will bring a copy to the clinic.    Social History     Tobacco Use     Smoking status: Never Smoker     Smokeless tobacco: Never Used   Substance Use Topics     Alcohol use: Yes     If you drink alcohol do you typically have >3 drinks per day or >7 drinks per week? No    Alcohol Use 9/13/2021   Prescreen: >3 drinks/day or >7 drinks/week? No   No flowsheet data found.    Reviewed orders with patient.  Reviewed health maintenance and updated orders accordingly - Yes  Labs reviewed in EPIC    Breast Cancer Screening:  Any new diagnosis of family breast, ovarian, or bowel cancer? No    FHS-7:   Breast CA Risk Assessment (FHS-7) 9/11/2021   Did any of your first-degree relatives have breast or ovarian cancer? No   Did any of your relatives have bilateral breast cancer? No   Did any man in your family have breast cancer? No   Did any woman in your family have breast and ovarian cancer? Yes   Did any woman in your family have breast cancer before age 50 y? No   Do you have 2 or more relatives with breast and/or ovarian cancer? No   Do you have 2 or more relatives with breast and/or bowel cancer? No       Mammogram Screening: Recommended annual mammography  Pertinent mammograms are reviewed under the imaging tab.    History of abnormal Pap smear: NO  "- age 30-65 PAP every 5 years with negative HPV co-testing recommended  PAP / HPV 2016   HPV See Scanned Report     Reviewed and updated as needed this visit by clinical staff  Tobacco   Meds              Reviewed and updated as needed this visit by Provider                Past Medical History:   Diagnosis Date     Anxiety      Diabetes mellitus (H)     Type 1      Past Surgical History:   Procedure Laterality Date     BREAST CYST ASPIRATION Left ?      SECTION      x2     CONIZATION CERVIX,LOOP ELECTRD      Description: Cervical Conization Loop Electrode Excision;  Recorded: 2008;       Review of Systems  CONSTITUTIONAL: NEGATIVE for fever, chills, change in weight  INTEGUMENTARU/SKIN: NEGATIVE for worrisome rashes, moles or lesions  EYES: NEGATIVE for vision changes or irritation  ENT: NEGATIVE for ear, mouth and throat problems  RESP: NEGATIVE for significant cough or SOB  BREAST: NEGATIVE for masses, tenderness or discharge  CV: NEGATIVE for chest pain, palpitations or peripheral edema  GI: NEGATIVE for nausea, abdominal pain, heartburn, or change in bowel habits  : NEGATIVE for unusual urinary or vaginal symptoms. Periods are regular.  MUSCULOSKELETAL: NEGATIVE for significant arthralgias or myalgia  NEURO: NEGATIVE for weakness, dizziness or paresthesias  PSYCHIATRIC: anxiety, depressed mood and HX anxiety     OBJECTIVE:   /67 (BP Location: Left arm, Patient Position: Sitting, Cuff Size: Adult Regular)   Pulse 60   Temp 96.9  F (36.1  C) (Oral)   Resp 16   Ht 1.74 m (5' 8.5\")   Wt 73.4 kg (161 lb 12.8 oz)   BMI 24.24 kg/m    Physical Exam  GENERAL: healthy, alert and no distress  EYES: Eyes grossly normal to inspection, PERRL and conjunctivae and sclerae normal  HENT: ear canals and TM's normal, nose and mouth without ulcers or lesions  NECK: no adenopathy, no asymmetry, masses, or scars and thyroid normal to palpation  RESP: lungs clear to auscultation - no rales, rhonchi " or wheezes  CV: regular rate and rhythm, normal S1 S2, no S3 or S4, no murmur, click or rub, no peripheral edema and peripheral pulses strong  ABDOMEN: soft, nontender  MS: no gross musculoskeletal defects noted, no edema  She is tender to palpation over the right lateral epicondyle area  SKIN: no suspicious lesions or rashes  NEURO: Normal strength and tone, mentation intact and speech normal  PSYCH: mentation appears normal, affect normal/bright    Diagnostic Test Results:  Labs reviewed in Epic    ASSESSMENT/PLAN:   Sera was seen today for physical.    Diagnoses and all orders for this visit:    Routine history and physical examination of adult    She remains physically active  Continue adequate calcium and vitamin D    Mammograms up-to-date from September 11, 2021  Her Pap test is up-to-date with her OB/GYN      Type 1 diabetes mellitus with complications (H)    Her most recent hemoglobin A1c was 7.2%  Continue plan per endocrinology with insulin pump  She will continue losartan  -     losartan (COZAAR) 50 MG tablet; Take 1 tablet (50 mg) by mouth daily    Anxiety state  Mood disorder (H)    PHQ-9 score is 9  TAB-7 score is 14    She will continue Wellbutrin  Per her preference she will start Abilify  Recommend providing an update and consider adjustments as needed    -     ARIPiprazole (ABILIFY) 2 MG tablet; Take 1 tablet (2 mg) by mouth daily    Lateral epicondylitis of right elbow    Refer for physical therapy.  She is a candidate for iontophoresis  Follow-up with orthopedics if not improving    -     Physical Therapy Referral; Future  -     Occupational Therapy Referral; Future    Palpitations    Consider further evaluation is warranted including EKG and Holter test    Other orders  -     REVIEW OF HEALTH MAINTENANCE PROTOCOL ORDERS        Patient has been advised of split billing requirements and indicates understanding: Yes  COUNSELING:  Reviewed preventive health counseling, as reflected in patient  "instructions       Regular exercise       Healthy diet/nutrition       Alcohol Use       Contraception       Colon cancer screening    Estimated body mass index is 24.24 kg/m  as calculated from the following:    Height as of this encounter: 1.74 m (5' 8.5\").    Weight as of this encounter: 73.4 kg (161 lb 12.8 oz).        She reports that she has never smoked. She has never used smokeless tobacco.      Counseling Resources:  ATP IV Guidelines  Pooled Cohorts Equation Calculator  Breast Cancer Risk Calculator  BRCA-Related Cancer Risk Assessment: FHS-7 Tool  FRAX Risk Assessment  ICSI Preventive Guidelines  Dietary Guidelines for Americans, 2010  USDA's MyPlate  ASA Prophylaxis  Lung CA Screening    Jose Elias Montgomery MD  [facility]  "

## 2021-09-13 NOTE — LETTER
My Depression Action Plan  Name: Sera Adkins   Date of Birth 1970  Date: 9/25/2021    My doctor: Jose Elias Montgomery   My clinic: [place] [address] [facility] [id number] [phone]          GREEN    ZONE   Good Control    What it looks like:     Things are going generally well. You have normal ups and downs. You may even feel depressed from time to time, but bad moods usually last less than a day.   What you need to do:  1. Continue to care for yourself (see self care plan)  2. Check your depression survival kit and update it as needed  3. Follow your physician s recommendations including any medication.  4. Do not stop taking medication unless you consult with your physician first.           YELLOW         ZONE Getting Worse    What it looks like:     Depression is starting to interfere with your life.     It may be hard to get out of bed; you may be starting to isolate yourself from others.    Symptoms of depression are starting to last most all day and this has happened for several days.     You may have suicidal thoughts but they are not constant.   What you need to do:     1. Call your care team. Your response to treatment will improve if you keep your care team informed of your progress. Yellow periods are signs an adjustment may need to be made.     2. Continue your self-care.  Just get dressed and ready for the day.  Don't give yourself time to talk yourself out of it.    3. Talk to someone in your support network.    4. Open up your Depression Self-Care Plan/Wellness Kit.           RED    ZONE Medical Alert - Get Help    What it looks like:     Depression is seriously interfering with your life.     You may experience these or other symptoms: You can t get out of bed most days, can t work or engage in other necessary activities, you have trouble taking care of basic hygiene, or basic responsibilities, thoughts of suicide or death  that will not go away, self-injurious behavior.     What you need to do:  1. Call your care team and request a same-day appointment. If they are not available (weekends or after hours) call your local crisis line, emergency room or 911.          Depression Self-Care Plan / Wellness Kit    Many people find that medication and therapy are helpful treatments for managing depression. In addition, making small changes to your everyday life can help to boost your mood and improve your wellbeing. Below are some tips for you to consider. Be sure to talk with your medical provider and/or behavioral health consultant if your symptoms are worsening or not improving.     Sleep   Sleep hygiene  means all of the habits that support good, restful sleep. It includes maintaining a consistent bedtime and wake time, using your bedroom only for sleeping or sex, and keeping the bedroom dark and free of distractions like a computer, smartphone, or television.     Develop a Healthy Routine  Maintain good hygiene. Get out of bed in the morning, make your bed, brush your teeth, take a shower, and get dressed. Don t spend too much time viewing media that makes you feel stressed. Find time to relax each day.    Exercise  Get some form of exercise every day. This will help reduce pain and release endorphins, the  feel good  chemicals in your brain. It can be as simple as just going for a walk or doing some gardening, anything that will get you moving.      Diet  Strive to eat healthy foods, including fruits and vegetables. Drink plenty of water. Avoid excessive sugar, caffeine, alcohol, and other mood-altering substances.     Stay Connected with Others  Stay in touch with friends and family members.    Manage Your Mood  Try deep breathing, massage therapy, biofeedback, or meditation. Take part in fun activities when you can. Try to find something to smile about each day.     Psychotherapy  Be open to working with a therapist if your provider  recommends it.     Medication  Be sure to take your medication as prescribed. Most anti-depressants need to be taken every day. It usually takes several weeks for medications to work. Not all medicines work for all people. It is important to follow-up with your provider to make sure you have a treatment plan that is working for you. Do not stop your medication abruptly without first discussing it with your provider.    Crisis Resources   These hotlines are for both adults and children. They and are open 24 hours a day, 7 days a week unless noted otherwise.      National Suicide Prevention Lifeline   0-560-241-RHKL (1561)      Crisis Text Line    www.crisistextline.org  Text HOME to 937111 from anywhere in the [place], anytime, about any type of crisis. A live, trained crisis counselor will receive the text and respond quickly.      [place] Lifeline for LGBTQ Youth  A national crisis intervention and suicide lifeline for LGBTQ youth under 25. Provides a safe place to talk without judgement. Call 1-146.907.4936; text START to 798581 or visit www.thetrevorproject.org to talk to a trained counselor.      For [facility] crisis numbers, visit the [facility] website at:  https://[place].gov/dhs/people-we-serve/adults/health-care/mental-health/resources/crisis-contacts.jsp

## 2021-09-13 NOTE — PROGRESS NOTES
Answers for HPI/ROS submitted by the patient on 9/13/2021  Frequency of exercise:: 4-5 days/week  Getting at least 3 servings of Calcium per day:: Yes  Diet:: Regular (no restrictions)  Taking medications regularly:: Yes  Medication side effects:: None  Bi-annual eye exam:: Yes  Dental care twice a year:: Yes  Sleep apnea or symptoms of sleep apnea:: None  Additional concerns today:: Yes  Duration of exercise:: 45-60 minutes

## 2021-09-14 ASSESSMENT — ANXIETY QUESTIONNAIRES: GAD7 TOTAL SCORE: 14

## 2021-09-25 PROBLEM — F39 MOOD DISORDER (H): Status: ACTIVE | Noted: 2021-09-25

## 2021-10-09 ENCOUNTER — PATIENT OUTREACH (OUTPATIENT)
Dept: ENDOCRINOLOGY | Facility: CLINIC | Age: 51
End: 2021-10-09

## 2021-10-09 NOTE — PROGRESS NOTES
Attempted to reach patient to schedule follow up in the Endocrinology Clinic.  No answer,  LM on VM to call office and [facility] message sent.    Schedule with  ALFIE Solo.

## 2021-10-15 ENCOUNTER — MYC MEDICAL ADVICE (OUTPATIENT)
Dept: ENDOCRINOLOGY | Facility: CLINIC | Age: 51
End: 2021-10-15

## 2021-10-15 ENCOUNTER — TELEPHONE (OUTPATIENT)
Dept: ENDOCRINOLOGY | Facility: CLINIC | Age: 51
End: 2021-10-15

## 2021-10-15 NOTE — TELEPHONE ENCOUNTER
Attempted to reach patient to schedule follow up in the Endocrinology Clinic.  No answer,  LM on VM to call office and [employer] message sent per checkout visit 7/26/21.    Schedule with  ALFIE Solo.

## 2021-10-25 ENCOUNTER — TELEPHONE (OUTPATIENT)
Dept: ENDOCRINOLOGY | Facility: CLINIC | Age: 51
End: 2021-10-25

## 2021-10-25 NOTE — TELEPHONE ENCOUNTER
[employer] message sent with pump setting from last clinic visit.   LVM to please check her MyChart.   Cheli Carlos RN on 10/25/2021 at 3:46 PM      Health Call Center    Phone Message    May a detailed message be left on voicemail: yes     Reason for Call: Other: . Per Patient states she had accidentally deleted her pump supply settings. Patient states she tried to remember what it was at and couldn't. Patient is wanting to get a call back at confidential line and able to leave a detailed message if not answered to know what her settings for her pump should be put at. Please advise.     Action Taken: Message routed to:  [facility] ([facility] Endo    Travel Screening: Not Applicable

## 2021-11-04 ENCOUNTER — TRANSFERRED RECORDS (OUTPATIENT)
Dept: HEALTH INFORMATION MANAGEMENT | Facility: CLINIC | Age: 51
End: 2021-11-04
Payer: COMMERCIAL

## 2021-12-03 ENCOUNTER — TELEPHONE (OUTPATIENT)
Dept: ENDOCRINOLOGY | Facility: CLINIC | Age: 51
End: 2021-12-03
Payer: COMMERCIAL

## 2021-12-03 NOTE — PROGRESS NOTES
Outcome for 12/03/21 10:24 AM: Left Voicemail   Outcome for 12/03/21 10:39 AM: [employer] message sent  Outcome for 12/03/21 2:32 PM: Patient did not answer after multiple attempts. Unable to collect data     Patient would like the video invitation sent by: Send to e-mail at: [email or web address]      Start time: 1044  End time: 1113  HPI:   Sera is a 52 yo woman here for follow up of type 1 diabetes since age 15. She switched to the Tandem X2 pump last year and has really liked it.  She checked her a1c at her clinic and it was 7.2%. She would like to see this under 7%. She has noticed her glucose is going higher, particularly after she eats.  She sometimes increases the amount of insulin she gives herself and sometimes drops low.  She would like to find some balance.  She has to have oral surgery and her oral surgeon recommended she lower her a1c to 6% before surgery.  Chadd is not sure she can do this.        Chadd just got over COVID for the second time.  She has kept on exercising. She was a bit short of breath, but had generally milder symptoms.      When she does exercise, she has been using the exercise function on her pump that we set up last visit.  This has largely prevented lows and she is very pleased about this.  She starts this 30 minutes before her planned activity.     Standard Profile Active at the time of upload  Start Time Basal Rate Correction Factor Carb Ratio Target BG  Midnight 0.800 u/hr 1u:50 mg/dL 1u:15.0 g 110 mg/dL  7:00 AM 0.900 u/hr 1u:50 mg/dL 1u:12.0 g 110 mg/dL  Noon  0.900 u/hr 1u:50 mg/dL 1u:15.0 g 110 mg/dL   Calculated Total Daily Basal 20.9 units     Exercise-   Start Time Basal Rate Correction Factor Carb Ratio Target BG  Midnight 0.135  1u:100 mg/dL   1u:30 g 140 mg/dL     Recent glucose:             Recent labs (4/9/21):   A1c- 6.8%  Lipids-   Total Chol- 134   HDL- 50  TG- 48  LDL- 75    Vitals:   /66, p 75  O2 98%.   Weight- 162.6 lbs.   Lipid- LDL  52  A1c: 7.2% 12/3    GFR has been in the 50's for the past couple years.   She is intentional about avoiding dehydration.     Her mood has been ok.  She is on antidepressant.  She thinks she might benefit from seeing a therapist.     She has no other concerns today.     ROS  GENERAL: Few pound weight gain.  No fevers, chills, malaise, night sweats.   HEENT: no dysphagia, diplopia, neck pain or tenderness, dry/scratchy eyes, URI, cough, sinus drainage, tinnitus, sinus pressure  CV: no chest pain, pressure.   LUNGS: no SOB, [place], cough, sputum production, wheezing   GI: no diarrhea, constipation, abdominal pain  EXTREMITIES: no rashes, ulcers, edema  NEUROLOGY: no changes in vision, tingling or numbness in hands or feet.   MSK: no muscle aches or pains, weakness  PSYCH: mood stable      Past Medical History:   Diagnosis Date     Anxiety      Diabetes mellitus (H)     Type 1       Past Surgical History:   Procedure Laterality Date     BREAST CYST ASPIRATION Left ?2005      SECTION      x2     CONIZATION CERVIX,LOOP ELECTRD      Description: Cervical Conization Loop Electrode Excision;  Recorded: 2008;       Family History   Problem Relation Age of Onset     Cancer Father      Breast Cancer Paternal Grandmother        Social History   Social Hx:  .  Two teenage children.  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  Enjoys being physically active, mostly biking, but also running and HIIT.     Socioeconomic History     Marital status: other     Spouse name: Not on file     Number of children: Not on file     Years of education: Not on file     Highest education level: Not on file   Occupational History     Not on file   Social Needs     Financial resource strain: Not on file     Food insecurity:     Worry: Not on file     Inability: Not on file     Transportation needs:     Medical: Not on file     Non-medical: Not on file   Tobacco Use     Smoking status: Never Smoker     Smokeless tobacco: Never Used  "  Substance and Sexual Activity     Alcohol use: No     Drug use: No     Sexual activity: Not on file   Lifestyle     Physical activity:     Days per week: Not on file     Minutes per session: Not on file     Stress: Not on file   Relationships     Social connections:     Talks on phone: Not on file     Gets together: Not on file     Attends Mosque service: Not on file     Active member of club or organization: Not on file     Attends meetings of clubs or organizations: Not on file     Relationship status: Not on file     Intimate partner violence:     Fear of current or ex partner: Not on file     Emotionally abused: Not on file     Physically abused: Not on file     Forced sexual activity: Not on file   Other Topics Concern     Not on file   Social History Narrative     Not on file       Current Outpatient Medications   Medication     acetone urine (KETOSTIX) test strip     ARIPiprazole (ABILIFY) 2 MG tablet     atorvastatin (LIPITOR) 10 MG tablet     buPROPion (WELLBUTRIN XL) 150 MG 24 hr tablet     Calcium Carbonate-Vitamin D (CALCIUM 500+D PO)     chlorthalidone (HYGROTON) 25 MG tablet     glucagon (GLUCAGON EMERGENCY) 1 MG kit     glucose blood VI test strips (ONE TOUCH ULTRA TEST) strip     insulin aspart (NOVOLOG VIAL) 100 UNITS/ML vial     insulin cartridge (T:SLIM 3ML) misc pump supply     insulin glargine (LANTUS PEN) 100 UNIT/ML pen     Insulin Infusion Pump Supplies (TRUSTEEL INFUSION SET) MISC     Insulin Pump Accessories MISC     insulin syringe-needle U-100 (30G X 1/2\" 0.3 ML) 30G X 1/2\" 0.3 ML miscellaneous     losartan (COZAAR) 50 MG tablet     No current facility-administered medications for this visit.          Allergies   Allergen Reactions     Percocet [Oxycodone-Acetaminophen] Nausea and Nausea and Vomiting       Physical Exam  There were no vitals taken for this visit.  GENERAL: healthy, alert and no distress  RESP: no audible wheeze, cough, or visible cyanosis.  No visible retractions or " increased work of breathing.  Able to speak fully in complete sentences.  PSYCH: mentation appears normal, affect normal/bright, judgement and insight intact, normal speech and appearance well-groomed    RESULTS  Lab Results   Component Value Date    A1C 7.2 (H) 08/02/2021    A1C 6.8 (A) 04/09/2021    A1C 7.7 (H) 01/20/2020    A1C 7.5 (H) 09/16/2019    A1C 7.8 (H) 03/18/2019    A1C 7.7 (H) 12/13/2016    HEMOGLOBINA1 7.4 (A) 01/20/2020    HEMOGLOBINA1 7.2 (A) 09/16/2019    HEMOGLOBINA1 7.0 (A) 04/16/2018    HEMOGLOBINA1 7.0 (A) 12/11/2017    HEMOGLOBINA1 7.4 (A) 06/05/2017       TSH   Date Value Ref Range Status   08/02/2021 1.84 0.30 - 5.00 uIU/mL Final   01/20/2020 2.07 0.40 - 4.00 mU/L Final   09/16/2019 1.89 0.40 - 4.00 mU/L Final   03/18/2019 2.40 0.40 - 4.00 mU/L Final   12/11/2017 1.69 0.40 - 4.00 mU/L Final   12/13/2016 3.72 0.40 - 4.00 mU/L Final     T4 Free   Date Value Ref Range Status   03/18/2019 0.81 0.76 - 1.46 ng/dL Final       ALT   Date Value Ref Range Status   01/20/2020 24 0 - 50 U/L Final   09/16/2019 25 0 - 50 U/L Final   ]    Recent Labs   Lab Test 08/02/21  0734 04/09/21  0000 01/20/20  1041   CHOL 99 134 132   HDL 38* 50 59   LDL 53  --  64   TRIG 40 48 44       Lab Results   Component Value Date     08/02/2021     01/20/2020      Lab Results   Component Value Date    POTASSIUM 3.9 08/02/2021    POTASSIUM 4.0 01/20/2020     Lab Results   Component Value Date    CHLORIDE 102 08/02/2021    CHLORIDE 105 01/20/2020     Lab Results   Component Value Date    SCOTTY 9.5 08/02/2021    SCOTTY 9.2 01/20/2020     Lab Results   Component Value Date    CO2 30 08/02/2021    CO2 32 01/20/2020     Lab Results   Component Value Date    BUN 17 08/02/2021    BUN 18 01/20/2020     Lab Results   Component Value Date    CR 1.04 08/02/2021    CR 0.94 01/20/2020       GFR Estimate   Date Value Ref Range Status   08/02/2021 63 >60 mL/min/1.73m2 Final     Comment:     As of July 11, 2021, eGFR is calculated by  the CKD-EPI creatinine equation, without race adjustment. eGFR can be influenced by muscle mass, exercise, and diet. The reported eGFR is an estimation only and is only applicable if the renal function is stable.   06/07/2021 49 (L) >60 mL/min/1.73m2 Final   10/12/2020 51 (L) >60 mL/min/1.73m2 Final   01/20/2020 72 >60 mL/min/[1.73_m2] Final     Comment:     Non  GFR Calc  Starting 12/18/2018, serum creatinine based estimated GFR (eGFR) will be   calculated using the Chronic Kidney Disease Epidemiology Collaboration   (CKD-EPI) equation.     09/16/2019 63 >60 mL/min/[1.73_m2] Final     Comment:     Non  GFR Calc  Starting 12/18/2018, serum creatinine based estimated GFR (eGFR) will be   calculated using the Chronic Kidney Disease Epidemiology Collaboration   (CKD-EPI) equation.     08/22/2019 47 (L) >60 mL/min/1.73m2 Final   03/18/2019 77 >60 mL/min/[1.73_m2] Final     Comment:     Non  GFR Calc  Starting 12/18/2018, serum creatinine based estimated GFR (eGFR) will be   calculated using the Chronic Kidney Disease Epidemiology Collaboration   (CKD-EPI) equation.       GFR Estimate If Black   Date Value Ref Range Status   06/07/2021 60 (L) >60 mL/min/1.73m2 Final   10/12/2020 >60 >60 mL/min/1.73m2 Final   01/20/2020 83 >60 mL/min/[1.73_m2] Final     Comment:      GFR Calc  Starting 12/18/2018, serum creatinine based estimated GFR (eGFR) will be   calculated using the Chronic Kidney Disease Epidemiology Collaboration   (CKD-EPI) equation.     09/16/2019 73 >60 mL/min/[1.73_m2] Final     Comment:      GFR Calc  Starting 12/18/2018, serum creatinine based estimated GFR (eGFR) will be   calculated using the Chronic Kidney Disease Epidemiology Collaboration   (CKD-EPI) equation.     08/22/2019 57 (L) >60 mL/min/1.73m2 Final   03/18/2019 89 >60 mL/min/[1.73_m2] Final     Comment:      GFR Calc  Starting 12/18/2018, serum  creatinine based estimated GFR (eGFR) will be   calculated using the Chronic Kidney Disease Epidemiology Collaboration   (CKD-EPI) equation.         Lab Results   Component Value Date    MICROL 6 01/20/2020     No results found for: MICROALBUMIN  No results found for: CPEPT, GADAB, ISCAB    No results found for: B12]    Most recent eye exam date: : Not Found     Fall, 2020.  Scheduled in December, 2020.       Assessment/Plan:     1.  Type 1 diabetes-  Glucose control is more stable, but she is climbing too high post-prandially. A1c 7.2%. She is having very little hypoglycemia.  Discussed a1c targets and agreed to tighten as much as we can without significant hypoglycemia.  Reminded her of the risks/benefits of tight control.  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   We made the following changes today (instructions given to patient):   Nice seeing you, Chadd!    Glucose is going a bit high after Standard Profile Active at the time of upload  Start Time Basal Rate Correction Factor Carb Ratio Target BG  Midnight 0.85 (was 0.8) 1u:50 mg/dL 1u:15.0 g 110 mg/dL  7:00 AM 0.900 u/hr 1u:50 mg/dL 1u:10 (was 1/12) 110 mg/dL  Noon  0.900 u/hr 1u:50 mg/dL 1u:13 (was 15) 110 mg/dL     If you find you are having more low blood sugars, please send me a [employer] message.  We will likely need to lower your basal (0.9 to 0.8).      Plan for exercise:   Switch to exercise pattern (new pattern) 60-90 minutes before activity.  This is a much lower basal rate than usual, and less aggressive with correction and carb ratios. Please put your pump in exercise mode at the same time (target glucose 140 vs 112.5 mg/dL).  Resume usual basal rate after exercise.     Exercise   Start Time Basal Rate Correction Factor Carb Ratio Target BG  Midnight 0.135  1u:100 mg/dL   1u:30 g 140 mg/dL       2.  Risk factors-     Retinopathy:  No. Scheduled for eye exam later this  month.    Nephropathy:  BP historically well controlled.  No history of microalbuminuria. She had SEKOU in 2019.  GFR improved to 63.  Follows with nephrology, last evaluation was in 12/2020. Encouraged hydration.   No evidence of diabetic nephropathy      Neuropathy: decreased vibration sensation in left great toe in the past.  Normal monofilament.   No pain/burning.   Feet: OK, no ulcers.   Lipids:  LDL at target. She is now on atorvastatin and tolerating this well.    Celiac screening: negative antibodies 2017.   Thyroid screening: TSH 1.84 8/2021.     3.  Anxiety- recommend she meet with Bob Burgess, health psychologist.  She will schedule.      4.  F/U in 3 months with myself.  Will establish staff endocrinologist, as Dr. Roy is no longer in our clinic.      38 minutes spent on the date of the encounter doing chart review, review of test results, review of continuous glucose sensor, insulin pump data, interpretation of glucose data, patient visit and documentation, counseling/coordination of care, and discussion of follow up plan for worsening hyper and hypoglycemia.  The patient understood and is satisfied with today's visit.     Hannah Whitley PA-C, MPAS   [facility]  Department of Medicine  Division of Endocrinology and Diabetes                  Answers for HPI/ROS submitted by the patient on 12/6/2021  General Symptoms: No  Skin Symptoms: No  HENT Symptoms: No  EYE SYMPTOMS: No  HEART SYMPTOMS: No  LUNG SYMPTOMS: Yes  INTESTINAL SYMPTOMS: Yes  URINARY SYMPTOMS: No  GYNECOLOGIC SYMPTOMS: No  BREAST SYMPTOMS: No  SKELETAL SYMPTOMS: Yes  BLOOD SYMPTOMS: No  NERVOUS SYSTEM SYMPTOMS: No  MENTAL HEALTH SYMPTOMS: No  Cough: No  Sputum or phlegm: No  Coughing up blood: No  Difficulty breating or shortness of breath: No  Snoring: No  Wheezing: No  Difficulty breathing on exertion: No  Nighttime Cough: No  Difficulty breathing when lying flat: No  Heart burn or indigestion: No  Nausea: No  Vomiting:  No  Abdominal pain: No  Bloating: No  Constipation: Yes  Diarrhea: No  Blood in stool: No  Black stools: No  Rectal or Anal pain: No  Fecal incontinence: No  Yellowing of skin or eyes: No  Vomit with blood: No  Change in stools: No  Back pain: No  Muscle aches: No  Neck pain: No  Swollen joints: No  Joint pain: No  Bone pain: No  Muscle cramps: No  Muscle weakness: No  Joint stiffness: No  Bone fracture: No

## 2021-12-03 NOTE — TELEPHONE ENCOUNTER
[facility] Call Center    Phone Message    May a detailed message be left on voicemail: yes     Reason for Call: Other: .      * Per Patient is wanting to get a call back in regards to getting help with getting her pump information uploaded. Patient states she was also told she is needing to get a new dexcom code. Patient is not sure how to do that and wanting to get help as well. Please advise.     Patient is wanting to be left a message on confidential line if not answered. Patient states she might be with a patient at the time of the call back from the clinic.     Action Taken: Message routed to:  [facility] ([facility] Endo    Travel Screening: Not Applicable

## 2021-12-06 ENCOUNTER — VIRTUAL VISIT (OUTPATIENT)
Dept: ENDOCRINOLOGY | Facility: CLINIC | Age: 51
End: 2021-12-06
Payer: COMMERCIAL

## 2021-12-06 DIAGNOSIS — E10.9 WELL CONTROLLED TYPE 1 DIABETES MELLITUS (H): Primary | ICD-10-CM

## 2021-12-06 PROCEDURE — 99214 OFFICE O/P EST MOD 30 MIN: CPT | Mod: 95 | Performed by: PHYSICIAN ASSISTANT

## 2021-12-06 ASSESSMENT — ENCOUNTER SYMPTOMS
STIFFNESS: 0
HEMOPTYSIS: 0
BOWEL INCONTINENCE: 0
NAUSEA: 0
WHEEZING: 0
COUGH: 0
ARTHRALGIAS: 0
DYSPNEA ON EXERTION: 0
CONSTIPATION: 1
NECK PAIN: 0
MUSCLE CRAMPS: 0
DIARRHEA: 0
RECTAL PAIN: 0
COUGH DISTURBING SLEEP: 0
HEARTBURN: 0
BLOOD IN STOOL: 0
MUSCLE WEAKNESS: 0
POSTURAL DYSPNEA: 0
SNORES LOUDLY: 0
ABDOMINAL PAIN: 0
BLOATING: 0
VOMITING: 0
JOINT SWELLING: 0
MYALGIAS: 0
JAUNDICE: 0
SHORTNESS OF BREATH: 0
BACK PAIN: 0
SPUTUM PRODUCTION: 0

## 2021-12-06 NOTE — PROGRESS NOTES
Chadd is a 51 year old who is being evaluated via a billable telephone visit.      What phone number would you like to be contacted at? [phone]

## 2021-12-06 NOTE — PATIENT INSTRUCTIONS
Nice seeing you, Chadd!    Glucose is going a bit high after Standard Profile Active at the time of upload  Start Time Basal Rate Correction Factor Carb Ratio Target BG  Midnight 0.85 (was 0.8) 1u:50 mg/dL 1u:15.0 g 110 mg/dL  7:00 AM 0.900 u/hr 1u:50 mg/dL 1u:10 (was 1/12) 110 mg/dL  Noon  0.900 u/hr 1u:50 mg/dL 1u:13 (was 15) 110 mg/dL     If you find you are having more low blood sugars, please send me a [employer] message.  We will likely need to lower your basal (0.9 to 0.8).      Plan for exercise:   Switch to exercise pattern (new pattern) 60-90 minutes before activity.  This is a much lower basal rate than usual, and less aggressive with correction and carb ratios. Please put your pump in exercise mode at the same time (target glucose 140 vs 112.5 mg/dL).  Resume usual basal rate after exercise.     Exercise   Start Time Basal Rate Correction Factor Carb Ratio Target BG  Midnight 0.135  1u:100 mg/dL   1u:30 g 140 mg/dL     Scheduling with a Health Psychologist  *Our Health Psychologists prefer that the patient reaches out to them directly to schedule an appointment. After choosing one of the providers listed below, you will more than likely reach their voicemail, as they are typically seeing patients during normal business hours. Make sure to leave your name, contact number, and the name of the doctor who referred you. They will try to get back to you within 24-48 business hours.     Dr. Bob Burgess: [phone]  Dr. Kory Bautista: [phone]  Dr. Isabel Cuellar: [phone]

## 2021-12-06 NOTE — NURSING NOTE
Patient denies any changes since echeck-in regarding medication and allergies and states all information entered during echeck-in remains accurate.  Marli Ventura, CMA

## 2021-12-06 NOTE — LETTER
12/6/2021       RE: Bing Adkins  [address]   [place] [address]     Dear Colleague,    Thank you for referring your patient, Bing Adkins, to the [facility] ENDOCRINOLOG[facility] [place] at [facility]. Please see a copy of my visit note below.    Outcome for 12/03/21 10:24 AM: Left Voicemail   Outcome for 12/03/21 10:39 AM: [employer] message sent  Outcome for 12/03/21 2:32 PM: Patient did not answer after multiple attempts. Unable to collect data     Patient would like the video invitation sent by: Send to e-mail at: [email or web address]      Start time: 1044  End time: 1113  HPI:   Bing is a 52 yo woman here for follow up of type 1 diabetes since age 15. She switched to the Tandem X2 pump last year and has really liked it.  She checked her a1c at her clinic and it was 7.2%. She would like to see this under 7%. She has noticed her glucose is going higher, particularly after she eats.  She sometimes increases the amount of insulin she gives herself and sometimes drops low.  She would like to find some balance.  She has to have oral surgery and her oral surgeon recommended she lower her a1c to 6% before surgery.  Chadd is not sure she can do this.        Chadd just got over COVID for the second time.  She has kept on exercising. She was a bit short of breath, but had generally milder symptoms.      When she does exercise, she has been using the exercise function on her pump that we set up last visit.  This has largely prevented lows and she is very pleased about this.  She starts this 30 minutes before her planned activity.     Standard Profile Active at the time of upload  Start Time Basal Rate Correction Factor Carb Ratio Target BG  Midnight 0.800 u/hr 1u:50 mg/dL 1u:15.0 g 110 mg/dL  7:00 AM 0.900 u/hr 1u:50 mg/dL 1u:12.0 g 110 mg/dL  Noon  0.900 u/hr 1u:50 mg/dL 1u:15.0 g 110 mg/dL   Calculated Total Daily Basal 20.9  units     Exercise-   Start Time Basal Rate Correction Factor Carb Ratio Target BG  Midnight 0.135  1u:100 mg/dL   1u:30 g 140 mg/dL     Recent glucose:             Recent labs (21):   A1c- 6.8%  Lipids-   Total Chol- 134   HDL- 50  TG- 48  LDL- 75    Vitals:   /66, p 75  O2 98%.   Weight- 162.6 lbs.   Lipid- LDL 52  A1c: 7.2% 12/3    GFR has been in the 50's for the past couple years.   She is intentional about avoiding dehydration.     Her mood has been ok.  She is on antidepressant.  She thinks she might benefit from seeing a therapist.     She has no other concerns today.     ROS  GENERAL: Few pound weight gain.  No fevers, chills, malaise, night sweats.   HEENT: no dysphagia, diplopia, neck pain or tenderness, dry/scratchy eyes, URI, cough, sinus drainage, tinnitus, sinus pressure  CV: no chest pain, pressure.   LUNGS: no SOB, [place], cough, sputum production, wheezing   GI: no diarrhea, constipation, abdominal pain  EXTREMITIES: no rashes, ulcers, edema  NEUROLOGY: no changes in vision, tingling or numbness in hands or feet.   MSK: no muscle aches or pains, weakness  PSYCH: mood stable      Past Medical History:   Diagnosis Date     Anxiety      Diabetes mellitus (H)     Type 1       Past Surgical History:   Procedure Laterality Date     BREAST CYST ASPIRATION Left ?      SECTION      x2     CONIZATION CERVIX,LOOP ELECTRD      Description: Cervical Conization Loop Electrode Excision;  Recorded: 2008;       Family History   Problem Relation Age of Onset     Cancer Father      Breast Cancer Paternal Grandmother        Social History   Social Hx:  .  Two teenage children.  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  Enjoys being physically active, mostly biking, but also running and HIIT.     Socioeconomic History     Marital status: other     Spouse name: Not on file     Number of children: Not on file     Years of education: Not on file     Highest education level: Not on file  "  Occupational History     Not on file   Social Needs     Financial resource strain: Not on file     Food insecurity:     Worry: Not on file     Inability: Not on file     Transportation needs:     Medical: Not on file     Non-medical: Not on file   Tobacco Use     Smoking status: Never Smoker     Smokeless tobacco: Never Used   Substance and Sexual Activity     Alcohol use: No     Drug use: No     Sexual activity: Not on file   Lifestyle     Physical activity:     Days per week: Not on file     Minutes per session: Not on file     Stress: Not on file   Relationships     Social connections:     Talks on phone: Not on file     Gets together: Not on file     Attends Rastafari service: Not on file     Active member of club or organization: Not on file     Attends meetings of clubs or organizations: Not on file     Relationship status: Not on file     Intimate partner violence:     Fear of current or ex partner: Not on file     Emotionally abused: Not on file     Physically abused: Not on file     Forced sexual activity: Not on file   Other Topics Concern     Not on file   Social History Narrative     Not on file       Current Outpatient Medications   Medication     acetone urine (KETOSTIX) test strip     ARIPiprazole (ABILIFY) 2 MG tablet     atorvastatin (LIPITOR) 10 MG tablet     buPROPion (WELLBUTRIN XL) 150 MG 24 hr tablet     Calcium Carbonate-Vitamin D (CALCIUM 500+D PO)     chlorthalidone (HYGROTON) 25 MG tablet     glucagon (GLUCAGON EMERGENCY) 1 MG kit     glucose blood VI test strips (ONE TOUCH ULTRA TEST) strip     insulin aspart (NOVOLOG VIAL) 100 UNITS/ML vial     insulin cartridge (T:SLIM 3ML) misc pump supply     insulin glargine (LANTUS PEN) 100 UNIT/ML pen     Insulin Infusion Pump Supplies (TRUSTEEL INFUSION SET) MISC     Insulin Pump Accessories MISC     insulin syringe-needle U-100 (30G X 1/2\" 0.3 ML) 30G X 1/2\" 0.3 ML miscellaneous     losartan (COZAAR) 50 MG tablet     No current " facility-administered medications for this visit.          Allergies   Allergen Reactions     Percocet [Oxycodone-Acetaminophen] Nausea and Nausea and Vomiting       Physical Exam  There were no vitals taken for this visit.  GENERAL: healthy, alert and no distress  RESP: no audible wheeze, cough, or visible cyanosis.  No visible retractions or increased work of breathing.  Able to speak fully in complete sentences.  PSYCH: mentation appears normal, affect normal/bright, judgement and insight intact, normal speech and appearance well-groomed    RESULTS  Lab Results   Component Value Date    A1C 7.2 (H) 08/02/2021    A1C 6.8 (A) 04/09/2021    A1C 7.7 (H) 01/20/2020    A1C 7.5 (H) 09/16/2019    A1C 7.8 (H) 03/18/2019    A1C 7.7 (H) 12/13/2016    HEMOGLOBINA1 7.4 (A) 01/20/2020    HEMOGLOBINA1 7.2 (A) 09/16/2019    HEMOGLOBINA1 7.0 (A) 04/16/2018    HEMOGLOBINA1 7.0 (A) 12/11/2017    HEMOGLOBINA1 7.4 (A) 06/05/2017       TSH   Date Value Ref Range Status   08/02/2021 1.84 0.30 - 5.00 uIU/mL Final   01/20/2020 2.07 0.40 - 4.00 mU/L Final   09/16/2019 1.89 0.40 - 4.00 mU/L Final   03/18/2019 2.40 0.40 - 4.00 mU/L Final   12/11/2017 1.69 0.40 - 4.00 mU/L Final   12/13/2016 3.72 0.40 - 4.00 mU/L Final     T4 Free   Date Value Ref Range Status   03/18/2019 0.81 0.76 - 1.46 ng/dL Final       ALT   Date Value Ref Range Status   01/20/2020 24 0 - 50 U/L Final   09/16/2019 25 0 - 50 U/L Final   ]    Recent Labs   Lab Test 08/02/21  0734 04/09/21  0000 01/20/20  1041   CHOL 99 134 132   HDL 38* 50 59   LDL 53  --  64   TRIG 40 48 44       Lab Results   Component Value Date     08/02/2021     01/20/2020      Lab Results   Component Value Date    POTASSIUM 3.9 08/02/2021    POTASSIUM 4.0 01/20/2020     Lab Results   Component Value Date    CHLORIDE 102 08/02/2021    CHLORIDE 105 01/20/2020     Lab Results   Component Value Date    SCOTTY 9.5 08/02/2021    SCOTTY 9.2 01/20/2020     Lab Results   Component Value Date    CO2 30  08/02/2021    CO2 32 01/20/2020     Lab Results   Component Value Date    BUN 17 08/02/2021    BUN 18 01/20/2020     Lab Results   Component Value Date    CR 1.04 08/02/2021    CR 0.94 01/20/2020       GFR Estimate   Date Value Ref Range Status   08/02/2021 63 >60 mL/min/1.73m2 Final     Comment:     As of July 11, 2021, eGFR is calculated by the CKD-EPI creatinine equation, without race adjustment. eGFR can be influenced by muscle mass, exercise, and diet. The reported eGFR is an estimation only and is only applicable if the renal function is stable.   06/07/2021 49 (L) >60 mL/min/1.73m2 Final   10/12/2020 51 (L) >60 mL/min/1.73m2 Final   01/20/2020 72 >60 mL/min/[1.73_m2] Final     Comment:     Non  GFR Calc  Starting 12/18/2018, serum creatinine based estimated GFR (eGFR) will be   calculated using the Chronic Kidney Disease Epidemiology Collaboration   (CKD-EPI) equation.     09/16/2019 63 >60 mL/min/[1.73_m2] Final     Comment:     Non  GFR Calc  Starting 12/18/2018, serum creatinine based estimated GFR (eGFR) will be   calculated using the Chronic Kidney Disease Epidemiology Collaboration   (CKD-EPI) equation.     08/22/2019 47 (L) >60 mL/min/1.73m2 Final   03/18/2019 77 >60 mL/min/[1.73_m2] Final     Comment:     Non  GFR Calc  Starting 12/18/2018, serum creatinine based estimated GFR (eGFR) will be   calculated using the Chronic Kidney Disease Epidemiology Collaboration   (CKD-EPI) equation.       GFR Estimate If Black   Date Value Ref Range Status   06/07/2021 60 (L) >60 mL/min/1.73m2 Final   10/12/2020 >60 >60 mL/min/1.73m2 Final   01/20/2020 83 >60 mL/min/[1.73_m2] Final     Comment:      GFR Calc  Starting 12/18/2018, serum creatinine based estimated GFR (eGFR) will be   calculated using the Chronic Kidney Disease Epidemiology Collaboration   (CKD-EPI) equation.     09/16/2019 73 >60 mL/min/[1.73_m2] Final     Comment:       GFR Calc  Starting 12/18/2018, serum creatinine based estimated GFR (eGFR) will be   calculated using the Chronic Kidney Disease Epidemiology Collaboration   (CKD-EPI) equation.     08/22/2019 57 (L) >60 mL/min/1.73m2 Final   03/18/2019 89 >60 mL/min/[1.73_m2] Final     Comment:      GFR Calc  Starting 12/18/2018, serum creatinine based estimated GFR (eGFR) will be   calculated using the Chronic Kidney Disease Epidemiology Collaboration   (CKD-EPI) equation.         Lab Results   Component Value Date    MICROL 6 01/20/2020     No results found for: MICROALBUMIN  No results found for: CPEPT, GADAB, ISCAB    No results found for: B12]    Most recent eye exam date: : Not Found     Fall, 2020.  Scheduled in December, 2020.       Assessment/Plan:     1.  Type 1 diabetes-  Glucose control is more stable, but she is climbing too high post-prandially. A1c 7.2%. She is having very little hypoglycemia.  Discussed a1c targets and agreed to tighten as much as we can without significant hypoglycemia.  Reminded her of the risks/benefits of tight control.  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   We made the following changes today (instructions given to patient):   Nice seeing you, Chadd!    Glucose is going a bit high after Standard Profile Active at the time of upload  Start Time Basal Rate Correction Factor Carb Ratio Target BG  Midnight 0.85 (was 0.8) 1u:50 mg/dL 1u:15.0 g 110 mg/dL  7:00 AM 0.900 u/hr 1u:50 mg/dL 1u:10 (was 1/12) 110 mg/dL  Noon  0.900 u/hr 1u:50 mg/dL 1u:13 (was 15) 110 mg/dL     If you find you are having more low blood sugars, please send me a [employer] message.  We will likely need to lower your basal (0.9 to 0.8).      Plan for exercise:   Switch to exercise pattern (new pattern) 60-90 minutes before activity.  This is a much lower basal rate than usual, and less aggressive with correction and carb ratios. Please  put your pump in exercise mode at the same time (target glucose 140 vs 112.5 mg/dL).  Resume usual basal rate after exercise.     Exercise   Start Time Basal Rate Correction Factor Carb Ratio Target BG  Midnight 0.135  1u:100 mg/dL   1u:30 g 140 mg/dL       2.  Risk factors-     Retinopathy:  No. Scheduled for eye exam later this month.    Nephropathy:  BP historically well controlled.  No history of microalbuminuria. She had SEKOU in 2019.  GFR improved to 63.  Follows with nephrology, last evaluation was in 12/2020. Encouraged hydration.   No evidence of diabetic nephropathy      Neuropathy: decreased vibration sensation in left great toe in the past.  Normal monofilament.   No pain/burning.   Feet: OK, no ulcers.   Lipids:  LDL at target. She is now on atorvastatin and tolerating this well.    Celiac screening: negative antibodies 2017.   Thyroid screening: TSH 1.84 8/2021.     3.  Anxiety- recommend she meet with Bob Burgess, health psychologist.  She will schedule.      4.  F/U in 3 months with myself.  Will establish staff endocrinologist, as Dr. Roy is no longer in our clinic.      38 minutes spent on the date of the encounter doing chart review, review of test results, review of continuous glucose sensor, insulin pump data, interpretation of glucose data, patient visit and documentation, counseling/coordination of care, and discussion of follow up plan for worsening hyper and hypoglycemia.  The patient understood and is satisfied with today's visit.     Answers for HPI/ROS submitted by the patient on 12/6/2021  General Symptoms: No  Skin Symptoms: No  HENT Symptoms: No  EYE SYMPTOMS: No  HEART SYMPTOMS: No  LUNG SYMPTOMS: Yes  INTESTINAL SYMPTOMS: Yes  URINARY SYMPTOMS: No  GYNECOLOGIC SYMPTOMS: No  BREAST SYMPTOMS: No  SKELETAL SYMPTOMS: Yes  BLOOD SYMPTOMS: No  NERVOUS SYSTEM SYMPTOMS: No  MENTAL HEALTH SYMPTOMS: No  Cough: No  Sputum or phlegm: No  Coughing up blood: No  Difficulty breating or  shortness of breath: No  Snoring: No  Wheezing: No  Difficulty breathing on exertion: No  Nighttime Cough: No  Difficulty breathing when lying flat: No  Heart burn or indigestion: No  Nausea: No  Vomiting: No  Abdominal pain: No  Bloating: No  Constipation: Yes  Diarrhea: No  Blood in stool: No  Black stools: No  Rectal or Anal pain: No  Fecal incontinence: No  Yellowing of skin or eyes: No  Vomit with blood: No  Change in stools: No  Back pain: No  Muscle aches: No  Neck pain: No  Swollen joints: No  Joint pain: No  Bone pain: No  Muscle cramps: No  Muscle weakness: No  Joint stiffness: No  Bone fracture: No        Chadd is a 51 year old who is being evaluated via a billable telephone visit.      What phone number would you like to be contacted at? [phone]          Again, thank you for allowing me to participate in the care of your patient.      Sincerely,    Hannah Whitley PA-C

## 2021-12-08 ENCOUNTER — TELEPHONE (OUTPATIENT)
Dept: PSYCHOLOGY | Facility: CLINIC | Age: 51
End: 2021-12-08
Payer: COMMERCIAL

## 2021-12-08 NOTE — TELEPHONE ENCOUNTER
Health Psychology                     Department of Medicine  Dai Churchill, Ph.D., L.P. [phone]             [facility] Daniela Peña, Ph.D., L.P. [phone]                  [place] [address]   Fernando Jerez, Ph.D., L.P.  [phone]    [address] [place] [address] Isabel Cuellar, Ph.D., A.B.P.P., L.P. [phone]    [place] [address]           Colt Burgess, Ph.D., A.B.P.P., L.P. [phone]     Breanne Melendez, Ph.D., L.P. [phone]  [address]       Telephone Note      I received call from patient and  left a telephone message for patient offering to schedule an appointment.  Patient has my telephone number  And hopefully we will connect soon.     Colt Burgess, PhD LP, Ph.D., A.B.P.P., L.P.  Director, Health Psychology  [phone]

## 2021-12-20 ENCOUNTER — TRANSFERRED RECORDS (OUTPATIENT)
Dept: HEALTH INFORMATION MANAGEMENT | Facility: CLINIC | Age: 51
End: 2021-12-20
Payer: COMMERCIAL

## 2021-12-20 LAB
RETINOPATHY: NEGATIVE
RETINOPATHY: POSITIVE

## 2022-01-30 DIAGNOSIS — E10.8 TYPE 1 DIABETES MELLITUS WITH COMPLICATIONS (H): ICD-10-CM

## 2022-02-01 RX ORDER — CHLORTHALIDONE 25 MG/1
TABLET ORAL
Qty: 90 TABLET | Refills: 1 | Status: SHIPPED | OUTPATIENT
Start: 2022-02-01 | End: 2022-04-11 | Stop reason: ALTCHOICE

## 2022-02-01 NOTE — TELEPHONE ENCOUNTER
"Last Written Prescription Date:  8/13/21  Last Fill Quantity: 90,  # refills: 1   Last office visit provider:  9/13/21     Requested Prescriptions   Pending Prescriptions Disp Refills     chlorthalidone (HYGROTON) 25 MG tablet [Pharmacy Med Name: CHLORTHALIDONE TABS 25MG] 90 tablet 3     Sig: TAKE 1 TABLET DAILY       Diuretics (Including Combos) Protocol Passed - 1/30/2022 10:56 PM        Passed - Blood pressure under 140/90 in past 12 months     BP Readings from Last 3 Encounters:   09/13/21 128/67   07/26/21 103/67   01/19/21 103/67                 Passed - Recent (12 mo) or future (30 days) visit within the authorizing provider's specialty     Patient has had an office visit with the authorizing provider or a provider within the authorizing providers department within the previous 12 mos or has a future within next 30 days. See \"Patient Info\" tab in inbasket, or \"Choose Columns\" in Meds & Orders section of the refill encounter.              Passed - Medication is active on med list        Passed - Patient is age 18 or older        Passed - No active pregancy on record        Passed - Normal serum creatinine on file in past 12 months     Recent Labs   Lab Test 08/02/21  0734   CR 1.04              Passed - Normal serum potassium on file in past 12 months     Recent Labs   Lab Test 08/02/21  0734   POTASSIUM 3.9                    Passed - Normal serum sodium on file in past 12 months     Recent Labs   Lab Test 08/02/21  0734                 Passed - No positive pregnancy test in past 12 months             Rosy Malik RN 02/01/22 1:50 PM  "

## 2022-02-20 ENCOUNTER — HEALTH MAINTENANCE LETTER (OUTPATIENT)
Age: 52
End: 2022-02-20

## 2022-02-25 ENCOUNTER — MYC MEDICAL ADVICE (OUTPATIENT)
Dept: ENDOCRINOLOGY | Facility: CLINIC | Age: 52
End: 2022-02-25
Payer: COMMERCIAL

## 2022-02-25 DIAGNOSIS — E10.8 TYPE 1 DIABETES MELLITUS WITH COMPLICATIONS (H): ICD-10-CM

## 2022-02-25 DIAGNOSIS — E11.3299 NONPROLIFERATIVE DIABETIC RETINOPATHY (H): ICD-10-CM

## 2022-02-28 DIAGNOSIS — E11.9 DIABETES MELLITUS (H): ICD-10-CM

## 2022-02-28 NOTE — TELEPHONE ENCOUNTER
Rx sent to provider.   Cheli Carlos, RN on 2/28/2022 at 8:23 AM       Chadd Trejo Anjali F, PA-C 3 days ago           Deion Young. Say, I am going to run out of insulin soon. One vial left.  To be honest, my aunt gave me a huge supply of Novolog when she changed insulins so I didn t require a Rx for a long time. It looks like I use 34-42u per day which would be 1200ish u per month but I d like 3 vials per month  just in case . So, 9 Novolog vials should get me through 3 months if they are 10 mL vials. Could you send that to [facility] in [place]? Also, I am taking a trip to [place] which fell on the date of our next appt. I promise to reschedule. Have a great weekend!

## 2022-03-01 RX ORDER — IBUPROFEN 600 MG/1
TABLET ORAL
Qty: 1 KIT | Refills: 3 | Status: SHIPPED | OUTPATIENT
Start: 2022-03-01

## 2022-03-01 RX ORDER — INSULIN GLARGINE-YFGN 100 [IU]/ML
18 INJECTION, SOLUTION SUBCUTANEOUS DAILY
Qty: 3 ML | Refills: 3 | Status: SHIPPED | OUTPATIENT
Start: 2022-03-01 | End: 2022-03-02

## 2022-03-02 DIAGNOSIS — E10.8 TYPE 1 DIABETES MELLITUS WITH COMPLICATIONS (H): ICD-10-CM

## 2022-03-02 RX ORDER — INSULIN GLARGINE-YFGN 100 [IU]/ML
18 INJECTION, SOLUTION SUBCUTANEOUS DAILY
Qty: 10 ML | Refills: 3 | Status: SHIPPED | OUTPATIENT
Start: 2022-03-02 | End: 2022-12-28

## 2022-03-03 ENCOUNTER — TELEPHONE (OUTPATIENT)
Dept: ENDOCRINOLOGY | Facility: CLINIC | Age: 52
End: 2022-03-03
Payer: COMMERCIAL

## 2022-03-03 DIAGNOSIS — E11.9 DIABETES MELLITUS (H): ICD-10-CM

## 2022-03-03 NOTE — TELEPHONE ENCOUNTER
Max units per day increased to 68  For 6 vials per 90 days Amber Bartholomew RN on 3/3/2022 at 12:33 PM

## 2022-03-03 NOTE — TELEPHONE ENCOUNTER
[facility] Call Center    Phone Message    May a detailed message be left on voicemail: yes     Reason for Call: Medication Question or concern regarding medication   Prescription Clarification    Name of Medication:   * insulin aspart (NOVOLOG VIAL) 100 UNITS/ML vial    Prescribing Provider: Gutierrez     Pharmacy: [facility] [address]     What on the order needs clarification? Per patient is wanting Gutierrez Young to write a new prescription for patient to use 67 units for 90 days. Patient states she is wanting to be dispense 6 vials for 90 days. Please advise    Patient states she is leaving this weekend and needing the medication ASAP.       Action Taken: Message routed to:  [facility] Endo    Travel Screening: Not Applicable

## 2022-04-01 NOTE — PROGRESS NOTES
Outcome for 04/01/22 11:13 AM: Bantr message sent  DASHA Deluna  Outcome for 04/07/22 1:28 PM: Per patient, will upload device before appointment will upload 4/7  DASHA Deluna     Start time: 0842  End time: 0856    HPI:   Sera is a 50 yo woman here for follow up of type 1 diabetes since age 15. She switched to the Tandem X2 pump last year and has really liked it.  She checked her a1c at her clinic and it was 6.8%. She has been feeling good.    She is doing more weight lifting. At her last visit, she was dropping low when she was exercising so we created a new exercise profile which she tries to switch to at least an hour before exercise.  This has been working really well.  She sometimes forgets to start it early enough.        She has noticed her blood pressure has been going lower. 98/68.  She wonders if she can cut back her blood pressure medication.      Pump settings:   Standard Profile (pump changes made at last visit) Active at the time of upload  Start Time Basal Rate Correction Factor Carb Ratio Target BG  Midnight 0.85 (was 0.8) 1u:50 mg/dL 1u:15.0 g 110 mg/dL  7:00 AM 0.900 u/hr 1u:50 mg/dL 1u:10 (was 1/12) 110 mg/dL  Noon  0.900 u/hr 1u:50 mg/dL 1u:13 (was 15) 110 mg/dL     Exercise-   Start Time Basal Rate Correction Factor Carb Ratio Target BG  Midnight 0.135  1u:100 mg/dL   1u:30 g 140 mg/dL     Recent glucose:                 Recent labs (4/9/21):   A1c- 6.8%  Lipids-   Total Chol- 134   HDL- 50  TG- 48  LDL- 75    Vitals:   /66, p 75  O2 98%.   Lipid- LDL 52  A1c: 7.2% 12/3    GFR has been in the 50's for the past couple years.   She is intentional about avoiding dehydration.     Her mood has been ok.  She is on antidepressant.      She has no other concerns today.     ROS  GENERAL: Few pound weight gain.  No fevers, chills, malaise, night sweats.   HEENT: no dysphagia, diplopia, neck pain or tenderness, dry/scratchy eyes, URI, cough, sinus drainage, tinnitus, sinus  pressure  CV: no chest pain, pressure.   LUNGS: no SOB, [place], cough, sputum production, wheezing   GI: no diarrhea, constipation, abdominal pain  EXTREMITIES: no rashes, ulcers, edema  NEUROLOGY: no changes in vision, tingling or numbness in hands or feet.   MSK: no muscle aches or pains, weakness  PSYCH: mood stable      Past Medical History:   Diagnosis Date     Anxiety      Diabetes mellitus (H)     Type 1       Past Surgical History:   Procedure Laterality Date     BREAST CYST ASPIRATION Left ?2005      SECTION      x2     CONIZATION CERVIX,LOOP ELECTRD      Description: Cervical Conization Loop Electrode Excision;  Recorded: 2008;       Family History   Problem Relation Age of Onset     Cancer Father      Breast Cancer Paternal Grandmother        Social History   Social Hx:  .  Two teenage children.  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  Enjoys being physically active, mostly biking, but also running and HIIT.     Socioeconomic History     Marital status: other     Spouse name: Not on file     Number of children: Not on file     Years of education: Not on file     Highest education level: Not on file   Occupational History     Not on file   Social Needs     Financial resource strain: Not on file     Food insecurity:     Worry: Not on file     Inability: Not on file     Transportation needs:     Medical: Not on file     Non-medical: Not on file   Tobacco Use     Smoking status: Never Smoker     Smokeless tobacco: Never Used   Substance and Sexual Activity     Alcohol use: No     Drug use: No     Sexual activity: Not on file   Lifestyle     Physical activity:     Days per week: Not on file     Minutes per session: Not on file     Stress: Not on file   Relationships     Social connections:     Talks on phone: Not on file     Gets together: Not on file     Attends Rastafari service: Not on file     Active member of club or organization: Not on file     Attends meetings of clubs or organizations: Not  "on file     Relationship status: Not on file     Intimate partner violence:     Fear of current or ex partner: Not on file     Emotionally abused: Not on file     Physically abused: Not on file     Forced sexual activity: Not on file   Other Topics Concern     Not on file   Social History Narrative     Not on file       Current Outpatient Medications   Medication     acetone urine (KETOSTIX) test strip     ARIPiprazole (ABILIFY) 2 MG tablet     atorvastatin (LIPITOR) 10 MG tablet     buPROPion (WELLBUTRIN XL) 150 MG 24 hr tablet     Calcium Carbonate-Vitamin D (CALCIUM 500+D PO)     chlorthalidone (HYGROTON) 25 MG tablet     chlorthalidone (HYGROTON) 25 MG tablet     Glucagon, rDNA, (GLUCAGON EMERGENCY) 1 MG KIT     glucose blood VI test strips (ONE TOUCH ULTRA TEST) strip     insulin aspart (NOVOLOG VIAL) 100 UNITS/ML vial     insulin cartridge (T:SLIM 3ML) misc pump supply     Insulin Glargine-yfgn (SEMGLEE, YFGN,) 100 UNIT/ML SOPN     Insulin Infusion Pump Supplies (TRUSTEEL INFUSION SET) MISC     Insulin Pump Accessories MISC     insulin syringe-needle U-100 (30G X 1/2\" 0.3 ML) 30G X 1/2\" 0.3 ML miscellaneous     losartan (COZAAR) 50 MG tablet     No current facility-administered medications for this visit.          Allergies   Allergen Reactions     Percocet [Oxycodone-Acetaminophen] Nausea and Nausea and Vomiting       Physical Exam  There were no vitals taken for this visit.  GENERAL: healthy, alert and no distress  RESP: no audible wheeze, cough, or visible cyanosis.  No visible retractions or increased work of breathing.  Able to speak fully in complete sentences.  PSYCH: mentation appears normal, affect normal/bright, judgement and insight intact, normal speech and appearance well-groomed    RESULTS  Lab Results   Component Value Date    A1C 7.2 (H) 08/02/2021    A1C 6.8 (A) 04/09/2021    A1C 7.7 (H) 01/20/2020    A1C 7.5 (H) 09/16/2019    A1C 7.8 (H) 03/18/2019    A1C 7.7 (H) 12/13/2016    HEMOGLOBINA1 " 7.4 (A) 01/20/2020    HEMOGLOBINA1 7.2 (A) 09/16/2019    HEMOGLOBINA1 7.0 (A) 04/16/2018    HEMOGLOBINA1 7.0 (A) 12/11/2017    HEMOGLOBINA1 7.4 (A) 06/05/2017       TSH   Date Value Ref Range Status   08/02/2021 1.84 0.30 - 5.00 uIU/mL Final   01/20/2020 2.07 0.40 - 4.00 mU/L Final   09/16/2019 1.89 0.40 - 4.00 mU/L Final   03/18/2019 2.40 0.40 - 4.00 mU/L Final   12/11/2017 1.69 0.40 - 4.00 mU/L Final   12/13/2016 3.72 0.40 - 4.00 mU/L Final     T4 Free   Date Value Ref Range Status   03/18/2019 0.81 0.76 - 1.46 ng/dL Final       ALT   Date Value Ref Range Status   01/20/2020 24 0 - 50 U/L Final   09/16/2019 25 0 - 50 U/L Final   ]    Recent Labs   Lab Test 08/02/21  0734 04/09/21  0000 01/20/20  1041   CHOL 99 134 132   HDL 38* 50 59   LDL 53  --  64   TRIG 40 48 44       Lab Results   Component Value Date     08/02/2021     01/20/2020      Lab Results   Component Value Date    POTASSIUM 3.9 08/02/2021    POTASSIUM 4.0 01/20/2020     Lab Results   Component Value Date    CHLORIDE 102 08/02/2021    CHLORIDE 105 01/20/2020     Lab Results   Component Value Date    SCOTTY 9.5 08/02/2021    SCOTTY 9.2 01/20/2020     Lab Results   Component Value Date    CO2 30 08/02/2021    CO2 32 01/20/2020     Lab Results   Component Value Date    BUN 17 08/02/2021    BUN 18 01/20/2020     Lab Results   Component Value Date    CR 1.04 08/02/2021    CR 0.94 01/20/2020       GFR Estimate   Date Value Ref Range Status   08/02/2021 63 >60 mL/min/1.73m2 Final     Comment:     As of July 11, 2021, eGFR is calculated by the CKD-EPI creatinine equation, without race adjustment. eGFR can be influenced by muscle mass, exercise, and diet. The reported eGFR is an estimation only and is only applicable if the renal function is stable.   06/07/2021 49 (L) >60 mL/min/1.73m2 Final   10/12/2020 51 (L) >60 mL/min/1.73m2 Final   01/20/2020 72 >60 mL/min/[1.73_m2] Final     Comment:     Non  GFR Calc  Starting 12/18/2018, serum  creatinine based estimated GFR (eGFR) will be   calculated using the Chronic Kidney Disease Epidemiology Collaboration   (CKD-EPI) equation.     09/16/2019 63 >60 mL/min/[1.73_m2] Final     Comment:     Non  GFR Calc  Starting 12/18/2018, serum creatinine based estimated GFR (eGFR) will be   calculated using the Chronic Kidney Disease Epidemiology Collaboration   (CKD-EPI) equation.     08/22/2019 47 (L) >60 mL/min/1.73m2 Final   03/18/2019 77 >60 mL/min/[1.73_m2] Final     Comment:     Non  GFR Calc  Starting 12/18/2018, serum creatinine based estimated GFR (eGFR) will be   calculated using the Chronic Kidney Disease Epidemiology Collaboration   (CKD-EPI) equation.       GFR Estimate If Black   Date Value Ref Range Status   06/07/2021 60 (L) >60 mL/min/1.73m2 Final   10/12/2020 >60 >60 mL/min/1.73m2 Final   01/20/2020 83 >60 mL/min/[1.73_m2] Final     Comment:      GFR Calc  Starting 12/18/2018, serum creatinine based estimated GFR (eGFR) will be   calculated using the Chronic Kidney Disease Epidemiology Collaboration   (CKD-EPI) equation.     09/16/2019 73 >60 mL/min/[1.73_m2] Final     Comment:      GFR Calc  Starting 12/18/2018, serum creatinine based estimated GFR (eGFR) will be   calculated using the Chronic Kidney Disease Epidemiology Collaboration   (CKD-EPI) equation.     08/22/2019 57 (L) >60 mL/min/1.73m2 Final   03/18/2019 89 >60 mL/min/[1.73_m2] Final     Comment:      GFR Calc  Starting 12/18/2018, serum creatinine based estimated GFR (eGFR) will be   calculated using the Chronic Kidney Disease Epidemiology Collaboration   (CKD-EPI) equation.         Lab Results   Component Value Date    MICROL 6 01/20/2020     No results found for: MICROALBUMIN  No results found for: CPEPT, GADAB, ISCAB    No results found for: B12]    Most recent eye exam date: : Not Found       Fall, 2020.  Scheduled in December, 2020.        Assessment/Plan:     1.  Type 1 diabetes-  Glucose control is more stable.  We made no changes today.  Reminded her to switch to exercise profile 90 minutes before activity, when possible.  Will check labs today.  She is doing quite well.       2.  Risk factors-     Retinopathy:  No. Had recent eye exam.  Nephropathy:  BP has been going low.  Discussed lowering chlorthalidone to 12.5 mg (1/2 tablet) daily.  If unable to split, she will take it every other day.  If bp remains well controlled, she will stop it and monitor bp closely.  No history of microalbuminuria. She had SEKOU in 2019.  GFR improved to 63.  Follows with nephrology, last evaluation was in 12/2020. Encouraged hydration.   No evidence of diabetic nephropathy      Neuropathy: decreased vibration sensation in left great toe in the past.  Normal monofilament.   No pain/burning.   Feet: OK, no ulcers.   Lipids:  LDL at target. She is now on atorvastatin and tolerating this well.    Celiac screening: negative antibodies 2017.   Thyroid screening: TSH 1.84 8/2021.     3.  Anxiety- recommend she meet with Bob Burgess, health psychologist.  She will schedule.      4.  F/U in 3 months with myself.  Will establish staff endocrinologist, as Dr. Roy is no longer in our clinic.      27 minutes spent on the date of the encounter doing chart review, review of test results, review of continuous glucose sensor, insulin pump data, interpretation of glucose data, patient visit and documentation, counseling/coordination of care, and discussion of follow up plan for worsening hyper and hypoglycemia.  The patient understood and is satisfied with today's visit.     Hannah Whitley PA-C, MPAS   [facility]  Department of Medicine  Division of Endocrinology and Diabetes      Answers for HPI/ROS submitted by the patient on 4/7/2022  General Symptoms: No  Skin Symptoms: No  HENT Symptoms: No  EYE SYMPTOMS: Yes  HEART SYMPTOMS: Yes  LUNG SYMPTOMS:  No  INTESTINAL SYMPTOMS: No  URINARY SYMPTOMS: No  GYNECOLOGIC SYMPTOMS: No  BREAST SYMPTOMS: No  SKELETAL SYMPTOMS: No  BLOOD SYMPTOMS: No  NERVOUS SYSTEM SYMPTOMS: Yes  MENTAL HEALTH SYMPTOMS: No  Eye pain: No  Vision loss: No  Dry eyes: Yes  Watery eyes: Yes  Eye bulging: No  Double vision: No  Flashing of lights: No  Spots: No  Floaters: No  Redness: No  Crossed eyes: No  Tunnel Vision: No  Yellowing of eyes: No  Eye irritation: No  Chest pain or pressure: No  Fast or irregular heartbeat: No  Pain in legs with walking: No  Trouble breathing while lying down: No  Fingers or toes appear blue: No  High blood pressure: No  Low blood pressure: No  Fainting: No  Murmurs: No  Pacemaker: No  Varicose veins: No  Edema or swelling: No  Wake up at night with shortness of breath: No  Light-headedness: Yes  Exercise intolerance: No  Trouble with coordination: No  Dizziness or trouble with balance: No  Fainting or black-out spells: No  Memory loss: Yes  Headache: No  Seizures: No  Speech problems: No  Tingling: No  Tremor: No  Weakness: No  Difficulty walking: No  Paralysis: No  Numbness: No

## 2022-04-07 ENCOUNTER — MYC MEDICAL ADVICE (OUTPATIENT)
Dept: ENDOCRINOLOGY | Facility: CLINIC | Age: 52
End: 2022-04-07
Payer: COMMERCIAL

## 2022-04-07 DIAGNOSIS — E10.8 TYPE 1 DIABETES MELLITUS WITH COMPLICATIONS (H): Primary | ICD-10-CM

## 2022-04-07 ASSESSMENT — ENCOUNTER SYMPTOMS
EYE PAIN: 0
DOUBLE VISION: 0
EYE WATERING: 1
TINGLING: 0
LIGHT-HEADEDNESS: 1
SYNCOPE: 0
EXERCISE INTOLERANCE: 0
PALPITATIONS: 0
DISTURBANCES IN COORDINATION: 0
DIZZINESS: 0
WEAKNESS: 0
PARALYSIS: 0
SPEECH CHANGE: 0
HYPERTENSION: 0
EYE IRRITATION: 0
NUMBNESS: 0
ORTHOPNEA: 0
LEG PAIN: 0
HEADACHES: 0
SEIZURES: 0
SLEEP DISTURBANCES DUE TO BREATHING: 0
HYPOTENSION: 0
TREMORS: 0
LOSS OF CONSCIOUSNESS: 0
EYE REDNESS: 0
MEMORY LOSS: 1

## 2022-04-07 NOTE — TELEPHONE ENCOUNTER
RESULTS  Lab Results   Component Value Date    A1C 7.2 (H) 08/02/2021    A1C 6.8 (A) 04/09/2021    A1C 7.7 (H) 01/20/2020    A1C 7.5 (H) 09/16/2019    A1C 7.8 (H) 03/18/2019    A1C 7.7 (H) 12/13/2016    HEMOGLOBINA1 7.4 (A) 01/20/2020    HEMOGLOBINA1 7.2 (A) 09/16/2019    HEMOGLOBINA1 7.0 (A) 04/16/2018    HEMOGLOBINA1 7.0 (A) 12/11/2017    HEMOGLOBINA1 7.4 (A) 06/05/2017       TSH   Date Value Ref Range Status   08/02/2021 1.84 0.30 - 5.00 uIU/mL Final   01/20/2020 2.07 0.40 - 4.00 mU/L Final   09/16/2019 1.89 0.40 - 4.00 mU/L Final   03/18/2019 2.40 0.40 - 4.00 mU/L Final   12/11/2017 1.69 0.40 - 4.00 mU/L Final   12/13/2016 3.72 0.40 - 4.00 mU/L Final     T4 Free   Date Value Ref Range Status   03/18/2019 0.81 0.76 - 1.46 ng/dL Final       ALT   Date Value Ref Range Status   01/20/2020 24 0 - 50 U/L Final   09/16/2019 25 0 - 50 U/L Final   ]    Recent Labs   Lab Test 08/02/21  0734 04/09/21  0000 01/20/20  1041   CHOL 99 134 132   HDL 38* 50 59   LDL 53  --  64   TRIG 40 48 44       Lab Results   Component Value Date     08/02/2021     01/20/2020      Lab Results   Component Value Date    POTASSIUM 3.9 08/02/2021    POTASSIUM 4.0 01/20/2020     Lab Results   Component Value Date    CHLORIDE 102 08/02/2021    CHLORIDE 105 01/20/2020     Lab Results   Component Value Date    SCOTTY 9.5 08/02/2021    SCOTTY 9.2 01/20/2020     Lab Results   Component Value Date    CO2 30 08/02/2021    CO2 32 01/20/2020     Lab Results   Component Value Date    BUN 17 08/02/2021    BUN 18 01/20/2020     Lab Results   Component Value Date    CR 1.04 08/02/2021    CR 0.94 01/20/2020       GFR Estimate   Date Value Ref Range Status   08/02/2021 63 >60 mL/min/1.73m2 Final     Comment:     As of July 11, 2021, eGFR is calculated by the CKD-EPI creatinine equation, without race adjustment. eGFR can be influenced by muscle mass, exercise, and diet. The reported eGFR is an estimation only and is only applicable if the renal function is  stable.   06/07/2021 49 (L) >60 mL/min/1.73m2 Final   10/12/2020 51 (L) >60 mL/min/1.73m2 Final   01/20/2020 72 >60 mL/min/[1.73_m2] Final     Comment:     Non  GFR Calc  Starting 12/18/2018, serum creatinine based estimated GFR (eGFR) will be   calculated using the Chronic Kidney Disease Epidemiology Collaboration   (CKD-EPI) equation.     09/16/2019 63 >60 mL/min/[1.73_m2] Final     Comment:     Non  GFR Calc  Starting 12/18/2018, serum creatinine based estimated GFR (eGFR) will be   calculated using the Chronic Kidney Disease Epidemiology Collaboration   (CKD-EPI) equation.     08/22/2019 47 (L) >60 mL/min/1.73m2 Final   03/18/2019 77 >60 mL/min/[1.73_m2] Final     Comment:     Non  GFR Calc  Starting 12/18/2018, serum creatinine based estimated GFR (eGFR) will be   calculated using the Chronic Kidney Disease Epidemiology Collaboration   (CKD-EPI) equation.       GFR Estimate If Black   Date Value Ref Range Status   06/07/2021 60 (L) >60 mL/min/1.73m2 Final   10/12/2020 >60 >60 mL/min/1.73m2 Final   01/20/2020 83 >60 mL/min/[1.73_m2] Final     Comment:      GFR Calc  Starting 12/18/2018, serum creatinine based estimated GFR (eGFR) will be   calculated using the Chronic Kidney Disease Epidemiology Collaboration   (CKD-EPI) equation.     09/16/2019 73 >60 mL/min/[1.73_m2] Final     Comment:      GFR Calc  Starting 12/18/2018, serum creatinine based estimated GFR (eGFR) will be   calculated using the Chronic Kidney Disease Epidemiology Collaboration   (CKD-EPI) equation.     08/22/2019 57 (L) >60 mL/min/1.73m2 Final   03/18/2019 89 >60 mL/min/[1.73_m2] Final     Comment:      GFR Calc  Starting 12/18/2018, serum creatinine based estimated GFR (eGFR) will be   calculated using the Chronic Kidney Disease Epidemiology Collaboration   (CKD-EPI) equation.         Lab Results   Component Value Date    MICROL 6 01/20/2020     No  results found for: MICROALBUMIN  No results found for: CPEPT, GADAB, ISCAB    No results found for: B12]    Most recent eye exam date: : Not Found       ----- Message from Yohana Cobb sent at 4/7/2022  1:24 PM CDT -----  Regarding: Labs prior to appointment  Laura Young,    I spoke with this patient to gather data for your visit on 4/11/22. She is wondering if you would jerica to have any labs done prior to her appointment. She did have an A1C done on 3/4/22.    Thank you!    Yohana Cobb, VF

## 2022-04-11 ENCOUNTER — TELEPHONE (OUTPATIENT)
Dept: ENDOCRINOLOGY | Facility: CLINIC | Age: 52
End: 2022-04-11

## 2022-04-11 ENCOUNTER — VIRTUAL VISIT (OUTPATIENT)
Dept: ENDOCRINOLOGY | Facility: CLINIC | Age: 52
End: 2022-04-11
Payer: COMMERCIAL

## 2022-04-11 ENCOUNTER — LAB (OUTPATIENT)
Dept: LAB | Facility: CLINIC | Age: 52
End: 2022-04-11

## 2022-04-11 DIAGNOSIS — E10.8 TYPE 1 DIABETES MELLITUS WITH COMPLICATIONS (H): ICD-10-CM

## 2022-04-11 DIAGNOSIS — E10.9 WELL CONTROLLED TYPE 1 DIABETES MELLITUS (H): Primary | ICD-10-CM

## 2022-04-11 LAB
ANION GAP SERPL CALCULATED.3IONS-SCNC: 9 MMOL/L (ref 5–18)
BUN SERPL-MCNC: 16 MG/DL (ref 8–22)
CALCIUM SERPL-MCNC: 9.7 MG/DL (ref 8.5–10.5)
CHLORIDE BLD-SCNC: 98 MMOL/L (ref 98–107)
CO2 SERPL-SCNC: 29 MMOL/L (ref 22–31)
CREAT SERPL-MCNC: 1.25 MG/DL (ref 0.6–1.1)
CREAT UR-MCNC: 75 MG/DL
GFR SERPL CREATININE-BSD FRML MDRD: 52 ML/MIN/1.73M2
GLUCOSE BLD-MCNC: 221 MG/DL (ref 70–125)
HBA1C MFR BLD: 7 % (ref 0–5.6)
MICROALBUMIN UR-MCNC: 2.87 MG/DL (ref 0–1.99)
MICROALBUMIN/CREAT UR: 38.3 MG/G CR
POTASSIUM BLD-SCNC: 4.3 MMOL/L (ref 3.5–5)
SODIUM SERPL-SCNC: 136 MMOL/L (ref 136–145)

## 2022-04-11 PROCEDURE — 99213 OFFICE O/P EST LOW 20 MIN: CPT | Mod: 95 | Performed by: PHYSICIAN ASSISTANT

## 2022-04-11 PROCEDURE — 80048 BASIC METABOLIC PNL TOTAL CA: CPT

## 2022-04-11 PROCEDURE — 83036 HEMOGLOBIN GLYCOSYLATED A1C: CPT

## 2022-04-11 PROCEDURE — 36415 COLL VENOUS BLD VENIPUNCTURE: CPT

## 2022-04-11 PROCEDURE — 82043 UR ALBUMIN QUANTITATIVE: CPT

## 2022-04-11 RX ORDER — CHLORTHALIDONE 25 MG/1
TABLET ORAL
Qty: 90 TABLET | Refills: 1 | COMMUNITY
Start: 2022-04-11 | End: 2022-06-03

## 2022-04-11 NOTE — TELEPHONE ENCOUNTER
Attempted to reach patient to schedule follow up with endocrinology. Please schedule with Hannah Whitley in 3 months, around 07/11/22  Marli Ventura CMA

## 2022-04-11 NOTE — LETTER
4/11/2022       RE: Sera Adkins  [address]  [place] [address]     Dear Colleague,    Thank you for referring your patient, Sera Adkins, to the [facility] [place] at [facility]. Please see a copy of my visit note below.    Outcome for 04/01/22 11:13 AM: [place] message sent  DASHA Deluna  Outcome for 04/07/22 1:28 PM: Per patient, will upload device before appointment will upload 4/7  DASHA Deluna     Start time: 0842  End time: 0856    HPI:   Sera is a 52 yo woman here for follow up of type 1 diabetes since age 15. She switched to the Tandem X2 pump last year and has really liked it.  She checked her a1c at her clinic and it was 6.8%. She has been feeling good.    She is doing more weight lifting. At her last visit, she was dropping low when she was exercising so we created a new exercise profile which she tries to switch to at least an hour before exercise.  This has been working really well.  She sometimes forgets to start it early enough.        She has noticed her blood pressure has been going lower. 98/68.  She wonders if she can cut back her blood pressure medication.      Pump settings:   Standard Profile (pump changes made at last visit) Active at the time of upload  Start Time Basal Rate Correction Factor Carb Ratio Target BG  Midnight 0.85 (was 0.8) 1u:50 mg/dL 1u:15.0 g 110 mg/dL  7:00 AM 0.900 u/hr 1u:50 mg/dL 1u:10 (was 1/12) 110 mg/dL  Noon  0.900 u/hr 1u:50 mg/dL 1u:13 (was 15) 110 mg/dL     Exercise-   Start Time Basal Rate Correction Factor Carb Ratio Target BG  Midnight 0.135  1u:100 mg/dL   1u:30 g 140 mg/dL     Recent glucose:                 Recent labs (4/9/21):   A1c- 6.8%  Lipids-   Total Chol- 134   HDL- 50  TG- 48  LDL- 75    Vitals:   /66, p 75  O2 98%.   Lipid- LDL 52  A1c: 7.2% 12/3    GFR has been in the 50's for the past couple years.   She is intentional  about avoiding dehydration.     Her mood has been ok.  She is on antidepressant.      She has no other concerns today.     ROS  GENERAL: Few pound weight gain.  No fevers, chills, malaise, night sweats.   HEENT: no dysphagia, diplopia, neck pain or tenderness, dry/scratchy eyes, URI, cough, sinus drainage, tinnitus, sinus pressure  CV: no chest pain, pressure.   LUNGS: no SOB, [place], cough, sputum production, wheezing   GI: no diarrhea, constipation, abdominal pain  EXTREMITIES: no rashes, ulcers, edema  NEUROLOGY: no changes in vision, tingling or numbness in hands or feet.   MSK: no muscle aches or pains, weakness  PSYCH: mood stable      Past Medical History:   Diagnosis Date     Anxiety      Diabetes mellitus (H)     Type 1       Past Surgical History:   Procedure Laterality Date     BREAST CYST ASPIRATION Left ?      SECTION      x2     CONIZATION CERVIX,LOOP ELECTRD      Description: Cervical Conization Loop Electrode Excision;  Recorded: 2008;       Family History   Problem Relation Age of Onset     Cancer Father      Breast Cancer Paternal Grandmother        Social History   Social Hx:  .  Two teenage children.  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  Enjoys being physically active, mostly biking, but also running and HIIT.     Socioeconomic History     Marital status: other     Spouse name: Not on file     Number of children: Not on file     Years of education: Not on file     Highest education level: Not on file   Occupational History     Not on file   Social Needs     Financial resource strain: Not on file     Food insecurity:     Worry: Not on file     Inability: Not on file     Transportation needs:     Medical: Not on file     Non-medical: Not on file   Tobacco Use     Smoking status: Never Smoker     Smokeless tobacco: Never Used   Substance and Sexual Activity     Alcohol use: No     Drug use: No     Sexual activity: Not on file   Lifestyle     Physical activity:     Days per week:  "Not on file     Minutes per session: Not on file     Stress: Not on file   Relationships     Social connections:     Talks on phone: Not on file     Gets together: Not on file     Attends Evangelical service: Not on file     Active member of club or organization: Not on file     Attends meetings of clubs or organizations: Not on file     Relationship status: Not on file     Intimate partner violence:     Fear of current or ex partner: Not on file     Emotionally abused: Not on file     Physically abused: Not on file     Forced sexual activity: Not on file   Other Topics Concern     Not on file   Social History Narrative     Not on file       Current Outpatient Medications   Medication     acetone urine (KETOSTIX) test strip     ARIPiprazole (ABILIFY) 2 MG tablet     atorvastatin (LIPITOR) 10 MG tablet     buPROPion (WELLBUTRIN XL) 150 MG 24 hr tablet     Calcium Carbonate-Vitamin D (CALCIUM 500+D PO)     chlorthalidone (HYGROTON) 25 MG tablet     chlorthalidone (HYGROTON) 25 MG tablet     Glucagon, rDNA, (GLUCAGON EMERGENCY) 1 MG KIT     glucose blood VI test strips (ONE TOUCH ULTRA TEST) strip     insulin aspart (NOVOLOG VIAL) 100 UNITS/ML vial     insulin cartridge (T:SLIM 3ML) misc pump supply     Insulin Glargine-yfgn (SEMGLEE, YFGN,) 100 UNIT/ML SOPN     Insulin Infusion Pump Supplies (TRUSTEEL INFUSION SET) MISC     Insulin Pump Accessories MISC     insulin syringe-needle U-100 (30G X 1/2\" 0.3 ML) 30G X 1/2\" 0.3 ML miscellaneous     losartan (COZAAR) 50 MG tablet     No current facility-administered medications for this visit.          Allergies   Allergen Reactions     Percocet [Oxycodone-Acetaminophen] Nausea and Nausea and Vomiting       Physical Exam  There were no vitals taken for this visit.  GENERAL: healthy, alert and no distress  RESP: no audible wheeze, cough, or visible cyanosis.  No visible retractions or increased work of breathing.  Able to speak fully in complete sentences.  PSYCH: mentation " appears normal, affect normal/bright, judgement and insight intact, normal speech and appearance well-groomed    RESULTS  Lab Results   Component Value Date    A1C 7.2 (H) 08/02/2021    A1C 6.8 (A) 04/09/2021    A1C 7.7 (H) 01/20/2020    A1C 7.5 (H) 09/16/2019    A1C 7.8 (H) 03/18/2019    A1C 7.7 (H) 12/13/2016    HEMOGLOBINA1 7.4 (A) 01/20/2020    HEMOGLOBINA1 7.2 (A) 09/16/2019    HEMOGLOBINA1 7.0 (A) 04/16/2018    HEMOGLOBINA1 7.0 (A) 12/11/2017    HEMOGLOBINA1 7.4 (A) 06/05/2017       TSH   Date Value Ref Range Status   08/02/2021 1.84 0.30 - 5.00 uIU/mL Final   01/20/2020 2.07 0.40 - 4.00 mU/L Final   09/16/2019 1.89 0.40 - 4.00 mU/L Final   03/18/2019 2.40 0.40 - 4.00 mU/L Final   12/11/2017 1.69 0.40 - 4.00 mU/L Final   12/13/2016 3.72 0.40 - 4.00 mU/L Final     T4 Free   Date Value Ref Range Status   03/18/2019 0.81 0.76 - 1.46 ng/dL Final       ALT   Date Value Ref Range Status   01/20/2020 24 0 - 50 U/L Final   09/16/2019 25 0 - 50 U/L Final   ]    Recent Labs   Lab Test 08/02/21  0734 04/09/21  0000 01/20/20  1041   CHOL 99 134 132   HDL 38* 50 59   LDL 53  --  64   TRIG 40 48 44       Lab Results   Component Value Date     08/02/2021     01/20/2020      Lab Results   Component Value Date    POTASSIUM 3.9 08/02/2021    POTASSIUM 4.0 01/20/2020     Lab Results   Component Value Date    CHLORIDE 102 08/02/2021    CHLORIDE 105 01/20/2020     Lab Results   Component Value Date    SCOTTY 9.5 08/02/2021    SCOTTY 9.2 01/20/2020     Lab Results   Component Value Date    CO2 30 08/02/2021    CO2 32 01/20/2020     Lab Results   Component Value Date    BUN 17 08/02/2021    BUN 18 01/20/2020     Lab Results   Component Value Date    CR 1.04 08/02/2021    CR 0.94 01/20/2020       GFR Estimate   Date Value Ref Range Status   08/02/2021 63 >60 mL/min/1.73m2 Final     Comment:     As of July 11, 2021, eGFR is calculated by the CKD-EPI creatinine equation, without race adjustment. eGFR can be influenced by muscle  mass, exercise, and diet. The reported eGFR is an estimation only and is only applicable if the renal function is stable.   06/07/2021 49 (L) >60 mL/min/1.73m2 Final   10/12/2020 51 (L) >60 mL/min/1.73m2 Final   01/20/2020 72 >60 mL/min/[1.73_m2] Final     Comment:     Non  GFR Calc  Starting 12/18/2018, serum creatinine based estimated GFR (eGFR) will be   calculated using the Chronic Kidney Disease Epidemiology Collaboration   (CKD-EPI) equation.     09/16/2019 63 >60 mL/min/[1.73_m2] Final     Comment:     Non  GFR Calc  Starting 12/18/2018, serum creatinine based estimated GFR (eGFR) will be   calculated using the Chronic Kidney Disease Epidemiology Collaboration   (CKD-EPI) equation.     08/22/2019 47 (L) >60 mL/min/1.73m2 Final   03/18/2019 77 >60 mL/min/[1.73_m2] Final     Comment:     Non  GFR Calc  Starting 12/18/2018, serum creatinine based estimated GFR (eGFR) will be   calculated using the Chronic Kidney Disease Epidemiology Collaboration   (CKD-EPI) equation.       GFR Estimate If Black   Date Value Ref Range Status   06/07/2021 60 (L) >60 mL/min/1.73m2 Final   10/12/2020 >60 >60 mL/min/1.73m2 Final   01/20/2020 83 >60 mL/min/[1.73_m2] Final     Comment:      GFR Calc  Starting 12/18/2018, serum creatinine based estimated GFR (eGFR) will be   calculated using the Chronic Kidney Disease Epidemiology Collaboration   (CKD-EPI) equation.     09/16/2019 73 >60 mL/min/[1.73_m2] Final     Comment:      GFR Calc  Starting 12/18/2018, serum creatinine based estimated GFR (eGFR) will be   calculated using the Chronic Kidney Disease Epidemiology Collaboration   (CKD-EPI) equation.     08/22/2019 57 (L) >60 mL/min/1.73m2 Final   03/18/2019 89 >60 mL/min/[1.73_m2] Final     Comment:      GFR Calc  Starting 12/18/2018, serum creatinine based estimated GFR (eGFR) will be   calculated using the Chronic Kidney Disease  Epidemiology Collaboration   (CKD-EPI) equation.         Lab Results   Component Value Date    MICROL 6 01/20/2020     No results found for: MICROALBUMIN  No results found for: CPEPT, GADAB, ISCAB    No results found for: B12]    Most recent eye exam date: : Not Found       Fall, 2020.  Scheduled in December, 2020.       Assessment/Plan:     1.  Type 1 diabetes-  Glucose control is more stable.  We made no changes today.  Reminded her to switch to exercise profile 90 minutes before activity, when possible.  Will check labs today.  She is doing quite well.       2.  Risk factors-     Retinopathy:  No. Had recent eye exam.  Nephropathy:  BP has been going low.  Discussed lowering chlorthalidone to 12.5 mg (1/2 tablet) daily.  If unable to split, she will take it every other day.  If bp remains well controlled, she will stop it and monitor bp closely.  No history of microalbuminuria. She had SEKOU in 2019.  GFR improved to 63.  Follows with nephrology, last evaluation was in 12/2020. Encouraged hydration.   No evidence of diabetic nephropathy      Neuropathy: decreased vibration sensation in left great toe in the past.  Normal monofilament.   No pain/burning.   Feet: OK, no ulcers.   Lipids:  LDL at target. She is now on atorvastatin and tolerating this well.    Celiac screening: negative antibodies 2017.   Thyroid screening: TSH 1.84 8/2021.     3.  Anxiety- recommend she meet with Bob Burgess, health psychologist.  She will schedule.      4.  F/U in 3 months with myself.  Will establish staff endocrinologist, as Dr. Roy is no longer in our clinic.      27 minutes spent on the date of the encounter doing chart review, review of test results, review of continuous glucose sensor, insulin pump data, interpretation of glucose data, patient visit and documentation, counseling/coordination of care, and discussion of follow up plan for worsening hyper and hypoglycemia.  The patient understood and is satisfied with  today's visit.     Hannah Whitley PA-C, MPAS   [facility]  Department of Medicine  Division of Endocrinology and Diabetes      Answers for HPI/ROS submitted by the patient on 4/7/2022  General Symptoms: No  Skin Symptoms: No  HENT Symptoms: No  EYE SYMPTOMS: Yes  HEART SYMPTOMS: Yes  LUNG SYMPTOMS: No  INTESTINAL SYMPTOMS: No  URINARY SYMPTOMS: No  GYNECOLOGIC SYMPTOMS: No  BREAST SYMPTOMS: No  SKELETAL SYMPTOMS: No  BLOOD SYMPTOMS: No  NERVOUS SYSTEM SYMPTOMS: Yes  MENTAL HEALTH SYMPTOMS: No  Eye pain: No  Vision loss: No  Dry eyes: Yes  Watery eyes: Yes  Eye bulging: No  Double vision: No  Flashing of lights: No  Spots: No  Floaters: No  Redness: No  Crossed eyes: No  Tunnel Vision: No  Yellowing of eyes: No  Eye irritation: No  Chest pain or pressure: No  Fast or irregular heartbeat: No  Pain in legs with walking: No  Trouble breathing while lying down: No  Fingers or toes appear blue: No  High blood pressure: No  Low blood pressure: No  Fainting: No  Murmurs: No  Pacemaker: No  Varicose veins: No  Edema or swelling: No  Wake up at night with shortness of breath: No  Light-headedness: Yes  Exercise intolerance: No  Trouble with coordination: No  Dizziness or trouble with balance: No  Fainting or black-out spells: No  Memory loss: Yes  Headache: No  Seizures: No  Speech problems: No  Tingling: No  Tremor: No  Weakness: No  Difficulty walking: No  Paralysis: No  Numbness: No        Outcome for 04/11/22 8:42 AM: Tried to troubleshoot Tandem further with pt and no success. Called back and also gave the Tech # for Pt to call Tandem as showing upload current but last data as of 12/2021. Pt will call Tandem  DASHA Gomez      Patient denies any changes since echeck-in regarding medication and allergies    Chadd is a 51 year old who is being evaluated via a billable telephone visit.      What phone number would you like to be contacted at? [phone]    How would you like to obtain your AVS?  MyChart

## 2022-04-11 NOTE — PROGRESS NOTES
Outcome for 04/11/22 8:42 AM: Tried to troubleshoot Tandem further with pt and no success. Called back and also gave the Tech # for Pt to call Tandem as showing upload current but last data as of 12/2021. Pt will call Tandem  DASHA Gomez      Patient denies any changes since echeck-in regarding medication and allergies    Chadd is a 51 year old who is being evaluated via a billable telephone visit.      What phone number would you like to be contacted at? [phone]    How would you like to obtain your AVS? Shamikahart

## 2022-05-06 ENCOUNTER — MYC MEDICAL ADVICE (OUTPATIENT)
Dept: LAB | Facility: CLINIC | Age: 52
End: 2022-05-06

## 2022-05-06 ENCOUNTER — LAB (OUTPATIENT)
Dept: LAB | Facility: CLINIC | Age: 52
End: 2022-05-06
Payer: COMMERCIAL

## 2022-05-06 ENCOUNTER — TELEPHONE (OUTPATIENT)
Dept: LAB | Facility: CLINIC | Age: 52
End: 2022-05-06
Payer: COMMERCIAL

## 2022-05-06 DIAGNOSIS — E10.8 TYPE 1 DIABETES MELLITUS WITH COMPLICATIONS (H): ICD-10-CM

## 2022-05-06 DIAGNOSIS — E10.9 WELL CONTROLLED TYPE 1 DIABETES MELLITUS (H): Primary | ICD-10-CM

## 2022-05-06 DIAGNOSIS — E10.9 WELL CONTROLLED TYPE 1 DIABETES MELLITUS (H): ICD-10-CM

## 2022-05-06 LAB
ANION GAP SERPL CALCULATED.3IONS-SCNC: 12 MMOL/L (ref 5–18)
BUN SERPL-MCNC: 13 MG/DL (ref 8–22)
CALCIUM SERPL-MCNC: 9.5 MG/DL (ref 8.5–10.5)
CHLORIDE BLD-SCNC: 101 MMOL/L (ref 98–107)
CO2 SERPL-SCNC: 27 MMOL/L (ref 22–31)
CREAT SERPL-MCNC: 1.12 MG/DL (ref 0.6–1.1)
GFR SERPL CREATININE-BSD FRML MDRD: 60 ML/MIN/1.73M2
GLUCOSE BLD-MCNC: 115 MG/DL (ref 70–125)
POTASSIUM BLD-SCNC: 3.8 MMOL/L (ref 3.5–5)
SODIUM SERPL-SCNC: 140 MMOL/L (ref 136–145)

## 2022-05-06 PROCEDURE — 80048 BASIC METABOLIC PNL TOTAL CA: CPT

## 2022-05-06 PROCEDURE — 36415 COLL VENOUS BLD VENIPUNCTURE: CPT

## 2022-05-06 NOTE — PROGRESS NOTES
Patient was here today requesting to have her BMP drawn. You had noted upon her most recent blood work:    4/12/2022  4:52 PM CDT Back to Top        Chadd- as we discussed, your microalbumin is likely elevated due to rigorous exercise just before the test.  Let's recheck this in 3 months.   Hannah     Does she need this drawn so soon? If so, please place orders, thank you!

## 2022-05-28 DIAGNOSIS — E10.9 WELL CONTROLLED TYPE 1 DIABETES MELLITUS (H): ICD-10-CM

## 2022-05-31 DIAGNOSIS — E10.8 TYPE 1 DIABETES MELLITUS WITH COMPLICATIONS (H): ICD-10-CM

## 2022-05-31 NOTE — TELEPHONE ENCOUNTER
Pending Prescriptions:                       Disp   Refills    chlorthalidone (HYGROTON) 25 MG tablet    90 tab*1            Sig: Take 1/2 tablet daily.

## 2022-06-01 RX ORDER — CHLORTHALIDONE 25 MG/1
TABLET ORAL
Qty: 90 TABLET | Refills: 1 | OUTPATIENT
Start: 2022-06-01

## 2022-06-02 RX ORDER — ATORVASTATIN CALCIUM 10 MG/1
10 TABLET, FILM COATED ORAL DAILY
Qty: 90 TABLET | Refills: 0 | Status: SHIPPED | OUTPATIENT
Start: 2022-06-02 | End: 2022-09-01

## 2022-06-02 NOTE — TELEPHONE ENCOUNTER
4/11/2022  [facility] [place]     Hannah Whitley PAJeancarlosC    Endocrinology, Diabetes, and Metabolism       atorvastatin (LIPITOR) 10 MG tablet

## 2022-06-03 RX ORDER — CHLORTHALIDONE 25 MG/1
TABLET ORAL
Qty: 45 TABLET | Refills: 3 | Status: SHIPPED | OUTPATIENT
Start: 2022-06-03 | End: 2022-08-22

## 2022-06-03 NOTE — TELEPHONE ENCOUNTER
Rx was not successfully sent to [facility] 4/11/22. Please resend to pharmacy.    Pending Prescriptions:                       Disp   Refills    chlorthalidone (HYGROTON) 25 MG tablet    45 tab*3            Sig: Take 1/2 tablet daily

## 2022-06-06 ENCOUNTER — TRANSFERRED RECORDS (OUTPATIENT)
Dept: HEALTH INFORMATION MANAGEMENT | Facility: CLINIC | Age: 52
End: 2022-06-06
Payer: COMMERCIAL

## 2022-06-27 ENCOUNTER — OFFICE VISIT (OUTPATIENT)
Dept: FAMILY MEDICINE | Facility: CLINIC | Age: 52
End: 2022-06-27
Payer: COMMERCIAL

## 2022-06-27 VITALS
OXYGEN SATURATION: 100 % | WEIGHT: 162.2 LBS | BODY MASS INDEX: 24.3 KG/M2 | RESPIRATION RATE: 16 BRPM | DIASTOLIC BLOOD PRESSURE: 77 MMHG | SYSTOLIC BLOOD PRESSURE: 132 MMHG | HEART RATE: 61 BPM

## 2022-06-27 DIAGNOSIS — Q21.12 PATENT FORAMEN OVALE: ICD-10-CM

## 2022-06-27 DIAGNOSIS — F39 MOOD DISORDER (H): ICD-10-CM

## 2022-06-27 DIAGNOSIS — E10.8 TYPE 1 DIABETES MELLITUS WITH COMPLICATIONS (H): ICD-10-CM

## 2022-06-27 DIAGNOSIS — R06.02 SHORTNESS OF BREATH: Primary | ICD-10-CM

## 2022-06-27 DIAGNOSIS — N18.31 STAGE 3A CHRONIC KIDNEY DISEASE (H): ICD-10-CM

## 2022-06-27 PROCEDURE — 99213 OFFICE O/P EST LOW 20 MIN: CPT | Performed by: FAMILY MEDICINE

## 2022-06-27 ASSESSMENT — ANXIETY QUESTIONNAIRES
1. FEELING NERVOUS, ANXIOUS, OR ON EDGE: NOT AT ALL
7. FEELING AFRAID AS IF SOMETHING AWFUL MIGHT HAPPEN: SEVERAL DAYS
5. BEING SO RESTLESS THAT IT IS HARD TO SIT STILL: NOT AT ALL
6. BECOMING EASILY ANNOYED OR IRRITABLE: NOT AT ALL
IF YOU CHECKED OFF ANY PROBLEMS ON THIS QUESTIONNAIRE, HOW DIFFICULT HAVE THESE PROBLEMS MADE IT FOR YOU TO DO YOUR WORK, TAKE CARE OF THINGS AT HOME, OR GET ALONG WITH OTHER PEOPLE: NOT DIFFICULT AT ALL
GAD7 TOTAL SCORE: 2
GAD7 TOTAL SCORE: 2
2. NOT BEING ABLE TO STOP OR CONTROL WORRYING: NOT AT ALL
3. WORRYING TOO MUCH ABOUT DIFFERENT THINGS: SEVERAL DAYS

## 2022-06-27 ASSESSMENT — ENCOUNTER SYMPTOMS: WHEEZING: 1

## 2022-06-27 ASSESSMENT — PATIENT HEALTH QUESTIONNAIRE - PHQ9
SUM OF ALL RESPONSES TO PHQ QUESTIONS 1-9: 0
5. POOR APPETITE OR OVEREATING: NOT AT ALL

## 2022-06-27 NOTE — PROGRESS NOTES
Assessment & Plan     Shortness of breath    Etiology unclear  Reviewed options including possible exercise-induced asthma, possible allergies contributing to bronchospasm versus cardiac cause versus other etiology    Her primary concern at this point is to have her heart evaluated  She notes that at one point years ago she had a transesophageal echocardiogram showing a patent foramen ovale and she would like to go back to cardiology regarding this  She typically tolerates vigorous physical activity but does have cardiac risk factors including type 1 diabetes wellness diagnosed over 30 years ago  Reviewed additional testing.  For allergies we could check ImmunoCAP respiratory panel.  Discussed that she can also consider a trial of an antihistamine over 2-3 weeks to see if that can be helpful  We discussed also a trial of an albuterol inhaler or Symbicort  If there are concerns for asthma she will ultimately need pulmonary function testing and possibly a methacholine challenge   Additionally, she expressed concern about possible reflux symptoms and we reviewed a possible trial of omeprazole or famotidine over 2 or 3-week period of time  These interventions could be done in a stepwise approach    Her preference at this time is to follow-up with cardiology  Refer to cardiology  Offered additional testing including EKG, chest x-ray, and laboratory testing such as BNP but she defers additional testing  She prefers not to do an EKG or other evaluation today but will wait and see cardiology    - Adult Cardiology Eval  Referral    Type 1 diabetes mellitus with complications (H)    Recent hemoglobin A1c was 7.0%  She has followed up with endocrinology for treatment  Continue her insulin pump    Stage 3a chronic kidney disease (H)    Her most recent GFR was 60  She previously followed up with nephrology  Recommend avoiding nephrotoxic substances  Continue her ACE inhibitor  Continue monitor kidney function and  consider referral back to nephrology if warranted    Mood disorder (H)    Currently stable on Wellbutrin    Patent foramen ovale    Refer to cardiology as noted above  - Adult Cardiology Eval  Referral      No follow-ups on file.    Jose Elias Montgomery MD  [facility]    Guillaume Florentino is a 51 year old, presenting for the following health issues:    This is a pleasant 51-year-old female who presents to the clinic primarily because of concern regarding shortness of breath with exertion that she has been experiencing recently.  She typically exercises on a consistent basis but more recently has had decreased exercise tolerance.  She will note that after period of time she will get a sense of tightness in her chest.  She wonders about possible exercise-induced asthma.  As noted, she has a history of type 1 diabetes mellitus and was diagnosed over 30 years ago.  She does have an insulin pump and follows up with endocrinology.  Her most recent hemoglobin A1c was 7.0%.  She has some concerns about her heart as well.  She notes that at one point in the distant past she had a transesophageal echocardiogram that showed a foramen ovale that was small.  She did follow-up with cardiology at the time.      She has a history of chronic kidney disease and has previous followed up with nephrology.  Her most recent GFR actually improved to 60.  She is treated with lisinopril and at one point to chlorthalidone for hypertension.  However, she tried to wean off of the chlorthalidone and felt like she developed rebound edema in her legs and was wondering about fluid in her lungs.  She started this back at a lower dose in the short-term.  She does wonder about possible allergies and has not had any specific allergy testing.  She is not taking an antihistamine.  She also wonders about reflux symptoms for possible exercise-induced asthma.    Family history is notable for a maternal grandmother with  asthma.      Allergies and Asthma (Possible asthma)      Allergies    History of Present Illness       Reason for visit:  History of PFO, leg edema and SOB when attempting to wean off diuretic    She eats 0-1 servings of fruits and vegetables daily.She consumes 0 sweetened beverage(s) daily.She exercises with enough effort to increase her heart rate 30 to 60 minutes per day.  She exercises with enough effort to increase her heart rate 4 days per week.   She is taking medications regularly.       Review of Systems   Respiratory: Positive for wheezing.             Objective    /77 (BP Location: Left arm, Patient Position: Sitting, Cuff Size: Adult Regular)   Pulse 61   Resp 16   Wt 73.6 kg (162 lb 3.2 oz)   SpO2 100%   BMI 24.30 kg/m    Body mass index is 24.3 kg/m .  Physical Exam   GENERAL: healthy, alert and no distress  EYES: Eyes grossly normal to inspection, PERRL and conjunctivae and sclerae normal  HENT: ear canals and TM's normal, nose and mouth without ulcers or lesions  NECK: no adenopathy, no asymmetry, masses, or scars and thyroid normal to palpation  RESP: lungs clear to auscultation - no rales, rhonchi or wheezes  CV: regular rate and rhythm, normal S1 S2, no S3 or S4, no murmur, click or rub, no peripheral edema and peripheral pulses strong  MS: no gross musculoskeletal defects noted, no edema  SKIN: no suspicious lesions or rashes  PSYCH: mentation appears normal, affect normal/bright            .  ..

## 2022-06-27 NOTE — PATIENT INSTRUCTIONS
Chadd,    Follow-up with cardiology as planned  Keep a diary of your symptoms  We discussed possibly having you take omeprazole or famotidine for 2-3 weeks and see if your symptoms improve while exercising  We also discussed a trial of an antihistamine to see if that is helpful  We can consider pulmonary function testing in the future  Please provide an update after you see cardiology    Jose Elias Montgomery MD

## 2022-07-16 ENCOUNTER — MYC REFILL (OUTPATIENT)
Dept: ENDOCRINOLOGY | Facility: CLINIC | Age: 52
End: 2022-07-16

## 2022-07-16 DIAGNOSIS — E10.8 TYPE 1 DIABETES MELLITUS WITH COMPLICATIONS (H): Primary | ICD-10-CM

## 2022-07-16 DIAGNOSIS — E11.9 DIABETES MELLITUS (H): ICD-10-CM

## 2022-07-18 RX ORDER — INSULIN ASPART 100 [IU]/ML
INJECTION, SOLUTION INTRAVENOUS; SUBCUTANEOUS
Qty: 70 ML | Refills: 1 | Status: SHIPPED | OUTPATIENT
Start: 2022-07-18 | End: 2022-07-20

## 2022-07-20 DIAGNOSIS — E10.8 TYPE 1 DIABETES MELLITUS WITH COMPLICATIONS (H): ICD-10-CM

## 2022-07-20 RX ORDER — INSULIN ASPART 100 [IU]/ML
INJECTION, SOLUTION INTRAVENOUS; SUBCUTANEOUS
Qty: 70 ML | Refills: 1 | Status: SHIPPED | OUTPATIENT
Start: 2022-07-20 | End: 2023-04-10

## 2022-07-28 ENCOUNTER — NURSE TRIAGE (OUTPATIENT)
Dept: NURSING | Facility: CLINIC | Age: 52
End: 2022-07-28

## 2022-07-28 ENCOUNTER — HOSPITAL ENCOUNTER (EMERGENCY)
Facility: HOSPITAL | Age: 52
End: 2022-07-28
Payer: COMMERCIAL

## 2022-07-29 NOTE — TELEPHONE ENCOUNTER
Triage note    Caller name: Chadd  Relation to patient: self    Mountain biking this evening. Hit tree with hand.  Pinky finger bent back and heard cracking in the finger.   Swollen in 5th MCP and and DIP. Is certain the MCP is out of alignment.   Concerned the finger may be fractured as well.  Has some bruising that has started. No opening or lacerations of the skin.    She's day 13 since testing positive for COVID. She did recently test negative. States she's negative for COVID symptoms.    Disposition: per protocol, patient to go to ED for evaluation. She verbalized her understanding and agrees with plan.        Mami Zayas RN  [facility] Nurse Advisor                 Reason for Disposition    Looks like a dislocated joint (e.g., crooked or deformed)    Additional Information    Negative: [1] Major bleeding (e.g., spurting blood) AND [2] can't be stopped    Negative: Amputated finger    Negative: Skin is split open or gaping (or length > 1/2 inch or 12 mm)    Negative: [1] Dirt in the wound AND [2] not removed with 15 minutes of scrubbing    Negative: High pressure injection injury (e.g., from grease gun or paint gun, usually work-related)    Negative: [1] Bleeding AND [2] won't stop after 10 minutes of direct pressure (using correct technique)    Negative: Looks like a broken bone (e.g., crooked or deformed)    Protocols used: FINGER INJURY-A-

## 2022-08-13 ENCOUNTER — LAB REQUISITION (OUTPATIENT)
Dept: LAB | Facility: CLINIC | Age: 52
End: 2022-08-13

## 2022-08-13 DIAGNOSIS — R06.09 OTHER FORMS OF DYSPNEA: ICD-10-CM

## 2022-08-13 LAB
ANION GAP SERPL CALCULATED.3IONS-SCNC: 8 MMOL/L (ref 7–15)
BUN SERPL-MCNC: 12.8 MG/DL (ref 6–20)
CALCIUM SERPL-MCNC: 9.1 MG/DL (ref 8.6–10)
CHLORIDE SERPL-SCNC: 108 MMOL/L (ref 98–107)
CREAT SERPL-MCNC: 1.04 MG/DL (ref 0.51–0.95)
CRP SERPL-MCNC: <3 MG/L
DEPRECATED HCO3 PLAS-SCNC: 27 MMOL/L (ref 22–29)
GFR SERPL CREATININE-BSD FRML MDRD: 65 ML/MIN/1.73M2
GLUCOSE SERPL-MCNC: 154 MG/DL (ref 70–99)
POTASSIUM SERPL-SCNC: 4.9 MMOL/L (ref 3.4–5.3)
SODIUM SERPL-SCNC: 143 MMOL/L (ref 136–145)

## 2022-08-13 PROCEDURE — 80048 BASIC METABOLIC PNL TOTAL CA: CPT | Performed by: FAMILY MEDICINE

## 2022-08-13 PROCEDURE — 86140 C-REACTIVE PROTEIN: CPT | Performed by: FAMILY MEDICINE

## 2022-08-18 ENCOUNTER — TELEPHONE (OUTPATIENT)
Dept: ENDOCRINOLOGY | Facility: CLINIC | Age: 52
End: 2022-08-18

## 2022-08-22 ENCOUNTER — OFFICE VISIT (OUTPATIENT)
Dept: FAMILY MEDICINE | Facility: CLINIC | Age: 52
End: 2022-08-22
Payer: COMMERCIAL

## 2022-08-22 VITALS
DIASTOLIC BLOOD PRESSURE: 75 MMHG | SYSTOLIC BLOOD PRESSURE: 128 MMHG | HEART RATE: 66 BPM | RESPIRATION RATE: 16 BRPM | WEIGHT: 166.2 LBS | BODY MASS INDEX: 24.9 KG/M2

## 2022-08-22 DIAGNOSIS — R06.09 DYSPNEA ON EXERTION: ICD-10-CM

## 2022-08-22 DIAGNOSIS — R07.2 PRECORDIAL PAIN: Primary | ICD-10-CM

## 2022-08-22 DIAGNOSIS — E10.8 TYPE 1 DIABETES MELLITUS WITH COMPLICATIONS (H): ICD-10-CM

## 2022-08-22 PROCEDURE — 99214 OFFICE O/P EST MOD 30 MIN: CPT | Performed by: FAMILY MEDICINE

## 2022-08-22 ASSESSMENT — ANXIETY QUESTIONNAIRES
5. BEING SO RESTLESS THAT IT IS HARD TO SIT STILL: NEARLY EVERY DAY
IF YOU CHECKED OFF ANY PROBLEMS ON THIS QUESTIONNAIRE, HOW DIFFICULT HAVE THESE PROBLEMS MADE IT FOR YOU TO DO YOUR WORK, TAKE CARE OF THINGS AT HOME, OR GET ALONG WITH OTHER PEOPLE: SOMEWHAT DIFFICULT
2. NOT BEING ABLE TO STOP OR CONTROL WORRYING: MORE THAN HALF THE DAYS
1. FEELING NERVOUS, ANXIOUS, OR ON EDGE: SEVERAL DAYS
GAD7 TOTAL SCORE: 12
3. WORRYING TOO MUCH ABOUT DIFFERENT THINGS: MORE THAN HALF THE DAYS
7. FEELING AFRAID AS IF SOMETHING AWFUL MIGHT HAPPEN: NEARLY EVERY DAY
GAD7 TOTAL SCORE: 12
4. TROUBLE RELAXING: NOT AT ALL
6. BECOMING EASILY ANNOYED OR IRRITABLE: SEVERAL DAYS

## 2022-08-22 NOTE — PROGRESS NOTES
Assessment & Plan     Precordial pain  Dyspnea on exertion    Consider cardiac etiology vs. Exercise-induced bronchospasm vs. other  She has noticed decreased exercise tolerance and experienced chest pain and shortness of breath with exertion which is unusual as she is a well conditioned athlete  She has cardiac risk factors including hypertension, hyperlipidemia, and  type 1 diabetes mellitus   She had a recent COVID-19 infection but her symptoms preceded her COVID infection  Reviewed her recent history as well as the emergency department visit  The CT scan with chest was negative for pulmonary embolism  An EKG revealed nonspecific changes    Recommend follow-up with cardiology to consider cardiac stress testing  Depending on results consider pulmonary function testing and pulmonology referral    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    Jose Elias Montgomery MD  [facility] LLOYD Florentino is a 51 year old, presenting for the following health issues:  Follow Up (Follow up from Urgent care )      This is a pleasant 51-year-old female history of type 1 diabetes who presents to the clinic as she has had intermittent chest pain and dyspnea on exertion over the past 1-2 months.  She was seen in the clinic on June 27 and at that time expressed concern about shortness of breath with exertion that she had noted recently.  She is an avid cyclist and has noted decreased exercise tolerance she has been experiencing chest pain and shortness of breath with physical exertion that is alleviated with rest.  Since then she noted that when she traveled to [place] on July 7 to participate in a strenuous bike race she again had significant shortness of breath and chest pain with activity.  She  developed edema in the lower extremities.  Around July 15 she developed symptoms of sore throat and had pressure in her face and sinuses.  2 days later she tested positive for COVID-19.  She had fatigue.  She is vaccinated against COVID-19.  Her symptoms generally improved over the course of the next week.  More recently she experienced worsening symptoms of shortness of breath and had an increase in lower extremity edema.  She presented to the emergency department on August 13.  At that time chest x-ray was negative for pneumonia.  Her D-dimer was elevated and she had a CT scan of the chest that was negative for pulmonary embolism.  She was advised to follow-up in the clinic.    Medical history is notable for type 1 diabetes mellitus which was diagnosed over 30 years ago.  She does have an insulin pump and follows up with endocrinology.  Her most recent hemoglobin A1c was 7.0%.  She notes that at one point in the distant past she had a transesophageal echocardiogram that showed a foramen ovale that was small.  She did follow-up with cardiology at the time.    She reports that she had discontinued chlorthalidone given that she was experiencing orthostatic hypotension.  She has continued to take losartan.     Her primary concern has been the chest pain and shortness of breath noted with exertion.  She would like to consider further cardiovascular testing.    HPI       How are you feeling today? Better  In the past 24 hours have you had shortness of breath when speaking, walking, or climbing stairs? My breathing issues have stayed the same  Do you have a cough? I don't have a cough  When is the last time you had a fever greater than 100? 4-5yrs ago   Are you having any other symptoms? Fatigue and Pain or pressure in chest   Do you have any other stressors you would like to discuss with your provider? No              TAB-7 Results 8/22/2022   TAB-7 Total Score 12   Some recent data might be hidden                    Review of Systems         Objective    /75 (BP Location: Left arm, Patient Position: Sitting, Cuff Size: Adult Regular)   Pulse 66   Resp 16   Wt 75.4 kg (166 lb 3.2 oz)   BMI 24.90 kg/m    Body mass index is 24.9 kg/m .  Physical Exam   GENERAL: healthy, alert and no distress  EYES: Eyes grossly normal to inspection, PERRL and conjunctivae and sclerae normal  RESP: lungs clear to auscultation - no rales, rhonchi or wheezes  CV: regular rate and rhythm, normal S1 S2, no S3 or S4, no murmur, click or rub, no peripheral edema and peripheral pulses strong  MS: Trace to 1+ lower extremity edema  NEURO: Normal strength and tone, mentation intact and speech normal  PSYCH: mentation appears normal, affect normal/bright              Radiology:    FINDINGS:   ANGIOGRAM CHEST: Pulmonary arteries are normal caliber and negative for pulmonary emboli. Thoracic aorta is negative for dissection.     LUNGS AND PLEURA: Linear band of atelectasis in the anterior left lower lobe. In the paradiaphragmatic right lung there are linear and small airspace opacities consistent with mixed atelectasis and inflammation. There is trace right pleural fluid. No groundglass or consolidative airspace opacities. Airways are normal.     MEDIASTINUM: Cardiac chambers are normal in size. No pericardial effusion. Fat replaced thymus in the anterior mediastinum. No enlarged mediastinal or hilar lymph nodes. Esophagus is decompressed.     CORONARY ARTERY CALCIFICATION: None.     UPPER ABDOMEN: There is a low-attenuation lesion with peripheral, nodular and discontinuous enhancement in the dome of the right hepatic lobe measuring 2.8 x 2.8 cm consistent with a benign hemangioma. No actionable findings in the imaged upper abdomen.     MUSCULOSKELETAL: Normal.     IMPRESSION:     1.  No acute pulmonary embolism.   2.  Linear atelectasis in the left upper lobe and mixed atelectasis and limited subpleural inflammation in the right  lower lobe. Small reactive right pleural effusion.  Exam End: 08/14/22  9:09 PM    Specimen Collected: 08/14/22  9:09 PM Last Resulted: 08/14/22  9:18 PM   Received From: [facility]          .  ..

## 2022-08-24 DIAGNOSIS — R06.09 DYSPNEA ON EXERTION: ICD-10-CM

## 2022-08-24 DIAGNOSIS — R07.2 PRECORDIAL PAIN: Primary | ICD-10-CM

## 2022-08-28 DIAGNOSIS — E10.9 WELL CONTROLLED TYPE 1 DIABETES MELLITUS (H): ICD-10-CM

## 2022-09-01 RX ORDER — ATORVASTATIN CALCIUM 10 MG/1
TABLET, FILM COATED ORAL
Qty: 90 TABLET | Refills: 3 | Status: SHIPPED | OUTPATIENT
Start: 2022-09-01 | End: 2022-09-12

## 2022-09-12 ENCOUNTER — HOSPITAL ENCOUNTER (OUTPATIENT)
Dept: CARDIOLOGY | Facility: CLINIC | Age: 52
Discharge: HOME | End: 2022-09-12
Attending: FAMILY MEDICINE | Admitting: FAMILY MEDICINE
Payer: COMMERCIAL

## 2022-09-12 ENCOUNTER — HOSPITAL ENCOUNTER (OUTPATIENT)
Dept: ULTRASOUND IMAGING | Facility: HOSPITAL | Age: 52
Discharge: HOME | End: 2022-09-12
Attending: INTERNAL MEDICINE | Admitting: INTERNAL MEDICINE
Payer: COMMERCIAL

## 2022-09-12 ENCOUNTER — OFFICE VISIT (OUTPATIENT)
Dept: CARDIOLOGY | Facility: CLINIC | Age: 52
End: 2022-09-12
Attending: FAMILY MEDICINE
Payer: COMMERCIAL

## 2022-09-12 VITALS
RESPIRATION RATE: 16 BRPM | BODY MASS INDEX: 24.98 KG/M2 | WEIGHT: 164.8 LBS | DIASTOLIC BLOOD PRESSURE: 62 MMHG | HEART RATE: 60 BPM | HEIGHT: 68 IN | SYSTOLIC BLOOD PRESSURE: 122 MMHG

## 2022-09-12 DIAGNOSIS — R60.9 EDEMA, UNSPECIFIED TYPE: ICD-10-CM

## 2022-09-12 DIAGNOSIS — E10.9 WELL CONTROLLED TYPE 1 DIABETES MELLITUS (H): ICD-10-CM

## 2022-09-12 DIAGNOSIS — I20.89 STABLE ANGINA PECTORIS (H): Primary | ICD-10-CM

## 2022-09-12 DIAGNOSIS — R06.02 SHORTNESS OF BREATH: ICD-10-CM

## 2022-09-12 DIAGNOSIS — Q21.12 PATENT FORAMEN OVALE: ICD-10-CM

## 2022-09-12 DIAGNOSIS — R07.2 PRECORDIAL PAIN: ICD-10-CM

## 2022-09-12 DIAGNOSIS — R06.09 DYSPNEA ON EXERTION: ICD-10-CM

## 2022-09-12 PROCEDURE — 93970 EXTREMITY STUDY: CPT

## 2022-09-12 PROCEDURE — 93016 CV STRESS TEST SUPVJ ONLY: CPT | Performed by: INTERNAL MEDICINE

## 2022-09-12 PROCEDURE — 93018 CV STRESS TEST I&R ONLY: CPT | Performed by: INTERNAL MEDICINE

## 2022-09-12 PROCEDURE — 99204 OFFICE O/P NEW MOD 45 MIN: CPT | Performed by: INTERNAL MEDICINE

## 2022-09-12 PROCEDURE — 93321 DOPPLER ECHO F-UP/LMTD STD: CPT | Mod: 26 | Performed by: INTERNAL MEDICINE

## 2022-09-12 PROCEDURE — 93325 DOPPLER ECHO COLOR FLOW MAPG: CPT | Mod: 26 | Performed by: INTERNAL MEDICINE

## 2022-09-12 PROCEDURE — 93350 STRESS TTE ONLY: CPT | Mod: 26 | Performed by: INTERNAL MEDICINE

## 2022-09-12 PROCEDURE — 93325 DOPPLER ECHO COLOR FLOW MAPG: CPT | Mod: TC

## 2022-09-12 PROCEDURE — 93017 CV STRESS TEST TRACING ONLY: CPT

## 2022-09-12 RX ORDER — ATORVASTATIN CALCIUM 20 MG/1
10 TABLET, FILM COATED ORAL DAILY
Qty: 90 TABLET | Refills: 11 | Status: SHIPPED | OUTPATIENT
Start: 2022-09-12 | End: 2022-10-12

## 2022-09-12 RX ORDER — AMLODIPINE BESYLATE 2.5 MG/1
2.5 TABLET ORAL DAILY
Qty: 90 TABLET | Refills: 11 | Status: SHIPPED | OUTPATIENT
Start: 2022-09-12 | End: 2022-10-12

## 2022-09-12 NOTE — PATIENT INSTRUCTIONS
1. Chest pain exertional with edema - worrisome for CAD given IDDM, but stress echo >10 min, no ECG/echo findings ischemia, discussed possible false neg or microvascular dz/endothelia dysfunction  - will increase atorvastatin 20mg, start aspirin 162mg, start amlodipine 2.5mg daily, CTA to screen for severe dz cors, interval training.  Noted elevated d-dimer, given long plane trip and two lobes lung on CT with possible infection (post covid) vs small PE, check US lower ext to rule out DVT, consider VQ if symptoms return.   2. 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

## 2022-09-12 NOTE — LETTER
"9/12/2022    Jose Elias Montgomery MD  [address]  [facility] [address]    RE: Sera Adkins       Dear Colleague,     I had the pleasure of seeing Sera Adkins in the [facility].    Thank you, Dr. Montgomery, for asking the [facility] team to see Ms. Sera Adkins to evaluate       Assessment/Recommendations   Assessment/Plan:  1. Chest pain exertional with edema - worrisome for CAD given IDDM, but stress echo >10 min, no ECG/echo findings ischemia, discussed possible false neg or microvascular dz/endothelia dysfunction  - will increase atorvastatin 20mg, start aspirin 162mg, start amlodipine 2.5mg daily, CTA to screen for severe dz cors, interval training.  Noted elevated d-dimer, given long plane trip and two lobes lung on CT with possible infection (post covid) vs small PE, check US lower ext to rule out DVT, consider VQ if symptoms return.   2. 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  Had burning chest pain in the past with biking not pleuretic but also has with running.  Noted present prior to COVID.      Follow up 3months     History of Present Illness/Subjective    Ms. Sera Adkins is a 51 year old female with IDDM, with exertional angina, episodes of dyspnea and edema then resolves, she did have COVID in July for the third time, Cr 1.1, stopped chlorthalidone and dizzy spells improved, last LDL 53, BNP normal , CRP normal, d-dimer mildley elevated with CTA PE neg for PE with some atelactisis RML, no PND/orthopnea, but once, no syncopal events, long conversation re CAD, lab tests, differential diagnosis.           Physical Examination Review of Systems   /62 (BP Location: Left arm, Patient Position: Sitting, Cuff Size: Adult Regular)   Pulse 60   Resp 16   Ht 1.727 m (5' 8\")   Wt 74.8 kg (164 lb 12.8 oz)   BMI " 25.06 kg/m    Body mass index is 25.06 kg/m .  Wt Readings from Last 3 Encounters:   22 74.8 kg (164 lb 12.8 oz)   22 75.4 kg (166 lb 3.2 oz)   22 73.6 kg (162 lb 3.2 oz)     [unfilled]  General Appearance:   no distress, normal body habitus   ENT/Mouth: membranes moist, no oral lesions or bleeding gums.      EYES:  no scleral icterus, normal conjunctivae   Neck: no carotid bruits or thyromegaly   Chest/Lungs:   lungs are clear to auscultation, no rales or wheezing,  sternal scar, equal chest wall expansion    Cardiovascular:   Regular. Normal first and second heart sounds with no murmurs, rubs, or gallops; the carotid, radial and posterior tibial pulses are intact, Jugular venous pressure , edema bilaterally    Abdomen:  no organomegaly, masses, bruits, or tenderness; bowel sounds are present   Extremities: no cyanosis or clubbing   Skin: no xanthelasma, warm.    Neurologic: normal  bilateral, no tremors     Psychiatric: alert and oriented x3, calm     Review of Systems - 12 points nega other than above      Medical History  Surgical History Family History Social History   Past Medical History:   Diagnosis Date     Anxiety      Diabetes mellitus (H)     Type 1    Past Surgical History:   Procedure Laterality Date     BREAST CYST ASPIRATION Left ?2005      SECTION      x2     CONIZATION CERVIX,LOOP ELECTRD      Description: Cervical Conization Loop Electrode Excision;  Recorded: 2008;    Family History   Problem Relation Age of Onset     Cancer Father      Breast Cancer Paternal Grandmother     Social History     Socioeconomic History     Marital status:      Spouse name: Not on file     Number of children: Not on file     Years of education: Not on file     Highest education level: Not on file   Occupational History     Not on file   Tobacco Use     Smoking status: Never Smoker     Smokeless tobacco: Never Used   Substance and Sexual Activity     Alcohol use: Yes     Drug  use: No     Sexual activity: Not on file   Other Topics Concern     Not on file   Social History Narrative     Not on file     Social Determinants of Health     Financial Resource Strain: Not on file   Food Insecurity: Not on file   Transportation Needs: Not on file   Physical Activity: Not on file   Stress: Not on file   Social Connections: Not on file   Intimate Partner Violence: Not on file   Housing Stability: Not on file          Medications  Allergies   Scheduled Meds:  Continuous Infusions:  PRN Meds:. Allergies   Allergen Reactions     Percocet [Oxycodone-Acetaminophen] Nausea and Nausea and Vomiting         Lab Results    Chemistry/lipid CBC Cardiac Enzymes/BNP/TSH/INR   Lab Results   Component Value Date    CHOL 99 08/02/2021    HDL 38 (L) 08/02/2021    TRIG 40 08/02/2021    BUN 12.8 08/13/2022     08/13/2022    CO2 27 08/13/2022    Lab Results   Component Value Date    WBC 8.9 01/19/2021    HGB 12.9 01/19/2021    HCT 39.0 01/19/2021    MCV 92 01/19/2021     01/19/2021    Lab Results   Component Value Date    TSH 1.84 08/02/2021              Davy Rod MD  Interventional Cardiology  [facility]    Thank you for allowing me to participate in the care of your patient.      Sincerely,     Davy Rod MD     [facility]  cc:   Jose Elias Montgomery MD  [address] [place] [address]

## 2022-09-12 NOTE — PROGRESS NOTES
"  Thank you, Dr. Montgomery, for asking the [facility] team to see Ms. Sera Adkins to evaluate       Assessment/Recommendations   Assessment/Plan:  1. Chest pain exertional with edema - worrisome for CAD given IDDM, but stress echo >10 min, no ECG/echo findings ischemia, discussed possible false neg or microvascular dz/endothelia dysfunction  - will increase atorvastatin 20mg, start aspirin 162mg, start amlodipine 2.5mg daily, CTA to screen for severe dz cors, interval training.  Noted elevated d-dimer, given long plane trip and two lobes lung on CT with possible infection (post covid) vs small PE, check US lower ext to rule out DVT, consider VQ if symptoms return.   2. 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  Had burning chest pain in the past with biking not pleuretic but also has with running.  Noted present prior to COVID.      Follow up 3months     History of Present Illness/Subjective    Ms. Sera Adkins is a 51 year old female with IDDM, with exertional angina, episodes of dyspnea and edema then resolves, she did have COVID in July for the third time, Cr 1.1, stopped chlorthalidone and dizzy spells improved, last LDL 53, BNP normal , CRP normal, d-dimer mildley elevated with CTA PE neg for PE with some atelactisis RML, no PND/orthopnea, but once, no syncopal events, long conversation re CAD, lab tests, differential diagnosis.           Physical Examination Review of Systems   /62 (BP Location: Left arm, Patient Position: Sitting, Cuff Size: Adult Regular)   Pulse 60   Resp 16   Ht 1.727 m (5' 8\")   Wt 74.8 kg (164 lb 12.8 oz)   BMI 25.06 kg/m    Body mass index is 25.06 kg/m .  Wt Readings from Last 3 Encounters:   09/12/22 74.8 kg (164 lb 12.8 oz)   08/22/22 75.4 kg (166 lb 3.2 oz)   06/27/22 73.6 kg (162 lb 3.2 oz)     [unfilled]  General " Appearance:   no distress, normal body habitus   ENT/Mouth: membranes moist, no oral lesions or bleeding gums.      EYES:  no scleral icterus, normal conjunctivae   Neck: no carotid bruits or thyromegaly   Chest/Lungs:   lungs are clear to auscultation, no rales or wheezing,  sternal scar, equal chest wall expansion    Cardiovascular:   Regular. Normal first and second heart sounds with no murmurs, rubs, or gallops; the carotid, radial and posterior tibial pulses are intact, Jugular venous pressure , edema bilaterally    Abdomen:  no organomegaly, masses, bruits, or tenderness; bowel sounds are present   Extremities: no cyanosis or clubbing   Skin: no xanthelasma, warm.    Neurologic: normal  bilateral, no tremors     Psychiatric: alert and oriented x3, calm     Review of Systems - 12 points nega other than above      Medical History  Surgical History Family History Social History   Past Medical History:   Diagnosis Date     Anxiety      Diabetes mellitus (H)     Type 1    Past Surgical History:   Procedure Laterality Date     BREAST CYST ASPIRATION Left ?2005      SECTION      x2     CONIZATION CERVIX,LOOP ELECTRD      Description: Cervical Conization Loop Electrode Excision;  Recorded: 2008;    Family History   Problem Relation Age of Onset     Cancer Father      Breast Cancer Paternal Grandmother     Social History     Socioeconomic History     Marital status:      Spouse name: Not on file     Number of children: Not on file     Years of education: Not on file     Highest education level: Not on file   Occupational History     Not on file   Tobacco Use     Smoking status: Never Smoker     Smokeless tobacco: Never Used   Substance and Sexual Activity     Alcohol use: Yes     Drug use: No     Sexual activity: Not on file   Other Topics Concern     Not on file   Social History Narrative     Not on file     Social Determinants of Health     Financial Resource Strain: Not on file   Food  Insecurity: Not on file   Transportation Needs: Not on file   Physical Activity: Not on file   Stress: Not on file   Social Connections: Not on file   Intimate Partner Violence: Not on file   Housing Stability: Not on file          Medications  Allergies   Scheduled Meds:  Continuous Infusions:  PRN Meds:. Allergies   Allergen Reactions     Percocet [Oxycodone-Acetaminophen] Nausea and Nausea and Vomiting         Lab Results    Chemistry/lipid CBC Cardiac Enzymes/BNP/TSH/INR   Lab Results   Component Value Date    CHOL 99 08/02/2021    HDL 38 (L) 08/02/2021    TRIG 40 08/02/2021    BUN 12.8 08/13/2022     08/13/2022    CO2 27 08/13/2022    Lab Results   Component Value Date    WBC 8.9 01/19/2021    HGB 12.9 01/19/2021    HCT 39.0 01/19/2021    MCV 92 01/19/2021     01/19/2021    Lab Results   Component Value Date    TSH 1.84 08/02/2021              Davy Rod MD  Interventional Cardiology  [facility]

## 2022-09-26 ENCOUNTER — ANCILLARY PROCEDURE (OUTPATIENT)
Dept: MAMMOGRAPHY | Facility: CLINIC | Age: 52
End: 2022-09-26
Attending: FAMILY MEDICINE
Payer: COMMERCIAL

## 2022-09-26 DIAGNOSIS — Z12.31 VISIT FOR SCREENING MAMMOGRAM: ICD-10-CM

## 2022-09-26 PROCEDURE — 77067 SCR MAMMO BI INCL CAD: CPT

## 2022-10-11 ENCOUNTER — HOSPITAL ENCOUNTER (OUTPATIENT)
Dept: CT IMAGING | Facility: CLINIC | Age: 52
Discharge: HOME | End: 2022-10-11
Attending: INTERNAL MEDICINE | Admitting: INTERNAL MEDICINE
Payer: COMMERCIAL

## 2022-10-11 VITALS
HEIGHT: 68 IN | BODY MASS INDEX: 25.01 KG/M2 | SYSTOLIC BLOOD PRESSURE: 112 MMHG | WEIGHT: 165 LBS | DIASTOLIC BLOOD PRESSURE: 60 MMHG

## 2022-10-11 DIAGNOSIS — I20.89 STABLE ANGINA PECTORIS (H): ICD-10-CM

## 2022-10-11 LAB
CREAT BLD-MCNC: 1 MG/DL (ref 0.6–1.1)
GFR SERPL CREATININE-BSD FRML MDRD: >60 ML/MIN/1.73M2

## 2022-10-11 PROCEDURE — 250N000009 HC RX 250: Performed by: INTERNAL MEDICINE

## 2022-10-11 PROCEDURE — 250N000011 HC RX IP 250 OP 636: Performed by: INTERNAL MEDICINE

## 2022-10-11 PROCEDURE — 75574 CT ANGIO HRT W/3D IMAGE: CPT | Mod: 26 | Performed by: INTERNAL MEDICINE

## 2022-10-11 PROCEDURE — 75574 CT ANGIO HRT W/3D IMAGE: CPT

## 2022-10-11 PROCEDURE — 0504T CT FFR: CPT | Performed by: INTERNAL MEDICINE

## 2022-10-11 PROCEDURE — 82565 ASSAY OF CREATININE: CPT

## 2022-10-11 PROCEDURE — 250N000013 HC RX MED GY IP 250 OP 250 PS 637: Performed by: INTERNAL MEDICINE

## 2022-10-11 PROCEDURE — 0503T CT FFR: CPT

## 2022-10-11 RX ORDER — NITROGLYCERIN 0.4 MG/1
0.4 TABLET SUBLINGUAL ONCE
Status: COMPLETED | OUTPATIENT
Start: 2022-10-11 | End: 2022-10-11

## 2022-10-11 RX ORDER — METOPROLOL TARTRATE 1 MG/ML
5 INJECTION, SOLUTION INTRAVENOUS
Status: DISCONTINUED | OUTPATIENT
Start: 2022-10-11 | End: 2022-10-12 | Stop reason: HOSPADM

## 2022-10-11 RX ORDER — IOPAMIDOL 755 MG/ML
100 INJECTION, SOLUTION INTRAVASCULAR ONCE
Status: COMPLETED | OUTPATIENT
Start: 2022-10-11 | End: 2022-10-11

## 2022-10-11 RX ORDER — DILTIAZEM HYDROCHLORIDE 5 MG/ML
5 INJECTION INTRAVENOUS
Status: DISCONTINUED | OUTPATIENT
Start: 2022-10-11 | End: 2022-10-12 | Stop reason: HOSPADM

## 2022-10-11 RX ORDER — DILTIAZEM HYDROCHLORIDE 5 MG/ML
10 INJECTION INTRAVENOUS
Status: DISCONTINUED | OUTPATIENT
Start: 2022-10-11 | End: 2022-10-12 | Stop reason: HOSPADM

## 2022-10-11 RX ADMIN — NITROGLYCERIN 0.4 MG: 0.4 TABLET, ORALLY DISINTEGRATING SUBLINGUAL at 07:06

## 2022-10-11 RX ADMIN — METOPROLOL TARTRATE 5 MG: 5 INJECTION INTRAVENOUS at 07:09

## 2022-10-11 RX ADMIN — IOPAMIDOL 100 ML: 755 INJECTION, SOLUTION INTRAVENOUS at 07:19

## 2022-10-11 RX ADMIN — METOPROLOL TARTRATE 5 MG: 5 INJECTION INTRAVENOUS at 07:04

## 2022-10-12 ENCOUNTER — MYC MEDICAL ADVICE (OUTPATIENT)
Dept: CARDIOLOGY | Facility: CLINIC | Age: 52
End: 2022-10-12

## 2022-10-12 DIAGNOSIS — R06.09 DYSPNEA ON EXERTION: ICD-10-CM

## 2022-10-12 DIAGNOSIS — R06.02 SHORTNESS OF BREATH: ICD-10-CM

## 2022-10-12 DIAGNOSIS — R07.2 PRECORDIAL PAIN: Primary | ICD-10-CM

## 2022-10-12 DIAGNOSIS — I20.89 STABLE ANGINA PECTORIS (H): ICD-10-CM

## 2022-10-12 LAB — BSA FOR ECHO PROCEDURE: 0 M2

## 2022-10-13 ENCOUNTER — TELEPHONE (OUTPATIENT)
Dept: CARDIOLOGY | Facility: CLINIC | Age: 52
End: 2022-10-13

## 2022-10-13 DIAGNOSIS — Z01.812 PRE-PROCEDURE LAB EXAM: Primary | ICD-10-CM

## 2022-10-13 RX ORDER — NITROGLYCERIN 0.4 MG/1
TABLET SUBLINGUAL
Qty: 25 TABLET | Refills: 1 | Status: SHIPPED | OUTPATIENT
Start: 2022-10-13 | End: 2022-12-12

## 2022-10-13 NOTE — CONFIDENTIAL NOTE
===View-only below this line===  ----- Message -----  From: Subha Porter MD  Sent: 10/13/2022   4:16 PM CDT  To: Khloe Short RN    Yes, please give SL NTG prescription prn chest pain and make sure she is taking aspirin 81mg daily.  I would not go on a mountain biking trip until we get this sorted out.     EG    ----- Message -----  From: Khloe Short RN  Sent: 10/13/2022  10:56 AM CDT  To: Subha Porter MD    FFR is ordered on the case request.    Pt is asking if she needs a SL nitroglycerin rx, and if she can go on a mountain bike trip this weekend.  Are you able to give a recommendation in Dr Rod's absence?    Thanks for your assistance.    Khloe Vazquez RN

## 2022-10-13 NOTE — TELEPHONE ENCOUNTER
SL nitroglycerin sent to pharm.  Pt is on aspiring already.  Recommendations regarding bike trip given to pt via [employer] message.  -rah    ===View-only below this line===  ----- Message -----  From: Sbuha Porter MD  Sent: 10/13/2022   4:16 PM CDT  To: Khloe Short RN    Yes, please give SL NTG prescription prn chest pain and make sure she is taking aspirin 81mg daily.  I would not go on a mountain biking trip until we get this sorted out.     EG    ----- Message -----  From: Khloe Short RN  Sent: 10/13/2022  10:56 AM CDT  To: Subha Porter MD    FFR is ordered on the case request.    Pt is asking if she needs a SL nitroglycerin rx, and if she can go on a mountain bike trip this weekend.  Are you able to give a recommendation in Dr Rod's absence?    Thanks for your assistance.    Khloe Vazquez RN

## 2022-10-13 NOTE — TELEPHONE ENCOUNTER
----- Message from Nicolasa Woodard sent at 10/13/2022  9:34 AM CDT -----  Regarding: RE: CA poss PCI  CORS POSS PCI, FFR WITH EMG ONLY     630AM ADMIT ON 10/27    H&P: 10/21 WITH BEW     ALERTS: DIABETIC    AT HOME COVID TEST TO BE COMPLETED BY PT 1-2 DAYS PRIOR     THANKS!   NICOLASA    ----- Message -----  From: Khloe Short RN  Sent: 10/12/2022   4:34 PM CDT  To: [facility]  Pool - [facility]  Subject: CA poss PCI                                      Case Type: CA poss PCI, FFR    Ordering Provider: Dr Rod    H&P: needs    Alerts: diabetic    Additional Info/Urgency: to be done by Dr Porter (per Dr Rod - Dr. Porter do her angiography with consideration of repeat FFR or IVUS of proximal LAD)    COVID Testing (Antigen, PCR or Not Needed): self test

## 2022-10-14 NOTE — PROGRESS NOTES
Outcome for 10/14/22 10:15 AM :Left Voicemail for patient to call back   Outcome for 10/14/22 10:16 AM :Patient is sharing data via clinic device website and patient has been instructed to update data on website. Find login information by using .Hardy Ying    Outcome for 10/14/22 10:18 AM :Sent patient [employer] message asking them to upload their BG data   Dottie Ying    Outcome for 10/14/22 3:14 PM :Glucose sent via Email   Dottie Ying      HPI:   Sera is a 50 yo woman here for follow up of type 1 diabetes since age 15. She has always been extremely active with high intensity biking as a regular part of her workouts.  Unfortunately, since this summer, she has developed exertional angina.  She has been seen by cardiology and will be having an angiogram soon.  She feels her tightness is now happening every day, even when she is not exerting herself.  She is not sure if this could also be her anxiety. She walked 5 miles this weekend.  She has to stop and catch breath.This has been very difficult for her to accept, as she as always been so healthy and active in her life.     Sera wears the Tandem X2 pump with Control [employer] technology.         Pump settings:   Standard Profile Active at the time of upload  Start Time Basal Rate Correction Factor Carb Ratio Target BG  Midnight 0.800 u/hr 1u:50 mg/dL 1u:15.0 g 110 mg/dL  7:00 AM 0.900 u/hr 1u:50 mg/dL 1u:12.0 g 110 mg/dL  Noon  0.900 u/hr 1u:50 mg/dL 1u:15.0 g 110 mg/dL  Calculated Total Daily Basal 20.9 units        Duration of Insulin:  5:00 hours         Carbohydrates:   On          Max Bolus:  10 units     Exercise Profile Inactive at the time of upload  Start Time Basal Rate Correction Factor Carb Ratio Target BG  Midnight 0.135 u/hr 1u:100 mg/dL 1u:30.0 g 140 mg/dL  Calculated Total Daily Basal 3.24 units        Duration of Insulin:  5:00 hours         Carbohydrates:   On          Max Bolus:  10 units    Recent glucose:           GFR has been  in the 50's for the past couple years.   She is intentional about avoiding dehydration.     Her mood has been down and anxious.  We have been talking about seeing a therapist for a while and she is scheduled soon with Bob Burgess.  Her boyfriend has been supportive.  She has not told her kids, as she does not worry her.   She is on antidepressant.      She has no other concerns today.     Past Medical History:   Diagnosis Date     Anxiety      Diabetes mellitus (H)     Type 1       Past Surgical History:   Procedure Laterality Date     BREAST CYST ASPIRATION Left ?2005      SECTION      x2     CONIZATION CERVIX,LOOP ELECTRD      Description: Cervical Conization Loop Electrode Excision;  Recorded: 2008;       Family History   Problem Relation Age of Onset     Cancer Father      Breast Cancer Paternal Grandmother        Social History   Social Hx:  .  Two teenage children.  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  Enjoys being physically active, mostly biking, but also running and HIIT.     Socioeconomic History     Marital status: other     Spouse name: Not on file     Number of children: Not on file     Years of education: Not on file     Highest education level: Not on file   Occupational History     Not on file   Social Needs     Financial resource strain: Not on file     Food insecurity:     Worry: Not on file     Inability: Not on file     Transportation needs:     Medical: Not on file     Non-medical: Not on file   Tobacco Use     Smoking status: Never Smoker     Smokeless tobacco: Never Used   Substance and Sexual Activity     Alcohol use: No     Drug use: No     Sexual activity: Not on file   Lifestyle     Physical activity:     Days per week: Not on file     Minutes per session: Not on file     Stress: Not on file   Relationships     Social connections:     Talks on phone: Not on file     Gets together: Not on file     Attends [employer] service: Not on file     Active member of club or  "organization: Not on file     Attends meetings of clubs or organizations: Not on file     Relationship status: Not on file     Intimate partner violence:     Fear of current or ex partner: Not on file     Emotionally abused: Not on file     Physically abused: Not on file     Forced sexual activity: Not on file   Other Topics Concern     Not on file   Social History Narrative     Not on file       Current Outpatient Medications   Medication     amLODIPine (NORVASC) 2.5 MG tablet     aspirin (ASA) 81 MG EC tablet     atorvastatin (LIPITOR) 20 MG tablet     blood glucose (NO BRAND SPECIFIED) test strip     Calcium Carbonate-Vitamin D (CALCIUM 500+D PO)     cholecalciferol 50 MCG (2000 UT) tablet     Glucagon, rDNA, (GLUCAGON EMERGENCY) 1 MG KIT     insulin aspart (NOVOLOG VIAL) 100 UNITS/ML vial     insulin aspart (NOVOLOG VIAL) 100 UNITS/ML vial     insulin cartridge (T:SLIM 3ML) misc pump supply     Insulin Glargine-yfgn (SEMGLEE, YFGN,) 100 UNIT/ML SOPN     Insulin Infusion Pump Supplies (TRUSTEEL INFUSION SET) MISC     Insulin Pump Accessories MISC     insulin syringe-needle U-100 (30G X 1/2\" 0.3 ML) 30G X 1/2\" 0.3 ML miscellaneous     levonorgestrel (MIRENA) 20 MCG/DAY IUD     losartan (COZAAR) 50 MG tablet     nitroGLYcerin (NITROSTAT) 0.4 MG sublingual tablet     No current facility-administered medications for this visit.          Allergies   Allergen Reactions     Latex      rash     Percocet [Oxycodone-Acetaminophen] Nausea and Nausea and Vomiting       Physical Exam  /70 (BP Location: Right arm, Patient Position: Sitting, Cuff Size: Adult Regular)   Pulse 73   Wt 74.7 kg (164 lb 9.6 oz)   BMI 25.03 kg/m     GENERAL: healthy, alert, tearful at times, appropriately.  EXTREMITIES: no edema, +pulses, no rashes, no lesions. No tremor.   NEUROLOGY: CN grossly intact, + monofilament, +vibratory sensation.  DTR upper and lower extremity  MSK: grossly intact    RESULTS  Lab Results   Component Value Date    " A1C 7.0 (H) 04/11/2022    A1C 7.2 (H) 08/02/2021    A1C 6.8 (A) 04/09/2021    A1C 7.7 (H) 01/20/2020    A1C 7.5 (H) 09/16/2019    A1C 7.8 (H) 03/18/2019    A1C 7.7 (H) 12/13/2016    HEMOGLOBINA1 7.4 (A) 01/20/2020    HEMOGLOBINA1 7.2 (A) 09/16/2019    HEMOGLOBINA1 7.0 (A) 04/16/2018    HEMOGLOBINA1 7.0 (A) 12/11/2017    HEMOGLOBINA1 7.4 (A) 06/05/2017       TSH   Date Value Ref Range Status   08/02/2021 1.84 0.30 - 5.00 uIU/mL Final   01/20/2020 2.07 0.40 - 4.00 mU/L Final   09/16/2019 1.89 0.40 - 4.00 mU/L Final   03/18/2019 2.40 0.40 - 4.00 mU/L Final   12/11/2017 1.69 0.40 - 4.00 mU/L Final   12/13/2016 3.72 0.40 - 4.00 mU/L Final     T4 Free   Date Value Ref Range Status   03/18/2019 0.81 0.76 - 1.46 ng/dL Final       ALT   Date Value Ref Range Status   01/20/2020 24 0 - 50 U/L Final   09/16/2019 25 0 - 50 U/L Final   ]    Recent Labs   Lab Test 08/02/21  0734 04/09/21  0000 01/20/20  1041   CHOL 99 134 132   HDL 38* 50 59   LDL 53  --  64   TRIG 40 48 44       Lab Results   Component Value Date     08/13/2022     01/20/2020      Lab Results   Component Value Date    POTASSIUM 4.9 08/13/2022    POTASSIUM 3.8 05/06/2022    POTASSIUM 4.0 01/20/2020     Lab Results   Component Value Date    CHLORIDE 108 08/13/2022    CHLORIDE 101 05/06/2022    CHLORIDE 105 01/20/2020     Lab Results   Component Value Date    SCOTTY 9.1 08/13/2022    SCOTTY 9.2 01/20/2020     Lab Results   Component Value Date    CO2 27 08/13/2022    CO2 27 05/06/2022    CO2 32 01/20/2020     Lab Results   Component Value Date    BUN 12.8 08/13/2022    BUN 13 05/06/2022    BUN 18 01/20/2020     Lab Results   Component Value Date    CR 1.0 10/11/2022    CR 1.04 08/13/2022    CR 0.94 01/20/2020       GFR Estimate   Date Value Ref Range Status   08/13/2022 65 >60 mL/min/1.73m2 Final     Comment:     Effective December 21, 2021 eGFRcr in adults is calculated using the 2021 CKD-EPI creatinine equation which includes age and gender (Jackie et al.,  [facility], DOI: 10.1056/BXJIdh9685789)   05/06/2022 60 (L) >60 mL/min/1.73m2 Final     Comment:     Effective December 21, 2021 eGFRcr in adults is calculated using the 2021 CKD-EPI creatinine equation which includes age and gender (Jackie et al., [facility], DOI: 10.1056/KANZct3638147)   04/11/2022 52 (L) >60 mL/min/1.73m2 Final     Comment:     Effective December 21, 2021 eGFRcr in adults is calculated using the 2021 CKD-EPI creatinine equation which includes age and gender (Jackie et al., [facility], DOI: 10.1056/VDNOez6802667)   06/07/2021 49 (L) >60 mL/min/1.73m2 Final   10/12/2020 51 (L) >60 mL/min/1.73m2 Final   01/20/2020 72 >60 mL/min/[1.73_m2] Final     Comment:     Non  GFR Calc  Starting 12/18/2018, serum creatinine based estimated GFR (eGFR) will be   calculated using the Chronic Kidney Disease Epidemiology Collaboration   (CKD-EPI) equation.     09/16/2019 63 >60 mL/min/[1.73_m2] Final     Comment:     Non  GFR Calc  Starting 12/18/2018, serum creatinine based estimated GFR (eGFR) will be   calculated using the Chronic Kidney Disease Epidemiology Collaboration   (CKD-EPI) equation.     08/22/2019 47 (L) >60 mL/min/1.73m2 Final   03/18/2019 77 >60 mL/min/[1.73_m2] Final     Comment:     Non  GFR Calc  Starting 12/18/2018, serum creatinine based estimated GFR (eGFR) will be   calculated using the Chronic Kidney Disease Epidemiology Collaboration   (CKD-EPI) equation.       GFR, ESTIMATED POCT   Date Value Ref Range Status   10/11/2022 >60 >60 mL/min/1.73m2 Final     GFR Estimate If Black   Date Value Ref Range Status   06/07/2021 60 (L) >60 mL/min/1.73m2 Final   10/12/2020 >60 >60 mL/min/1.73m2 Final   01/20/2020 83 >60 mL/min/[1.73_m2] Final     Comment:      GFR Calc  Starting 12/18/2018, serum creatinine based estimated GFR (eGFR) will be   calculated using the Chronic Kidney Disease Epidemiology Collaboration   (CKD-EPI) equation.     09/16/2019 73 >60  mL/min/[1.73_m2] Final     Comment:      GFR Calc  Starting 12/18/2018, serum creatinine based estimated GFR (eGFR) will be   calculated using the Chronic Kidney Disease Epidemiology Collaboration   (CKD-EPI) equation.     08/22/2019 57 (L) >60 mL/min/1.73m2 Final   03/18/2019 89 >60 mL/min/[1.73_m2] Final     Comment:      GFR Calc  Starting 12/18/2018, serum creatinine based estimated GFR (eGFR) will be   calculated using the Chronic Kidney Disease Epidemiology Collaboration   (CKD-EPI) equation.         Lab Results   Component Value Date    MICROL 6 01/20/2020     No results found for: MICROALBUMIN  No results found for: CPEPT, GADAB, ISCAB    No results found for: B12]    Most recent eye exam date: : Not Found     Fall, 2020.  Scheduled in December, 2020.       Assessment/Plan:     1.  Type 1 diabetes- Excellent control, however, she is having too much hypoglycemia.  Will reduce overnight basal rates. Instructions given to patient:      New basal rate: Mid- 0.75 (was 0.8). This will prevent the lows.     Contact cardiology clinic about symptoms at rest.      ER if you develop more shortness of breath or chest pain.    Please let me know if you are having low blood sugars less than 70 or over 250 mg/dL.      If you have concerns, please send me a [employer] message M-Th, call the clinic at [phone], or call [phone] after hours/weekends and ask to speak with the endocrinologist on call.        2.  Risk factors-     Retinopathy:  Yes.  Last exam 12/2021.   Nephropathy:  BP well controlled. No history of microalbuminuria. She had SEKOU in 2019.  GFR stable in the 50s. Follows with nephrology, last evaluation was in 1/31/21. Encouraged hydration.   No evidence of diabetic nephropathy      Neuropathy: decreased vibration sensation in left great toe in the past.  Normal monofilament.   No pain/burning.   Feet: OK, no ulcers.   Lipids:  LDL at target. She is on atorvastatin 20 mg.   Will increase to 40 mg as she seems to now have CVD.     Celiac screening: negative antibodies 2017.   Thyroid screening: TSH 1.84 8/2021. Will recheck TSH with increase in anxiety and physical symptoms.     3.  Anxiety- This has worsened with recent concerns about her heart.  Symptoms are overlapping.  Recommend she meet with Bob Burgess, health psychologist.  She is scheduled for next week.     4.  F/U in 3 months with myself.  Will establish staff endocrinologist, as Dr. Roy is no longer in our clinic.        43 minutes spent on the date of the encounter doing chart review, review of test results, review of continuous glucose sensor, insulin pump data, interpretation of glucose data, patient visit and documentation, counseling/coordination of care, and discussion of follow up plan for worsening hyper and hypoglycemia.  The patient understood and is satisfied with today's visit.     Hannah Whitley PA-C, MPAS   [facility]  Department of Medicine  Division of Endocrinology and Diabetes    Answers for HPI/ROS submitted by the patient on 10/16/2022  General Symptoms: No  Skin Symptoms: No  HENT Symptoms: No  EYE SYMPTOMS: No  HEART SYMPTOMS: Yes  LUNG SYMPTOMS: Yes  INTESTINAL SYMPTOMS: No  URINARY SYMPTOMS: No  GYNECOLOGIC SYMPTOMS: No  BREAST SYMPTOMS: No  SKELETAL SYMPTOMS: No  BLOOD SYMPTOMS: No  NERVOUS SYSTEM SYMPTOMS: No  MENTAL HEALTH SYMPTOMS: No  Chest pain or pressure: Yes  Fast or irregular heartbeat: Yes  Pain in legs with walking: No  Trouble breathing while lying down: No  Fingers or toes appear blue: No  High blood pressure: No  Low blood pressure: No  Fainting: No  Murmurs: No  Pacemaker: No  Varicose veins: No  Edema or swelling: Yes  Wake up at night with shortness of breath: No  Light-headedness: No  Exercise intolerance: Yes  Cough: No  Sputum or phlegm: No  Coughing up blood: No  Difficulty breating or shortness of breath: Yes  Snoring: No  Wheezing: No  Difficulty breathing on  exertion: Yes  Nighttime Cough: No  Difficulty breathing when lying flat: No

## 2022-10-16 ASSESSMENT — ENCOUNTER SYMPTOMS
HYPERTENSION: 0
HEMOPTYSIS: 0
ORTHOPNEA: 0
SHORTNESS OF BREATH: 1
DYSPNEA ON EXERTION: 1
WHEEZING: 0
SLEEP DISTURBANCES DUE TO BREATHING: 0
COUGH: 0
LEG PAIN: 0
COUGH DISTURBING SLEEP: 0
EXERCISE INTOLERANCE: 1
PALPITATIONS: 1
SYNCOPE: 0
LIGHT-HEADEDNESS: 0
POSTURAL DYSPNEA: 0
SPUTUM PRODUCTION: 0
HYPOTENSION: 0
SNORES LOUDLY: 0

## 2022-10-17 ENCOUNTER — PREP FOR PROCEDURE (OUTPATIENT)
Dept: CARDIOLOGY | Facility: CLINIC | Age: 52
End: 2022-10-17

## 2022-10-17 ENCOUNTER — OFFICE VISIT (OUTPATIENT)
Dept: ENDOCRINOLOGY | Facility: CLINIC | Age: 52
End: 2022-10-17
Payer: COMMERCIAL

## 2022-10-17 VITALS
WEIGHT: 164.6 LBS | BODY MASS INDEX: 25.03 KG/M2 | SYSTOLIC BLOOD PRESSURE: 109 MMHG | HEART RATE: 73 BPM | DIASTOLIC BLOOD PRESSURE: 70 MMHG

## 2022-10-17 DIAGNOSIS — I20.89 STABLE ANGINA PECTORIS (H): Primary | ICD-10-CM

## 2022-10-17 DIAGNOSIS — R07.2 PRECORDIAL PAIN: ICD-10-CM

## 2022-10-17 DIAGNOSIS — R06.09 DYSPNEA ON EXERTION: ICD-10-CM

## 2022-10-17 DIAGNOSIS — R06.02 SHORTNESS OF BREATH: ICD-10-CM

## 2022-10-17 DIAGNOSIS — E10.9 WELL CONTROLLED TYPE 1 DIABETES MELLITUS (H): Primary | ICD-10-CM

## 2022-10-17 PROCEDURE — 99215 OFFICE O/P EST HI 40 MIN: CPT | Performed by: PHYSICIAN ASSISTANT

## 2022-10-17 RX ORDER — DEXTROSE MONOHYDRATE 25 G/50ML
25-50 INJECTION, SOLUTION INTRAVENOUS
Status: CANCELLED | OUTPATIENT
Start: 2022-10-24

## 2022-10-17 RX ORDER — SODIUM CHLORIDE 9 MG/ML
INJECTION, SOLUTION INTRAVENOUS CONTINUOUS
Status: CANCELLED | OUTPATIENT
Start: 2022-10-24

## 2022-10-17 RX ORDER — ASPIRIN 81 MG/1
243 TABLET, CHEWABLE ORAL ONCE
Status: CANCELLED | OUTPATIENT
Start: 2022-10-24

## 2022-10-17 RX ORDER — ASPIRIN 325 MG
325 TABLET ORAL ONCE
Status: CANCELLED | OUTPATIENT
Start: 2022-10-24 | End: 2022-10-17

## 2022-10-17 RX ORDER — DIAZEPAM 10 MG
10 TABLET ORAL
Status: CANCELLED | OUTPATIENT
Start: 2022-10-24

## 2022-10-17 RX ORDER — NICOTINE POLACRILEX 4 MG
15-30 LOZENGE BUCCAL
Status: CANCELLED | OUTPATIENT
Start: 2022-10-24

## 2022-10-17 RX ORDER — LIDOCAINE 40 MG/G
CREAM TOPICAL
Status: CANCELLED | OUTPATIENT
Start: 2022-10-17

## 2022-10-17 RX ORDER — FENTANYL CITRATE 50 UG/ML
25 INJECTION, SOLUTION INTRAMUSCULAR; INTRAVENOUS
Status: CANCELLED | OUTPATIENT
Start: 2022-10-24

## 2022-10-17 RX ORDER — ATORVASTATIN CALCIUM 40 MG/1
40 TABLET, FILM COATED ORAL DAILY
Qty: 90 TABLET | Refills: 3 | Status: SHIPPED | OUTPATIENT
Start: 2022-10-17 | End: 2023-10-30

## 2022-10-17 ASSESSMENT — PAIN SCALES - GENERAL: PAINLEVEL: NO PAIN (0)

## 2022-10-17 NOTE — TELEPHONE ENCOUNTER
Molina Woodard 1 hour ago (1:08 PM)     JELLY Ledbetter,     I was able to reschedule this pt with EMG on 10/24 with an arrival time of 1030AM. I spoke to the pt, so she's aware. Let me know if you have any other questions.     Thanks!   Molina Barajas  [facility]  Pool - ruben 2 hours ago (11:51 AM)     RH  Any chance her procedure can be moved up?  I believe she is scheduled 10/27 right now.     Let me know.     Thanks,   Khloe Vazquez

## 2022-10-17 NOTE — PATIENT INSTRUCTIONS
New basal rate: Mid- 0.75 (was 0.8). This will prevent the lows.     Contact cardiology clinic about symptoms at rest.      ER if you develop more shortness of breath or chest pain.    Please let me know if you are having low blood sugars less than 70 or over 250 mg/dL.      If you have concerns, please send me a [employer] message M-Th, call the clinic at [phone], or call [phone] after hours/weekends and ask to speak with the endocrinologist on call.

## 2022-10-17 NOTE — LETTER
10/17/2022       RE: Sera Adkins  [address]  [facility] [address]     Dear Colleague,    Thank you for referring your patient, Sera Adkins, to the [facility] [place] [facility]. Please see a copy of my visit note below.    Outcome for 10/14/22 10:15 AM :Left Voicemail for patient to call back   Outcome for 10/14/22 10:16 AM :Patient is sharing data via clinic device website and patient has been instructed to update data on website. Find login information by using .Quickshift    Dottie Ying    Outcome for 10/14/22 10:18 AM :Sent patient [employer] message asking them to upload their BG data   Dottie Ying    Outcome for 10/14/22 3:14 PM :Glucose sent via Email   Dottie Ying      HPI:   Sera is a 50 yo woman here for follow up of type 1 diabetes since age 15. She has always been extremely active with high intensity biking as a regular part of her workouts.  Unfortunately, since this summer, she has developed exertional angina.  She has been seen by cardiology and will be having an angiogram soon.  She feels her tightness is now happening every day, even when she is not exerting herself.  She is not sure if this could also be her anxiety. She walked 5 miles this weekend.  She has to stop and catch breath.This has been very difficult for her to accept, as she as always been so healthy and active in her life.     Sera wears the Tandem X2 pump with [employer] technology.         Pump settings:   Standard Profile Active at the time of upload  Start Time Basal Rate Correction Factor Carb Ratio Target BG  Midnight 0.800 u/hr 1u:50 mg/dL 1u:15.0 g 110 mg/dL  7:00 AM 0.900 u/hr 1u:50 mg/dL 1u:12.0 g 110 mg/dL  Noon  0.900 u/hr 1u:50 mg/dL 1u:15.0 g 110 mg/dL  Calculated Total Daily Basal 20.9 units        Duration of Insulin:  5:00 hours         Carbohydrates:   On          Max Bolus:  10 units     Exercise  Profile Inactive at the time of upload  Start Time Basal Rate Correction Factor Carb Ratio Target BG  Midnight 0.135 u/hr 1u:100 mg/dL 1u:30.0 g 140 mg/dL  Calculated Total Daily Basal 3.24 units        Duration of Insulin:  5:00 hours         Carbohydrates:   On          Max Bolus:  10 units    Recent glucose:           GFR has been in the 50's for the past couple years.   She is intentional about avoiding dehydration.     Her mood has been down and anxious.  We have been talking about seeing a therapist for a while and she is scheduled soon with Bob Burgess.  Her boyfriend has been supportive.  She has not told her kids, as she does not worry her.   She is on antidepressant.      She has no other concerns today.     Past Medical History:   Diagnosis Date     Anxiety      Diabetes mellitus (H)     Type 1       Past Surgical History:   Procedure Laterality Date     BREAST CYST ASPIRATION Left ?      SECTION      x2     CONIZATION CERVIX,LOOP ELECTRD      Description: Cervical Conization Loop Electrode Excision;  Recorded: 2008;       Family History   Problem Relation Age of Onset     Cancer Father      Breast Cancer Paternal Grandmother        Social History   Social Hx:  .  Two teenage children.  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  Enjoys being physically active, mostly biking, but also running and HIIT.     Socioeconomic History     Marital status: other     Spouse name: Not on file     Number of children: Not on file     Years of education: Not on file     Highest education level: Not on file   Occupational History     Not on file   Social Needs     Financial resource strain: Not on file     Food insecurity:     Worry: Not on file     Inability: Not on file     Transportation needs:     Medical: Not on file     Non-medical: Not on file   Tobacco Use     Smoking status: Never Smoker     Smokeless tobacco: Never Used   Substance and Sexual Activity     Alcohol use: No     Drug use: No      "Sexual activity: Not on file   Lifestyle     Physical activity:     Days per week: Not on file     Minutes per session: Not on file     Stress: Not on file   Relationships     Social connections:     Talks on phone: Not on file     Gets together: Not on file     Attends Anglican service: Not on file     Active member of club or organization: Not on file     Attends meetings of clubs or organizations: Not on file     Relationship status: Not on file     Intimate partner violence:     Fear of current or ex partner: Not on file     Emotionally abused: Not on file     Physically abused: Not on file     Forced sexual activity: Not on file   Other Topics Concern     Not on file   Social History Narrative     Not on file       Current Outpatient Medications   Medication     amLODIPine (NORVASC) 2.5 MG tablet     aspirin (ASA) 81 MG EC tablet     atorvastatin (LIPITOR) 20 MG tablet     blood glucose (NO BRAND SPECIFIED) test strip     Calcium Carbonate-Vitamin D (CALCIUM 500+D PO)     cholecalciferol 50 MCG (2000 UT) tablet     Glucagon, rDNA, (GLUCAGON EMERGENCY) 1 MG KIT     insulin aspart (NOVOLOG VIAL) 100 UNITS/ML vial     insulin aspart (NOVOLOG VIAL) 100 UNITS/ML vial     insulin cartridge (T:SLIM 3ML) misc pump supply     Insulin Glargine-yfgn (SEMGLEE, YFGN,) 100 UNIT/ML SOPN     Insulin Infusion Pump Supplies (TRUSTEEL INFUSION SET) MISC     Insulin Pump Accessories MISC     insulin syringe-needle U-100 (30G X 1/2\" 0.3 ML) 30G X 1/2\" 0.3 ML miscellaneous     levonorgestrel (MIRENA) 20 MCG/DAY IUD     losartan (COZAAR) 50 MG tablet     nitroGLYcerin (NITROSTAT) 0.4 MG sublingual tablet     No current facility-administered medications for this visit.          Allergies   Allergen Reactions     Latex      rash     Percocet [Oxycodone-Acetaminophen] Nausea and Nausea and Vomiting       Physical Exam  /70 (BP Location: Right arm, Patient Position: Sitting, Cuff Size: Adult Regular)   Pulse 73   Wt 74.7 kg (164 " lb 9.6 oz)   BMI 25.03 kg/m     GENERAL: healthy, alert, tearful at times, appropriately.  EXTREMITIES: no edema, +pulses, no rashes, no lesions. No tremor.   NEUROLOGY: CN grossly intact, + monofilament, +vibratory sensation.  DTR upper and lower extremity  MSK: grossly intact    RESULTS  Lab Results   Component Value Date    A1C 7.0 (H) 04/11/2022    A1C 7.2 (H) 08/02/2021    A1C 6.8 (A) 04/09/2021    A1C 7.7 (H) 01/20/2020    A1C 7.5 (H) 09/16/2019    A1C 7.8 (H) 03/18/2019    A1C 7.7 (H) 12/13/2016    HEMOGLOBINA1 7.4 (A) 01/20/2020    HEMOGLOBINA1 7.2 (A) 09/16/2019    HEMOGLOBINA1 7.0 (A) 04/16/2018    HEMOGLOBINA1 7.0 (A) 12/11/2017    HEMOGLOBINA1 7.4 (A) 06/05/2017       TSH   Date Value Ref Range Status   08/02/2021 1.84 0.30 - 5.00 uIU/mL Final   01/20/2020 2.07 0.40 - 4.00 mU/L Final   09/16/2019 1.89 0.40 - 4.00 mU/L Final   03/18/2019 2.40 0.40 - 4.00 mU/L Final   12/11/2017 1.69 0.40 - 4.00 mU/L Final   12/13/2016 3.72 0.40 - 4.00 mU/L Final     T4 Free   Date Value Ref Range Status   03/18/2019 0.81 0.76 - 1.46 ng/dL Final       ALT   Date Value Ref Range Status   01/20/2020 24 0 - 50 U/L Final   09/16/2019 25 0 - 50 U/L Final   ]    Recent Labs   Lab Test 08/02/21  0734 04/09/21  0000 01/20/20  1041   CHOL 99 134 132   HDL 38* 50 59   LDL 53  --  64   TRIG 40 48 44       Lab Results   Component Value Date     08/13/2022     01/20/2020      Lab Results   Component Value Date    POTASSIUM 4.9 08/13/2022    POTASSIUM 3.8 05/06/2022    POTASSIUM 4.0 01/20/2020     Lab Results   Component Value Date    CHLORIDE 108 08/13/2022    CHLORIDE 101 05/06/2022    CHLORIDE 105 01/20/2020     Lab Results   Component Value Date    SCOTTY 9.1 08/13/2022    SCOTTY 9.2 01/20/2020     Lab Results   Component Value Date    CO2 27 08/13/2022    CO2 27 05/06/2022    CO2 32 01/20/2020     Lab Results   Component Value Date    BUN 12.8 08/13/2022    BUN 13 05/06/2022    BUN 18 01/20/2020     Lab Results   Component  Value Date    CR 1.0 10/11/2022    CR 1.04 08/13/2022    CR 0.94 01/20/2020       GFR Estimate   Date Value Ref Range Status   08/13/2022 65 >60 mL/min/1.73m2 Final     Comment:     Effective December 21, 2021 eGFRcr in adults is calculated using the 2021 CKD-EPI creatinine equation which includes age and gender (Jackie et al., [facility], DOI: 10.1056/VUPDsn3303740)   05/06/2022 60 (L) >60 mL/min/1.73m2 Final     Comment:     Effective December 21, 2021 eGFRcr in adults is calculated using the 2021 CKD-EPI creatinine equation which includes age and gender (Jackie et al., [facility], DOI: 10.1056/LNHCxx0881596)   04/11/2022 52 (L) >60 mL/min/1.73m2 Final     Comment:     Effective December 21, 2021 eGFRcr in adults is calculated using the 2021 CKD-EPI creatinine equation which includes age and gender (Jackie et al., [facility], DOI: 10.1056/GKXQji3221142)   06/07/2021 49 (L) >60 mL/min/1.73m2 Final   10/12/2020 51 (L) >60 mL/min/1.73m2 Final   01/20/2020 72 >60 mL/min/[1.73_m2] Final     Comment:     Non  GFR Calc  Starting 12/18/2018, serum creatinine based estimated GFR (eGFR) will be   calculated using the Chronic Kidney Disease Epidemiology Collaboration   (CKD-EPI) equation.     09/16/2019 63 >60 mL/min/[1.73_m2] Final     Comment:     Non  GFR Calc  Starting 12/18/2018, serum creatinine based estimated GFR (eGFR) will be   calculated using the Chronic Kidney Disease Epidemiology Collaboration   (CKD-EPI) equation.     08/22/2019 47 (L) >60 mL/min/1.73m2 Final   03/18/2019 77 >60 mL/min/[1.73_m2] Final     Comment:     Non  GFR Calc  Starting 12/18/2018, serum creatinine based estimated GFR (eGFR) will be   calculated using the Chronic Kidney Disease Epidemiology Collaboration   (CKD-EPI) equation.       GFR, ESTIMATED POCT   Date Value Ref Range Status   10/11/2022 >60 >60 mL/min/1.73m2 Final     GFR Estimate If Black   Date Value Ref Range Status   06/07/2021 60 (L) >60  mL/min/1.73m2 Final   10/12/2020 >60 >60 mL/min/1.73m2 Final   01/20/2020 83 >60 mL/min/[1.73_m2] Final     Comment:      GFR Calc  Starting 12/18/2018, serum creatinine based estimated GFR (eGFR) will be   calculated using the Chronic Kidney Disease Epidemiology Collaboration   (CKD-EPI) equation.     09/16/2019 73 >60 mL/min/[1.73_m2] Final     Comment:      GFR Calc  Starting 12/18/2018, serum creatinine based estimated GFR (eGFR) will be   calculated using the Chronic Kidney Disease Epidemiology Collaboration   (CKD-EPI) equation.     08/22/2019 57 (L) >60 mL/min/1.73m2 Final   03/18/2019 89 >60 mL/min/[1.73_m2] Final     Comment:      GFR Calc  Starting 12/18/2018, serum creatinine based estimated GFR (eGFR) will be   calculated using the Chronic Kidney Disease Epidemiology Collaboration   (CKD-EPI) equation.         Lab Results   Component Value Date    MICROL 6 01/20/2020     No results found for: MICROALBUMIN  No results found for: CPEPT, GADAB, ISCAB    No results found for: B12]    Most recent eye exam date: : Not Found     Fall, 2020.  Scheduled in December, 2020.       Assessment/Plan:     1.  Type 1 diabetes- Excellent control, however, she is having too much hypoglycemia.  Will reduce overnight basal rates. Instructions given to patient:      New basal rate: Mid- 0.75 (was 0.8). This will prevent the lows.     Contact cardiology clinic about symptoms at rest.      ER if you develop more shortness of breath or chest pain.    Please let me know if you are having low blood sugars less than 70 or over 250 mg/dL.      If you have concerns, please send me a [employer] message M-Th, call the clinic at [phone], or call [phone] after hours/weekends and ask to speak with the endocrinologist on call.        2.  Risk factors-     Retinopathy:  Yes.  Last exam 12/2021.   Nephropathy:  BP well controlled. No history of microalbuminuria. She had SEKOU in 2019.  GFR  stable in the 50s. Follows with nephrology, last evaluation was in 1/31/21. Encouraged hydration.   No evidence of diabetic nephropathy      Neuropathy: decreased vibration sensation in left great toe in the past.  Normal monofilament.   No pain/burning.   Feet: OK, no ulcers.   Lipids:  LDL at target. She is on atorvastatin 20 mg.  Will increase to 40 mg as she seems to now have CVD.     Celiac screening: negative antibodies 2017.   Thyroid screening: TSH 1.84 8/2021. Will recheck TSH with increase in anxiety and physical symptoms.     3.  Anxiety- This has worsened with recent concerns about her heart.  Symptoms are overlapping.  Recommend she meet with Bob Burgess, health psychologist.  She is scheduled for next week.     4.  F/U in 3 months with myself.  Will establish staff endocrinologist, as Dr. Roy is no longer in our clinic.        43 minutes spent on the date of the encounter doing chart review, review of test results, review of continuous glucose sensor, insulin pump data, interpretation of glucose data, patient visit and documentation, counseling/coordination of care, and discussion of follow up plan for worsening hyper and hypoglycemia.  The patient understood and is satisfied with today's visit.     Hannah Whitley PA-C, MPAS   [facility]  Department of Medicine  Division of Endocrinology and Diabetes    Answers for HPI/ROS submitted by the patient on 10/16/2022  General Symptoms: No  Skin Symptoms: No  HENT Symptoms: No  EYE SYMPTOMS: No  HEART SYMPTOMS: Yes  LUNG SYMPTOMS: Yes  INTESTINAL SYMPTOMS: No  URINARY SYMPTOMS: No  GYNECOLOGIC SYMPTOMS: No  BREAST SYMPTOMS: No  SKELETAL SYMPTOMS: No  BLOOD SYMPTOMS: No  NERVOUS SYSTEM SYMPTOMS: No  MENTAL HEALTH SYMPTOMS: No  Chest pain or pressure: Yes  Fast or irregular heartbeat: Yes  Pain in legs with walking: No  Trouble breathing while lying down: No  Fingers or toes appear blue: No  High blood pressure: No  Low blood pressure:  No  Fainting: No  Murmurs: No  Pacemaker: No  Varicose veins: No  Edema or swelling: Yes  Wake up at night with shortness of breath: No  Light-headedness: No  Exercise intolerance: Yes  Cough: No  Sputum or phlegm: No  Coughing up blood: No  Difficulty breating or shortness of breath: Yes  Snoring: No  Wheezing: No  Difficulty breathing on exertion: Yes  Nighttime Cough: No  Difficulty breathing when lying flat: No

## 2022-10-17 NOTE — TELEPHONE ENCOUNTER
Sera Adkins  [address]   [facility] [address]  [phone] (home) [phone] (work)    PCP:  Jose Elias Montgomery  H&P completed by:  Dr Anderson 10/21/22  Admit date 10/24/22  Arrival time:  10:30 AM  Anticoagulation:  NA  Previous PCI: No  Bypass Grafts: No  Renal Issues: No  Diabetic?: Yes  Device?: Yes  Type:  Insulin pump  Ambulation status: independent    Reason for Visit:  Telephone call to discuss pre-procedure education in preparation for: Coronary Angiogram with Possible PCI    Procedure Prep:  EKG results obtained, dated: To be completed on day of cath lab procedure  Hemogram results obtained: To be completed on day of cath lab procedure  Basic Metabolic Panel results obtained: To be completed on day of cath lab procedure  Pertinent cardiac test results: 10/11/22 coronary CTA  COVID-19 test results obtained: self test    Patient Education    1. Your arrival time is 10:30 AM.  [place] [address] - Main Entrance of the Hospital  2. Please plan on being at the hospital all day.  3. At any time, emergencies and/or urgent cases may come up which could delay the start of your procedure.    COVID Testing Instructions  *Mandatory COVID Testing:   ALL Patients will need to complete a COVID test no sooner than 4 days prior to their procedure (regardless of vaccination status).      To schedule COVID testing Please call [phone]    If you want to complete this at an outside facility please call them to find out if they will have the results within the appropriate time frame and their fax number.  You will need to provide us with that information so we can send the order.    The facility completing the test will need to fax the results to [phone]    If you are running into and issues please call us.     Pre-procedure instructions  Take your temperature in the morning prior to coming in.  If your temperature is 100 F please  call [facility] [phone] and notify them.  If you do not have access to a thermometer at home, please come in for testing.  If you are running a temperature your procedure may be rescheduled.  Patient instructed to not Eat or Drink after midnight.  Patient instructed to shower the evening before or the morning of the procedure.  Patient instructed to arrange for transportation home following procedure from a responsible family member or friend. No driving for at least 24 hours.  Patient instructed to have a responsible adult with them for 24 hours post-procedure.  Post-procedure follow up process.  Conscious sedation discussed.      Pre-procedure medication instructions.  Continue medications as scheduled, with a small amount of water on the day of the procedure unless indicated. (NO Diabetic Medications or Blood Thinners)  Pt instructed not to consume Alcohol, Tobacco, Caffeine, or Carbonated beverages 12 hours prior to procedure.  Patient instructed to take 324 mg of Aspirin the morning of procedure: Yes  Other medication: instructed to only take amlodipine and losartan a.m. of the procedure.              Diabetic Medication Instructions  ? DO NOT take any oral diabetic medication, short-acting diabetes medications/insulin, humalog or regular insulin the morning of your test  ? Take   dose of long-acting insulin (Lantus, Levemir) the day of your test  ? Hold Oral Diabetic on the day of the procedure and for 48 hours after IV contrast given  ? Remember to  bring your glucometer and insulin with you to take after your test if needed              Patient states understanding of procedure and agrees to proceed.    *PATIENTS RECORDS AVAILABLE IN [facility] UNLESS OTHERWISE INDICATED*      Patient Active Problem List   Diagnosis     Type 1 diabetes mellitus with complications (H)     Nonproliferative diabetic retinopathy (H)     Low kidney function     Cervical dysplasia     Generalized anxiety disorder     Hearing loss  "    Mood disorder (H)     Stage 3a chronic kidney disease (H)       Current Outpatient Medications   Medication Sig Dispense Refill     amLODIPine (NORVASC) 2.5 MG tablet Take 2 tablets (5 mg) by mouth daily 90 tablet 1     aspirin (ASA) 81 MG EC tablet Take 2 tablets (162 mg) by mouth daily 180 tablet      atorvastatin (LIPITOR) 40 MG tablet Take 1 tablet (40 mg) by mouth daily 90 tablet 3     blood glucose (NO BRAND SPECIFIED) test strip Test 6 to 8 times daily.  1 Box 12     Calcium Carbonate-Vitamin D (CALCIUM 500+D PO) Take 1 tablet by mouth as needed.       cholecalciferol 50 MCG (2000 UT) tablet Take 1 tablet by mouth daily       Glucagon, rDNA, (GLUCAGON EMERGENCY) 1 MG KIT Inject IM for hypoglycemic event needs 2 pens one for home and one for work 1 kit 3     insulin aspart (NOVOLOG VIAL) 100 UNITS/ML vial USES UP TO 68 UNITS PER DAY AS DIRECTED IN INSULIN PUMP FOR BASAL, BOLUS AND PRIMING OF TUBING 70 mL 1     insulin aspart (NOVOLOG VIAL) 100 UNITS/ML vial USES UP TO 68 UNITS PER DAY AS DIRECTED IN INSULIN PUMP FOR BASAL, BOLUS AND PRIMING OF TUBING. 60 mL 0     insulin cartridge (T:SLIM 3ML) misc pump supply Insulin cartridge to be used with pump as directed.  Change every 2 days or as directed. 45 each 3     Insulin Glargine-yfgn (SEMGLEE, YFGN,) 100 UNIT/ML SOPN Inject 18 Units Subcutaneous daily Use in case of pump failure  10 ml vials 10 mL 3     Insulin Infusion Pump Supplies (TRUSTEEL INFUSION SET) MISC 1 each every other day 50 each 3     Insulin Pump Accessories MISC Tandem Insulin pump infusion sets per patient preference.  Change every 2 days. 45 each 3     insulin syringe-needle U-100 (30G X 1/2\" 0.3 ML) 30G X 1/2\" 0.3 ML miscellaneous Use 4 syringes daily or as directed. 90 each 1     levonorgestrel (MIRENA) 20 MCG/DAY IUD 1 each by Intrauterine route once       losartan (COZAAR) 50 MG tablet Take 1 tablet (50 mg) by mouth daily (Patient taking differently: Take 25 mg by mouth daily) 90 " tablet 1     nitroGLYcerin (NITROSTAT) 0.4 MG sublingual tablet For chest pain place 1 tablet under the tongue every 5 minutes for 3 doses. If symptoms persist 5 minutes after 1st dose call 032. 39 tablet 1       Allergies   Allergen Reactions     Latex      rash     Percocet [Oxycodone-Acetaminophen] Nausea and Nausea and Vomiting       Khloe Short RN on 10/17/2022 at 2:19 PM

## 2022-10-18 ENCOUNTER — NURSE TRIAGE (OUTPATIENT)
Dept: CARDIOLOGY | Facility: CLINIC | Age: 52
End: 2022-10-18

## 2022-10-18 NOTE — TELEPHONE ENCOUNTER
"Patient called with concern of central chest pressure that has been occurring for weeks, now becoming more intense. She was at work at the time of the call. She stated she had chest pressure rating an 8 that started epigastric going into her chest and concurrent nausea. She took NTG and went away after 5 minutes. Now has mild burning. The pain started with activity and now is occurring while at rest. Patient states she can get an EKG to Dr. Rod if requested. She has an appointment with Alverto Anderson 10/21 and has an angio scheduled 10/24. Recommended patient present to ED if pain presents again. Will route to the nurse with Dr. Rod to follow-up with patient.     1. LOCATION: \"Where does it hurt?\" Central epigastric region.  2. RADIATION: \"Does the pain go anywhere else?\" (e.g., into neck, jaw, arms, back) Neck.  3. ONSET: \"When did the chest pain begin?\" (Minutes, hours or days) For weeks, getting more intense.  4. PATTERN \"Does the pain come and go, or has it been constant since it started?\" \"Does it get worse with exertion?\" Intermittent.  5. DURATION: \"How long does it last\" (e.g., seconds, minutes, hours) Intermittent started with activity, now is getting with rest.   6. SEVERITY: \"How bad is the pain?\" (e.g., Scale 1-10; mild, moderate, or severe) 8 when she has the pain.  - MILD (1-3): doesn't interfere with normal activities   - MODERATE (4-7): interferes with normal activities or awakens from sleep  - SEVERE (8-10): excruciating pain, unable to do any normal activities   7. CARDIAC RISK FACTORS: \"Do you have any history of heart problems or risk factors for heart disease?\" (e.g., angina, prior heart attack; diabetes, high blood pressure, high cholesterol, smoker, or strong family history of heart disease) exertional chest pain, TAB.  8. PULMONARY RISK FACTORS: \"Do you have any history of lung disease?\" (e.g., blood clots in lung, asthma, emphysema, birth control pills) None.  9. CAUSE: \"What do you " "think is causing the chest pain?\" Cardiac-related.  10. OTHER SYMPTOMS: \"Do you have any other symptoms?\" (e.g., dizziness, nausea, vomiting, sweating, fever, difficulty breathing, cough) Nausea.       "

## 2022-10-18 NOTE — TELEPHONE ENCOUNTER
Discussed with pt via [employer] message.  Recommended pt go to ED with worsening symptoms.  -alice

## 2022-10-20 LAB
ABO/RH(D): NORMAL
ANTIBODY SCREEN: NEGATIVE
SPECIMEN EXPIRATION DATE: NORMAL

## 2022-10-21 ENCOUNTER — APPOINTMENT (OUTPATIENT)
Dept: CARDIOLOGY | Facility: CLINIC | Age: 52
End: 2022-10-21
Payer: COMMERCIAL

## 2022-10-21 ENCOUNTER — LAB (OUTPATIENT)
Dept: LAB | Facility: CLINIC | Age: 52
End: 2022-10-21
Payer: COMMERCIAL

## 2022-10-21 ENCOUNTER — OFFICE VISIT (OUTPATIENT)
Dept: CARDIOLOGY | Facility: CLINIC | Age: 52
End: 2022-10-21
Attending: INTERNAL MEDICINE
Payer: COMMERCIAL

## 2022-10-21 VITALS
WEIGHT: 163 LBS | BODY MASS INDEX: 24.71 KG/M2 | RESPIRATION RATE: 16 BRPM | SYSTOLIC BLOOD PRESSURE: 94 MMHG | HEIGHT: 68 IN | HEART RATE: 84 BPM | DIASTOLIC BLOOD PRESSURE: 60 MMHG

## 2022-10-21 DIAGNOSIS — Z01.812 PRE-PROCEDURE LAB EXAM: ICD-10-CM

## 2022-10-21 DIAGNOSIS — R07.2 PRECORDIAL PAIN: ICD-10-CM

## 2022-10-21 DIAGNOSIS — R93.1 ABNORMAL CARDIAC CT ANGIOGRAPHY: Primary | ICD-10-CM

## 2022-10-21 DIAGNOSIS — R06.02 SHORTNESS OF BREATH: ICD-10-CM

## 2022-10-21 DIAGNOSIS — E10.9 WELL CONTROLLED TYPE 1 DIABETES MELLITUS (H): ICD-10-CM

## 2022-10-21 DIAGNOSIS — R06.09 DYSPNEA ON EXERTION: ICD-10-CM

## 2022-10-21 DIAGNOSIS — I20.89 STABLE ANGINA PECTORIS (H): ICD-10-CM

## 2022-10-21 LAB
ANION GAP SERPL CALCULATED.3IONS-SCNC: 12 MMOL/L (ref 7–15)
BUN SERPL-MCNC: 12 MG/DL (ref 6–20)
CALCIUM SERPL-MCNC: 9.2 MG/DL (ref 8.6–10)
CHLORIDE SERPL-SCNC: 103 MMOL/L (ref 98–107)
CHOLEST SERPL-MCNC: 99 MG/DL
CREAT SERPL-MCNC: 0.93 MG/DL (ref 0.51–0.95)
CREAT UR-MCNC: 89.6 MG/DL
DEPRECATED HCO3 PLAS-SCNC: 25 MMOL/L (ref 22–29)
ERYTHROCYTE [DISTWIDTH] IN BLOOD BY AUTOMATED COUNT: 12.5 % (ref 10–15)
GFR SERPL CREATININE-BSD FRML MDRD: 75 ML/MIN/1.73M2
GLUCOSE SERPL-MCNC: 121 MG/DL (ref 70–99)
HCT VFR BLD AUTO: 40.5 % (ref 35–47)
HDLC SERPL-MCNC: 42 MG/DL
HGB BLD-MCNC: 13.4 G/DL (ref 11.7–15.7)
LDLC SERPL CALC-MCNC: 49 MG/DL
MCH RBC QN AUTO: 29.8 PG (ref 26.5–33)
MCHC RBC AUTO-ENTMCNC: 33.1 G/DL (ref 31.5–36.5)
MCV RBC AUTO: 90 FL (ref 78–100)
MICROALBUMIN UR-MCNC: <12 MG/L
MICROALBUMIN/CREAT UR: NORMAL MG/G{CREAT}
NONHDLC SERPL-MCNC: 57 MG/DL
PLATELET # BLD AUTO: 195 10E3/UL (ref 150–450)
POTASSIUM SERPL-SCNC: 4.2 MMOL/L (ref 3.4–5.3)
RBC # BLD AUTO: 4.49 10E6/UL (ref 3.8–5.2)
SODIUM SERPL-SCNC: 140 MMOL/L (ref 136–145)
TRIGL SERPL-MCNC: 41 MG/DL
TSH SERPL DL<=0.005 MIU/L-ACNC: 3.52 UIU/ML (ref 0.3–4.2)
WBC # BLD AUTO: 5.8 10E3/UL (ref 4–11)

## 2022-10-21 PROCEDURE — 86900 BLOOD TYPING SEROLOGIC ABO: CPT | Performed by: GENERAL ACUTE CARE HOSPITAL

## 2022-10-21 PROCEDURE — 80061 LIPID PANEL: CPT | Performed by: GENERAL ACUTE CARE HOSPITAL

## 2022-10-21 PROCEDURE — 99215 OFFICE O/P EST HI 40 MIN: CPT | Performed by: GENERAL ACUTE CARE HOSPITAL

## 2022-10-21 PROCEDURE — 80048 BASIC METABOLIC PNL TOTAL CA: CPT | Performed by: GENERAL ACUTE CARE HOSPITAL

## 2022-10-21 PROCEDURE — 85027 COMPLETE CBC AUTOMATED: CPT | Performed by: GENERAL ACUTE CARE HOSPITAL

## 2022-10-21 PROCEDURE — 86850 RBC ANTIBODY SCREEN: CPT | Performed by: GENERAL ACUTE CARE HOSPITAL

## 2022-10-21 PROCEDURE — 86901 BLOOD TYPING SEROLOGIC RH(D): CPT | Performed by: GENERAL ACUTE CARE HOSPITAL

## 2022-10-21 PROCEDURE — 82043 UR ALBUMIN QUANTITATIVE: CPT

## 2022-10-21 PROCEDURE — 36415 COLL VENOUS BLD VENIPUNCTURE: CPT | Performed by: GENERAL ACUTE CARE HOSPITAL

## 2022-10-21 PROCEDURE — 84443 ASSAY THYROID STIM HORMONE: CPT | Performed by: GENERAL ACUTE CARE HOSPITAL

## 2022-10-21 RX ORDER — METOPROLOL SUCCINATE 25 MG/1
25 TABLET, EXTENDED RELEASE ORAL DAILY
Qty: 90 TABLET | Refills: 3 | Status: SHIPPED | OUTPATIENT
Start: 2022-10-21 | End: 2022-12-23

## 2022-10-21 NOTE — PATIENT INSTRUCTIONS
We can try a low dose of metoprolol. Let us know if you have any side effects from this.  Continue on your current medications.  See us back in 2 months.

## 2022-10-21 NOTE — LETTER
10/21/2022    Jose Elias Montgomery MD  [address]  [facility] [address]    RE: Sera Adkins       Dear Colleague,     I had the pleasure of seeing Sera Adkins in the [facility].  HEART CARE ENCOUNTER NOTE          Assessment/Recommendations   Assessment:    1. Abnormal coronary CT angiogram showing eccentric noncalcified plaque in the proximal left anterior descending artery. On my personal review of the CT images, this does not appear luminally obstructive by visual assessment (likely 50% stenosis) and FFTct did not suggest functional significance. However, the location and plaque characteristics may suggest an increased risk of a future acute coronary event.  2. Exertional chest discomfort and dyspnea which may represent angina, although functional assessments have not indicated significant ischemia.  3. Essential hypertension.  4. Hyperlipidemia. Last LDL 53 mg/dL.  5. Diabetes mellitus type 1. Last HbA1c 7.0%.    Plan:  1. Coronary angiography scheduled for Monday 10/24/2022.  2. We spent time discussing the nature of obstructive versus nonobstructive and stable versus unstable plaques in the natural history of coronary artery disease. In the event that she is found to not have any obstructive plaque, medical therapy (specifically high-intensity statin therapy) and continued healthy lifestyle will be paramount to preventing future coronary events and that percutaneous coronary intervention does not play a role in this situation.  3. Continue atorvastatin at increased dose of 40 mg daily.  4. Continue aspirin, although 81 mg instead of 162 mg daily may be fine.  5. We will try adding oral metoprolol succinate 25 mg daily. We discussed that this may cause side effects such as lightheadedness or fatigue, but she wants to try this.  6. Follow-up in 2 months.       A total time of 40 minutes was spent on the date of this encounter, with greater than 50% the time spent face-to-face  including counseling and coordination of care.    History of Present Illness   Ms. Sera Adkins is a 51 year old female with a significant past history of DM1, HTN, and HLD presenting for preoperative evaluation prior to coronary angiography scheduled for 10/24/2022. She has been following with my cardiology colleague, Dr. Davy Rod.    For the past several weeks, she has been feeling chest discomfort and dyspnea with exercise. She had a stress echo on 9/12/2022, where she did experience symptoms but demonstrated excellent functional capacity with no ECG or echo evidence of ischemia. She then had a coronary CT angio on 10/11/2022, which suggested proximal LAD plaque without obstruction. She has been started on amlodipine, SL nitroglycerin, aspirin, and had her atorvastatin dose increased.    No lightheadedness on the new medications.     Cardiac Problems and Cardiac Diagnostics     Most Recent Cardiac testing:  ECG dated 9/12/2022 (personaly reviewed and interpreted): SR, normal ECG    Stress test 9/12/2022 (report reviewed):   1. The patient achieved excellent workload exercise without chest pain.  2. Exercise stress ECG is negative for inducible myocardial ischemia.  3. Exercise stress ECHO is negative for inducible myocardial ischemia.  4. Normal rest ECHO images showed normal left ventricular size, wall motionand systolic function. The estimated left ventricular ejection fraction is 55-60%. At the peak of exercise, left ventricle appears hyperdynamic, no new segmental  wall motion abnormality. The estimated left ventricular ejection fraction is more than 70%.    Coronary CT angiogram 10/11/2022 (personally reviewed and interpreted):     The proximal LAD appears smaller in caliber when compared to the more distal portions of the LAD suggesting potential for diffuse narrowing of the proximal LAD without obvious plaque. FFR analysis completed to further define.    No observed significant plaque in the  "visualized circumflex and obtuse marginal branches and right coronary artery and major branches.    FFR analysis suggests overall likelihood of lesion specific ischemia. There is lower flow in the distal/terminal LAD which does not necessarily suggest lesion specific ischemia    Please see separate report for radiology for additional findings.    There is eccentric noncalcified plaque in the proximal LAD although it is likely not causing any greater than 50% luminal stenosis.       Medications  Allergies   Current Outpatient Medications   Medication Sig Dispense Refill     amLODIPine (NORVASC) 2.5 MG tablet Take 2 tablets (5 mg) by mouth daily 90 tablet 1     aspirin (ASA) 81 MG EC tablet Take 2 tablets (162 mg) by mouth daily 180 tablet      atorvastatin (LIPITOR) 40 MG tablet Take 1 tablet (40 mg) by mouth daily 90 tablet 3     blood glucose (NO BRAND SPECIFIED) test strip Test 6 to 8 times daily.  1 Box 12     Calcium Carbonate-Vitamin D (CALCIUM 500+D PO) Take 1 tablet by mouth as needed.       cholecalciferol 50 MCG (2000 UT) tablet Take 1 tablet by mouth daily       Glucagon, rDNA, (GLUCAGON EMERGENCY) 1 MG KIT Inject IM for hypoglycemic event needs 2 pens one for home and one for work 1 kit 3     insulin aspart (NOVOLOG VIAL) 100 UNITS/ML vial USES UP TO 68 UNITS PER DAY AS DIRECTED IN INSULIN PUMP FOR BASAL, BOLUS AND PRIMING OF TUBING 70 mL 1     insulin cartridge (T:SLIM 3ML) misc pump supply Insulin cartridge to be used with pump as directed.  Change every 2 days or as directed. 45 each 3     Insulin Glargine-yfgn (SEMGLEE, YFGN,) 100 UNIT/ML SOPN Inject 18 Units Subcutaneous daily Use in case of pump failure  10 ml vials 10 mL 3     Insulin Infusion Pump Supplies (TRUSTEEL INFUSION SET) MISC 1 each every other day 50 each 3     Insulin Pump Accessories MISC Tandem Insulin pump infusion sets per patient preference.  Change every 2 days. 45 each 3     insulin syringe-needle U-100 (30G X 1/2\" 0.3 ML) 30G X " "1/2\" 0.3 ML miscellaneous Use 4 syringes daily or as directed. 90 each 1     levonorgestrel (MIRENA) 20 MCG/DAY IUD 1 each by Intrauterine route once       losartan (COZAAR) 50 MG tablet Take 1 tablet (50 mg) by mouth daily 90 tablet 1     nitroGLYcerin (NITROSTAT) 0.4 MG sublingual tablet For chest pain place 1 tablet under the tongue every 5 minutes for 3 doses. If symptoms persist 5 minutes after 1st dose call 911. 25 tablet 1      Allergies   Allergen Reactions     Latex      rash     Percocet [Oxycodone-Acetaminophen] Nausea and Nausea and Vomiting        Physical Examination Review of Systems   BP 94/60 (BP Location: Right arm, Patient Position: Sitting, Cuff Size: Adult Regular)   Pulse 84   Resp 16   Ht 1.727 m (5' 8\")   Wt 73.9 kg (163 lb)   BMI 24.78 kg/m    Body mass index is 24.78 kg/m .  Wt Readings from Last 3 Encounters:   10/21/22 73.9 kg (163 lb)   10/17/22 74.7 kg (164 lb 9.6 oz)   10/11/22 74.8 kg (165 lb)       General Appearance:   Pleasant  female, appears  stated age. no acute distress, normal body habitus   ENT/Mouth: Facemask.     EYES:  no scleral icterus, normal conjunctivae   Neck: no carotid bruits. No anterior cervical lymphadenopaty   Respiratory:   lungs are clear to auscultation, no rales or wheezing, equal chest wall expansion    Cardiovascular:   Regular rhythm, normal rate. Normal first and second heart sounds with no murmurs, rubs, or gallops; the carotid, radial and posterior tibial pulses are intact, Jugular venous pressure normal, no edema bilaterally    Abdomen/GI:  no organomegaly, masses, bruits, or tenderness; bowel sounds are present   Extremities: no cyanosis or clubbing   Skin: no xanthelasma, warm.    Heme/lymph/ Immunology No apparent bleeding noted.   Neurologic: Alert and oriented. normal gait, no tremors     Psychiatric: Pleasant, calm, appropriate affect.    A complete 10 system review of systems was performed and is negative except as mentioned in the " HPI/subjective.         Past History   Past Medical History:   Past Medical History:   Diagnosis Date     Anxiety      Diabetes mellitus (H)     Type 1       Past Surgical History:   Past Surgical History:   Procedure Laterality Date     BREAST CYST ASPIRATION Left ?2005      SECTION      x2     CONIZATION CERVIX,LOOP ELECTRD      Description: Cervical Conization Loop Electrode Excision;  Recorded: 2008;       Family History:   Family History   Problem Relation Age of Onset     Cancer Father      Breast Cancer Paternal Grandmother        Social History:   Social History     Socioeconomic History     Marital status:      Spouse name: Not on file     Number of children: Not on file     Years of education: Not on file     Highest education level: Not on file   Occupational History     Not on file   Tobacco Use     Smoking status: Never     Smokeless tobacco: Never   Substance and Sexual Activity     Alcohol use: Yes     Drug use: No     Sexual activity: Not on file   Other Topics Concern     Not on file   Social History Narrative     Not on file     Social Determinants of Health     Financial Resource Strain: Not on file   Food Insecurity: Not on file   Transportation Needs: Not on file   Physical Activity: Not on file   Stress: Not on file   Social Connections: Not on file   Intimate Partner Violence: Not on file   Housing Stability: Not on file              Lab Results    Chemistry/lipid CBC Cardiac Enzymes/BNP/TSH/INR   Lab Results   Component Value Date    CHOL 99 2021    HDL 38 (L) 2021    LDL 53 2021    TRIG 40 2021    CR 1.0 10/11/2022    BUN 12.8 2022    POTASSIUM 4.9 2022     2022    CO2 27 2022      Lab Results   Component Value Date    WBC 8.9 2021    HGB 12.9 2021    HCT 39.0 2021    MCV 92 2021     2021    A1C 7.0 (H) 2022     Lab Results   Component Value Date    A1C 7.0 (H) 2022     Lab Results   Component Value Date    TSH 1.84 08/02/2021          Alverto Anderson MD [facility]  Non-Invasive Cardiologist  [facility]  Pager [phone]    Thank you for allowing me to participate in the care of your patient.      Sincerely,     Alverto Anderson MD     [facility]  cc:   Davy Rod MD  [address] [phone]

## 2022-10-21 NOTE — H&P (VIEW-ONLY)
HEART CARE ENCOUNTER NOTE          Assessment/Recommendations   Assessment:    1. Abnormal coronary CT angiogram showing eccentric noncalcified plaque in the proximal left anterior descending artery. On my personal review of the CT images, this does not appear luminally obstructive by visual assessment (likely 50% stenosis) and FFTct did not suggest functional significance. However, the location and plaque characteristics may suggest an increased risk of a future acute coronary event.  2. Exertional chest discomfort and dyspnea which may represent angina, although functional assessments have not indicated significant ischemia.  3. Essential hypertension.  4. Hyperlipidemia. Last LDL 53 mg/dL.  5. Diabetes mellitus type 1. Last HbA1c 7.0%.    Plan:  1. Coronary angiography scheduled for Monday 10/24/2022.  2. We spent time discussing the nature of obstructive versus nonobstructive and stable versus unstable plaques in the natural history of coronary artery disease. In the event that she is found to not have any obstructive plaque, medical therapy (specifically high-intensity statin therapy) and continued healthy lifestyle will be paramount to preventing future coronary events and that percutaneous coronary intervention does not play a role in this situation.  3. Continue atorvastatin at increased dose of 40 mg daily.  4. Continue aspirin, although 81 mg instead of 162 mg daily may be fine.  5. We will try adding oral metoprolol succinate 25 mg daily. We discussed that this may cause side effects such as lightheadedness or fatigue, but she wants to try this.  6. Follow-up in 2 months.       A total time of 40 minutes was spent on the date of this encounter, with greater than 50% the time spent face-to-face including counseling and coordination of care.    History of Present Illness   Ms. Sera Adkins is a 51 year old female with a significant past history of DM1, HTN, and HLD presenting for preoperative  evaluation prior to coronary angiography scheduled for 10/24/2022. She has been following with my cardiology colleague, Dr. Davy Rod.    For the past several weeks, she has been feeling chest discomfort and dyspnea with exercise. She had a stress echo on 9/12/2022, where she did experience symptoms but demonstrated excellent functional capacity with no ECG or echo evidence of ischemia. She then had a coronary CT angio on 10/11/2022, which suggested proximal LAD plaque without obstruction. She has been started on amlodipine, SL nitroglycerin, aspirin, and had her atorvastatin dose increased.    No lightheadedness on the new medications.     Cardiac Problems and Cardiac Diagnostics     Most Recent Cardiac testing:  ECG dated 9/12/2022 (personaly reviewed and interpreted): SR, normal ECG    Stress test 9/12/2022 (report reviewed):   1. The patient achieved excellent workload exercise without chest pain.  2. Exercise stress ECG is negative for inducible myocardial ischemia.  3. Exercise stress ECHO is negative for inducible myocardial ischemia.  4. Normal rest ECHO images showed normal left ventricular size, wall motionand systolic function. The estimated left ventricular ejection fraction is 55-60%. At the peak of exercise, left ventricle appears hyperdynamic, no new segmental  wall motion abnormality. The estimated left ventricular ejection fraction is more than 70%.    Coronary CT angiogram 10/11/2022 (personally reviewed and interpreted):     The proximal LAD appears smaller in caliber when compared to the more distal portions of the LAD suggesting potential for diffuse narrowing of the proximal LAD without obvious plaque. FFR analysis completed to further define.    No observed significant plaque in the visualized circumflex and obtuse marginal branches and right coronary artery and major branches.    FFR analysis suggests overall likelihood of lesion specific ischemia. There is lower flow in the  "distal/terminal LAD which does not necessarily suggest lesion specific ischemia    Please see separate report for radiology for additional findings.    There is eccentric noncalcified plaque in the proximal LAD although it is likely not causing any greater than 50% luminal stenosis.       Medications  Allergies   Current Outpatient Medications   Medication Sig Dispense Refill     amLODIPine (NORVASC) 2.5 MG tablet Take 2 tablets (5 mg) by mouth daily 90 tablet 1     aspirin (ASA) 81 MG EC tablet Take 2 tablets (162 mg) by mouth daily 180 tablet      atorvastatin (LIPITOR) 40 MG tablet Take 1 tablet (40 mg) by mouth daily 90 tablet 3     blood glucose (NO BRAND SPECIFIED) test strip Test 6 to 8 times daily.  1 Box 12     Calcium Carbonate-Vitamin D (CALCIUM 500+D PO) Take 1 tablet by mouth as needed.       cholecalciferol 50 MCG (2000 UT) tablet Take 1 tablet by mouth daily       Glucagon, rDNA, (GLUCAGON EMERGENCY) 1 MG KIT Inject IM for hypoglycemic event needs 2 pens one for home and one for work 1 kit 3     insulin aspart (NOVOLOG VIAL) 100 UNITS/ML vial USES UP TO 68 UNITS PER DAY AS DIRECTED IN INSULIN PUMP FOR BASAL, BOLUS AND PRIMING OF TUBING 70 mL 1     insulin cartridge (T:SLIM 3ML) misc pump supply Insulin cartridge to be used with pump as directed.  Change every 2 days or as directed. 45 each 3     Insulin Glargine-yfgn (SEMGLEE, YFGN,) 100 UNIT/ML SOPN Inject 18 Units Subcutaneous daily Use in case of pump failure  10 ml vials 10 mL 3     Insulin Infusion Pump Supplies (TRUSTEEL INFUSION SET) MISC 1 each every other day 50 each 3     Insulin Pump Accessories MISC Tandem Insulin pump infusion sets per patient preference.  Change every 2 days. 45 each 3     insulin syringe-needle U-100 (30G X 1/2\" 0.3 ML) 30G X 1/2\" 0.3 ML miscellaneous Use 4 syringes daily or as directed. 90 each 1     levonorgestrel (MIRENA) 20 MCG/DAY IUD 1 each by Intrauterine route once       losartan (COZAAR) 50 MG tablet Take 1 " "tablet (50 mg) by mouth daily 90 tablet 1     nitroGLYcerin (NITROSTAT) 0.4 MG sublingual tablet For chest pain place 1 tablet under the tongue every 5 minutes for 3 doses. If symptoms persist 5 minutes after 1st dose call 911. 25 tablet 1      Allergies   Allergen Reactions     Latex      rash     Percocet [Oxycodone-Acetaminophen] Nausea and Nausea and Vomiting        Physical Examination Review of Systems   BP 94/60 (BP Location: Right arm, Patient Position: Sitting, Cuff Size: Adult Regular)   Pulse 84   Resp 16   Ht 1.727 m (5' 8\")   Wt 73.9 kg (163 lb)   BMI 24.78 kg/m    Body mass index is 24.78 kg/m .  Wt Readings from Last 3 Encounters:   10/21/22 73.9 kg (163 lb)   10/17/22 74.7 kg (164 lb 9.6 oz)   10/11/22 74.8 kg (165 lb)       General Appearance:   Pleasant  female, appears  stated age. no acute distress, normal body habitus   ENT/Mouth: Facemask.     EYES:  no scleral icterus, normal conjunctivae   Neck: no carotid bruits. No anterior cervical lymphadenopaty   Respiratory:   lungs are clear to auscultation, no rales or wheezing, equal chest wall expansion    Cardiovascular:   Regular rhythm, normal rate. Normal first and second heart sounds with no murmurs, rubs, or gallops; the carotid, radial and posterior tibial pulses are intact, Jugular venous pressure normal, no edema bilaterally    Abdomen/GI:  no organomegaly, masses, bruits, or tenderness; bowel sounds are present   Extremities: no cyanosis or clubbing   Skin: no xanthelasma, warm.    Heme/lymph/ Immunology No apparent bleeding noted.   Neurologic: Alert and oriented. normal gait, no tremors     Psychiatric: Pleasant, calm, appropriate affect.    A complete 10 system review of systems was performed and is negative except as mentioned in the HPI/subjective.         Past History   Past Medical History:   Past Medical History:   Diagnosis Date     Anxiety      Diabetes mellitus (H)     Type 1       Past Surgical History:   Past Surgical " History:   Procedure Laterality Date     BREAST CYST ASPIRATION Left ?2005      SECTION      x2     CONIZATION CERVIX,LOOP ELECTRD      Description: Cervical Conization Loop Electrode Excision;  Recorded: 2008;       Family History:   Family History   Problem Relation Age of Onset     Cancer Father      Breast Cancer Paternal Grandmother        Social History:   Social History     Socioeconomic History     Marital status:      Spouse name: Not on file     Number of children: Not on file     Years of education: Not on file     Highest education level: Not on file   Occupational History     Not on file   Tobacco Use     Smoking status: Never     Smokeless tobacco: Never   Substance and Sexual Activity     Alcohol use: Yes     Drug use: No     Sexual activity: Not on file   Other Topics Concern     Not on file   Social History Narrative     Not on file     Social Determinants of Health     Financial Resource Strain: Not on file   Food Insecurity: Not on file   Transportation Needs: Not on file   Physical Activity: Not on file   Stress: Not on file   Social Connections: Not on file   Intimate Partner Violence: Not on file   Housing Stability: Not on file              Lab Results    Chemistry/lipid CBC Cardiac Enzymes/BNP/TSH/INR   Lab Results   Component Value Date    CHOL 99 2021    HDL 38 (L) 2021    LDL 53 2021    TRIG 40 2021    CR 1.0 10/11/2022    BUN 12.8 2022    POTASSIUM 4.9 2022     2022    CO2 27 2022      Lab Results   Component Value Date    WBC 8.9 2021    HGB 12.9 2021    HCT 39.0 2021    MCV 92 2021     2021    A1C 7.0 (H) 2022     Lab Results   Component Value Date    A1C 7.0 (H) 2022    Lab Results   Component Value Date    TSH 1.84 2021          Alverto Anderson MD [facility]  Non-Invasive Cardiologist  [facility]  Pager [phone]

## 2022-10-21 NOTE — PROGRESS NOTES
HEART CARE ENCOUNTER NOTE          Assessment/Recommendations   Assessment:    1. Abnormal coronary CT angiogram showing eccentric noncalcified plaque in the proximal left anterior descending artery. On my personal review of the CT images, this does not appear luminally obstructive by visual assessment (likely 50% stenosis) and FFTct did not suggest functional significance. However, the location and plaque characteristics may suggest an increased risk of a future acute coronary event.  2. Exertional chest discomfort and dyspnea which may represent angina, although functional assessments have not indicated significant ischemia.  3. Essential hypertension.  4. Hyperlipidemia. Last LDL 53 mg/dL.  5. Diabetes mellitus type 1. Last HbA1c 7.0%.    Plan:  1. Coronary angiography scheduled for Monday 10/24/2022.  2. We spent time discussing the nature of obstructive versus nonobstructive and stable versus unstable plaques in the natural history of coronary artery disease. In the event that she is found to not have any obstructive plaque, medical therapy (specifically high-intensity statin therapy) and continued healthy lifestyle will be paramount to preventing future coronary events and that percutaneous coronary intervention does not play a role in this situation.  3. Continue atorvastatin at increased dose of 40 mg daily.  4. Continue aspirin, although 81 mg instead of 162 mg daily may be fine.  5. We will try adding oral metoprolol succinate 25 mg daily. We discussed that this may cause side effects such as lightheadedness or fatigue, but she wants to try this.  6. Follow-up in 2 months.       A total time of 40 minutes was spent on the date of this encounter, with greater than 50% the time spent face-to-face including counseling and coordination of care.    History of Present Illness   Ms. Sear Adkins is a 51 year old female with a significant past history of DM1, HTN, and HLD presenting for preoperative  evaluation prior to coronary angiography scheduled for 10/24/2022. She has been following with my cardiology colleague, Dr. Davy Rod.    For the past several weeks, she has been feeling chest discomfort and dyspnea with exercise. She had a stress echo on 9/12/2022, where she did experience symptoms but demonstrated excellent functional capacity with no ECG or echo evidence of ischemia. She then had a coronary CT angio on 10/11/2022, which suggested proximal LAD plaque without obstruction. She has been started on amlodipine, SL nitroglycerin, aspirin, and had her atorvastatin dose increased.    No lightheadedness on the new medications.     Cardiac Problems and Cardiac Diagnostics     Most Recent Cardiac testing:  ECG dated 9/12/2022 (personaly reviewed and interpreted): SR, normal ECG    Stress test 9/12/2022 (report reviewed):   1. The patient achieved excellent workload exercise without chest pain.  2. Exercise stress ECG is negative for inducible myocardial ischemia.  3. Exercise stress ECHO is negative for inducible myocardial ischemia.  4. Normal rest ECHO images showed normal left ventricular size, wall motionand systolic function. The estimated left ventricular ejection fraction is 55-60%. At the peak of exercise, left ventricle appears hyperdynamic, no new segmental  wall motion abnormality. The estimated left ventricular ejection fraction is more than 70%.    Coronary CT angiogram 10/11/2022 (personally reviewed and interpreted):     The proximal LAD appears smaller in caliber when compared to the more distal portions of the LAD suggesting potential for diffuse narrowing of the proximal LAD without obvious plaque. FFR analysis completed to further define.    No observed significant plaque in the visualized circumflex and obtuse marginal branches and right coronary artery and major branches.    FFR analysis suggests overall likelihood of lesion specific ischemia. There is lower flow in the  "distal/terminal LAD which does not necessarily suggest lesion specific ischemia    Please see separate report for radiology for additional findings.    There is eccentric noncalcified plaque in the proximal LAD although it is likely not causing any greater than 50% luminal stenosis.       Medications  Allergies   Current Outpatient Medications   Medication Sig Dispense Refill     amLODIPine (NORVASC) 2.5 MG tablet Take 2 tablets (5 mg) by mouth daily 90 tablet 1     aspirin (ASA) 81 MG EC tablet Take 2 tablets (162 mg) by mouth daily 180 tablet      atorvastatin (LIPITOR) 40 MG tablet Take 1 tablet (40 mg) by mouth daily 90 tablet 3     blood glucose (NO BRAND SPECIFIED) test strip Test 6 to 8 times daily.  1 Box 12     Calcium Carbonate-Vitamin D (CALCIUM 500+D PO) Take 1 tablet by mouth as needed.       cholecalciferol 50 MCG (2000 UT) tablet Take 1 tablet by mouth daily       Glucagon, rDNA, (GLUCAGON EMERGENCY) 1 MG KIT Inject IM for hypoglycemic event needs 2 pens one for home and one for work 1 kit 3     insulin aspart (NOVOLOG VIAL) 100 UNITS/ML vial USES UP TO 68 UNITS PER DAY AS DIRECTED IN INSULIN PUMP FOR BASAL, BOLUS AND PRIMING OF TUBING 70 mL 1     insulin cartridge (T:SLIM 3ML) misc pump supply Insulin cartridge to be used with pump as directed.  Change every 2 days or as directed. 45 each 3     Insulin Glargine-yfgn (SEMGLEE, YFGN,) 100 UNIT/ML SOPN Inject 18 Units Subcutaneous daily Use in case of pump failure  10 ml vials 10 mL 3     Insulin Infusion Pump Supplies (TRUSTEEL INFUSION SET) MISC 1 each every other day 50 each 3     Insulin Pump Accessories MISC Tandem Insulin pump infusion sets per patient preference.  Change every 2 days. 45 each 3     insulin syringe-needle U-100 (30G X 1/2\" 0.3 ML) 30G X 1/2\" 0.3 ML miscellaneous Use 4 syringes daily or as directed. 90 each 1     levonorgestrel (MIRENA) 20 MCG/DAY IUD 1 each by Intrauterine route once       losartan (COZAAR) 50 MG tablet Take 1 " "tablet (50 mg) by mouth daily 90 tablet 1     nitroGLYcerin (NITROSTAT) 0.4 MG sublingual tablet For chest pain place 1 tablet under the tongue every 5 minutes for 3 doses. If symptoms persist 5 minutes after 1st dose call 911. 25 tablet 1      Allergies   Allergen Reactions     Latex      rash     Percocet [Oxycodone-Acetaminophen] Nausea and Nausea and Vomiting        Physical Examination Review of Systems   BP 94/60 (BP Location: Right arm, Patient Position: Sitting, Cuff Size: Adult Regular)   Pulse 84   Resp 16   Ht 1.727 m (5' 8\")   Wt 73.9 kg (163 lb)   BMI 24.78 kg/m    Body mass index is 24.78 kg/m .  Wt Readings from Last 3 Encounters:   10/21/22 73.9 kg (163 lb)   10/17/22 74.7 kg (164 lb 9.6 oz)   10/11/22 74.8 kg (165 lb)       General Appearance:   Pleasant  female, appears  stated age. no acute distress, normal body habitus   ENT/Mouth: Facemask.     EYES:  no scleral icterus, normal conjunctivae   Neck: no carotid bruits. No anterior cervical lymphadenopaty   Respiratory:   lungs are clear to auscultation, no rales or wheezing, equal chest wall expansion    Cardiovascular:   Regular rhythm, normal rate. Normal first and second heart sounds with no murmurs, rubs, or gallops; the carotid, radial and posterior tibial pulses are intact, Jugular venous pressure normal, no edema bilaterally    Abdomen/GI:  no organomegaly, masses, bruits, or tenderness; bowel sounds are present   Extremities: no cyanosis or clubbing   Skin: no xanthelasma, warm.    Heme/lymph/ Immunology No apparent bleeding noted.   Neurologic: Alert and oriented. normal gait, no tremors     Psychiatric: Pleasant, calm, appropriate affect.    A complete 10 system review of systems was performed and is negative except as mentioned in the HPI/subjective.         Past History   Past Medical History:   Past Medical History:   Diagnosis Date     Anxiety      Diabetes mellitus (H)     Type 1       Past Surgical History:   Past Surgical " History:   Procedure Laterality Date     BREAST CYST ASPIRATION Left ?2005      SECTION      x2     CONIZATION CERVIX,LOOP ELECTRD      Description: Cervical Conization Loop Electrode Excision;  Recorded: 2008;       Family History:   Family History   Problem Relation Age of Onset     Cancer Father      Breast Cancer Paternal Grandmother        Social History:   Social History     Socioeconomic History     Marital status:      Spouse name: Not on file     Number of children: Not on file     Years of education: Not on file     Highest education level: Not on file   Occupational History     Not on file   Tobacco Use     Smoking status: Never     Smokeless tobacco: Never   Substance and Sexual Activity     Alcohol use: Yes     Drug use: No     Sexual activity: Not on file   Other Topics Concern     Not on file   Social History Narrative     Not on file     Social Determinants of Health     Financial Resource Strain: Not on file   Food Insecurity: Not on file   Transportation Needs: Not on file   Physical Activity: Not on file   Stress: Not on file   Social Connections: Not on file   Intimate Partner Violence: Not on file   Housing Stability: Not on file              Lab Results    Chemistry/lipid CBC Cardiac Enzymes/BNP/TSH/INR   Lab Results   Component Value Date    CHOL 99 2021    HDL 38 (L) 2021    LDL 53 2021    TRIG 40 2021    CR 1.0 10/11/2022    BUN 12.8 2022    POTASSIUM 4.9 2022     2022    CO2 27 2022      Lab Results   Component Value Date    WBC 8.9 2021    HGB 12.9 2021    HCT 39.0 2021    MCV 92 2021     2021    A1C 7.0 (H) 2022     Lab Results   Component Value Date    A1C 7.0 (H) 2022    Lab Results   Component Value Date    TSH 1.84 2021          Alverto Anderson MD [facility]  Non-Invasive Cardiologist  [facility]  Pager [phone]

## 2022-10-24 ENCOUNTER — MEDICAL CORRESPONDENCE (OUTPATIENT)
Dept: HEALTH INFORMATION MANAGEMENT | Facility: CLINIC | Age: 52
End: 2022-10-24

## 2022-10-24 ENCOUNTER — HOSPITAL ENCOUNTER (OUTPATIENT)
Facility: HOSPITAL | Age: 52
Discharge: HOME | End: 2022-10-24
Attending: INTERNAL MEDICINE | Admitting: INTERNAL MEDICINE
Payer: COMMERCIAL

## 2022-10-24 VITALS
HEIGHT: 68 IN | DIASTOLIC BLOOD PRESSURE: 67 MMHG | SYSTOLIC BLOOD PRESSURE: 118 MMHG | TEMPERATURE: 98.5 F | HEART RATE: 73 BPM | RESPIRATION RATE: 20 BRPM | OXYGEN SATURATION: 96 % | BODY MASS INDEX: 24.55 KG/M2 | WEIGHT: 162 LBS

## 2022-10-24 DIAGNOSIS — R06.02 SHORTNESS OF BREATH: ICD-10-CM

## 2022-10-24 DIAGNOSIS — E10.9 WELL CONTROLLED TYPE 1 DIABETES MELLITUS (H): ICD-10-CM

## 2022-10-24 DIAGNOSIS — R06.09 DYSPNEA ON EXERTION: ICD-10-CM

## 2022-10-24 DIAGNOSIS — I20.89 STABLE ANGINA PECTORIS (H): ICD-10-CM

## 2022-10-24 DIAGNOSIS — R07.2 PRECORDIAL PAIN: ICD-10-CM

## 2022-10-24 LAB
ACT BLD: 297 SECONDS (ref 74–150)
ATRIAL RATE - MUSE: 71 BPM
CHOLEST SERPL-MCNC: 92 MG/DL
DIASTOLIC BLOOD PRESSURE - MUSE: NORMAL MMHG
HCG INTACT+B SERPL-ACNC: <1 MIU/ML
HDLC SERPL-MCNC: 39 MG/DL
INTERPRETATION ECG - MUSE: NORMAL
LDLC SERPL CALC-MCNC: 44 MG/DL
NONHDLC SERPL-MCNC: 53 MG/DL
P AXIS - MUSE: 76 DEGREES
PR INTERVAL - MUSE: 130 MS
QRS DURATION - MUSE: 78 MS
QT - MUSE: 396 MS
QTC - MUSE: 430 MS
R AXIS - MUSE: 82 DEGREES
SYSTOLIC BLOOD PRESSURE - MUSE: NORMAL MMHG
T AXIS - MUSE: 43 DEGREES
TRIGL SERPL-MCNC: 43 MG/DL
VENTRICULAR RATE- MUSE: 71 BPM

## 2022-10-24 PROCEDURE — C1725 CATH, TRANSLUMIN NON-LASER: HCPCS | Performed by: INTERNAL MEDICINE

## 2022-10-24 PROCEDURE — 99152 MOD SED SAME PHYS/QHP 5/>YRS: CPT | Performed by: INTERNAL MEDICINE

## 2022-10-24 PROCEDURE — 250N000011 HC RX IP 250 OP 636: Performed by: INTERNAL MEDICINE

## 2022-10-24 PROCEDURE — 93010 ELECTROCARDIOGRAM REPORT: CPT | Performed by: INTERNAL MEDICINE

## 2022-10-24 PROCEDURE — 36415 COLL VENOUS BLD VENIPUNCTURE: CPT | Performed by: INTERNAL MEDICINE

## 2022-10-24 PROCEDURE — 255N000002 HC RX 255 OP 636: Performed by: INTERNAL MEDICINE

## 2022-10-24 PROCEDURE — 85347 COAGULATION TIME ACTIVATED: CPT

## 2022-10-24 PROCEDURE — 250N000013 HC RX MED GY IP 250 OP 250 PS 637: Performed by: INTERNAL MEDICINE

## 2022-10-24 PROCEDURE — 93458 L HRT ARTERY/VENTRICLE ANGIO: CPT | Performed by: INTERNAL MEDICINE

## 2022-10-24 PROCEDURE — 93571 IV DOP VEL&/PRESS C FLO 1ST: CPT | Mod: 52,LD | Performed by: INTERNAL MEDICINE

## 2022-10-24 PROCEDURE — C1894 INTRO/SHEATH, NON-LASER: HCPCS | Performed by: INTERNAL MEDICINE

## 2022-10-24 PROCEDURE — 272N000001 HC OR GENERAL SUPPLY STERILE: Performed by: INTERNAL MEDICINE

## 2022-10-24 PROCEDURE — 93571 IV DOP VEL&/PRESS C FLO 1ST: CPT | Mod: 26 | Performed by: INTERNAL MEDICINE

## 2022-10-24 PROCEDURE — 99153 MOD SED SAME PHYS/QHP EA: CPT | Performed by: INTERNAL MEDICINE

## 2022-10-24 PROCEDURE — 84702 CHORIONIC GONADOTROPIN TEST: CPT | Performed by: INTERNAL MEDICINE

## 2022-10-24 PROCEDURE — C1769 GUIDE WIRE: HCPCS | Performed by: INTERNAL MEDICINE

## 2022-10-24 PROCEDURE — 93005 ELECTROCARDIOGRAM TRACING: CPT

## 2022-10-24 PROCEDURE — 258N000003 HC RX IP 258 OP 636: Performed by: INTERNAL MEDICINE

## 2022-10-24 PROCEDURE — 999N000054 HC STATISTIC EKG NON-CHARGEABLE

## 2022-10-24 PROCEDURE — 80061 LIPID PANEL: CPT | Performed by: INTERNAL MEDICINE

## 2022-10-24 PROCEDURE — 93458 L HRT ARTERY/VENTRICLE ANGIO: CPT | Mod: 26 | Performed by: INTERNAL MEDICINE

## 2022-10-24 RX ORDER — NICOTINE POLACRILEX 4 MG
15-30 LOZENGE BUCCAL
Status: DISCONTINUED | OUTPATIENT
Start: 2022-10-24 | End: 2022-10-24 | Stop reason: HOSPADM

## 2022-10-24 RX ORDER — NALOXONE HYDROCHLORIDE 0.4 MG/ML
0.4 INJECTION, SOLUTION INTRAMUSCULAR; INTRAVENOUS; SUBCUTANEOUS
Status: DISCONTINUED | OUTPATIENT
Start: 2022-10-24 | End: 2022-10-24 | Stop reason: HOSPADM

## 2022-10-24 RX ORDER — NALOXONE HYDROCHLORIDE 0.4 MG/ML
0.2 INJECTION, SOLUTION INTRAMUSCULAR; INTRAVENOUS; SUBCUTANEOUS
Status: DISCONTINUED | OUTPATIENT
Start: 2022-10-24 | End: 2022-10-24 | Stop reason: HOSPADM

## 2022-10-24 RX ORDER — IODIXANOL 320 MG/ML
INJECTION, SOLUTION INTRAVASCULAR
Status: DISCONTINUED | OUTPATIENT
Start: 2022-10-24 | End: 2022-10-24 | Stop reason: HOSPADM

## 2022-10-24 RX ORDER — ASPIRIN 81 MG/1
243 TABLET, CHEWABLE ORAL ONCE
Status: DISCONTINUED | OUTPATIENT
Start: 2022-10-24 | End: 2022-10-24 | Stop reason: HOSPADM

## 2022-10-24 RX ORDER — DIAZEPAM 10 MG
10 TABLET ORAL
Status: COMPLETED | OUTPATIENT
Start: 2022-10-24 | End: 2022-10-24

## 2022-10-24 RX ORDER — ATROPINE SULFATE 0.1 MG/ML
0.5 INJECTION INTRAVENOUS
Status: DISCONTINUED | OUTPATIENT
Start: 2022-10-24 | End: 2022-10-24 | Stop reason: HOSPADM

## 2022-10-24 RX ORDER — ACETAMINOPHEN 325 MG/1
650 TABLET ORAL EVERY 4 HOURS PRN
Status: DISCONTINUED | OUTPATIENT
Start: 2022-10-24 | End: 2022-10-24 | Stop reason: HOSPADM

## 2022-10-24 RX ORDER — OXYCODONE HYDROCHLORIDE 5 MG/1
10 TABLET ORAL EVERY 4 HOURS PRN
Status: DISCONTINUED | OUTPATIENT
Start: 2022-10-24 | End: 2022-10-24 | Stop reason: HOSPADM

## 2022-10-24 RX ORDER — DEXTROSE MONOHYDRATE 25 G/50ML
25-50 INJECTION, SOLUTION INTRAVENOUS
Status: DISCONTINUED | OUTPATIENT
Start: 2022-10-24 | End: 2022-10-24 | Stop reason: HOSPADM

## 2022-10-24 RX ORDER — FENTANYL CITRATE 50 UG/ML
25 INJECTION, SOLUTION INTRAMUSCULAR; INTRAVENOUS
Status: DISCONTINUED | OUTPATIENT
Start: 2022-10-24 | End: 2022-10-24 | Stop reason: HOSPADM

## 2022-10-24 RX ORDER — SODIUM CHLORIDE 9 MG/ML
INJECTION, SOLUTION INTRAVENOUS CONTINUOUS
Status: ACTIVE | OUTPATIENT
Start: 2022-10-24 | End: 2022-10-24

## 2022-10-24 RX ORDER — LIDOCAINE 40 MG/G
CREAM TOPICAL
Status: DISCONTINUED | OUTPATIENT
Start: 2022-10-24 | End: 2022-10-24 | Stop reason: HOSPADM

## 2022-10-24 RX ORDER — NITROGLYCERIN 0.4 MG/1
TABLET SUBLINGUAL
Qty: 25 TABLET | Refills: 3 | Status: SHIPPED | OUTPATIENT
Start: 2022-10-24

## 2022-10-24 RX ORDER — FLUMAZENIL 0.1 MG/ML
0.2 INJECTION, SOLUTION INTRAVENOUS
Status: DISCONTINUED | OUTPATIENT
Start: 2022-10-24 | End: 2022-10-24 | Stop reason: HOSPADM

## 2022-10-24 RX ORDER — ASPIRIN 325 MG
325 TABLET ORAL ONCE
Status: DISCONTINUED | OUTPATIENT
Start: 2022-10-24 | End: 2022-10-24 | Stop reason: HOSPADM

## 2022-10-24 RX ORDER — ISOSORBIDE MONONITRATE 30 MG/1
30 TABLET, EXTENDED RELEASE ORAL DAILY
Qty: 30 TABLET | Refills: 11 | Status: SHIPPED | OUTPATIENT
Start: 2022-10-24 | End: 2022-12-12

## 2022-10-24 RX ORDER — ONDANSETRON 2 MG/ML
INJECTION INTRAMUSCULAR; INTRAVENOUS
Status: DISCONTINUED | OUTPATIENT
Start: 2022-10-24 | End: 2022-10-24 | Stop reason: HOSPADM

## 2022-10-24 RX ORDER — SODIUM CHLORIDE 9 MG/ML
INJECTION, SOLUTION INTRAVENOUS CONTINUOUS
Status: DISCONTINUED | OUTPATIENT
Start: 2022-10-24 | End: 2022-10-24 | Stop reason: HOSPADM

## 2022-10-24 RX ORDER — NITROGLYCERIN 0.4 MG/1
TABLET SUBLINGUAL
Status: DISCONTINUED | OUTPATIENT
Start: 2022-10-24 | End: 2022-10-24 | Stop reason: HOSPADM

## 2022-10-24 RX ORDER — HEPARIN SODIUM 1000 [USP'U]/ML
INJECTION, SOLUTION INTRAVENOUS; SUBCUTANEOUS
Status: DISCONTINUED | OUTPATIENT
Start: 2022-10-24 | End: 2022-10-24 | Stop reason: HOSPADM

## 2022-10-24 RX ORDER — OXYCODONE HYDROCHLORIDE 5 MG/1
5 TABLET ORAL EVERY 4 HOURS PRN
Status: DISCONTINUED | OUTPATIENT
Start: 2022-10-24 | End: 2022-10-24 | Stop reason: HOSPADM

## 2022-10-24 RX ORDER — FENTANYL CITRATE 50 UG/ML
INJECTION, SOLUTION INTRAMUSCULAR; INTRAVENOUS
Status: DISCONTINUED | OUTPATIENT
Start: 2022-10-24 | End: 2022-10-24 | Stop reason: HOSPADM

## 2022-10-24 RX ADMIN — DIAZEPAM 10 MG: 10 TABLET ORAL at 12:25

## 2022-10-24 RX ADMIN — SODIUM CHLORIDE: 9 INJECTION, SOLUTION INTRAVENOUS at 11:56

## 2022-10-24 ASSESSMENT — ACTIVITIES OF DAILY LIVING (ADL)
ADLS_ACUITY_SCORE: 35

## 2022-10-24 ASSESSMENT — EJECTION FRACTION: EF_VALUE: .29

## 2022-10-24 NOTE — DISCHARGE INSTRUCTIONS

## 2022-10-24 NOTE — INTERVAL H&P NOTE
I have reviewed the surgical (or preoperative) H&P that is linked to this encounter, and examined the patient. There are no significant changes    Clinical Conditions Present on Arrival:  Clinically Significant Risk Factors Present on Admission

## 2022-10-24 NOTE — PRE-PROCEDURE
GENERAL PRE-PROCEDURE:   Procedure:  Cor angio with possible intervention; FFR  Date/Time:  10/24/2022 12:49 PM    Written consent obtained?: Yes    Risks and benefits: Risks, benefits and alternatives were discussed    Consent given by:  Patient  Patient states understanding of procedure being performed: Yes    Patient's understanding of procedure matches consent: Yes    Procedure consent matches procedure scheduled: Yes    Expected level of sedation:  Moderate  Appropriately NPO:  Yes  ASA Class:  3 (+CP, abn CT, HTN, HLD, DM I)  Mallampati  :  Grade 1- soft palate, uvula, tonsillar pillars, and posterior pharyngeal wall visible  Lungs:  Lungs clear with good breath sounds bilaterally  Heart:  Normal heart sounds and rate  History & Physical reviewed:  History and physical reviewed and no updates needed  Statement of review:  I have reviewed the lab findings, diagnostic data, medications, and the plan for sedation

## 2022-10-24 NOTE — Clinical Note
Unless otherwise noted, the J wire was used to insert, remove, exchange, and reposition all catheters. Bentson wire used for initial catheter insertion.

## 2022-10-24 NOTE — Clinical Note
Conscious sedation is planned for this procedure.    Provider immediate assessment completed.  History  Chief Complaint   Patient presents with   • Abdominal Pain     Thinks he has a bowel obstruction     Patient has a history of ulcerative colitis, status post colectomy with ileal pouch formation and subsequent complications  Patient was recently admitted by me 10 days ago with increased abdominal pain, and ileal pouchitis  He is currently on a steroid wean  He was seen 2 days ago in this ED with increased pain and rectal bleeding  Patient responded to treatment in the ED was treated and released  He states since last night he has developed increased pain with abdominal distension  He has nausea but no vomiting  He arrives in distress stating that he thinks he has obstructive  He has not been able to pass any gas or bowel movement this morning  Prior to Admission Medications   Prescriptions Last Dose Informant Patient Reported? Taking? DULoxetine (CYMBALTA) 60 mg delayed release capsule   No No   Sig: Take 1 capsule (60 mg total) by mouth daily   metroNIDAZOLE (FLAGYL) 500 mg tablet   No No   Sig: Take 1 tablet (500 mg total) by mouth every 8 (eight) hours   ondansetron (ZOFRAN) 4 mg tablet   No No   Sig: Take 1 tablet (4 mg total) by mouth every 6 (six) hours   oxyCODONE-acetaminophen (Percocet) 5-325 mg per tablet   No No   Sig: Take 1 tablet by mouth every 6 (six) hours as needed for severe pain Max Daily Amount: 4 tablets   pantoprazole (PROTONIX) 40 mg tablet   No No   Sig: Take 1 tablet (40 mg total) by mouth 2 (two) times a day   predniSONE 10 mg tablet   No No   Sig: Take 4 tablets (40 mg total) by mouth daily for 7 days, THEN 3 tablets (30 mg total) daily for 7 days, THEN 2 tablets (20 mg total) daily for 7 days, THEN 1 tablet (10 mg total) daily for 7 days     saccharomyces boulardii (FLORASTOR) 250 mg capsule   No No   Sig: Take 1 capsule (250 mg total) by mouth 2 (two) times a day      Facility-Administered Medications: None       Past Medical History:   Diagnosis Date   • Ankylosing spondylitis (HCC)    • Anxiety    • Bowel obstruction (HCC)    • Clostridium difficile colitis 9/13/2018   • Colitis    • Ileal pouchitis ([facility] [address] ) 9/13/2018   • Pancreatitis    • Ulcerative colitis ([facility] [address] )        Past Surgical History:   Procedure Laterality Date   • COLECTOMY TOTAL      with ileal pouch and anastemosis   • IR PICC PLACEMENT SINGLE LUMEN  3/1/2022   • TOTAL COLECTOMY         History reviewed  No pertinent family history  I have reviewed and agree with the history as documented  E-Cigarette/Vaping   • E-Cigarette Use Never User      E-Cigarette/Vaping Substances   • Nicotine No    • THC No    • CBD No    • Flavoring No    • Other No    • Unknown No      Social History     Tobacco Use   • Smoking status: Never Smoker   • Smokeless tobacco: Never Used   Vaping Use   • Vaping Use: Never used   Substance Use Topics   • Alcohol use: Not Currently     Comment: pt states 5 a month/socially   • Drug use: No       Review of Systems   Constitutional: Negative for chills and fever  HENT: Negative for congestion and sore throat  Eyes: Negative for visual disturbance  Respiratory: Negative for cough and shortness of breath  Cardiovascular: Negative for chest pain  Gastrointestinal: Positive for abdominal distention, abdominal pain and nausea  Negative for vomiting  Genitourinary: Negative for dysuria  Musculoskeletal: Negative for back pain and neck pain  Skin: Negative for color change and rash  Neurological: Negative for weakness and headaches  Hematological: Does not bruise/bleed easily  Psychiatric/Behavioral: Negative for confusion  All other systems reviewed and are negative  Physical Exam  Physical Exam  Vitals and nursing note reviewed  Constitutional:       General: He is in acute distress  Appearance: He is well-developed  HENT:      Head: Normocephalic  Mouth/Throat:      Mouth: Mucous membranes are moist       Pharynx: Oropharynx is clear     Eyes: Extraocular Movements: Extraocular movements intact  Pupils: Pupils are equal, round, and reactive to light  Cardiovascular:      Rate and Rhythm: Regular rhythm  Tachycardia present  Heart sounds: Normal heart sounds  Pulmonary:      Effort: Pulmonary effort is normal       Breath sounds: Normal breath sounds  Abdominal:      General: Bowel sounds are absent  There is distension  Palpations: Abdomen is rigid  Tenderness: There is generalized abdominal tenderness  Skin:     General: Skin is warm and dry  Capillary Refill: Capillary refill takes less than 2 seconds  Neurological:      General: No focal deficit present  Mental Status: He is alert and oriented to person, place, and time  Psychiatric:         Mood and Affect: Mood normal          Behavior: Behavior normal          Vital Signs  ED Triage Vitals [09/30/22 0935]   Temperature Pulse Respirations Blood Pressure SpO2   98 1 °F (36 7 °C) 103 18 150/83 98 %      Temp src Heart Rate Source Patient Position - Orthostatic VS BP Location FiO2 (%)   -- -- -- -- --      Pain Score       9           Vitals:    09/30/22 0935 09/30/22 1102   BP: 150/83 129/77   Pulse: 103 94         Visual Acuity      ED Medications  Medications   sodium chloride 0 9 % bolus 1,000 mL (1,000 mL Intravenous New Bag 9/30/22 0957)   ondansetron (ZOFRAN) injection 4 mg (4 mg Intravenous Given 9/30/22 0957)   HYDROmorphone (DILAUDID) injection 2 mg (2 mg Intravenous Given 9/30/22 0957)   ondansetron (ZOFRAN) injection 4 mg (4 mg Intravenous Given 9/30/22 1125)       Diagnostic Studies  Results Reviewed     Procedure Component Value Units Date/Time    Lactic acid 2 Hours [[id number]] Collected: 09/30/22 1157    Lab Status:  In process Specimen: Blood from Arm, Right Updated: 09/30/22 1159    Lactic acid [[id number]]  (Abnormal) Collected: 09/30/22 0957    Lab Status: Final result Specimen: Blood from Arm, Right Updated: 09/30/22 1125     LACTIC ACID 2 1 mmol/L     Narrative:      Result may be elevated if tourniquet was used during collection      Comprehensive metabolic panel [[id number]]  (Abnormal) Collected: 09/30/22 0957    Lab Status: Final result Specimen: Blood from Arm, Right Updated: 09/30/22 1020     Sodium 145 mmol/L      Potassium 3 2 mmol/L      Chloride 104 mmol/L      CO2 33 mmol/L      ANION GAP 8 mmol/L      BUN 14 mg/dL      Creatinine 1 27 mg/dL      Glucose 114 mg/dL      Calcium 9 1 mg/dL      AST 10 U/L      ALT 30 U/L      Alkaline Phosphatase 73 U/L      Total Protein 7 4 g/dL      Albumin 3 8 g/dL      Total Bilirubin 0 59 mg/dL      eGFR 75 ml/min/1 73sq m     Narrative:      [place] guidelines for Chronic Kidney Disease (CKD):   •  Stage 1 with normal or high GFR (GFR > 90 mL/min/1 73 square meters)  •  Stage 2 Mild CKD (GFR = 60-89 mL/min/1 73 square meters)  •  Stage 3A Moderate CKD (GFR = 45-59 mL/min/1 73 square meters)  •  Stage 3B Moderate CKD (GFR = 30-44 mL/min/1 73 square meters)  •  Stage 4 Severe CKD (GFR = 15-29 mL/min/1 73 square meters)  •  Stage 5 End Stage CKD (GFR <15 mL/min/1 73 square meters)  Note: GFR calculation is accurate only with a steady state creatinine    Lipase [[id number]]  (Normal) Collected: 09/30/22 0957    Lab Status: Final result Specimen: Blood from Arm, Right Updated: 09/30/22 1020     Lipase 160 u/L     Protime-INR [[id number]]  (Normal) Collected: 09/30/22 0957    Lab Status: Final result Specimen: Blood from Arm, Right Updated: 09/30/22 1015     Protime 13 7 seconds      INR 1 03    APTT [[id number]]  (Normal) Collected: 09/30/22 0957    Lab Status: Final result Specimen: Blood from Arm, Right Updated: 09/30/22 1015     PTT 26 seconds     CBC and differential [[id number]]  (Abnormal) Collected: 09/30/22 0957    Lab Status: Final result Specimen: Blood from Arm, Right Updated: 09/30/22 1004     WBC 20 79 Thousand/uL      RBC 6 53 Million/uL      Hemoglobin 12 2 g/dL Hematocrit 41 5 %      MCV 64 fL      MCH 18 7 pg      MCHC 29 4 g/dL      RDW 18 5 %      MPV 8 8 fL      Platelets 551 Thousands/uL      nRBC 0 /100 WBCs      Neutrophils Relative 77 %      Immat GRANS % 1 %      Lymphocytes Relative 15 %      Monocytes Relative 7 %      Eosinophils Relative 0 %      Basophils Relative 0 %      Neutrophils Absolute 16 02 Thousands/µL      Immature Grans Absolute 0 13 Thousand/uL      Lymphocytes Absolute 3 01 Thousands/µL      Monocytes Absolute 1 53 Thousand/µL      Eosinophils Absolute 0 06 Thousand/µL      Basophils Absolute 0 04 Thousands/µL                  XR abdomen obstruction series   Final Result by Sirena Allen MD (09/30 1114)      Findings suggesting high-grade small bowel obstruction with distended bowel loops and air-fluid levels  Workstation performed: [id number]                    Procedures  Procedures         ED Course                               SBIRT 22yo+    Flowsheet Row Most Recent Value   SBIRT (25 yo +)    In order to provide better care to our patients, we are screening all of our patients for alcohol and drug use  Would it be okay to ask you these screening questions? No Filed at: 09/30/2022 9441                    MDM  Number of Diagnoses or Management Options  Diagnosis management comments: Patient has had a history of obstruction  He had a CT scan 2 days ago and another 1 week before that    Will check an obstructive series, symptomatic treatment      Disposition  Final diagnoses:   Abdominal pain   Bowel obstruction ([address] )   Ulcerative colitis ([address] )     Time reflects when diagnosis was documented in both MDM as applicable and the Disposition within this note     Time User Action Codes Description Comment    9/30/2022 12:08 PM Júnior Sails A Add [R10 9] Abdominal pain     9/30/2022 12:08 PM Fish File Add [K56 609] Bowel obstruction ([address] )     9/30/2022 12:08 PM Júnior Sails A Add [K51 90] Ulcerative colitis [facility])       ED Disposition     ED Disposition   Admit    Condition   Stable    Date/Time   Fri Sep 30, 2022 12:08 PM    Comment   Case was discussed with hospitalist and the patient's admission status was agreed to be Admission Status: inpatient status to the service of Dr Juan M Richardson   Follow-up Information    None         Patient's Medications   Discharge Prescriptions    No medications on file       No discharge procedures on file      PDMP Review       Value Time User    PDMP Reviewed  Yes 9/26/2022  5:08 PM Aidee Gay DO          ED Provider  Electronically Signed by           Katelyn Hoang MD  09/30/22 9585

## 2022-10-25 NOTE — PROGRESS NOTES
Patient was kept comfortable during post-procedure stay.VSS. Denies pain. Right radial access site remains dry & free from signs of bleeding. Pt able to eat, drink, ambulate and void post-procedure.    Did not make a follow-up appointment for pt at this time. She has been seeing Dr. Rod and currently unsure who she wants to follow-up with as many of the cardiologists have no openings and/or are not accepting new patients right now. Pt is going to make her own follow-up appointment as she thinks she may switch to a cardiology group outside [facility].     Dr. Porter was able to speak with patient's  post-procedure. Post-op instructions given to patient & spouse. Able to ask questions. Verbalized no concerns. Belongings returned. Discharged to home in stable condition.

## 2022-10-29 ENCOUNTER — HEALTH MAINTENANCE LETTER (OUTPATIENT)
Age: 52
End: 2022-10-29

## 2022-11-07 ENCOUNTER — VIRTUAL VISIT (OUTPATIENT)
Dept: PSYCHOLOGY | Facility: CLINIC | Age: 52
End: 2022-11-07
Payer: COMMERCIAL

## 2022-11-07 DIAGNOSIS — F41.9 ANXIETY: Primary | ICD-10-CM

## 2022-11-07 PROCEDURE — 90791 PSYCH DIAGNOSTIC EVALUATION: CPT | Mod: GT | Performed by: PSYCHOLOGIST
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
## 2022-11-15 DIAGNOSIS — I10 ESSENTIAL HYPERTENSION: Primary | ICD-10-CM

## 2022-11-15 DIAGNOSIS — E10.9 WELL CONTROLLED TYPE 1 DIABETES MELLITUS (H): ICD-10-CM

## 2022-11-15 RX ORDER — AMLODIPINE BESYLATE 5 MG/1
5 TABLET ORAL DAILY
Qty: 90 TABLET | Refills: 3 | Status: SHIPPED | OUTPATIENT
Start: 2022-11-15 | End: 2023-01-23 | Stop reason: DRUGHIGH

## 2022-12-05 ENCOUNTER — VIRTUAL VISIT (OUTPATIENT)
Dept: PSYCHOLOGY | Facility: CLINIC | Age: 52
End: 2022-12-05
Payer: COMMERCIAL

## 2022-12-05 DIAGNOSIS — F41.9 ANXIETY: Primary | ICD-10-CM

## 2022-12-05 PROCEDURE — 90837 PSYTX W PT 60 MINUTES: CPT | Mod: GT | Performed by: PSYCHOLOGIST

## 2022-12-05 ASSESSMENT — ANXIETY QUESTIONNAIRES
GAD7 TOTAL SCORE: 14
4. TROUBLE RELAXING: NEARLY EVERY DAY
1. FEELING NERVOUS, ANXIOUS, OR ON EDGE: NEARLY EVERY DAY
3. WORRYING TOO MUCH ABOUT DIFFERENT THINGS: NEARLY EVERY DAY
5. BEING SO RESTLESS THAT IT IS HARD TO SIT STILL: MORE THAN HALF THE DAYS
7. FEELING AFRAID AS IF SOMETHING AWFUL MIGHT HAPPEN: MORE THAN HALF THE DAYS
GAD7 TOTAL SCORE: 14
7. FEELING AFRAID AS IF SOMETHING AWFUL MIGHT HAPPEN: MORE THAN HALF THE DAYS
6. BECOMING EASILY ANNOYED OR IRRITABLE: NOT AT ALL
GAD7 TOTAL SCORE: 14
2. NOT BEING ABLE TO STOP OR CONTROL WORRYING: SEVERAL DAYS

## 2022-12-05 ASSESSMENT — PATIENT HEALTH QUESTIONNAIRE - PHQ9
SUM OF ALL RESPONSES TO PHQ QUESTIONS 1-9: 9
SUM OF ALL RESPONSES TO PHQ QUESTIONS 1-9: 9
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
## 2022-12-12 ENCOUNTER — OFFICE VISIT (OUTPATIENT)
Dept: PULMONOLOGY | Facility: CLINIC | Age: 52
End: 2022-12-12
Payer: COMMERCIAL

## 2022-12-12 VITALS
DIASTOLIC BLOOD PRESSURE: 78 MMHG | HEART RATE: 86 BPM | HEIGHT: 68 IN | OXYGEN SATURATION: 100 % | SYSTOLIC BLOOD PRESSURE: 130 MMHG | BODY MASS INDEX: 25.19 KG/M2 | WEIGHT: 166.2 LBS

## 2022-12-12 DIAGNOSIS — R07.9 CHEST PAIN, UNSPECIFIED TYPE: Primary | ICD-10-CM

## 2022-12-12 PROCEDURE — 99204 OFFICE O/P NEW MOD 45 MIN: CPT | Performed by: INTERNAL MEDICINE

## 2022-12-12 NOTE — PROGRESS NOTES
Pulmonary Clinic Consultation    Assessment:  52-year-old female with type 1 diabetes and reasonable glucose control here with chest pain and exercise limitation of unclear etiology.  Her symptoms seem related to extreme elevation in heart rate and not really consistent with exercise induced bronchospasm.  Unfortunately we have no PFTs prior to her visit to discuss and she is not interested in pursuing them currently.    Plan:   I did recommend cardiopulmonary exercise testing to see if her flow-volume loop changes with activity.  This would lead towards an exercise-induced asthma picture.  If there is no change in her flow-volume loop with activity, this would point to a cardiac source of her limitation.    In addition her significantly elevated LVEDP is quite curious.  It is unclear to me if her edema was present during her left heart catheterization however it is currently well controlled.    She is pursuing cardiac care with [facility] and follow-up.  We will not schedule pulmonary follow-up for her--she will get results via [facility] of the cardiopulmonary exercise test.    Angie Garcia MD  Pulmonary/Critical Care  ---------------------------------    Reason for Consult: Chest pain    HPI: 52-year-old female with history of type 1 diabetes presents for evaluation of chest pain.  Chest burning occurs with running but not biking. When she stops it subsides then relieves. Has been taking pepcid. Has reflux related to alcohol or caffeine, feels different than when she is running. First mile while running has to stop, then it clears, and she resumes the further she gets into it the better it gets unless HR too fast.     Has had COVID infection 3 times as well not sure how much this is related.  Stopped the chlorthalidone due to orthostasis and edema worsened, breathing worsened. Resumed and felt better.     Did Bike race in [place] with bad chest pain at elevation so felt this was a lot related to elevation.    Stopped  the Chlorthalidone once she returned from [place] because edema was better. Had COVID in the interim and very short of breath, August, more edematous again with weight up. Went to urgency room, D-dimer was elevated but CTA done and No PE seen. Did take lasix 40mg for a week, finally got rid of edema and extra weight.     Followed up with Dr. Sprague prior to trip to [facility]. He started the Norvasc and daily dyspnea improved finally 3 weeks after norvasc started.     Subsequently underwent cardiac catheterization that found no obstructing areas of concern but did find an LVEDP of 21.  CTA performed at urgent care found no pulmonary embolism.  There is a small amount of linear atelectasis in the left and a tiny infiltrate at the right that looked more like atelectasis than true infection to me.    No history of asthma as a child, no issues with breathing earlier in life. Does have a bit of tightness in her chest when around her cats.     18 years of 2nd hand smoke, denies occupational exposures    ROS:  A 12-system review was notable for chest pain, edema, lightheadedness, shortness of breath, anxiety. The remainder reviewed and negative.     PMH:  Past Medical History:   Diagnosis Date     Anxiety      DMI     Type 1       PSH:  Social History     Tobacco Use     Smoking status: Never     Smokeless tobacco: Never   Substance Use Topics     Alcohol use: Yes     Drug use: No       Allergies:  Allergies   Allergen Reactions     Latex      rash     Percocet [Oxycodone-Acetaminophen] Nausea and Nausea and Vomiting       Family HX:  Family History   Problem Relation Age of Onset     Cancer Father      Breast Cancer Paternal Grandmother        Social Hx:  Social History     Socioeconomic History     Marital status:      Spouse name: Not on file     Number of children: Not on file     Years of education: Not on file     Highest education level: Not on file   Occupational History     Not on file   Tobacco Use      "Smoking status: Never     Smokeless tobacco: Never   Substance and Sexual Activity     Alcohol use: Yes     Drug use: No     Sexual activity: Not on file   Other Topics Concern     Not on file   Social History Narrative     Not on file     Social Determinants of Health     Financial Resource Strain: Not on file   Food Insecurity: Not on file   Transportation Needs: Not on file   Physical Activity: Not on file   Stress: Not on file   Social Connections: Not on file   Intimate Partner Violence: Not on file   Housing Stability: Not on file       Current Meds:  Current Outpatient Medications   Medication     amLODIPine (NORVASC) 5 MG tablet     aspirin (ASA) 81 MG EC tablet     atorvastatin (LIPITOR) 40 MG tablet     blood glucose (NO BRAND SPECIFIED) test strip     Calcium Carbonate-Vitamin D (CALCIUM 500+D PO)     cholecalciferol 50 MCG (2000 UT) tablet     Glucagon, rDNA, (GLUCAGON EMERGENCY) 1 MG KIT     insulin aspart (NOVOLOG VIAL) 100 UNITS/ML vial     insulin cartridge (T:SLIM 3ML) misc pump supply     Insulin Glargine-yfgn (SEMGLEE, YFGN,) 100 UNIT/ML SOPN     Insulin Infusion Pump Supplies (TRUSTEEL INFUSION SET) MISC     Insulin Pump Accessories MISC     insulin syringe-needle U-100 (30G X 1/2\" 0.3 ML) 30G X 1/2\" 0.3 ML miscellaneous     levonorgestrel (MIRENA) 20 MCG/DAY IUD     losartan (COZAAR) 50 MG tablet     metoprolol succinate ER (TOPROL XL) 25 MG 24 hr tablet     nitroGLYcerin (NITROSTAT) 0.4 MG sublingual tablet     No current facility-administered medications for this visit.         Physical Exam:  /78 (BP Location: Left arm)   Pulse 86   Ht 1.727 m (5' 8\")   Wt 75.4 kg (166 lb 3.2 oz)   SpO2 100%   BMI 25.27 kg/m    Gen: alert, oriented, no distress  HEENT: anicteric sclera, mask in place.  Neck: trachea midline, no cervical or supraclavicular lymphadenopathy  CV: Regular rate and rhythm, normal S1 and S2.   Resp: Clear bilaterally with good air entry.   Abd: soft, nontender  Skin: no " visible rashes or lesions.   Ext: no cyanosis, clubbing or edema  Neuro: alert, nonfocal, grossly normal. Pressured speech.    Labs:  Recent Labs   Lab Test 10/21/22  0806   WBC 5.8   RBC 4.49   HGB 13.4   HCT 40.5   MCV 90   MCH 29.8   MCHC 33.1   RDW 12.5        Results for orders placed or performed in visit on 10/21/22   Basic metabolic panel   Result Value Ref Range    Sodium 140 136 - 145 mmol/L    Potassium 4.2 3.4 - 5.3 mmol/L    Chloride 103 98 - 107 mmol/L    Carbon Dioxide (CO2) 25 22 - 29 mmol/L    Anion Gap 12 7 - 15 mmol/L    Urea Nitrogen 12.0 6.0 - 20.0 mg/dL    Creatinine 0.93 0.51 - 0.95 mg/dL    Calcium 9.2 8.6 - 10.0 mg/dL    Glucose 121 (H) 70 - 99 mg/dL    GFR Estimate 75 >60 mL/min/1.73m2           Imaging studies:  Personally reviewed image/s and Personally reviewed impression/s  EXAM: CTA CHEST   LOCATION: [facility]   DATE/TIME: 8/14/2022 9:09 PM     INDICATION: chest pain, dyspnea   COMPARISON: CT chest without contrast 10/19/2018   TECHNIQUE: CT chest pulmonary angiogram during arterial phase injection of IV contrast. Multiplanar reformats and MIP reconstructions were performed. Dose reduction techniques were used.   CONTRAST: IOPAMIDOL 300 MG/ML  ML BOTTLE: 100mL     FINDINGS:   ANGIOGRAM CHEST: Pulmonary arteries are normal caliber and negative for pulmonary emboli. Thoracic aorta is negative for dissection.     LUNGS AND PLEURA: Linear band of atelectasis in the anterior left lower lobe. In the paradiaphragmatic right lung there are linear and small airspace opacities consistent with mixed atelectasis and inflammation. There is trace right pleural fluid. No groundglass or consolidative airspace opacities. Airways are normal.     MEDIASTINUM: Cardiac chambers are normal in size. No pericardial effusion. Fat replaced thymus in the anterior mediastinum. No enlarged mediastinal or hilar lymph nodes. Esophagus is decompressed.     CORONARY ARTERY  CALCIFICATION: None.     UPPER ABDOMEN: There is a low-attenuation lesion with peripheral, nodular and discontinuous enhancement in the dome of the right hepatic lobe measuring 2.8 x 2.8 cm consistent with a benign hemangioma. No actionable findings in the imaged upper abdomen.     MUSCULOSKELETAL: Normal.     IMPRESSION:     1.  No acute pulmonary embolism.   2.  Linear atelectasis in the left upper lobe and mixed atelectasis and limited subpleural inflammation in the right lower lobe. Small reactive right pleural effusion.        PFT's   none

## 2022-12-20 ENCOUNTER — MYC MEDICAL ADVICE (OUTPATIENT)
Dept: FAMILY MEDICINE | Facility: CLINIC | Age: 52
End: 2022-12-20

## 2022-12-23 ENCOUNTER — E-VISIT (OUTPATIENT)
Dept: FAMILY MEDICINE | Facility: CLINIC | Age: 52
End: 2022-12-23
Payer: COMMERCIAL

## 2022-12-23 DIAGNOSIS — B96.89 BACTERIAL VAGINOSIS: Primary | ICD-10-CM

## 2022-12-23 DIAGNOSIS — B37.31 YEAST VAGINITIS: ICD-10-CM

## 2022-12-23 DIAGNOSIS — N76.0 BACTERIAL VAGINOSIS: Primary | ICD-10-CM

## 2022-12-23 PROCEDURE — 99422 OL DIG E/M SVC 11-20 MIN: CPT | Performed by: FAMILY MEDICINE

## 2022-12-23 RX ORDER — METRONIDAZOLE 7.5 MG/G
1 GEL VAGINAL DAILY
Qty: 70 G | Refills: 0 | Status: SHIPPED | OUTPATIENT
Start: 2022-12-23 | End: 2022-12-28

## 2022-12-23 RX ORDER — FLUCONAZOLE 150 MG/1
150 TABLET ORAL ONCE
Qty: 1 TABLET | Refills: 1 | Status: SHIPPED | OUTPATIENT
Start: 2022-12-23 | End: 2022-12-23

## 2022-12-23 NOTE — PATIENT INSTRUCTIONS
Bacterial Vaginosis    You have a vaginal infection called bacterial vaginosis (BV). Both good and bad bacteria are present in a healthy vagina. BV occurs when these bacteria get out of balance. The number of bad bacteria increase. And the number of good bacteria decrease. BV is linked with sexual activity, but it's not a sexually transmitted infection (STI).   BV may or may not cause symptoms. If symptoms do occur, they can include:     Thin, gray, milky-white, or sometimes green discharge    Unpleasant odor or  fishy  smell    Itching, burning, or pain in or around the vagina  It is not known what causes BV, but certain factors can make the problem more likely. These can include:     Douching    Spermicides    Use of antibiotics    Change in hormone levels with pregnancy, breastfeeding, or menopause    Having sex with a new partner    Having sex with more than one partner  BV will sometimes go away on its own. But treatment is often advised. This is because untreated BV can raise the risk of more serious health problems such as:     Pelvic inflammatory disease (PID)     delivery (giving birth to a baby early if you re pregnant)    HIV and some other sexually transmitted infections (STIs)    Infection after surgery on the reproductive organs  Home care  General care    BV is most often treated with medicines called antibiotics. These may be given as pills or as a vaginal cream. If antibiotics are prescribed, be sure to use them exactly as directed. And complete all of the medicine, even if your symptoms go away.    Don't douche or having sex during treatment.    If you have sex with a female partner, ask your healthcare provider if she should also be treated.  Prevention    Don't douche.    Don't have sex. If you do have sex, then take steps to lower your risk:  ? Use condoms when having sex.  ? Limit the number of sex partners you have.    Follow-up care  Follow up with your healthcare provider, or as  advised.   When to get medical advice  Call your healthcare provider right away if:     You have a fever of 100.4 F (38 C) or higher, or as directed by your provider.    Your symptoms get worse, or they don t go away within a few days of starting treatment.    You have new pain in the lower belly or pelvic region.    You have side effects that bother you or a reaction to the pills or cream you re prescribed.    You or any of your sex partners have new symptoms, such as a rash, joint pain, or sores.  [employer] last reviewed this educational content on 6/1/2020 2000-2021 [employer]. All rights reserved. This information is not intended as a substitute for professional medical care. Always follow your healthcare professional's instructions.

## 2022-12-28 DIAGNOSIS — E10.8 TYPE 1 DIABETES MELLITUS WITH COMPLICATIONS (H): ICD-10-CM

## 2022-12-28 RX ORDER — INSULIN GLARGINE-YFGN 100 [IU]/ML
18 INJECTION, SOLUTION SUBCUTANEOUS DAILY
Qty: 10 ML | Refills: 1 | Status: SHIPPED | OUTPATIENT
Start: 2022-12-28 | End: 2024-02-22

## 2022-12-28 RX ORDER — INSULIN GLARGINE-YFGN 100 [IU]/ML
18 INJECTION, SOLUTION SUBCUTANEOUS DAILY
Qty: 10 ML | Status: CANCELLED | OUTPATIENT
Start: 2022-12-28

## 2022-12-28 NOTE — TELEPHONE ENCOUNTER
[facility]    Phone Message    May a detailed message be left on voicemail: yes     Reason for Call: Medication Refill Request    Has the patient contacted the pharmacy for the refill? Yes   Name of medication being requested: Insulin Glargine-yfgn (SEMGLEE, YFGN,) 100 UNIT/ML SOPN     Provider who prescribed the medication: Hannah Whitley    Date medication is needed: 12/28/22   Pt requesting prior to the end of the year pt reached her deductable

## 2022-12-28 NOTE — TELEPHONE ENCOUNTER
Insulin Glargine-yfgn (SEMGLEE, YFGN,) 100 UNIT/ML SOPN  Last Written Prescription Date:   3/2/2022  Last Fill Quantity: 10,   # refills: 3  Last Office Visit :  10/17/2022  Future Office visit:   1/23/2023    Routing refill request to provider for review/approval because:  Drug not on the G, P or [facility] refill protocol or controlled substance      Rachel Schmid RN  Central Triage Red Flags/Med Refills

## 2023-01-16 NOTE — PROGRESS NOTES
Outcome for 01/16/23 11:02 AM: Data uploaded on Dexcom  Radha White LPN    Outcome for 01/16/23 11:03 AM: [employer] message sent- need tandem upload  Radhawhitney White LPN   Outcome for 01/19/23 3:56 PM: Left Voicemail   Elena Rosaalvaro, DASHA  Outcome for 01/20/23 2:15 PM: Data uploaded on Dexcom and Tandem  Taylor Myhre, DASHA     Start time: 0901  End time: 0935  Provider location: off site- home  Patient location: off site- home.    HPI:   Sera is a 51 yo woman here for follow up of type 1 diabetes since age 15. She has always been extremely active with high intensity biking as a regular part of her workouts.  Unfortunately, last summer, she developed exertional angina.  She had an angiogram, which was clear last year and was diagnosed with microvascular angina (epicardial CAD) and HFpEF.  This has been emotionally very difficult for her, as she has always been so healthy and physically active.  She was prescribed amlodipine (now on 7.5 mg daily), cholorthalidone 12.5 mg daily and Imdur, however she was not able to tolerate Imdur due to headaches and nausea.  She has continued her intense physical activity and she does take a nitroglycerine prior to activity.  This allows her to complete her exercise.  She has significant discomfort if she forgets to do so.  She is unsure of her long term prognosis.  She has met with Bob Burgess in health psychology, but was not sure if the virtual platform was helpful.     Her diabetes has been fairly well controlled, but she has been going low overnight lately.  Her pump failed before Christmas and she had to set up the new pump on her own.  She is not sure if she put the settings in right.    Sera wears the Tandem X2 pump with Control IQ technology.         Pump settings:   Standard Profile Active at the time of upload  Start Time Basal Rate Correction Factor Carb Ratio Target BG  Midnight 0.800 u/hr 1u:50 mg/dL 1u:15.0 g 110 mg/dL  7:00 AM 0.900 u/hr 1u:50 mg/dL 1u:12.0 g 110  mg/dL  12:00 PM 0.900 u/hr 1u:50 mg/dL 1u:15.0 g 110 mg/dL  Calculated Total Daily Basal 20.9 units  Duration of Insulin: 5:00 hours  Carbohydrates: On     Exercise Profile Inactive at the time of upload  Start Time Basal Rate Correction Factor Carb Ratio Target BG  Midnight 0.135 u/hr 1u:100 mg/dL 1u:30.0 g 140 mg/dL  Calculated Total Daily Basal 3.24 units        Duration of Insulin:  5:00 hours         Carbohydrates:   On          Max Bolus:  10 units    Her overall average glucose over the past two weeks is 156 mg/dL (CV 35%, TIR Recent glucose:                 Average total daily insulin dose: 39 units (51% basal).     GFR has been in the 50's for the past couple years.   She is intentional about avoiding dehydration.     Her mood has been down and anxious.  We have been talking about seeing a therapist for a while and she is scheduled soon with Bob Burgess.  Her boyfriend has been supportive.  She has not told her kids, as she does not worry her.   She is on antidepressant.      She has no other concerns today.     Past Medical History:   Diagnosis Date     Anxiety      DMI     Type 1   Epicardial CAD- diagnosed .   HFpEF- diagnosed .    Past Surgical History:   Procedure Laterality Date     BREAST CYST ASPIRATION Left ?      SECTION      x2     CONIZATION CERVIX,LOOP ELECTRD      Description: Cervical Conization Loop Electrode Excision;  Recorded: 2008;     CV CORONARY ANGIOGRAM N/A 10/24/2022    Procedure: Coronary Angiogram;  Surgeon: Subha Porter MD;  Location: [facility] CV     CV FRACTIONAL FLOW RATIO WIRE N/A 10/24/2022    Procedure: Fractional Flow Ratio Wire;  Surgeon: Subha Porter MD;  Location: [facility] CV     CV LEFT HEART CATH N/A 10/24/2022    Procedure: Left Heart Catheterization;  Surgeon: Subha Porter MD;  Location: [facility] LAB CV       Family History   Problem Relation Age of Onset     Cancer Father      Breast Cancer Paternal  Grandmother        Social History     Socioeconomic History     Marital status:    Tobacco Use     Smoking status: Never     Smokeless tobacco: Never   Substance and Sexual Activity     Alcohol use: Yes     Drug use: No         Past Surgical History:   Procedure Laterality Date     BREAST CYST ASPIRATION Left ?2005      SECTION      x2     CONIZATION CERVIX,LOOP ELECTRD      Description: Cervical Conization Loop Electrode Excision;  Recorded: 2008;     CV CORONARY ANGIOGRAM N/A 10/24/2022    Procedure: Coronary Angiogram;  Surgeon: Subha Porter MD;  Location: [facility] CATH LAB CV     CV FRACTIONAL FLOW RATIO WIRE N/A 10/24/2022    Procedure: Fractional Flow Ratio Wire;  Surgeon: Subha Porter MD;  Location: [facility] CATH LAB CV     CV LEFT HEART CATH N/A 10/24/2022    Procedure: Left Heart Catheterization;  Surgeon: Subha Porter MD;  Location: [facility] CATH LAB CV       Family History   Problem Relation Age of Onset     Cancer Father      Breast Cancer Paternal Grandmother        Social History   Social Hx:  .  Two teenage children.  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  Enjoys being physically active, mostly biking, but also running and HIIT.     Socioeconomic History     Marital status: other     Spouse name: Not on file     Number of children: Not on file     Years of education: Not on file     Highest education level: Not on file   Occupational History     Not on file   Social Needs     Financial resource strain: Not on file     Food insecurity:     Worry: Not on file     Inability: Not on file     Transportation needs:     Medical: Not on file     Non-medical: Not on file   Tobacco Use     Smoking status: Never Smoker     Smokeless tobacco: Never Used   Substance and Sexual Activity     Alcohol use: No     Drug use: No     Sexual activity: Not on file   Lifestyle     Physical activity:     Days per week: Not on file     Minutes per session: Not on file     Stress: Not on file  "  Relationships     Social connections:     Talks on phone: Not on file     Gets together: Not on file     Attends [employer] service: Not on file     Active member of club or organization: Not on file     Attends meetings of clubs or organizations: Not on file     Relationship status: Not on file     Intimate partner violence:     Fear of current or ex partner: Not on file     Emotionally abused: Not on file     Physically abused: Not on file     Forced sexual activity: Not on file   Other Topics Concern     Not on file   Social History Narrative     Not on file       Current Outpatient Medications   Medication     amLODIPine (NORVASC) 5 MG tablet     aspirin (ASA) 81 MG EC tablet     atorvastatin (LIPITOR) 40 MG tablet     blood glucose (NO BRAND SPECIFIED) test strip     Calcium Carbonate-Vitamin D (CALCIUM 500+D PO)     cholecalciferol 50 MCG (2000 UT) tablet     Continuous Blood Gluc Sensor (DEXCOM G6 SENSOR) MISC     Continuous Blood Gluc Transmit (DEXCOM G6 TRANSMITTER) MISC     Glucagon, rDNA, (GLUCAGON EMERGENCY) 1 MG KIT     insulin aspart (NOVOLOG VIAL) 100 UNITS/ML vial     insulin cartridge (T:SLIM 3ML) misc pump supply     Insulin Glargine-yfgn (SEMGLEE, YFGN,) 100 UNIT/ML SOLN     Insulin Infusion Pump Supplies (TRUSTEEL INFUSION SET) MISC     Insulin Pump Accessories MISC     insulin syringe-needle U-100 (30G X 1/2\" 0.3 ML) 30G X 1/2\" 0.3 ML miscellaneous     levonorgestrel (MIRENA) 20 MCG/DAY IUD     losartan (COZAAR) 50 MG tablet     nitroGLYcerin (NITROSTAT) 0.4 MG sublingual tablet     No current facility-administered medications for this visit.          Allergies   Allergen Reactions     Latex      rash     Percocet [Oxycodone-Acetaminophen] Nausea and Nausea and Vomiting       Physical Exam  GENERAL: healthy, alert.  Tearful, at times.   RESP: no audible wheeze, cough, or visible cyanosis.  No visible retractions or increased work of breathing.  Able to speak fully in complete sentences.  PSYCH: " mentation appears normal, affect normal/bright, judgement and insight intact, normal speech and appearance well-groomed  RESULTS  Lab Results   Component Value Date    A1C 7.0 (H) 04/11/2022    A1C 7.2 (H) 08/02/2021    A1C 6.8 (A) 04/09/2021    A1C 7.7 (H) 01/20/2020    A1C 7.5 (H) 09/16/2019    A1C 7.8 (H) 03/18/2019    A1C 7.7 (H) 12/13/2016    HEMOGLOBINA1 7.4 (A) 01/20/2020    HEMOGLOBINA1 7.2 (A) 09/16/2019    HEMOGLOBINA1 7.0 (A) 04/16/2018    HEMOGLOBINA1 7.0 (A) 12/11/2017    HEMOGLOBINA1 7.4 (A) 06/05/2017       TSH   Date Value Ref Range Status   10/21/2022 3.52 0.30 - 4.20 uIU/mL Final   08/02/2021 1.84 0.30 - 5.00 uIU/mL Final   01/20/2020 2.07 0.40 - 4.00 mU/L Final   09/16/2019 1.89 0.40 - 4.00 mU/L Final   03/18/2019 2.40 0.40 - 4.00 mU/L Final   12/11/2017 1.69 0.40 - 4.00 mU/L Final   12/13/2016 3.72 0.40 - 4.00 mU/L Final     T4 Free   Date Value Ref Range Status   03/18/2019 0.81 0.76 - 1.46 ng/dL Final       ALT   Date Value Ref Range Status   01/20/2020 24 0 - 50 U/L Final   09/16/2019 25 0 - 50 U/L Final   ]    Recent Labs   Lab Test 10/24/22  1133 10/21/22  0806   CHOL 92 99   HDL 39* 42*   LDL 44 49   TRIG 43 41       Lab Results   Component Value Date     10/21/2022     01/20/2020      Lab Results   Component Value Date    POTASSIUM 4.2 10/21/2022    POTASSIUM 3.8 05/06/2022    POTASSIUM 4.0 01/20/2020     Lab Results   Component Value Date    CHLORIDE 103 10/21/2022    CHLORIDE 101 05/06/2022    CHLORIDE 105 01/20/2020     Lab Results   Component Value Date    SCOTTY 9.2 10/21/2022    SCOTTY 9.2 01/20/2020     Lab Results   Component Value Date    CO2 25 10/21/2022    CO2 27 05/06/2022    CO2 32 01/20/2020     Lab Results   Component Value Date    BUN 12.0 10/21/2022    BUN 13 05/06/2022    BUN 18 01/20/2020     Lab Results   Component Value Date    CR 0.93 10/21/2022    CR 0.94 01/20/2020       GFR Estimate   Date Value Ref Range Status   10/21/2022 75 >60 mL/min/1.73m2 Final      Comment:     Effective December 21, 2021 eGFRcr in adults is calculated using the 2021 CKD-EPI creatinine equation which includes age and gender (Jackie et al., [facility], DOI: 10.1056/KJIUwv1593901)   08/13/2022 65 >60 mL/min/1.73m2 Final     Comment:     Effective December 21, 2021 eGFRcr in adults is calculated using the 2021 CKD-EPI creatinine equation which includes age and gender (Jackie et al., [facility], DOI: 10.1056/SMHQcg4308234)   05/06/2022 60 (L) >60 mL/min/1.73m2 Final     Comment:     Effective December 21, 2021 eGFRcr in adults is calculated using the 2021 CKD-EPI creatinine equation which includes age and gender (Jackie et al., [facility], DOI: 10.1056/HIXPge5723419)   06/07/2021 49 (L) >60 mL/min/1.73m2 Final   10/12/2020 51 (L) >60 mL/min/1.73m2 Final   01/20/2020 72 >60 mL/min/[1.73_m2] Final     Comment:     Non  GFR Calc  Starting 12/18/2018, serum creatinine based estimated GFR (eGFR) will be   calculated using the Chronic Kidney Disease Epidemiology Collaboration   (CKD-EPI) equation.     09/16/2019 63 >60 mL/min/[1.73_m2] Final     Comment:     Non  GFR Calc  Starting 12/18/2018, serum creatinine based estimated GFR (eGFR) will be   calculated using the Chronic Kidney Disease Epidemiology Collaboration   (CKD-EPI) equation.     08/22/2019 47 (L) >60 mL/min/1.73m2 Final   03/18/2019 77 >60 mL/min/[1.73_m2] Final     Comment:     Non  GFR Calc  Starting 12/18/2018, serum creatinine based estimated GFR (eGFR) will be   calculated using the Chronic Kidney Disease Epidemiology Collaboration   (CKD-EPI) equation.       GFR, ESTIMATED POCT   Date Value Ref Range Status   10/11/2022 >60 >60 mL/min/1.73m2 Final     GFR Estimate If Black   Date Value Ref Range Status   06/07/2021 60 (L) >60 mL/min/1.73m2 Final   10/12/2020 >60 >60 mL/min/1.73m2 Final   01/20/2020 83 >60 mL/min/[1.73_m2] Final     Comment:      GFR Calc  Starting 12/18/2018, serum creatinine  based estimated GFR (eGFR) will be   calculated using the Chronic Kidney Disease Epidemiology Collaboration   (CKD-EPI) equation.     09/16/2019 73 >60 mL/min/[1.73_m2] Final     Comment:      GFR Calc  Starting 12/18/2018, serum creatinine based estimated GFR (eGFR) will be   calculated using the Chronic Kidney Disease Epidemiology Collaboration   (CKD-EPI) equation.     08/22/2019 57 (L) >60 mL/min/1.73m2 Final   03/18/2019 89 >60 mL/min/[1.73_m2] Final     Comment:      GFR Calc  Starting 12/18/2018, serum creatinine based estimated GFR (eGFR) will be   calculated using the Chronic Kidney Disease Epidemiology Collaboration   (CKD-EPI) equation.         Lab Results   Component Value Date    MICROL <12.0 10/21/2022    MICROL 6 01/20/2020     No results found for: MICROALBUMIN  No results found for: CPEPT, GADAB, ISCAB    No results found for: B12]    Most recent eye exam date: : Not Found     December, 2021.     Assessment/Plan:     1.  Type 1 diabetes- Glucose is fairly well controlled, however she is dropping too much at night and climbing too much after eating.  Her sleep schedule was shut off. Will schedule her in diabetes education to review pump features to ensure she is using it to its potential.  We made the following changes today (instructions given to patient):     Set up appointment with Diabetes education to review pump settings/Control IQ.     Here are the pump changes we made today:   Pump settings:   Standard Profile Active at the time of upload  Start Time Basal Rate Correction Factor Carb Ratio Target BG  Midnight 0.75 (was 0.8) u/hr 1u:60 (was 50) mg/dL 1u:15.0 g 110 mg/dL  7:00 AM 0.900 u/hr 1u:50 mg/dL 1u:10 (was 12.0 g)  110 mg/dL  12:00 PM 0.900 u/hr 1u:50 mg/dL 1u:13g (1:15.0 )g 110 mg/dL    Set sleep schedule overnight 11pm-7am.     Check a1c.     Emergency issues: Please contact the clinic as soon as you recognize a problem.  Here are some concerns you  should contact us about.  -Vomiting: more than twice.  Please check ketones.  If positive, go to ER. Monitor glucose hourly.   -High glucose with a pump:  Twice in doubt, take it out.  If glucose is high x 2, change your pump site and take a correction with a syringe. Check ketones.  If glucose is not coming down, please call the clinic. If ketones are moderate or large, drink lots of water, take an insulin correction, and recheck ketones in 1 hour.  If ketones are still high (or you are vomiting), go to the ER.  -Hypoglycemia (low glucose):   If glucose is less than 70 mg/dL, treat with 15g carb (4 glucose tablets), recheck glucose in 10 minutes.  If low again, repeat.   If glucose is less than 54 mg/dL, treat with 30g carb, recheck glucose in 10 minutes.  If low again, repeat.  Keep glucagon in your home in case of severe hypoglycemia and train someone how to use this.    Emergency kit (please ensure you always have these with you):   Glucose tablets  Glucagon  Insulin  Syringes (if on a pump)  Extra infusion set (if on a pump)  Ketone strips    Contact information:   If you have concerns, please send me a [facility] message or call the clinic at [phone].  For more urgent concerns, please call [phone] after hours/weekends and ask to speak with the endocrinologist on call.      Please let me know if you are having low blood sugars less than 70 or over 350 mg/dL.  Do not wait until your next appointment if this is happening.      Consider visit with Vanda Anderson health psychologist. [phone]      2.  Risk factors-     Retinopathy:  Yes.  Last exam 12/2021. Will schedule another appointment.   Nephropathy:  BP well controlled. No history of microalbuminuria. She had SEKOU in 2019.  GFR improved to 70 in 10/2022.  Follows with nephrology, last evaluation was in 1/31/21. Encouraged hydration.   No evidence of diabetic nephropathy.       Neuropathy: decreased vibration sensation in left great toe in the past.   Normal monofilament.   No pain/burning.   Feet: OK, no ulcers.   Lipids:  LDL at target on Atorvastatin 40 mg.   Celiac screening: negative antibodies 2017.   Thyroid screening: TSH 3.52 10/2022.  ASA: 2 x 81 mg daily.     3.  Anxiety- This has worsened with recent concerns about her heart.  Symptoms are overlapping.  She has met with Bob Burgess, health psychologist, but is not sure that she found it helpful with the virtual visits.  Suggested she consider in person, perhaps with Vanda Anderson, in the future.  She will consider.      4. Heart failure with preserved EF- new dx- mild edema. Symptoms are currently well controlled. Has follow up with cardiologist scheduled.    5. F/U in 3 months with myself.  Will establish staff endocrinologist, as Dr. Roy is no longer in our clinic.        43 minutes spent on the date of the encounter doing chart review, review of test results, review of continuous glucose sensor, insulin pump data, interpretation of glucose data, patient visit and documentation, counseling/coordination of care, and discussion of follow up plan for worsening hyper and hypoglycemia.  The patient understood and is satisfied with today's visit.     Hannah Whitley PA-C, MPAS   [facility]  Department of Medicine  Division of Endocrinology and Diabetes

## 2023-01-19 ENCOUNTER — TELEPHONE (OUTPATIENT)
Dept: ENDOCRINOLOGY | Facility: CLINIC | Age: 53
End: 2023-01-19
Payer: COMMERCIAL

## 2023-01-19 NOTE — TELEPHONE ENCOUNTER
LVM.  Need patient to upload data from tandem pump. Only have up until Jan 10th. More recent data is needed for Monday's appt.. Need her to leave marie open for 10-15 minutes so we can get all that data transferred over to us on our end.   Taylor Myhre, RMA

## 2023-01-20 DIAGNOSIS — E10.8 TYPE 1 DIABETES MELLITUS WITH COMPLICATIONS (H): ICD-10-CM

## 2023-01-20 DIAGNOSIS — E10.8 TYPE 1 DIABETES MELLITUS WITH COMPLICATIONS (H): Primary | ICD-10-CM

## 2023-01-20 RX ORDER — PROCHLORPERAZINE 25 MG/1
SUPPOSITORY RECTAL
Qty: 3 EACH | Refills: 11 | Status: SHIPPED | OUTPATIENT
Start: 2023-01-20 | End: 2023-01-23

## 2023-01-20 RX ORDER — PROCHLORPERAZINE 25 MG/1
SUPPOSITORY RECTAL
Qty: 3 EACH | Refills: 11 | OUTPATIENT
Start: 2023-01-20 | End: 2023-01-20

## 2023-01-20 RX ORDER — PROCHLORPERAZINE 25 MG/1
SUPPOSITORY RECTAL
Qty: 1 EACH | Refills: 3 | OUTPATIENT
Start: 2023-01-20 | End: 2023-01-20

## 2023-01-20 RX ORDER — PROCHLORPERAZINE 25 MG/1
SUPPOSITORY RECTAL
Qty: 1 EACH | Refills: 3 | Status: SHIPPED | OUTPATIENT
Start: 2023-01-20 | End: 2023-01-23

## 2023-01-23 ENCOUNTER — VIRTUAL VISIT (OUTPATIENT)
Dept: ENDOCRINOLOGY | Facility: CLINIC | Age: 53
End: 2023-01-23
Payer: COMMERCIAL

## 2023-01-23 ENCOUNTER — TELEPHONE (OUTPATIENT)
Dept: ENDOCRINOLOGY | Facility: CLINIC | Age: 53
End: 2023-01-23

## 2023-01-23 DIAGNOSIS — E10.9 WELL CONTROLLED TYPE 1 DIABETES MELLITUS (H): Primary | ICD-10-CM

## 2023-01-23 DIAGNOSIS — E10.8 TYPE 1 DIABETES MELLITUS WITH COMPLICATIONS (H): ICD-10-CM

## 2023-01-23 PROCEDURE — 99215 OFFICE O/P EST HI 40 MIN: CPT | Mod: 95 | Performed by: PHYSICIAN ASSISTANT

## 2023-01-23 RX ORDER — AMLODIPINE BESYLATE 5 MG/1
5 TABLET ORAL DAILY
Qty: 90 TABLET | Refills: 3 | COMMUNITY
Start: 2023-01-23

## 2023-01-23 RX ORDER — PROCHLORPERAZINE 25 MG/1
SUPPOSITORY RECTAL
Qty: 9 EACH | Refills: 3 | Status: SHIPPED | OUTPATIENT
Start: 2023-01-23 | End: 2024-01-09

## 2023-01-23 RX ORDER — PROCHLORPERAZINE 25 MG/1
SUPPOSITORY RECTAL
Qty: 1 EACH | Refills: 3 | Status: SHIPPED | OUTPATIENT
Start: 2023-01-23 | End: 2024-01-09

## 2023-01-23 RX ORDER — PROCHLORPERAZINE 25 MG/1
SUPPOSITORY RECTAL
Qty: 9 EACH | Refills: 3 | Status: SHIPPED | OUTPATIENT
Start: 2023-01-23 | End: 2023-01-23

## 2023-01-23 NOTE — TELEPHONE ENCOUNTER
Called and LVM to schedule for Hannah Whitley:Schedule in 3 months with me, in 6 months to establish with Dr. Palencia.

## 2023-01-23 NOTE — PATIENT INSTRUCTIONS
Nice seeing you, Chadd!    Set up appointment with Diabetes education to review pump settings/Control IQ.     Here are the pump changes we made today:   Pump settings:   Standard Profile Active at the time of upload  Start Time Basal Rate Correction Factor Carb Ratio Target BG  Midnight 0.75 (was 0.8) u/hr 1u:60 (was 50) mg/dL 1u:15.0 g 110 mg/dL  7:00 AM 0.900 u/hr 1u:50 mg/dL 1u:10 (was 12.0 g)  110 mg/dL  12:00 PM 0.900 u/hr 1u:50 mg/dL 1u:13g (1:15.0 )g 110 mg/dL    Set sleep schedule overnight 11pm-7am.     Check a1c.     Emergency issues: Please contact the clinic as soon as you recognize a problem.  Here are some concerns you should contact us about.  -Vomiting: more than twice.  Please check ketones.  If positive, go to ER. Monitor glucose hourly.   -High glucose with a pump:  Twice in doubt, take it out.  If glucose is high x 2, change your pump site and take a correction with a syringe. Check ketones.  If glucose is not coming down, please call the clinic. If ketones are moderate or large, drink lots of water, take an insulin correction, and recheck ketones in 1 hour.  If ketones are still high (or you are vomiting), go to the ER.  -Hypoglycemia (low glucose):   If glucose is less than 70 mg/dL, treat with 15g carb (4 glucose tablets), recheck glucose in 10 minutes.  If low again, repeat.   If glucose is less than 54 mg/dL, treat with 30g carb, recheck glucose in 10 minutes.  If low again, repeat.  Keep glucagon in your home in case of severe hypoglycemia and train someone how to use this.    Emergency kit (please ensure you always have these with you):   Glucose tablets  Glucagon  Insulin  Syringes (if on a pump)  Extra infusion set (if on a pump)  Ketone strips    Contact information:   If you have concerns, please send me a [facility] message or call the clinic at [phone].  For more urgent concerns, please call [phone] after hours/weekends and ask to speak with the endocrinologist on call.       Please let me know if you are having low blood sugars less than 70 or over 350 mg/dL.  Do not wait until your next appointment if this is happening.      Consider visit with Vanda Anderson, health psychologist. [phone]

## 2023-01-23 NOTE — PROGRESS NOTES
Sera Adkins  is being evaluated via a billable video visit.      How would you like to obtain your AVS? [employer]  For the video visit, send the invitation by: Text to cell phone: [phone]  Will anyone else be joining your video visit? No

## 2023-01-23 NOTE — LETTER
1/23/2023       RE: Sera Adkins  [address]   [facility] [address]     Dear Colleague,    Thank you for referring your patient, Sera Adkins, to the [facility] [place] [facility]. Please see a copy of my visit note below.    Outcome for 01/16/23 11:02 AM: Data uploaded on Dexcom  Radha White, LPN    Outcome for 01/16/23 11:03 AM: [place] message sent- need tandem upload  Radha White, LPN   Outcome for 01/19/23 3:56 PM: Left Voicemail   Taylor Myhre, VF  Outcome for 01/20/23 2:15 PM: Data uploaded on Dexcom and Tandem  Taylor Myhre, DASHA     Start time: 0901  End time: 0935  Provider location: off site- home  Patient location: off site- home.    HPI:   Sera is a 51 yo woman here for follow up of type 1 diabetes since age 15. She has always been extremely active with high intensity biking as a regular part of her workouts.  Unfortunately, last summer, she developed exertional angina.  She had an angiogram, which was clear last year and was diagnosed with microvascular angina (epicardial CAD) and HFpEF.  This has been emotionally very difficult for her, as she has always been so healthy and physically active.  She was prescribed amlodipine (now on 7.5 mg daily), cholorthalidone 12.5 mg daily and Imdur, however she was not able to tolerate Imdur due to headaches and nausea.  She has continued her intense physical activity and she does take a nitroglycerine prior to activity.  This allows her to complete her exercise.  She has significant discomfort if she forgets to do so.  She is unsure of her long term prognosis.  She has met with Bob Burgess in health psychology, but was not sure if the virtual platform was helpful.     Her diabetes has been fairly well controlled, but she has been going low overnight lately.  Her pump failed before Christmas and she had to set up the new pump on her own.  She is not sure if she  put the settings in right.    Sera wears the Tandem X2 pump with Control Dream[employer] technology.         Pump settings:   Standard Profile Active at the time of upload  Start Time Basal Rate Correction Factor Carb Ratio Target BG  Midnight 0.800 u/hr 1u:50 mg/dL 1u:15.0 g 110 mg/dL  7:00 AM 0.900 u/hr 1u:50 mg/dL 1u:12.0 g 110 mg/dL  12:00 PM 0.900 u/hr 1u:50 mg/dL 1u:15.0 g 110 mg/dL  Calculated Total Daily Basal 20.9 units  Duration of Insulin: 5:00 hours  Carbohydrates: On     Exercise Profile Inactive at the time of upload  Start Time Basal Rate Correction Factor Carb Ratio Target BG  Midnight 0.135 u/hr 1u:100 mg/dL 1u:30.0 g 140 mg/dL  Calculated Total Daily Basal 3.24 units        Duration of Insulin:  5:00 hours         Carbohydrates:   On          Max Bolus:  10 units    Her overall average glucose over the past two weeks is 156 mg/dL (CV 35%, TIR Recent glucose:                 Average total daily insulin dose: 39 units (51% basal).     GFR has been in the 50's for the past couple years.   She is intentional about avoiding dehydration.     Her mood has been down and anxious.  We have been talking about seeing a therapist for a while and she is scheduled soon with Bob Burgess.  Her boyfriend has been supportive.  She has not told her kids, as she does not worry her.   She is on antidepressant.      She has no other concerns today.     Past Medical History:   Diagnosis Date     Anxiety      DMI     Type 1   Epicardial CAD- diagnosed .   HFpEF- diagnosed .    Past Surgical History:   Procedure Laterality Date     BREAST CYST ASPIRATION Left ?      SECTION      x2     CONIZATION CERVIX,LOOP ELECTRD      Description: Cervical Conization Loop Electrode Excision;  Recorded: 2008;     CV CORONARY ANGIOGRAM N/A 10/24/2022    Procedure: Coronary Angiogram;  Surgeon: Subha Porter MD;  Location: [facility] CATH LAB CV     CV FRACTIONAL FLOW RATIO WIRE N/A 10/24/2022    Procedure: Fractional  Flow Ratio Wire;  Surgeon: Subha Porter MD;  Location: [facility] CATH LAB CV     CV LEFT HEART CATH N/A 10/24/2022    Procedure: Left Heart Catheterization;  Surgeon: Subha Porter MD;  Location: [facility] CATH LAB CV       Family History   Problem Relation Age of Onset     Cancer Father      Breast Cancer Paternal Grandmother        Social History     Socioeconomic History     Marital status:    Tobacco Use     Smoking status: Never     Smokeless tobacco: Never   Substance and Sexual Activity     Alcohol use: Yes     Drug use: No         Past Surgical History:   Procedure Laterality Date     BREAST CYST ASPIRATION Left ?2005      SECTION      x2     CONIZATION CERVIX,LOOP ELECTRD      Description: Cervical Conization Loop Electrode Excision;  Recorded: 2008;     CV CORONARY ANGIOGRAM N/A 10/24/2022    Procedure: Coronary Angiogram;  Surgeon: Subha Porter MD;  Location: [facility] CATH LAB CV     CV FRACTIONAL FLOW RATIO WIRE N/A 10/24/2022    Procedure: Fractional Flow Ratio Wire;  Surgeon: Subha Porter MD;  Location: [facility] CATH LAB CV     CV LEFT HEART CATH N/A 10/24/2022    Procedure: Left Heart Catheterization;  Surgeon: Subha Porter MD;  Location: [facility] CATH LAB CV       Family History   Problem Relation Age of Onset     Cancer Father      Breast Cancer Paternal Grandmother        Social History   Social Hx:  .  Two teenage children.  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  Enjoys being physically active, mostly biking, but also running and HIIT.     Socioeconomic History     Marital status: other     Spouse name: Not on file     Number of children: Not on file     Years of education: Not on file     Highest education level: Not on file   Occupational History     Not on file   Social Needs     Financial resource strain: Not on file     Food insecurity:     Worry: Not on file     Inability: Not on file     Transportation needs:     Medical: Not on file     Non-medical:  "Not on file   Tobacco Use     Smoking status: Never Smoker     Smokeless tobacco: Never Used   Substance and Sexual Activity     Alcohol use: No     Drug use: No     Sexual activity: Not on file   Lifestyle     Physical activity:     Days per week: Not on file     Minutes per session: Not on file     Stress: Not on file   Relationships     Social connections:     Talks on phone: Not on file     Gets together: Not on file     Attends Druze service: Not on file     Active member of club or organization: Not on file     Attends meetings of clubs or organizations: Not on file     Relationship status: Not on file     Intimate partner violence:     Fear of current or ex partner: Not on file     Emotionally abused: Not on file     Physically abused: Not on file     Forced sexual activity: Not on file   Other Topics Concern     Not on file   Social History Narrative     Not on file       Current Outpatient Medications   Medication     amLODIPine (NORVASC) 5 MG tablet     aspirin (ASA) 81 MG EC tablet     atorvastatin (LIPITOR) 40 MG tablet     blood glucose (NO BRAND SPECIFIED) test strip     Calcium Carbonate-Vitamin D (CALCIUM 500+D PO)     cholecalciferol 50 MCG (2000 UT) tablet     Continuous Blood Gluc Sensor (DEXCOM G6 SENSOR) MISC     Continuous Blood Gluc Transmit (DEXCOM G6 TRANSMITTER) MISC     Glucagon, rDNA, (GLUCAGON EMERGENCY) 1 MG KIT     insulin aspart (NOVOLOG VIAL) 100 UNITS/ML vial     insulin cartridge (T:SLIM 3ML) misc pump supply     Insulin Glargine-yfgn (SEMGLEE, YFGN,) 100 UNIT/ML SOLN     Insulin Infusion Pump Supplies (TRUSTEEL INFUSION SET) MISC     Insulin Pump Accessories MISC     insulin syringe-needle U-100 (30G X 1/2\" 0.3 ML) 30G X 1/2\" 0.3 ML miscellaneous     levonorgestrel (MIRENA) 20 MCG/DAY IUD     losartan (COZAAR) 50 MG tablet     nitroGLYcerin (NITROSTAT) 0.4 MG sublingual tablet     No current facility-administered medications for this visit.          Allergies   Allergen " Reactions     Latex      rash     Percocet [Oxycodone-Acetaminophen] Nausea and Nausea and Vomiting       Physical Exam  GENERAL: healthy, alert.  Tearful, at times.   RESP: no audible wheeze, cough, or visible cyanosis.  No visible retractions or increased work of breathing.  Able to speak fully in complete sentences.  PSYCH: mentation appears normal, affect normal/bright, judgement and insight intact, normal speech and appearance well-groomed  RESULTS  Lab Results   Component Value Date    A1C 7.0 (H) 04/11/2022    A1C 7.2 (H) 08/02/2021    A1C 6.8 (A) 04/09/2021    A1C 7.7 (H) 01/20/2020    A1C 7.5 (H) 09/16/2019    A1C 7.8 (H) 03/18/2019    A1C 7.7 (H) 12/13/2016    HEMOGLOBINA1 7.4 (A) 01/20/2020    HEMOGLOBINA1 7.2 (A) 09/16/2019    HEMOGLOBINA1 7.0 (A) 04/16/2018    HEMOGLOBINA1 7.0 (A) 12/11/2017    HEMOGLOBINA1 7.4 (A) 06/05/2017       TSH   Date Value Ref Range Status   10/21/2022 3.52 0.30 - 4.20 uIU/mL Final   08/02/2021 1.84 0.30 - 5.00 uIU/mL Final   01/20/2020 2.07 0.40 - 4.00 mU/L Final   09/16/2019 1.89 0.40 - 4.00 mU/L Final   03/18/2019 2.40 0.40 - 4.00 mU/L Final   12/11/2017 1.69 0.40 - 4.00 mU/L Final   12/13/2016 3.72 0.40 - 4.00 mU/L Final     T4 Free   Date Value Ref Range Status   03/18/2019 0.81 0.76 - 1.46 ng/dL Final       ALT   Date Value Ref Range Status   01/20/2020 24 0 - 50 U/L Final   09/16/2019 25 0 - 50 U/L Final   ]    Recent Labs   Lab Test 10/24/22  1133 10/21/22  0806   CHOL 92 99   HDL 39* 42*   LDL 44 49   TRIG 43 41       Lab Results   Component Value Date     10/21/2022     01/20/2020      Lab Results   Component Value Date    POTASSIUM 4.2 10/21/2022    POTASSIUM 3.8 05/06/2022    POTASSIUM 4.0 01/20/2020     Lab Results   Component Value Date    CHLORIDE 103 10/21/2022    CHLORIDE 101 05/06/2022    CHLORIDE 105 01/20/2020     Lab Results   Component Value Date    SCOTTY 9.2 10/21/2022    SCOTTY 9.2 01/20/2020     Lab Results   Component Value Date    CO2 25  10/21/2022    CO2 27 05/06/2022    CO2 32 01/20/2020     Lab Results   Component Value Date    BUN 12.0 10/21/2022    BUN 13 05/06/2022    BUN 18 01/20/2020     Lab Results   Component Value Date    CR 0.93 10/21/2022    CR 0.94 01/20/2020       GFR Estimate   Date Value Ref Range Status   10/21/2022 75 >60 mL/min/1.73m2 Final     Comment:     Effective December 21, 2021 eGFRcr in adults is calculated using the 2021 CKD-EPI creatinine equation which includes age and gender ( et al., NE, DOI: 10.1056/YPEGwt9781066)   08/13/2022 65 >60 mL/min/1.73m2 Final     Comment:     Effective December 21, 2021 eGFRcr in adults is calculated using the 2021 CKD-EPI creatinine equation which includes age and gender (Jackie et al., NE, DOI: 10.1056/UEVHti7525378)   05/06/2022 60 (L) >60 mL/min/1.73m2 Final     Comment:     Effective December 21, 2021 eGFRcr in adults is calculated using the 2021 CKD-EPI creatinine equation which includes age and gender (Jackie et al., NE, DOI: 10.1056/JUZFnh6628665)   06/07/2021 49 (L) >60 mL/min/1.73m2 Final   10/12/2020 51 (L) >60 mL/min/1.73m2 Final   01/20/2020 72 >60 mL/min/[1.73_m2] Final     Comment:     Non  GFR Calc  Starting 12/18/2018, serum creatinine based estimated GFR (eGFR) will be   calculated using the Chronic Kidney Disease Epidemiology Collaboration   (CKD-EPI) equation.     09/16/2019 63 >60 mL/min/[1.73_m2] Final     Comment:     Non  GFR Calc  Starting 12/18/2018, serum creatinine based estimated GFR (eGFR) will be   calculated using the Chronic Kidney Disease Epidemiology Collaboration   (CKD-EPI) equation.     08/22/2019 47 (L) >60 mL/min/1.73m2 Final   03/18/2019 77 >60 mL/min/[1.73_m2] Final     Comment:     Non  GFR Calc  Starting 12/18/2018, serum creatinine based estimated GFR (eGFR) will be   calculated using the Chronic Kidney Disease Epidemiology Collaboration   (CKD-EPI) equation.       GFR, ESTIMATED POCT    Date Value Ref Range Status   10/11/2022 >60 >60 mL/min/1.73m2 Final     GFR Estimate If Black   Date Value Ref Range Status   06/07/2021 60 (L) >60 mL/min/1.73m2 Final   10/12/2020 >60 >60 mL/min/1.73m2 Final   01/20/2020 83 >60 mL/min/[1.73_m2] Final     Comment:      GFR Calc  Starting 12/18/2018, serum creatinine based estimated GFR (eGFR) will be   calculated using the Chronic Kidney Disease Epidemiology Collaboration   (CKD-EPI) equation.     09/16/2019 73 >60 mL/min/[1.73_m2] Final     Comment:      GFR Calc  Starting 12/18/2018, serum creatinine based estimated GFR (eGFR) will be   calculated using the Chronic Kidney Disease Epidemiology Collaboration   (CKD-EPI) equation.     08/22/2019 57 (L) >60 mL/min/1.73m2 Final   03/18/2019 89 >60 mL/min/[1.73_m2] Final     Comment:      GFR Calc  Starting 12/18/2018, serum creatinine based estimated GFR (eGFR) will be   calculated using the Chronic Kidney Disease Epidemiology Collaboration   (CKD-EPI) equation.         Lab Results   Component Value Date    MICROL <12.0 10/21/2022    MICROL 6 01/20/2020     No results found for: MICROALBUMIN  No results found for: CPEPT, GADAB, ISCAB    No results found for: B12]    Most recent eye exam date: : Not Found     December, 2021.     Assessment/Plan:     1.  Type 1 diabetes- Glucose is fairly well controlled, however she is dropping too much at night and climbing too much after eating.  Her sleep schedule was shut off. Will schedule her in diabetes education to review pump features to ensure she is using it to its potential.  We made the following changes today (instructions given to patient):     Set up appointment with Diabetes education to review pump settings/Control IQ.     Here are the pump changes we made today:   Pump settings:   Standard Profile Active at the time of upload  Start Time Basal Rate Correction Factor Carb Ratio Target BG  Midnight 0.75 (was 0.8) u/hr  1u:60 (was 50) mg/dL 1u:15.0 g 110 mg/dL  7:00 AM 0.900 u/hr 1u:50 mg/dL 1u:10 (was 12.0 g)  110 mg/dL  12:00 PM 0.900 u/hr 1u:50 mg/dL 1u:13g (1:15.0 )g 110 mg/dL    Set sleep schedule overnight 11pm-7am.     Check a1c.     Emergency issues: Please contact the clinic as soon as you recognize a problem.  Here are some concerns you should contact us about.  -Vomiting: more than twice.  Please check ketones.  If positive, go to ER. Monitor glucose hourly.   -High glucose with a pump:  Twice in doubt, take it out.  If glucose is high x 2, change your pump site and take a correction with a syringe. Check ketones.  If glucose is not coming down, please call the clinic. If ketones are moderate or large, drink lots of water, take an insulin correction, and recheck ketones in 1 hour.  If ketones are still high (or you are vomiting), go to the ER.  -Hypoglycemia (low glucose):   If glucose is less than 70 mg/dL, treat with 15g carb (4 glucose tablets), recheck glucose in 10 minutes.  If low again, repeat.   If glucose is less than 54 mg/dL, treat with 30g carb, recheck glucose in 10 minutes.  If low again, repeat.  Keep glucagon in your home in case of severe hypoglycemia and train someone how to use this.    Emergency kit (please ensure you always have these with you):   Glucose tablets  Glucagon  Insulin  Syringes (if on a pump)  Extra infusion set (if on a pump)  Ketone strips    Contact information:   If you have concerns, please send me a Roll20 message or call the clinic at [phone].  For more urgent concerns, please call [phone] after hours/weekends and ask to speak with the endocrinologist on call.      Please let me know if you are having low blood sugars less than 70 or over 350 mg/dL.  Do not wait until your next appointment if this is happening.      Consider visit with Vanda Anderson health psychologist. [phone]      2.  Risk factors-     Retinopathy:  Yes.  Last exam 12/2021. Will schedule another  appointment.   Nephropathy:  BP well controlled. No history of microalbuminuria. She had SEKOU in 2019.  GFR improved to 70 in 10/2022.  Follows with nephrology, last evaluation was in 1/31/21. Encouraged hydration.   No evidence of diabetic nephropathy.       Neuropathy: decreased vibration sensation in left great toe in the past.  Normal monofilament.   No pain/burning.   Feet: OK, no ulcers.   Lipids:  LDL at target on Atorvastatin 40 mg.   Celiac screening: negative antibodies 2017.   Thyroid screening: TSH 3.52 10/2022.  ASA: 2 x 81 mg daily.     3.  Anxiety- This has worsened with recent concerns about her heart.  Symptoms are overlapping.  She has met with Bob Burgess, health psychologist, but is not sure that she found it helpful with the virtual visits.  Suggested she consider in person, perhaps with Vanda Anderson, in the future.  She will consider.      4. Heart failure with preserved EF- new dx- mild edema. Symptoms are currently well controlled. Has follow up with cardiologist scheduled.    5. F/U in 3 months with myself.  Will establish staff endocrinologist, as Dr. Roy is no longer in our clinic.        43 minutes spent on the date of the encounter doing chart review, review of test results, review of continuous glucose sensor, insulin pump data, interpretation of glucose data, patient visit and documentation, counseling/coordination of care, and discussion of follow up plan for worsening hyper and hypoglycemia.  The patient understood and is satisfied with today's visit.     Hannah Whitley PA-C, MPAS   [facility]  Department of Medicine  Division of Endocrinology and Diabetes    Sera Hadleyon  is being evaluated via a billable video visit.      How would you like to obtain your AVS? MyChart  For the video visit, send the invitation by: Text to cell phone: [phone]  Will anyone else be joining your video visit? No

## 2023-01-30 ENCOUNTER — TELEPHONE (OUTPATIENT)
Dept: ENDOCRINOLOGY | Facility: CLINIC | Age: 53
End: 2023-01-30
Payer: COMMERCIAL

## 2023-01-30 NOTE — TELEPHONE ENCOUNTER
MTM referral from: [facility] visit (referral by provider)    MTM referral outreach attempt #2 on January 30, 2023 at 3:52 PM      Outcome: Patient not reachable after several attempts, will route to MTM Pharmacist/Provider as an FYI.  MTM scheduling number is [phone].  Thank you for the referral.    Diaz Steve, MTM coordinator    Pt is [facility]

## 2023-02-06 DIAGNOSIS — E10.8 TYPE 1 DIABETES MELLITUS WITH COMPLICATIONS (H): ICD-10-CM

## 2023-02-06 RX ORDER — LOSARTAN POTASSIUM 50 MG/1
TABLET ORAL
Qty: 90 TABLET | Refills: 3 | Status: SHIPPED | OUTPATIENT
Start: 2023-02-06

## 2023-02-06 NOTE — TELEPHONE ENCOUNTER
"Routing refill request to provider for review/approval because:  A break in medication    Last Written Prescription Date:  6/19/22  Last Fill Quantity: 90,  # refills: 1   Last office visit provider:  8/22/22     Requested Prescriptions   Pending Prescriptions Disp Refills     losartan (COZAAR) 50 MG tablet [Pharmacy Med Name: LOSARTAN TABS 50MG] 90 tablet 3     Sig: TAKE 1 TABLET DAILY       Angiotensin-II Receptors Passed - 2/6/2023  8:01 AM        Passed - Last blood pressure under 140/90 in past 12 months     BP Readings from Last 3 Encounters:   12/12/22 130/78   10/24/22 118/67   10/21/22 94/60                 Passed - Recent (12 mo) or future (30 days) visit within the authorizing provider's specialty     Patient has had an office visit with the authorizing provider or a provider within the authorizing providers department within the previous 12 mos or has a future within next 30 days. See \"Patient Info\" tab in inbasket, or \"Choose Columns\" in Meds & Orders section of the refill encounter.              Passed - Medication is active on med list        Passed - Patient is age 18 or older        Passed - No active pregnancy on record        Passed - Normal serum creatinine on file in past 12 months     Recent Labs   Lab Test 10/21/22  0806   CR 0.93       Ok to refill medication if creatinine is low          Passed - Normal serum potassium on file in past 12 months     Recent Labs   Lab Test 10/21/22  0806   POTASSIUM 4.2                    Passed - No positive pregnancy test in past 12 months             Juice Mccarthy RN 02/06/23 3:47 PM  "

## 2023-03-20 ENCOUNTER — TRANSFERRED RECORDS (OUTPATIENT)
Dept: HEALTH INFORMATION MANAGEMENT | Facility: CLINIC | Age: 53
End: 2023-03-20
Payer: COMMERCIAL

## 2023-03-20 LAB
RETINOPATHY: NEGATIVE
RETINOPATHY: NEGATIVE

## 2023-04-07 DIAGNOSIS — E10.8 TYPE 1 DIABETES MELLITUS WITH COMPLICATIONS (H): ICD-10-CM

## 2023-04-09 NOTE — TELEPHONE ENCOUNTER
insulin aspart (NOVOLOG VIAL) 100 UNITS/ML vial 70 mL 1 7/20/2022         Last Written Prescription Date:  7-  Last Fill Quantity: 70ml,   # refills: 1  Last Office Visit : 1-  Future Office visit:  none    Routing refill request to provider for review/approval because:  Insulin - refilled per clinic      Kathleen M Doege RN

## 2023-04-10 RX ORDER — INSULIN ASPART 100 [IU]/ML
INJECTION, SOLUTION INTRAVENOUS; SUBCUTANEOUS
Qty: 70 ML | Refills: 2 | Status: SHIPPED | OUTPATIENT
Start: 2023-04-10 | End: 2024-01-16

## 2023-05-03 ENCOUNTER — LAB REQUISITION (OUTPATIENT)
Dept: LAB | Facility: CLINIC | Age: 53
End: 2023-05-03
Payer: COMMERCIAL

## 2023-05-03 DIAGNOSIS — N39.0 URINARY TRACT INFECTION, SITE NOT SPECIFIED: ICD-10-CM

## 2023-05-03 PROCEDURE — 87186 SC STD MICRODIL/AGAR DIL: CPT | Mod: ORL | Performed by: OBSTETRICS & GYNECOLOGY

## 2023-05-05 LAB — BACTERIA UR CULT: ABNORMAL

## 2023-05-22 NOTE — CONFIDENTIAL NOTE
atorvastatin (LIPITOR) 10 MG tablet        Last Written Prescription Date:  6/2/2022  Last Fill Quantity: 90,   # refills: 0  Last Office Visit : 4/11/2022  Future Office visit:  10/17/2022    Routing refill request to provider for review/approval because: labs          pleasant, well nourished, well developed, in no acute distress , normal communication ability

## 2023-06-01 ENCOUNTER — HEALTH MAINTENANCE LETTER (OUTPATIENT)
Age: 53
End: 2023-06-01

## 2023-06-26 ENCOUNTER — TRANSFERRED RECORDS (OUTPATIENT)
Dept: HEALTH INFORMATION MANAGEMENT | Facility: CLINIC | Age: 53
End: 2023-06-26
Payer: COMMERCIAL

## 2023-07-07 ENCOUNTER — TRANSFERRED RECORDS (OUTPATIENT)
Dept: HEALTH INFORMATION MANAGEMENT | Facility: CLINIC | Age: 53
End: 2023-07-07
Payer: COMMERCIAL

## 2023-07-12 ENCOUNTER — TELEPHONE (OUTPATIENT)
Dept: FAMILY MEDICINE | Facility: CLINIC | Age: 53
End: 2023-07-12
Payer: COMMERCIAL

## 2023-07-12 NOTE — TELEPHONE ENCOUNTER
Nurse SBAR    Situation:  Patient calling to report symptoms & requesting to have labs ordered from PCP.    Background:  Has a hx of Hyperkalemia. States that sx were similar to when she had elevated potassium level.    Assessment:  Reports having leg cramping, dizziness, nausea. Sx started Monday night. Feels like it was worst last night. Denies any breathing difficulty. States she was going to go to  but is currently at work & sx are not as bad now.    Recommendation:  RN recommended evaluation. Patient is at work & would like to make an appt for Friday instead. Scheduled patient with PCP on 7/14.    Advised patient that if sx worsen or if develop chest pain/SOB to go to ER. Patient verbalizes understanding.    Jannet Leonard, RN, BSN  [facility]

## 2023-08-28 ENCOUNTER — PATIENT OUTREACH (OUTPATIENT)
Dept: CARE COORDINATION | Facility: CLINIC | Age: 53
End: 2023-08-28
Payer: COMMERCIAL

## 2023-09-25 ENCOUNTER — PATIENT OUTREACH (OUTPATIENT)
Dept: CARE COORDINATION | Facility: CLINIC | Age: 53
End: 2023-09-25
Payer: COMMERCIAL

## 2023-09-25 NOTE — PROGRESS NOTES
"Outcome for 09/25/23 3:39 PM: Data uploaded on Dexcom  Betty Mcdonough MA  Outcome for 09/25/23 3:40 PM: CPA Exchange message sent  Betty Mcdonough MA  Outcome for 09/29/23 1:13 PM:  Spoke with patient. Tandem data only showing up to 1/24/23. Pt will leave tandem marie open connected to wifi.   Betty Mcdonough MA  Outcome for 10/02/23 10:22 AM: Left Voicemail   Betty Mcdonough MA  Outcome for 10/02/23 10:24 AM: Data obtained via Dexcom website  Betty MENDY Mcdonough  Outcome for 10/03/23 6:54 AM: Error with upload pump, settings obtained via tandem.    Radha White LPN       Start time: 0704  End time: 0731  Provider location: off site- home  Patient location: off site- home.        HPI:   Sera is a 53 yo woman here for follow up of type 1 diabetes since age 15.   She has always been extremely active with high intensity biking as a regular part of her workouts.  Unfortunately, last summer, she developed exertional angina.  She had an angiogram, which was clear last year and was diagnosed with microvascular angina (epicardial CAD) and HFpEF.  This has been emotionally very difficult for her, as she has always been so healthy and physically active.  She has been doing very well this year.  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  She is no longer requiring nitroglycerine before activity.     Recently she has been running out of areas on abdomen where she is able to absorb insulin.  Sometimes puts it in a site and removes it.  Stings.  Wonders about other options for infusion sites.  Using the steel sites now.      Vitals- August 1st- A1c 7.1%.  8/4- 100/69.  A1c- 7.4%- 10/2/23.  BP- 101/61.  Weight- 162.8#, 5'8\".      Sera wears the Tandem X2 pump with Control [employer] technology.     Unable to upload her pump.  Open the marie and stay connected to WIFI is not working.     Device Settings   Standard Profile Active at the time of upload  Start " Time Basal Rate Correction Factor Carb Ratio Target BG  Midnight 0.800 u/hr 1u:50 mg/dL 1u:15.0 g 110 mg/dL  6:00 AM 0.900 u/hr 1u:50 mg/dL 1u:10.0 g 110 mg/dL  12:00 PM 0.900 u/hr 1u:50 mg/dL 1u:15.0 g 110 mg/dL  Calculated Total Daily Basal 21.0 units  Duration of Insulin: 5:00 hours  Carbohydrates: On  Max Bolus: 12 units  Exercise Profile Inactive at the time of upload  Start Time Basal Rate Correction Factor Carb Ratio Target BG  Midnight 0.135 u/hr 1u:100 mg/dL 1u:30.0 g 140 mg/dL  Calculated Total Daily Basal 3.2 units  Duration of Insulin: 5:00 hours  Carbohydrates: On  Max Bolus: 12 units    Set sleep schedule overnight 11pm-7am.     Her overall average glucose over the past two weeks is 175 mg/dL (CV 35%, TIR Recent glucose:                 Average total daily insulin dose: 39 units (51% basal).     GFR has been in the 50's for the past couple years.   She is intentional about avoiding dehydration.     Her mood has been down and anxious.  We have been talking about seeing a therapist for a while and she is scheduled soon with Bob Burgess.  Her boyfriend has been supportive.  She has not told her kids, as she does not worry her.   She is on antidepressant.      She has no other concerns today.     Past Medical History:   Diagnosis Date    Anxiety     DMI     Type 1   Epicardial CAD- diagnosed .   HFpEF- diagnosed .    Past Surgical History:   Procedure Laterality Date    BREAST CYST ASPIRATION Left ?     SECTION      x2    CONIZATION CERVIX,LOOP ELECTRD      Description: Cervical Conization Loop Electrode Excision;  Recorded: 2008;    CV CORONARY ANGIOGRAM N/A 10/24/2022    Procedure: Coronary Angiogram;  Surgeon: Subha Porter MD;  Location: [facility] CATH LAB CV    CV FRACTIONAL FLOW RATIO WIRE N/A 10/24/2022    Procedure: Fractional Flow Ratio Wire;  Surgeon: Subha Porter MD;  Location: [facility] CATH LAB CV    CV LEFT HEART CATH N/A 10/24/2022    Procedure: Left Heart  Catheterization;  Surgeon: Subha Porter MD;  Location: [facility] CATH LAB CV       Family History   Problem Relation Age of Onset    Cancer Father     Breast Cancer Paternal Grandmother        Social History     Socioeconomic History    Marital status:    Tobacco Use    Smoking status: Never    Smokeless tobacco: Never   Substance and Sexual Activity    Alcohol use: Yes    Drug use: No         Past Surgical History:   Procedure Laterality Date    BREAST CYST ASPIRATION Left ?2005     SECTION      x2    CONIZATION CERVIX,LOOP ELECTRD      Description: Cervical Conization Loop Electrode Excision;  Recorded: 2008;    CV CORONARY ANGIOGRAM N/A 10/24/2022    Procedure: Coronary Angiogram;  Surgeon: Subha Porter MD;  Location: [facility] CATH LAB CV    CV FRACTIONAL FLOW RATIO WIRE N/A 10/24/2022    Procedure: Fractional Flow Ratio Wire;  Surgeon: Subha Porter MD;  Location: [facility] CATH LAB CV    CV LEFT HEART CATH N/A 10/24/2022    Procedure: Left Heart Catheterization;  Surgeon: Subha Porter MD;  Location: [facility] CATH LAB CV       Family History   Problem Relation Age of Onset    Cancer Father     Breast Cancer Paternal Grandmother        Social History   Social Hx:  .  Two teenage children.  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  Enjoys being physically active, mostly biking, but also running and HIIT.     Socioeconomic History    Marital status: other     Spouse name: Not on file    Number of children: Not on file    Years of education: Not on file    Highest education level: Not on file   Occupational History    Not on file   Social Needs    Financial resource strain: Not on file    Food insecurity:     Worry: Not on file     Inability: Not on file    Transportation needs:     Medical: Not on file     Non-medical: Not on file   Tobacco Use    Smoking status: Never Smoker    Smokeless tobacco: Never Used   Substance and Sexual Activity    Alcohol use: No    Drug use: No     "Sexual activity: Not on file   Lifestyle    Physical activity:     Days per week: Not on file     Minutes per session: Not on file    Stress: Not on file   Relationships    Social connections:     Talks on phone: Not on file     Gets together: Not on file     Attends Sikh service: Not on file     Active member of club or organization: Not on file     Attends meetings of clubs or organizations: Not on file     Relationship status: Not on file    Intimate partner violence:     Fear of current or ex partner: Not on file     Emotionally abused: Not on file     Physically abused: Not on file     Forced sexual activity: Not on file   Other Topics Concern    Not on file   Social History Narrative    Not on file       Current Outpatient Medications   Medication    amLODIPine (NORVASC) 5 MG tablet    aspirin (ASA) 81 MG EC tablet    atorvastatin (LIPITOR) 40 MG tablet    blood glucose (NO BRAND SPECIFIED) test strip    Calcium Carbonate-Vitamin D (CALCIUM 500+D PO)    cholecalciferol 50 MCG (2000 UT) tablet    Continuous Blood Gluc Sensor (DEXCOM G6 SENSOR) MISC    Continuous Blood Gluc Transmit (DEXCOM G6 TRANSMITTER) MISC    Glucagon, rDNA, (GLUCAGON EMERGENCY) 1 MG KIT    insulin cartridge (T:SLIM 3ML) misc pump supply    Insulin Glargine-yfgn (SEMGLEE, YFGN,) 100 UNIT/ML SOLN    Insulin Infusion Pump Supplies (TRUSTEEL INFUSION SET) MISC    Insulin Pump Accessories MISC    insulin syringe-needle U-100 (30G X 1/2\" 0.3 ML) 30G X 1/2\" 0.3 ML miscellaneous    levonorgestrel (MIRENA) 20 MCG/DAY IUD    losartan (COZAAR) 50 MG tablet    nitroGLYcerin (NITROSTAT) 0.4 MG sublingual tablet    NOVOLOG VIAL 100 UNIT/ML soln     No current facility-administered medications for this visit.          Allergies   Allergen Reactions    Latex      rash    Percocet [Oxycodone-Acetaminophen] Nausea and Nausea and Vomiting       Physical Exam  GENERAL: healthy, alert.   RESP: no audible wheeze, cough, or visible cyanosis.  No visible " retractions or increased work of breathing.  Able to speak fully in complete sentences.  PSYCH: mentation appears normal, affect normal/bright, judgement and insight intact, normal speech and appearance well-groomed    RESULTS  Lab Results   Component Value Date    A1C 7.0 (H) 04/11/2022    A1C 7.2 (H) 08/02/2021    A1C 6.8 (A) 04/09/2021    A1C 7.7 (H) 01/20/2020    A1C 7.5 (H) 09/16/2019    A1C 7.8 (H) 03/18/2019    A1C 7.7 (H) 12/13/2016    HEMOGLOBINA1 7.4 (A) 01/20/2020    HEMOGLOBINA1 7.2 (A) 09/16/2019    HEMOGLOBINA1 7.0 (A) 04/16/2018    HEMOGLOBINA1 7.0 (A) 12/11/2017    HEMOGLOBINA1 7.4 (A) 06/05/2017       TSH   Date Value Ref Range Status   10/21/2022 3.52 0.30 - 4.20 uIU/mL Final   08/02/2021 1.84 0.30 - 5.00 uIU/mL Final   01/20/2020 2.07 0.40 - 4.00 mU/L Final   09/16/2019 1.89 0.40 - 4.00 mU/L Final   03/18/2019 2.40 0.40 - 4.00 mU/L Final   12/11/2017 1.69 0.40 - 4.00 mU/L Final   12/13/2016 3.72 0.40 - 4.00 mU/L Final     T4 Free   Date Value Ref Range Status   03/18/2019 0.81 0.76 - 1.46 ng/dL Final       ALT   Date Value Ref Range Status   01/20/2020 24 0 - 50 U/L Final   09/16/2019 25 0 - 50 U/L Final   ]    Recent Labs   Lab Test 10/24/22  1133 10/21/22  0806   CHOL 92 99   HDL 39* 42*   LDL 44 49   TRIG 43 41       Lab Results   Component Value Date     10/21/2022     01/20/2020      Lab Results   Component Value Date    POTASSIUM 4.2 10/21/2022    POTASSIUM 3.8 05/06/2022    POTASSIUM 4.0 01/20/2020     Lab Results   Component Value Date    CHLORIDE 103 10/21/2022    CHLORIDE 101 05/06/2022    CHLORIDE 105 01/20/2020     Lab Results   Component Value Date    SCOTTY 9.2 10/21/2022    SCOTTY 9.2 01/20/2020     Lab Results   Component Value Date    CO2 25 10/21/2022    CO2 27 05/06/2022    CO2 32 01/20/2020     Lab Results   Component Value Date    BUN 12.0 10/21/2022    BUN 13 05/06/2022    BUN 18 01/20/2020     Lab Results   Component Value Date    CR 0.93 10/21/2022    CR 0.94  01/20/2020       GFR Estimate   Date Value Ref Range Status   10/21/2022 75 >60 mL/min/1.73m2 Final     Comment:     Effective December 21, 2021 eGFRcr in adults is calculated using the 2021 CKD-EPI creatinine equation which includes age and gender (Jackie et al., [facility], DOI: 10.1056/UCEKge9495462)   08/13/2022 65 >60 mL/min/1.73m2 Final     Comment:     Effective December 21, 2021 eGFRcr in adults is calculated using the 2021 CKD-EPI creatinine equation which includes age and gender (Jackie et al., [facility], DOI: 10.1056/BDAVyd4230840)   05/06/2022 60 (L) >60 mL/min/1.73m2 Final     Comment:     Effective December 21, 2021 eGFRcr in adults is calculated using the 2021 CKD-EPI creatinine equation which includes age and gender (Jackie et al., [facility], DOI: 10.1056/TKJMfs4220620)   06/07/2021 49 (L) >60 mL/min/1.73m2 Final   10/12/2020 51 (L) >60 mL/min/1.73m2 Final   01/20/2020 72 >60 mL/min/[1.73_m2] Final     Comment:     Non  GFR Calc  Starting 12/18/2018, serum creatinine based estimated GFR (eGFR) will be   calculated using the Chronic Kidney Disease Epidemiology Collaboration   (CKD-EPI) equation.     09/16/2019 63 >60 mL/min/[1.73_m2] Final     Comment:     Non  GFR Calc  Starting 12/18/2018, serum creatinine based estimated GFR (eGFR) will be   calculated using the Chronic Kidney Disease Epidemiology Collaboration   (CKD-EPI) equation.     08/22/2019 47 (L) >60 mL/min/1.73m2 Final   03/18/2019 77 >60 mL/min/[1.73_m2] Final     Comment:     Non  GFR Calc  Starting 12/18/2018, serum creatinine based estimated GFR (eGFR) will be   calculated using the Chronic Kidney Disease Epidemiology Collaboration   (CKD-EPI) equation.       GFR, ESTIMATED POCT   Date Value Ref Range Status   10/11/2022 >60 >60 mL/min/1.73m2 Final     GFR Estimate If Black   Date Value Ref Range Status   06/07/2021 60 (L) >60 mL/min/1.73m2 Final   10/12/2020 >60 >60 mL/min/1.73m2 Final   01/20/2020 83 >60  mL/min/[1.73_m2] Final     Comment:      GFR Calc  Starting 12/18/2018, serum creatinine based estimated GFR (eGFR) will be   calculated using the Chronic Kidney Disease Epidemiology Collaboration   (CKD-EPI) equation.     09/16/2019 73 >60 mL/min/[1.73_m2] Final     Comment:      GFR Calc  Starting 12/18/2018, serum creatinine based estimated GFR (eGFR) will be   calculated using the Chronic Kidney Disease Epidemiology Collaboration   (CKD-EPI) equation.     08/22/2019 57 (L) >60 mL/min/1.73m2 Final   03/18/2019 89 >60 mL/min/[1.73_m2] Final     Comment:      GFR Calc  Starting 12/18/2018, serum creatinine based estimated GFR (eGFR) will be   calculated using the Chronic Kidney Disease Epidemiology Collaboration   (CKD-EPI) equation.         Lab Results   Component Value Date    MICROL <12.0 10/21/2022    MICROL 6 01/20/2020     No results found for: MICROALBUMIN  No results found for: CPEPT, GADAB, ISCAB    No results found for: B12]    Most recent eye exam date: : Not Found       Assessment/Plan:     1.  Type 1 diabetes- Glucose is fairly well controlled, however she is dropping too much at night and climbing too much after eating.  Will schedule her in diabetes education to review pump features to ensure she is using it to its potential and also review her infusion sets, etc, as she is dealing with some absorption issues.  Will focus on pre-bolusing, but if glucose remains too high, will tighten IC ratio. We made the following changes today (instructions given to patient):     Standard Profile Active at the time of upload  Start Time Basal Rate Correction Factor Carb Ratio Target BG  Midnight 0.75 (was 0.8) u/hr 1u:50 mg/dL 1u:15.0 g 110 mg/dL  6:00 AM 0.900 u/hr 1u:50 mg/dL 1u:10.0 g 110 mg/dL  12:00 PM 0.900 u/hr 1u:50 mg/dL 1u:15.0 g 110 mg/dL  Calculated Total Daily Basal 21.0 units  Duration of Insulin: 5:00 hours  Carbohydrates: On  Max Bolus: 12  units    With hybrid closed loop pumps, it is important to bolus earlier prior to meal (15-20 mins), as delayed bolus will lead to glucose rise and automated basal increases on top of carb boluses and can cause low blood sugars afterwards.    Cover minimum of 30g of carb 15-20 minutes before eating.     Schedule in diabetes education to review your pump sites, etc.       Emergency issues: Please contact the clinic as soon as you recognize a problem.  Here are some concerns you should contact us about.  -Vomiting: more than twice.  Please check ketones.  If positive, go to ER. Monitor glucose hourly.   -High glucose with a pump:  Twice in doubt, take it out.  If glucose is high x 2, change your pump site and take a correction with a syringe. Check ketones.  If glucose is not coming down, please call the clinic. If ketones are moderate or large, drink lots of water, take an insulin correction, and recheck ketones in 1 hour.  If ketones are still high (or you are vomiting), go to the ER.  -Hypoglycemia (low glucose):   If glucose is less than 70 mg/dL, treat with 15g carb (4 glucose tablets), recheck glucose in 10 minutes.  If low again, repeat.   If glucose is less than 54 mg/dL, treat with 30g carb, recheck glucose in 10 minutes.  If low again, repeat.  Keep glucagon in your home in case of severe hypoglycemia and train someone how to use this.    Emergency kit (please ensure you always have these with you):   Glucose tablets  Glucagon  Insulin  Syringes (if on a pump)  Extra infusion set (if on a pump)  Ketone strips    Contact information:   If you have concerns, please send me a [employer] message or call the clinic at [phone].  For more urgent concerns, please call [phone] after hours/weekends and ask to speak with the endocrinologist on call.      Please let me know if you are having low blood sugars less than 70 or over 350 mg/dL.  Do not wait until your next appointment if this is happening.       2.   Risk factors-     Retinopathy:  Yes.  Last exam 12/2022. Will schedule another appointment.   Nephropathy:  BP well controlled. No history of microalbuminuria. She had SEKOU in 2019.  GFR improved.  Follows with nephrology, last evaluation was in 1/31/21. Encouraged hydration.   No evidence of diabetic nephropathy.       Neuropathy: decreased vibration sensation in left great toe in the past.  Normal monofilament.   No pain/burning.   Feet: OK, no ulcers.   Lipids:  LDL at target on Atorvastatin 40 mg.   Celiac screening: negative antibodies 2017.   Thyroid screening: TSH 3.52 10/2022. Will recheck.  ASA: 81 mg daily.     3.   Heart failure with preserved EF- On amlodipine 5 mg daily (reduced) and losartan 50 mg.  Edema is improved.  Symptomatically much better now.     4. F/U in 3 months with myself.  Will establish staff endocrinologist in 6 mos, as Dr. Roy is no longer in our clinic.        41 minutes spent on the date of the encounter doing chart review, review of test results, review of continuous glucose sensor, insulin pump data, interpretation of glucose data, patient visit and documentation, counseling/coordination of care, and discussion of follow up plan for worsening hyper and hypoglycemia.  The patient understood and is satisfied with today's visit.     Hannah Whitley PA-C, MPAS   [facility]  Department of Medicine  Division of Endocrinology and Diabetes    Answers submitted by the patient for this visit:  Symptoms you have experienced in the last 30 days (Submitted on 10/2/2023)  General Symptoms: No  Skin Symptoms: No  HENT Symptoms: No  EYE SYMPTOMS: No  HEART SYMPTOMS: No  LUNG SYMPTOMS: No  INTESTINAL SYMPTOMS: No  URINARY SYMPTOMS: No  GYNECOLOGIC SYMPTOMS: No  BREAST SYMPTOMS: No  SKELETAL SYMPTOMS: No  BLOOD SYMPTOMS: No  NERVOUS SYSTEM SYMPTOMS: No  MENTAL HEALTH SYMPTOMS: No

## 2023-09-29 ENCOUNTER — TELEPHONE (OUTPATIENT)
Dept: ENDOCRINOLOGY | Facility: CLINIC | Age: 53
End: 2023-09-29
Payer: COMMERCIAL

## 2023-09-29 NOTE — TELEPHONE ENCOUNTER
Spoke with patient. Pt uses tandem marie on her phone. Data showing up to 1/24/23. Pt will login into marie and leave open while connected to wifi to allow data to transfer. Betty Mcdonough CMA on 9/29/2023 at 1:11 PM

## 2023-10-02 NOTE — TELEPHONE ENCOUNTER
Called pt and lvm. Advised tandem data is only showing to 6/14/23 and requested patient to leave marie open longer while connected to wifi. Betty Mcdonough CMA on 10/2/2023 at 10:24 AM

## 2023-10-03 ENCOUNTER — VIRTUAL VISIT (OUTPATIENT)
Dept: ENDOCRINOLOGY | Facility: CLINIC | Age: 53
End: 2023-10-03
Payer: COMMERCIAL

## 2023-10-03 DIAGNOSIS — E10.9 WELL CONTROLLED TYPE 1 DIABETES MELLITUS (H): Primary | ICD-10-CM

## 2023-10-03 PROCEDURE — 99215 OFFICE O/P EST HI 40 MIN: CPT | Mod: VID | Performed by: PHYSICIAN ASSISTANT

## 2023-10-03 ASSESSMENT — PAIN SCALES - GENERAL: PAINLEVEL: NO PAIN (0)

## 2023-10-03 NOTE — PATIENT INSTRUCTIONS
Standard Profile Active at the time of upload  Start Time Basal Rate Correction Factor Carb Ratio Target BG  Midnight 0.75 (was 0.8) u/hr 1u:50 mg/dL 1u:15.0 g 110 mg/dL  6:00 AM 0.900 u/hr 1u:50 mg/dL 1u:10.0 g 110 mg/dL  12:00 PM 0.900 u/hr 1u:50 mg/dL 1u:15.0 g 110 mg/dL  Calculated Total Daily Basal 21.0 units  Duration of Insulin: 5:00 hours  Carbohydrates: On  Max Bolus: 12 units    With hybrid closed loop pumps, it is important to bolus earlier prior to meal (15-20 mins), as delayed bolus will lead to glucose rise and automated basal increases on top of carb boluses and can cause low blood sugars afterwards.    Cover minimum of 30g of carb 15-20 minutes before eating.     Schedule in diabetes education to review your pump sites, etc.       Emergency issues: Please contact the clinic as soon as you recognize a problem.  Here are some concerns you should contact us about.  -Vomiting: more than twice.  Please check ketones.  If positive, go to ER. Monitor glucose hourly.   -High glucose with a pump:  Twice in doubt, take it out.  If glucose is high x 2, change your pump site and take a correction with a syringe. Check ketones.  If glucose is not coming down, please call the clinic. If ketones are moderate or large, drink lots of water, take an insulin correction, and recheck ketones in 1 hour.  If ketones are still high (or you are vomiting), go to the ER.  -Hypoglycemia (low glucose):   If glucose is less than 70 mg/dL, treat with 15g carb (4 glucose tablets), recheck glucose in 10 minutes.  If low again, repeat.   If glucose is less than 54 mg/dL, treat with 30g carb, recheck glucose in 10 minutes.  If low again, repeat.  Keep glucagon in your home in case of severe hypoglycemia and train someone how to use this.    Emergency kit (please ensure you always have these with you):   Glucose tablets  Glucagon  Insulin  Syringes (if on a pump)  Extra infusion set (if on a pump)  Ketone strips    Contact  information:   If you have concerns, please send me a [email or web address] message or call the clinic at [phone].  For more urgent concerns, please call [phone] after hours/weekends and ask to speak with the endocrinologist on call.      Please let me know if you are having low blood sugars less than 70 or over 350 mg/dL.  Do not wait until your next appointment if this is happening.

## 2023-10-03 NOTE — NURSING NOTE
Is the patient currently in the state of [place]? YES    Visit mode:VIDEO    If the visit is dropped, the patient can be reconnected by: VIDEO VISIT: Text to cell phone:   Telephone Information:   Mobile [phone]       Will anyone else be joining the visit? NO  (If patient encounters technical issues they should call [phone]    How would you like to obtain your AVS? MyChart    Are changes needed to the allergy or medication list? No    Reason for visit: Follow Up    Radha White LPN LPN

## 2023-10-03 NOTE — LETTER
10/3/2023       RE: Sera Adkins  [address]   [facility] [address]     Dear Colleague,    Thank you for referring your patient, Sera Adkins, to the [facility] ENDOCRINOLOGY CLINIC [place] [facility]. Please see a copy of my visit note below.    Outcome for 09/25/23 3:39 PM: Data uploaded on Dexcom  Betty Mcdonough MA  Outcome for 09/25/23 3:40 PM: [employer] message sent  Betty Mcdonough MA  Outcome for 09/29/23 1:13 PM:  Spoke with patient. Tandem data only showing up to 1/24/23. Pt will leave tandem marie open connected to wifi.   Betty Mcdonough MA  Outcome for 10/02/23 10:22 AM: Left Voicemail   Betty Mcdonough MA  Outcome for 10/02/23 10:24 AM: Data obtained via Dexcom website  Betty Mcdonough MA  Outcome for 10/03/23 6:54 AM: Error with upload pump, settings obtained via tandem.    Radha White LPN       Start time: 0704  End time: 0731  Provider location: off site- home  Patient location: off site- home.        HPI:   Sera is a 51 yo woman here for follow up of type 1 diabetes since age 15.   She has always been extremely active with high intensity biking as a regular part of her workouts.  Unfortunately, last summer, she developed exertional angina.  She had an angiogram, which was clear last year and was diagnosed with microvascular angina (epicardial CAD) and HFpEF.  This has been emotionally very difficult for her, as she has always been so healthy and physically active.  She has been doing very well this year.  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  She is no longer requiring nitroglycerine before activity.     Recently she has been running out of areas on abdomen where she is able to absorb insulin.  Sometimes puts it in a site and removes it.  Stings.  Wonders about other options for infusion sites.  Using the steel sites now.      Vitals-  "- A1c 7.1%.  - 100/69.  A1c- 7.4%- 10/2/23.  BP- 101/61.  Weight- 162.8#, 5'8\".      Sera wears the Tandem X2 pump with Control [employer] technology.     Unable to upload her pump.  Open the marie and stay connected to WIFI is not working.     Device Settings   Standard Profile Active at the time of upload  Start Time Basal Rate Correction Factor Carb Ratio Target BG  Midnight 0.800 u/hr 1u:50 mg/dL 1u:15.0 g 110 mg/dL  6:00 AM 0.900 u/hr 1u:50 mg/dL 1u:10.0 g 110 mg/dL  12:00 PM 0.900 u/hr 1u:50 mg/dL 1u:15.0 g 110 mg/dL  Calculated Total Daily Basal 21.0 units  Duration of Insulin: 5:00 hours  Carbohydrates: On  Max Bolus: 12 units  Exercise Profile Inactive at the time of upload  Start Time Basal Rate Correction Factor Carb Ratio Target BG  Midnight 0.135 u/hr 1u:100 mg/dL 1u:30.0 g 140 mg/dL  Calculated Total Daily Basal 3.2 units  Duration of Insulin: 5:00 hours  Carbohydrates: On  Max Bolus: 12 units    Set sleep schedule overnight 11pm-7am.     Her overall average glucose over the past two weeks is 175 mg/dL (CV 35%, TIR Recent glucose:                 Average total daily insulin dose: 39 units (51% basal).     GFR has been in the 50's for the past couple years.   She is intentional about avoiding dehydration.     Her mood has been down and anxious.  We have been talking about seeing a therapist for a while and she is scheduled soon with Bob Burgess.  Her boyfriend has been supportive.  She has not told her kids, as she does not worry her.   She is on antidepressant.      She has no other concerns today.     Past Medical History:   Diagnosis Date    Anxiety     DMI     Type 1   Epicardial CAD- diagnosed .   HFpEF- diagnosed .    Past Surgical History:   Procedure Laterality Date    BREAST CYST ASPIRATION Left ?     SECTION      x2    CONIZATION CERVIX,LOOP ELECTRD      Description: Cervical Conization Loop Electrode Excision;  Recorded: 2008;    CV CORONARY ANGIOGRAM " N/A 10/24/2022    Procedure: Coronary Angiogram;  Surgeon: Subha Porter MD;  Location: [facility]    CV FRACTIONAL FLOW RATIO WIRE N/A 10/24/2022    Procedure: Fractional Flow Ratio Wire;  Surgeon: Subha Porter MD;  Location: [facility]    CV LEFT HEART CATH N/A 10/24/2022    Procedure: Left Heart Catheterization;  Surgeon: Subha Porter MD;  Location: [facility]       Family History   Problem Relation Age of Onset    Cancer Father     Breast Cancer Paternal Grandmother        Social History     Socioeconomic History    Marital status:    Tobacco Use    Smoking status: Never    Smokeless tobacco: Never   Substance and Sexual Activity    Alcohol use: Yes    Drug use: No         Past Surgical History:   Procedure Laterality Date    BREAST CYST ASPIRATION Left ?2005     SECTION      x2    CONIZATION CERVIX,LOOP ELECTRD      Description: Cervical Conization Loop Electrode Excision;  Recorded: 2008;    CV CORONARY ANGIOGRAM N/A 10/24/2022    Procedure: Coronary Angiogram;  Surgeon: Subha Porter MD;  Location: [facility]    CV FRACTIONAL FLOW RATIO WIRE N/A 10/24/2022    Procedure: Fractional Flow Ratio Wire;  Surgeon: Subha Porter MD;  Location: [facility]    CV LEFT HEART CATH N/A 10/24/2022    Procedure: Left Heart Catheterization;  Surgeon: Subha Porter MD;  Location: [facility]       Family History   Problem Relation Age of Onset    Cancer Father     Breast Cancer Paternal Grandmother        Social History   Social Hx:  .  Two teenage children.  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  Enjoys being physically active, mostly biking, but also running and HIIT.     Socioeconomic History    Marital status: other     Spouse name: Not on file    Number of children: Not on file    Years of education: Not on file    Highest education level: Not on file   Occupational History    Not on file   Social Needs    Financial  "resource strain: Not on file    Food insecurity:     Worry: Not on file     Inability: Not on file    Transportation needs:     Medical: Not on file     Non-medical: Not on file   Tobacco Use    Smoking status: Never Smoker    Smokeless tobacco: Never Used   Substance and Sexual Activity    Alcohol use: No    Drug use: No    Sexual activity: Not on file   Lifestyle    Physical activity:     Days per week: Not on file     Minutes per session: Not on file    Stress: Not on file   Relationships    Social connections:     Talks on phone: Not on file     Gets together: Not on file     Attends Christian service: Not on file     Active member of club or organization: Not on file     Attends meetings of clubs or organizations: Not on file     Relationship status: Not on file    Intimate partner violence:     Fear of current or ex partner: Not on file     Emotionally abused: Not on file     Physically abused: Not on file     Forced sexual activity: Not on file   Other Topics Concern    Not on file   Social History Narrative    Not on file       Current Outpatient Medications   Medication    amLODIPine (NORVASC) 5 MG tablet    aspirin (ASA) 81 MG EC tablet    atorvastatin (LIPITOR) 40 MG tablet    blood glucose (NO BRAND SPECIFIED) test strip    Calcium Carbonate-Vitamin D (CALCIUM 500+D PO)    cholecalciferol 50 MCG (2000 UT) tablet    Continuous Blood Gluc Sensor (DEXCOM G6 SENSOR) MISC    Continuous Blood Gluc Transmit (DEXCOM G6 TRANSMITTER) MISC    Glucagon, rDNA, (GLUCAGON EMERGENCY) 1 MG KIT    insulin cartridge (T:SLIM 3ML) misc pump supply    Insulin Glargine-yfgn (SEMGLEE, YFGN,) 100 UNIT/ML SOLN    Insulin Infusion Pump Supplies (TRUSTEEL INFUSION SET) MISC    Insulin Pump Accessories MISC    insulin syringe-needle U-100 (30G X 1/2\" 0.3 ML) 30G X 1/2\" 0.3 ML miscellaneous    levonorgestrel (MIRENA) 20 MCG/DAY IUD    losartan (COZAAR) 50 MG tablet    nitroGLYcerin (NITROSTAT) 0.4 MG sublingual tablet    NOVOLOG VIAL " 100 UNIT/ML soln     No current facility-administered medications for this visit.          Allergies   Allergen Reactions    Latex      rash    Percocet [Oxycodone-Acetaminophen] Nausea and Nausea and Vomiting       Physical Exam  GENERAL: healthy, alert.   RESP: no audible wheeze, cough, or visible cyanosis.  No visible retractions or increased work of breathing.  Able to speak fully in complete sentences.  PSYCH: mentation appears normal, affect normal/bright, judgement and insight intact, normal speech and appearance well-groomed    RESULTS  Lab Results   Component Value Date    A1C 7.0 (H) 04/11/2022    A1C 7.2 (H) 08/02/2021    A1C 6.8 (A) 04/09/2021    A1C 7.7 (H) 01/20/2020    A1C 7.5 (H) 09/16/2019    A1C 7.8 (H) 03/18/2019    A1C 7.7 (H) 12/13/2016    HEMOGLOBINA1 7.4 (A) 01/20/2020    HEMOGLOBINA1 7.2 (A) 09/16/2019    HEMOGLOBINA1 7.0 (A) 04/16/2018    HEMOGLOBINA1 7.0 (A) 12/11/2017    HEMOGLOBINA1 7.4 (A) 06/05/2017       TSH   Date Value Ref Range Status   10/21/2022 3.52 0.30 - 4.20 uIU/mL Final   08/02/2021 1.84 0.30 - 5.00 uIU/mL Final   01/20/2020 2.07 0.40 - 4.00 mU/L Final   09/16/2019 1.89 0.40 - 4.00 mU/L Final   03/18/2019 2.40 0.40 - 4.00 mU/L Final   12/11/2017 1.69 0.40 - 4.00 mU/L Final   12/13/2016 3.72 0.40 - 4.00 mU/L Final     T4 Free   Date Value Ref Range Status   03/18/2019 0.81 0.76 - 1.46 ng/dL Final       ALT   Date Value Ref Range Status   01/20/2020 24 0 - 50 U/L Final   09/16/2019 25 0 - 50 U/L Final   ]    Recent Labs   Lab Test 10/24/22  1133 10/21/22  0806   CHOL 92 99   HDL 39* 42*   LDL 44 49   TRIG 43 41       Lab Results   Component Value Date     10/21/2022     01/20/2020      Lab Results   Component Value Date    POTASSIUM 4.2 10/21/2022    POTASSIUM 3.8 05/06/2022    POTASSIUM 4.0 01/20/2020     Lab Results   Component Value Date    CHLORIDE 103 10/21/2022    CHLORIDE 101 05/06/2022    CHLORIDE 105 01/20/2020     Lab Results   Component Value Date    SCOTTY  9.2 10/21/2022    SCOTTY 9.2 01/20/2020     Lab Results   Component Value Date    CO2 25 10/21/2022    CO2 27 05/06/2022    CO2 32 01/20/2020     Lab Results   Component Value Date    BUN 12.0 10/21/2022    BUN 13 05/06/2022    BUN 18 01/20/2020     Lab Results   Component Value Date    CR 0.93 10/21/2022    CR 0.94 01/20/2020       GFR Estimate   Date Value Ref Range Status   10/21/2022 75 >60 mL/min/1.73m2 Final     Comment:     Effective December 21, 2021 eGFRcr in adults is calculated using the 2021 CKD-EPI creatinine equation which includes age and gender (Jackie et al., [facility], DOI: 10.1056/MBLOun5542031)   08/13/2022 65 >60 mL/min/1.73m2 Final     Comment:     Effective December 21, 2021 eGFRcr in adults is calculated using the 2021 CKD-EPI creatinine equation which includes age and gender (Jackie et al., [facility], DOI: 10.1056/DLGKkv6124088)   05/06/2022 60 (L) >60 mL/min/1.73m2 Final     Comment:     Effective December 21, 2021 eGFRcr in adults is calculated using the 2021 CKD-EPI creatinine equation which includes age and gender (Jackie et al., [facility], DOI: 10.1056/IMMLel3775396)   06/07/2021 49 (L) >60 mL/min/1.73m2 Final   10/12/2020 51 (L) >60 mL/min/1.73m2 Final   01/20/2020 72 >60 mL/min/[1.73_m2] Final     Comment:     Non  GFR Calc  Starting 12/18/2018, serum creatinine based estimated GFR (eGFR) will be   calculated using the Chronic Kidney Disease Epidemiology Collaboration   (CKD-EPI) equation.     09/16/2019 63 >60 mL/min/[1.73_m2] Final     Comment:     Non  GFR Calc  Starting 12/18/2018, serum creatinine based estimated GFR (eGFR) will be   calculated using the Chronic Kidney Disease Epidemiology Collaboration   (CKD-EPI) equation.     08/22/2019 47 (L) >60 mL/min/1.73m2 Final   03/18/2019 77 >60 mL/min/[1.73_m2] Final     Comment:     Non  GFR Calc  Starting 12/18/2018, serum creatinine based estimated GFR (eGFR) will be   calculated using the Chronic Kidney  Disease Epidemiology Collaboration   (CKD-EPI) equation.       GFR, ESTIMATED POCT   Date Value Ref Range Status   10/11/2022 >60 >60 mL/min/1.73m2 Final     GFR Estimate If Black   Date Value Ref Range Status   06/07/2021 60 (L) >60 mL/min/1.73m2 Final   10/12/2020 >60 >60 mL/min/1.73m2 Final   01/20/2020 83 >60 mL/min/[1.73_m2] Final     Comment:      GFR Calc  Starting 12/18/2018, serum creatinine based estimated GFR (eGFR) will be   calculated using the Chronic Kidney Disease Epidemiology Collaboration   (CKD-EPI) equation.     09/16/2019 73 >60 mL/min/[1.73_m2] Final     Comment:      GFR Calc  Starting 12/18/2018, serum creatinine based estimated GFR (eGFR) will be   calculated using the Chronic Kidney Disease Epidemiology Collaboration   (CKD-EPI) equation.     08/22/2019 57 (L) >60 mL/min/1.73m2 Final   03/18/2019 89 >60 mL/min/[1.73_m2] Final     Comment:      GFR Calc  Starting 12/18/2018, serum creatinine based estimated GFR (eGFR) will be   calculated using the Chronic Kidney Disease Epidemiology Collaboration   (CKD-EPI) equation.         Lab Results   Component Value Date    MICROL <12.0 10/21/2022    MICROL 6 01/20/2020     No results found for: MICROALBUMIN  No results found for: CPEPT, GADAB, ISCAB    No results found for: B12]    Most recent eye exam date: : Not Found       Assessment/Plan:     1.  Type 1 diabetes- Glucose is fairly well controlled, however she is dropping too much at night and climbing too much after eating.  Will schedule her in diabetes education to review pump features to ensure she is using it to its potential and also review her infusion sets, etc, as she is dealing with some absorption issues.  Will focus on pre-bolusing, but if glucose remains too high, will tighten IC ratio. We made the following changes today (instructions given to patient):     Standard Profile Active at the time of upload  Start Time Basal Rate Correction  Factor Carb Ratio Target BG  Midnight 0.75 (was 0.8) u/hr 1u:50 mg/dL 1u:15.0 g 110 mg/dL  6:00 AM 0.900 u/hr 1u:50 mg/dL 1u:10.0 g 110 mg/dL  12:00 PM 0.900 u/hr 1u:50 mg/dL 1u:15.0 g 110 mg/dL  Calculated Total Daily Basal 21.0 units  Duration of Insulin: 5:00 hours  Carbohydrates: On  Max Bolus: 12 units    With hybrid closed loop pumps, it is important to bolus earlier prior to meal (15-20 mins), as delayed bolus will lead to glucose rise and automated basal increases on top of carb boluses and can cause low blood sugars afterwards.    Cover minimum of 30g of carb 15-20 minutes before eating.     Schedule in diabetes education to review your pump sites, etc.       Emergency issues: Please contact the clinic as soon as you recognize a problem.  Here are some concerns you should contact us about.  -Vomiting: more than twice.  Please check ketones.  If positive, go to ER. Monitor glucose hourly.   -High glucose with a pump:  Twice in doubt, take it out.  If glucose is high x 2, change your pump site and take a correction with a syringe. Check ketones.  If glucose is not coming down, please call the clinic. If ketones are moderate or large, drink lots of water, take an insulin correction, and recheck ketones in 1 hour.  If ketones are still high (or you are vomiting), go to the ER.  -Hypoglycemia (low glucose):   If glucose is less than 70 mg/dL, treat with 15g carb (4 glucose tablets), recheck glucose in 10 minutes.  If low again, repeat.   If glucose is less than 54 mg/dL, treat with 30g carb, recheck glucose in 10 minutes.  If low again, repeat.  Keep glucagon in your home in case of severe hypoglycemia and train someone how to use this.    Emergency kit (please ensure you always have these with you):   Glucose tablets  Glucagon  Insulin  Syringes (if on a pump)  Extra infusion set (if on a pump)  Ketone strips    Contact information:   If you have concerns, please send me a [employer] message or call the clinic  at [phone].  For more urgent concerns, please call [phone] after hours/weekends and ask to speak with the endocrinologist on call.      Please let me know if you are having low blood sugars less than 70 or over 350 mg/dL.  Do not wait until your next appointment if this is happening.       2.  Risk factors-     Retinopathy:  Yes.  Last exam 12/2022. Will schedule another appointment.   Nephropathy:  BP well controlled. No history of microalbuminuria. She had SEKOU in 2019.  GFR improved.  Follows with nephrology, last evaluation was in 1/31/21. Encouraged hydration.   No evidence of diabetic nephropathy.       Neuropathy: decreased vibration sensation in left great toe in the past.  Normal monofilament.   No pain/burning.   Feet: OK, no ulcers.   Lipids:  LDL at target on Atorvastatin 40 mg.   Celiac screening: negative antibodies 2017.   Thyroid screening: TSH 3.52 10/2022. Will recheck.  ASA: 81 mg daily.     3.   Heart failure with preserved EF- On amlodipine 5 mg daily (reduced) and losartan 50 mg.  Edema is improved.  Symptomatically much better now.     4. F/U in 3 months with myself.  Will establish staff endocrinologist in 6 mos, as Dr. Roy is no longer in our clinic.        41 minutes spent on the date of the encounter doing chart review, review of test results, review of continuous glucose sensor, insulin pump data, interpretation of glucose data, patient visit and documentation, counseling/coordination of care, and discussion of follow up plan for worsening hyper and hypoglycemia.  The patient understood and is satisfied with today's visit.     Hannah Whitley PA-C, MPAS   [facility]  Department of Medicine  Division of Endocrinology and Diabetes    Answers submitted by the patient for this visit:  Symptoms you have experienced in the last 30 days (Submitted on 10/2/2023)  General Symptoms: No  Skin Symptoms: No  HENT Symptoms: No  EYE SYMPTOMS: No  HEART SYMPTOMS: No  LUNG  SYMPTOMS: No  INTESTINAL SYMPTOMS: No  URINARY SYMPTOMS: No  GYNECOLOGIC SYMPTOMS: No  BREAST SYMPTOMS: No  SKELETAL SYMPTOMS: No  BLOOD SYMPTOMS: No  NERVOUS SYSTEM SYMPTOMS: No  MENTAL HEALTH SYMPTOMS: No

## 2023-10-11 ENCOUNTER — TELEPHONE (OUTPATIENT)
Dept: EDUCATION SERVICES | Facility: CLINIC | Age: 53
End: 2023-10-11
Payer: COMMERCIAL

## 2023-10-11 NOTE — TELEPHONE ENCOUNTER
Patient call:     Appointment type: Diabetic Ed  Provider: Ally Mcguire  Return date: 10/20/23  Speciality phone number: [phone]  Additional appointment(s) needed: N/A  Additional notes: referral for CDE Pt wants to be seen by Shayla in person. Okay per Shayla to schedule 10/20. Schedule is held for resource but you could put her in any open time.       LVM, MyC x1     Digna Hand on 10/11/2023 at 9:48 AM

## 2023-10-13 ENCOUNTER — TELEPHONE (OUTPATIENT)
Dept: EDUCATION SERVICES | Facility: CLINIC | Age: 53
End: 2023-10-13
Payer: COMMERCIAL

## 2023-10-13 NOTE — TELEPHONE ENCOUNTER
Patient call:     Appointment type: Diabetic ed  Provider: Ally Mcguire  Return date: 10/20  Speciality phone number: [phone]  Additional appointment(s) needed: N/A  Additional notes: schedule is held for resource but you could put her in any open time. Oksloan CABAN, MyC x2     Digna Hand on 10/13/2023 at 9:06 AM

## 2023-10-19 NOTE — PROGRESS NOTES
DIABETES SELF MANAGEMENT EDUCATION: Previous Diagnosis/Individual Diabetes Review    Sera Adkins presents today for insulin pump review related to Type 1 diabetes.  She is accompanied by self    Year of diagnosis: age 15  Referring provider:  Hannah Whitley PA-C    Past Diabetes Education: Yes  Support system: family and spouse/significant other  Living Situation: significant other  Employment: NP    DIABETES RELATED CONCERNS/COMMENTS: site related issues with pump, can't bolus from phone    ASSESSMENT:  Patient Problem List and Medication List reviewed for relevant medical history, current medical status, and diabetes risk factors.    MEDICATION:    Current Diabetes Management per Patient:  Taking diabetes medications? yes:     Diabetes Medication(s)       Diabetic Other       Glucagon (BAQSIMI) 3 MG/DOSE nasal powder    Spray 1 spray (3 mg) in nostril as needed (severe hypoglycemia) in the event of unconscious hypoglycemia or hypoglycemic seizure. May repeat dose if no response after 15 minutes.     Glucagon, rDNA, (GLUCAGON EMERGENCY) 1 MG KIT    Inject IM for hypoglycemic event needs 2 pens one for home and one for work      Insulin       Insulin Glargine-yfgn (SEMGLEE, YFGN,) 100 UNIT/ML SOLN    Inject 18 Units Subcutaneous daily Use ONLY in case of pump failure.     NOVOLOG VIAL 100 UNIT/ML soln    USE UP TO 68 UNITS PER DAY AS DIRECTED IN INSULIN PUMP FOR BASAL, BOLUS AND PRIMING OF TUBING          Insulin Pump Type: Tandem T-Slim Control IQ insulin pump with Dexcom G6 continuous glucose monitor     MONITORING:  Patient glucose self monitoring as follows: continuously using a continuous glucose monitor (CGM)  Blood sugar data:          Patient's most recent   Lab Results   Component Value Date    A1C 7.0 04/11/2022    A1C 6.8 04/09/2021      Patient's A1C goal: <7.0    PHYSICAL ACTIVITY:  strenuous regular exercise program which includes biking and IDA classes    NUTRITION:  Takes her a long time to eat  "during workday as she is typing-- in her upload her most recent eesquiel    Diabetes knowledge and skills assessment:   Barriers to Learning Assessment: No Barriers identified    Problem Solving:    Patient is at risk of hypoglycemia?: YES  Hospitalizations for hyper or hypoglycemia: No    EDUCATION and INSTRUCTION PROVIDED AT THIS VISIT:    She does have \"scar tissue\" on abdomen but not on buttocks--advised to use new areas for infusion sets, she may do better with OP5 as the pod is easier to insert on the back side--she was shown how that system works    Her Tandem pump is starting to have battery issues, she pays out of pocket for a lot of her Dexcom and pump supplies as her DME benefit is not great.  We will order the OP5 just to see what the cost comparison is and she can decide if she wants to move forward.    Insulin pump specifically:  Tandem CIQ--she is locked out of the [employer] marie and that is how to bolus through the phone--will ask Teresa Rahel to follow up on this.    She has trouble bolusing at work because she is eating and typing so the food does not get in in a timely manor    Pt verbalized understanding of concepts discussed and recommendations provided today.       PLAN:  Check coverage for OP5.  Use new sites for infusion sets    FOLLOW-UP:    If she does move forward with Omnipod would have her get training with Kaye Block    Time Spent: 60 minutes  Encounter Type: Individual    Any diabetes medication dose changes were made via the CDE Protocol and Collaborative Practice Agreement with Harry and  Pardeep.  A copy of this encounter was provided to patient's referring provider.    "

## 2023-10-20 ENCOUNTER — ALLIED HEALTH/NURSE VISIT (OUTPATIENT)
Dept: EDUCATION SERVICES | Facility: CLINIC | Age: 53
End: 2023-10-20
Payer: COMMERCIAL

## 2023-10-20 ENCOUNTER — LAB (OUTPATIENT)
Dept: LAB | Facility: CLINIC | Age: 53
End: 2023-10-20
Payer: COMMERCIAL

## 2023-10-20 DIAGNOSIS — E10.9 WELL CONTROLLED TYPE 1 DIABETES MELLITUS (H): ICD-10-CM

## 2023-10-20 DIAGNOSIS — E10.9 WELL CONTROLLED TYPE 1 DIABETES MELLITUS (H): Primary | ICD-10-CM

## 2023-10-20 LAB
ANION GAP SERPL CALCULATED.3IONS-SCNC: 7 MMOL/L (ref 7–15)
BUN SERPL-MCNC: 16 MG/DL (ref 6–20)
CALCIUM SERPL-MCNC: 9.8 MG/DL (ref 8.6–10)
CHLORIDE SERPL-SCNC: 102 MMOL/L (ref 98–107)
CREAT SERPL-MCNC: 0.97 MG/DL (ref 0.51–0.95)
CREAT UR-MCNC: 25 MG/DL
DEPRECATED HCO3 PLAS-SCNC: 30 MMOL/L (ref 22–29)
EGFRCR SERPLBLD CKD-EPI 2021: 70 ML/MIN/1.73M2
GLUCOSE SERPL-MCNC: 119 MG/DL (ref 70–99)
MICROALBUMIN UR-MCNC: <12 MG/L
MICROALBUMIN/CREAT UR: NORMAL MG/G{CREAT}
POTASSIUM SERPL-SCNC: 4.1 MMOL/L (ref 3.4–5.3)
SODIUM SERPL-SCNC: 139 MMOL/L (ref 135–145)
TSH SERPL DL<=0.005 MIU/L-ACNC: 2.45 UIU/ML (ref 0.3–4.2)

## 2023-10-20 PROCEDURE — 80048 BASIC METABOLIC PNL TOTAL CA: CPT | Performed by: PATHOLOGY

## 2023-10-20 PROCEDURE — 82570 ASSAY OF URINE CREATININE: CPT | Performed by: PHYSICIAN ASSISTANT

## 2023-10-20 PROCEDURE — G0108 DIAB MANAGE TRN  PER INDIV: HCPCS

## 2023-10-20 PROCEDURE — 84443 ASSAY THYROID STIM HORMONE: CPT | Performed by: PATHOLOGY

## 2023-10-20 PROCEDURE — 99000 SPECIMEN HANDLING OFFICE-LAB: CPT | Performed by: PATHOLOGY

## 2023-10-20 PROCEDURE — 36415 COLL VENOUS BLD VENIPUNCTURE: CPT | Performed by: PATHOLOGY

## 2023-10-20 RX ORDER — INSULIN PMP CART,AUT,G6/7,CNTR
1 EACH SUBCUTANEOUS
Qty: 1 KIT | Refills: 0 | Status: SHIPPED | OUTPATIENT
Start: 2023-10-20 | End: 2023-10-31

## 2023-10-20 RX ORDER — INSULIN PMP CART,AUT,G6/7,CNTR
1 EACH SUBCUTANEOUS
Qty: 30 EACH | Refills: 4 | Status: SHIPPED | OUTPATIENT
Start: 2023-10-20 | End: 2023-10-31

## 2023-10-26 DIAGNOSIS — E10.9 WELL CONTROLLED TYPE 1 DIABETES MELLITUS (H): ICD-10-CM

## 2023-10-26 NOTE — TELEPHONE ENCOUNTER
Refill Approved    Rx renewed per Medication Renewal Policy. Medication was last renewed on 6/12/19.    Martha Sheppard, [facility] Triage/Med Refill 8/4/2019     Requested Prescriptions   Pending Prescriptions Disp Refills     chlorthalidone (HYGROTEN) 25 MG tablet [Pharmacy Med Name: CHLORTHALIDONE 25 MG TABLET] 90 tablet 0     Sig: TAKE 1 TABLET BY MOUTH EVERY DAY       Diuretics/Combination Diuretics Refill Protocol  Passed - 8/4/2019 11:10 AM        Passed - Visit with PCP or prescribing provider visit in past 12 months     Last office visit with prescriber/PCP: 9/10/2018 Jose Elias Montgomery MD OR same dept: 9/10/2018 Jose Elias Montgomery MD OR same specialty: 9/10/2018 Jose Elias Montgomery MD  Last physical: 6/11/2018 Last MTM visit: Visit date not found   Next visit within 3 mo: Visit date not found  Next physical within 3 mo: Visit date not found  Prescriber OR PCP: Jose Elias Montgomery MD  Last diagnosis associated with med order: 1. Type 1 Diabetes Mellitus  - chlorthalidone (HYGROTEN) 25 MG tablet [Pharmacy Med Name: CHLORTHALIDONE 25 MG TABLET]; TAKE 1 TABLET BY MOUTH EVERY DAY  Dispense: 90 tablet; Refill: 0    If protocol passes may refill for 12 months if within 3 months of last provider visit (or a total of 15 months).             Passed - Serum Potassium in past 12 months      Lab Results   Component Value Date    Potassium 3.9 10/29/2018             Passed - Serum Sodium in past 12 months      Lab Results   Component Value Date    Sodium 136 10/29/2018             Passed - Blood pressure on file in past 12 months     BP Readings from Last 1 Encounters:   03/18/19 115/77             Passed - Serum Creatinine in past 12 months      Creatinine   Date Value Ref Range Status   10/29/2018 1.05 0.60 - 1.10 mg/dL Final                          HTN (hypertension)

## 2023-10-30 ENCOUNTER — MYC MEDICAL ADVICE (OUTPATIENT)
Dept: ENDOCRINOLOGY | Facility: CLINIC | Age: 53
End: 2023-10-30
Payer: COMMERCIAL

## 2023-10-30 DIAGNOSIS — E10.9 WELL CONTROLLED TYPE 1 DIABETES MELLITUS (H): ICD-10-CM

## 2023-10-30 RX ORDER — ATORVASTATIN CALCIUM 40 MG/1
40 TABLET, FILM COATED ORAL DAILY
Qty: 90 TABLET | Refills: 0 | Status: SHIPPED | OUTPATIENT
Start: 2023-10-30 | End: 2023-10-31

## 2023-10-31 DIAGNOSIS — E10.9 WELL CONTROLLED TYPE 1 DIABETES MELLITUS (H): ICD-10-CM

## 2023-10-31 RX ORDER — ATORVASTATIN CALCIUM 40 MG/1
40 TABLET, FILM COATED ORAL DAILY
Qty: 90 TABLET | Refills: 3 | Status: SHIPPED | OUTPATIENT
Start: 2023-10-31 | End: 2024-03-22

## 2023-10-31 RX ORDER — INSULIN PMP CART,AUT,G6/7,CNTR
1 EACH SUBCUTANEOUS
Qty: 30 EACH | Refills: 4 | Status: SHIPPED | OUTPATIENT
Start: 2023-10-31 | End: 2024-02-22

## 2023-10-31 RX ORDER — INSULIN PMP CART,AUT,G6/7,CNTR
1 EACH SUBCUTANEOUS
Qty: 1 KIT | Refills: 0 | Status: SHIPPED | OUTPATIENT
Start: 2023-10-31 | End: 2024-03-22

## 2023-11-07 ENCOUNTER — ANCILLARY PROCEDURE (OUTPATIENT)
Dept: MAMMOGRAPHY | Facility: HOSPITAL | Age: 53
End: 2023-11-07
Payer: COMMERCIAL

## 2023-11-07 DIAGNOSIS — Z12.31 VISIT FOR SCREENING MAMMOGRAM: ICD-10-CM

## 2023-11-07 PROCEDURE — 77067 SCR MAMMO BI INCL CAD: CPT

## 2023-11-11 ENCOUNTER — HEALTH MAINTENANCE LETTER (OUTPATIENT)
Age: 53
End: 2023-11-11

## 2024-01-05 DIAGNOSIS — E10.8 TYPE 1 DIABETES MELLITUS WITH COMPLICATIONS (H): ICD-10-CM

## 2024-01-05 NOTE — PROGRESS NOTES
Outcome for 01/05/24 8:43 AM: Data uploaded on Dexcom and Tandem  Betty Mcdonough MA  Outcome for 01/12/24 1:17 PM: [employer] message sent- tandem data up to 1/2/24.  Betty Mcdonough MA  Outcome for 01/12/24 1:18 PM: Left Voicemail   Betty Mcdonough MA  Outcome for 01/15/24 10:40 AM: Data obtained via Dexcom and Tandem website  Betty Mcdonough MA    HPI:   Sera is a pleasant 54 yo woman here for follow up of type 1 diabetes since age 15.   She has always been extremely active with high intensity biking as a regular part of her workouts.  In 2022, she developed exertional angina.  She had an angiogram, which was clear and was diagnosed with microvascular angina (epicardial CAD) and HFpEF.  She is improving and is still able to do all of her intense activity.  She has been doing very well this year.   She is no longer requiring nitroglycerine before activity. HIIT 3 days a week.  [employer]- 25 mile race in June.  Fully able to intensely exercise.  Edema is better. Sometimes feels tightness in feet at the end of the day.      Recently she has been running out of areas on abdomen where she is able to absorb insulin.  Sometimes puts it in a site and removes it.  Stings.  Wonders about other options for infusion sites.  Using the steel sites now.   Struggling with new insurance.  Wanted to try omnipod- infuencing absorption.  Highs after eating- fighting to get it down.  Stopped absorbing in the middle of the night- jumps up in the night.  She was quoted a really high price for Omnipod and she is frustrated.     Memory not as good- probably microvascular, per cardiology.      Set sleep schedule overnight 11pm-7am.     Pump settings     Pump Profile Settings - Standard Active at upload  Time Basal Rate (u/hr) Correction Factor (u:mg/dL) Carb Ratio (u:grams) Target BG (mg/dL)  12:00am 0.750 1:50 1:15 110  6:00am 0.950 1:50 1:10 110  12:00pm 0.900 1:50 1:15 110  Total Daily Basal: 21.000 u Insulin Duration: 5 hrs  Carbohydrates: On    Pump Profile Settings - Temp rate  Time Basal Rate (u/hr) Correction Factor (u:mg/dL) Carb Ratio (u:grams) Target BG (mg/dL)  12:00am 0.500 1:100 1:30 115  Total Daily Basal: 12.000 u Insulin Duration: 4 hrs Carbohydrates: On    Pump Profile Settings - Exercise  Time Basal Rate (u/hr) Correction Factor (u:mg/dL) Carb Ratio (u:grams) Target BG (mg/dL)  12:00am 0.135 1:100 1:30 140  Total Daily Basal: 3.240 u Insulin Duration: 5 hrs Carbohydrates: On  Created on Bhavin 15, 2024   2024 [employer]. 1 of 1  Control-IQ settings  Control-IQ On  Weight  Total daily insulin  Sleep schedules  Everyday (11:00pm - 7:00am) On  - -  Insulin duration  Target BG  160 lbs  45 u  5 hrs  110 mg/dL    Her overall average glucose over the past two weeks is 171 mg/dL (CV 37%, TIR 59%. Recent glucose:                               GFR has been in the 50's for the past couple years, now improved to the 70s.   She is intentional about avoiding dehydration.     Her mood has been down and anxious.  We have been talking about seeing a therapist for a while and she is scheduled soon with Bob Burgess.  Her boyfriend has been supportive.  She has not told her kids, as she does not worry her.   She is on antidepressant.      She has no other concerns today.     Past Medical History:   Diagnosis Date    Anxiety     DMI     Type 1   Epicardial CAD- diagnosed .   HFpEF- diagnosed .    Past Surgical History:   Procedure Laterality Date    BREAST CYST ASPIRATION Left ?     SECTION      x2    CONIZATION CERVIX,LOOP ELECTRD      Description: Cervical Conization Loop Electrode Excision;  Recorded: 2008;    CV CORONARY ANGIOGRAM N/A 10/24/2022    Procedure: Coronary Angiogram;  Surgeon: Subha Porter MD;  Location: [facility] CATH LAB CV    CV FRACTIONAL FLOW RATIO WIRE N/A 10/24/2022    Procedure: Fractional Flow Ratio Wire;  Surgeon: Subha Porter MD;  Location: [facility] CATH LAB CV    CV  LEFT HEART CATH N/A 10/24/2022    Procedure: Left Heart Catheterization;  Surgeon: Subha Porter MD;  Location: [facility] CATH LAB CV       Family History   Problem Relation Age of Onset    Cancer Father     Breast Cancer Paternal Grandmother        Social History     Socioeconomic History    Marital status:    Tobacco Use    Smoking status: Never    Smokeless tobacco: Never   Substance and Sexual Activity    Alcohol use: Yes    Drug use: No         Past Surgical History:   Procedure Laterality Date    BREAST CYST ASPIRATION Left ?2005     SECTION      x2    CONIZATION CERVIX,LOOP ELECTRD      Description: Cervical Conization Loop Electrode Excision;  Recorded: 2008;    CV CORONARY ANGIOGRAM N/A 10/24/2022    Procedure: Coronary Angiogram;  Surgeon: Subha Porter MD;  Location: [facility] CATH LAB CV    CV FRACTIONAL FLOW RATIO WIRE N/A 10/24/2022    Procedure: Fractional Flow Ratio Wire;  Surgeon: Subha Porter MD;  Location: [facility] CATH LAB CV    CV LEFT HEART CATH N/A 10/24/2022    Procedure: Left Heart Catheterization;  Surgeon: Subha Porter MD;  Location: [facility] CATH LAB CV       Family History   Problem Relation Age of Onset    Cancer Father     Breast Cancer Paternal Grandmother        Social History   Social Hx:  .  Two teenage children.  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  Enjoys being physically active, mostly biking, but also running and HIIT.     Socioeconomic History    Marital status: other     Spouse name: Not on file    Number of children: Not on file    Years of education: Not on file    Highest education level: Not on file   Occupational History    Not on file   Social Needs    Financial resource strain: Not on file    Food insecurity:     Worry: Not on file     Inability: Not on file    Transportation needs:     Medical: Not on file     Non-medical: Not on file   Tobacco Use    Smoking status: Never Smoker    Smokeless tobacco: Never Used   Substance and  "Sexual Activity    Alcohol use: No    Drug use: No    Sexual activity: Not on file   Lifestyle    Physical activity:     Days per week: Not on file     Minutes per session: Not on file    Stress: Not on file   Relationships    Social connections:     Talks on phone: Not on file     Gets together: Not on file     Attends Restorationism service: Not on file     Active member of club or organization: Not on file     Attends meetings of clubs or organizations: Not on file     Relationship status: Not on file    Intimate partner violence:     Fear of current or ex partner: Not on file     Emotionally abused: Not on file     Physically abused: Not on file     Forced sexual activity: Not on file   Other Topics Concern    Not on file   Social History Narrative    Not on file       Current Outpatient Medications   Medication    amLODIPine (NORVASC) 5 MG tablet    aspirin (ASA) 81 MG EC tablet    atorvastatin (LIPITOR) 40 MG tablet    blood glucose (NO BRAND SPECIFIED) test strip    Calcium Carbonate-Vitamin D (CALCIUM 500+D PO)    cholecalciferol 50 MCG (2000 UT) tablet    Continuous Blood Gluc Sensor (DEXCOM G6 SENSOR) MISC    Continuous Blood Gluc Transmit (DEXCOM G6 TRANSMITTER) MISC    Glucagon (BAQSIMI) 3 MG/DOSE nasal powder    Glucagon, rDNA, (GLUCAGON EMERGENCY) 1 MG KIT    insulin cartridge (T:SLIM 3ML) misc pump supply    Insulin Disposable Pump (OMNIPOD 5 G6 INTRO, GEN 5,) KIT    Insulin Disposable Pump (OMNIPOD 5 G6 POD, GEN 5,) MISC    Insulin Glargine-yfgn (SEMGLEE, YFGN,) 100 UNIT/ML SOLN    Insulin Infusion Pump Supplies (TRUSTEEL INFUSION SET) MISC    insulin lispro (HUMALOG) 100 UNIT/ML vial    Insulin Pump Accessories MISC    insulin syringe-needle U-100 (30G X 1/2\" 0.3 ML) 30G X 1/2\" 0.3 ML miscellaneous    levonorgestrel (MIRENA) 20 MCG/DAY IUD    losartan (COZAAR) 50 MG tablet    nitroGLYcerin (NITROSTAT) 0.4 MG sublingual tablet    NOVOLOG VIAL 100 UNIT/ML soln     No current facility-administered " medications for this visit.          Allergies   Allergen Reactions    Latex      rash    Percocet [Oxycodone-Acetaminophen] Nausea and Nausea and Vomiting       Physical Exam  GENERAL: healthy, alert.   RESP: no audible wheeze, cough, or visible cyanosis.  No visible retractions or increased work of breathing.  Able to speak fully in complete sentences.  PSYCH: mentation appears normal, affect normal/bright, judgement and insight intact, normal speech and appearance well-groomed  RESULTS  Lab Results   Component Value Date    A1C 7.0 (H) 04/11/2022    A1C 7.2 (H) 08/02/2021    A1C 6.8 (A) 04/09/2021    A1C 7.7 (H) 01/20/2020    A1C 7.5 (H) 09/16/2019    A1C 7.8 (H) 03/18/2019    A1C 7.7 (H) 12/13/2016    HEMOGLOBINA1 7.4 (A) 01/20/2020    HEMOGLOBINA1 7.2 (A) 09/16/2019    HEMOGLOBINA1 7.0 (A) 04/16/2018    HEMOGLOBINA1 7.0 (A) 12/11/2017    HEMOGLOBINA1 7.4 (A) 06/05/2017       TSH   Date Value Ref Range Status   10/20/2023 2.45 0.30 - 4.20 uIU/mL Final   10/21/2022 3.52 0.30 - 4.20 uIU/mL Final   08/02/2021 1.84 0.30 - 5.00 uIU/mL Final   01/20/2020 2.07 0.40 - 4.00 mU/L Final   09/16/2019 1.89 0.40 - 4.00 mU/L Final   03/18/2019 2.40 0.40 - 4.00 mU/L Final   12/11/2017 1.69 0.40 - 4.00 mU/L Final   12/13/2016 3.72 0.40 - 4.00 mU/L Final     T4 Free   Date Value Ref Range Status   03/18/2019 0.81 0.76 - 1.46 ng/dL Final       ALT   Date Value Ref Range Status   01/20/2020 24 0 - 50 U/L Final   09/16/2019 25 0 - 50 U/L Final   ]    Recent Labs   Lab Test 10/24/22  1133 10/21/22  0806   CHOL 92 99   HDL 39* 42*   LDL 44 49   TRIG 43 41       Lab Results   Component Value Date     10/20/2023     01/20/2020      Lab Results   Component Value Date    POTASSIUM 4.1 10/20/2023    POTASSIUM 3.8 05/06/2022    POTASSIUM 4.0 01/20/2020     Lab Results   Component Value Date    CHLORIDE 102 10/20/2023    CHLORIDE 101 05/06/2022    CHLORIDE 105 01/20/2020     Lab Results   Component Value Date    SCOTTY 9.8  10/20/2023    SCOTTY 9.2 01/20/2020     Lab Results   Component Value Date    CO2 30 10/20/2023    CO2 27 05/06/2022    CO2 32 01/20/2020     Lab Results   Component Value Date    BUN 16.0 10/20/2023    BUN 13 05/06/2022    BUN 18 01/20/2020     Lab Results   Component Value Date    CR 0.97 10/20/2023    CR 0.94 01/20/2020       GFR Estimate   Date Value Ref Range Status   10/20/2023 70 >60 mL/min/1.73m2 Final   10/21/2022 75 >60 mL/min/1.73m2 Final     Comment:     Effective December 21, 2021 eGFRcr in adults is calculated using the 2021 CKD-EPI creatinine equation which includes age and gender (Jackie et al., [facility], DOI: 10.1056/VOIWqq1792607)   08/13/2022 65 >60 mL/min/1.73m2 Final     Comment:     Effective December 21, 2021 eGFRcr in adults is calculated using the 2021 CKD-EPI creatinine equation which includes age and gender (Jackie et al., [place], DOI: 10.1056/FIGAxt6921930)   06/07/2021 49 (L) >60 mL/min/1.73m2 Final   10/12/2020 51 (L) >60 mL/min/1.73m2 Final   01/20/2020 72 >60 mL/min/[1.73_m2] Final     Comment:     Non  GFR Calc  Starting 12/18/2018, serum creatinine based estimated GFR (eGFR) will be   calculated using the Chronic Kidney Disease Epidemiology Collaboration   (CKD-EPI) equation.     09/16/2019 63 >60 mL/min/[1.73_m2] Final     Comment:     Non  GFR Calc  Starting 12/18/2018, serum creatinine based estimated GFR (eGFR) will be   calculated using the Chronic Kidney Disease Epidemiology Collaboration   (CKD-EPI) equation.     08/22/2019 47 (L) >60 mL/min/1.73m2 Final   03/18/2019 77 >60 mL/min/[1.73_m2] Final     Comment:     Non  GFR Calc  Starting 12/18/2018, serum creatinine based estimated GFR (eGFR) will be   calculated using the Chronic Kidney Disease Epidemiology Collaboration   (CKD-EPI) equation.       GFR, ESTIMATED POCT   Date Value Ref Range Status   10/11/2022 >60 >60 mL/min/1.73m2 Final     GFR Estimate If Black   Date Value Ref Range  "Status   06/07/2021 60 (L) >60 mL/min/1.73m2 Final   10/12/2020 >60 >60 mL/min/1.73m2 Final   01/20/2020 83 >60 mL/min/[1.73_m2] Final     Comment:      GFR Calc  Starting 12/18/2018, serum creatinine based estimated GFR (eGFR) will be   calculated using the Chronic Kidney Disease Epidemiology Collaboration   (CKD-EPI) equation.     09/16/2019 73 >60 mL/min/[1.73_m2] Final     Comment:      GFR Calc  Starting 12/18/2018, serum creatinine based estimated GFR (eGFR) will be   calculated using the Chronic Kidney Disease Epidemiology Collaboration   (CKD-EPI) equation.     08/22/2019 57 (L) >60 mL/min/1.73m2 Final   03/18/2019 89 >60 mL/min/[1.73_m2] Final     Comment:      GFR Calc  Starting 12/18/2018, serum creatinine based estimated GFR (eGFR) will be   calculated using the Chronic Kidney Disease Epidemiology Collaboration   (CKD-EPI) equation.         Lab Results   Component Value Date    MICROL <12.0 10/20/2023    MICROL 6 01/20/2020     No results found for: \"MICROALBUMIN\"  No results found for: \"CPEPT\", \"GADAB\", \"ISCAB\"    No results found for: \"B12\"]    Most recent eye exam date: : Not Found       Assessment/Plan:     1.  Type 1 diabetes- Glucose is fairly well controlled, however she is dropping too much at night going too high after being low.  She is doing better with pre-bolusing.  She is having absorption issues with her Tandem and wants to try Omnipod, but was told it would be quite expensive.  Will refer to [facility] pharmacy to help sort out costs.  Also suggested perhaps adding some basal insulin in case of site issues, but for now, we will try for Omnipod.  Will lower overnight basal rates.  Likely will need tighter IC ratio to prevent post-prandial hyperglycemia.  We made the following changes today (instructions given to patient):       Pump Profile Settings - Standard Active at upload  Time Basal Rate (u/hr) Correction Factor (u:mg/dL) Carb Ratio (u:grams) " Target BG (mg/dL)  12:00am 0.7 (was 0.75) 1:50 1:15 110  6:00am 0.950 1:50 1:10 110  12:00pm 0.900 1:50 1:15 110  10pm: 0.8 (was 0.9), 1:60 (was 50), 1:15    Let me know if you  the Omnipod 5 starter kit.      I will schedule you with Gilbert Navarro, our pharmacist.      Check in with me 4-6 weeks after starting Omnipod.     Emergency issues: Here are some concerns you should contact us about.  -Vomiting: more than twice.  Please check ketones.  If positive, go to ER. Monitor glucose hourly.   -High glucose (over 300 mg/dL twice in a row) with a pump:  Twice in doubt, take it out.  Change your pump site. Check ketones.  If ketones are negative, take an insulin correction by syringe/pen and recheck glucose in 1 hour.  If glucose is not coming down, please call the clinic. If ketones are moderate or large, drink lots of water, take an insulin correction 1.5 times your usual correction by syringe/pen, and recheck ketones in 1 hour.  If ketones are still present (or you are vomiting), go to the ER.  -Hypoglycemia (low glucose):   If glucose is less than 70 mg/dL, treat with 15g carb (4 glucose tablets), recheck glucose in 15 minutes.  If low again, repeat.   If glucose is less than 54 mg/dL, treat with 30g carb, recheck glucose in 15 minutes.  If low again, repeat.  Keep glucagon in your home in case of severe hypoglycemia and train someone how to use this.    Emergency kit (please ensure you always have these with you):   Glucose tablets  Glucagon  Insulin  Syringes/needles   Extra infusion set (if on a pump)  Ketone strips    Contact information:   If you have concerns, please send me a [employer] message or call the clinic at [phone].  For more urgent concerns, please call [phone] after hours/weekends and ask to speak with the endocrinologist on call.      Please let me know if you are having low blood sugars less than 70 or over 350 mg/dL.  Do not wait until your next appointment if this is happening.     2.  Risk factors-     Retinopathy:  Yes.  Last exam 12/2023.   Nephropathy:  BP well controlled. No history of microalbuminuria. She had SEKOU in 2019.  GFR improved.  Follows with nephrology. Encouraged hydration.   No evidence of diabetic nephropathy.       Neuropathy: decreased vibration sensation in left great toe in the past.  Normal monofilament.   No pain/burning.   Feet: OK, no ulcers.   Lipids:  LDL at target on rosuvastatin.   Celiac screening: negative antibodies 2017.   Thyroid screening: TSH normal 10/2023.  ASA: 81 mg daily.     3.   Heart failure with preserved EF- On amlodipine 5 mg daily and losartan 50 mg.  Edema is improved.  Symptomatically much better now. Exercise tolerance is excellent.    4. F/U in 6 weeks with myself.  Will establish staff endocrinologist in 6 mos, as Dr. Roy is no longer in our clinic.        35 minutes spent on the date of the encounter doing chart review, review of test results, review of continuous glucose sensor, insulin pump data, interpretation of glucose data, patient visit and documentation, counseling/coordination of care, and discussion of follow up plan for worsening hyper and hypoglycemia.  The patient understood and is satisfied with today's visit.     Hannah Whitley PA-C, MPAS   [facility]  Department of Medicine  Division of Endocrinology and Diabetes

## 2024-01-09 RX ORDER — PROCHLORPERAZINE 25 MG/1
SUPPOSITORY RECTAL
Qty: 9 EACH | Refills: 3 | Status: SHIPPED | OUTPATIENT
Start: 2024-01-09

## 2024-01-09 RX ORDER — PROCHLORPERAZINE 25 MG/1
SUPPOSITORY RECTAL
Qty: 1 EACH | Refills: 3 | Status: SHIPPED | OUTPATIENT
Start: 2024-01-09 | End: 2024-08-05

## 2024-01-09 NOTE — TELEPHONE ENCOUNTER
Dexcom G6 Sensor Miscellaneous   Last Written Prescription Date:  1/23/2023  Last Fill Quantity: 9,   # refills: 3  Last Office Visit : 10/3/2023  Future Office visit:  1/16/2024  9 Each, 3 Refills sent to pharm     Dexcom G6 Transmitter Miscellaneous   Last Written Prescription Date:  1/23/2023  Last Fill Quantity: 1,   # refills: 3  Last Office Visit : 10/3/2023  Future Office visit:  1/16/2024  1 Each, 3 Refills sent to rufus Schmid RN  Central Triage Red Flags/Med Refills

## 2024-01-11 ENCOUNTER — TELEPHONE (OUTPATIENT)
Dept: ENDOCRINOLOGY | Facility: CLINIC | Age: 54
End: 2024-01-11
Payer: COMMERCIAL

## 2024-01-11 DIAGNOSIS — E10.9 WELL CONTROLLED TYPE 1 DIABETES MELLITUS (H): Primary | ICD-10-CM

## 2024-01-11 NOTE — TELEPHONE ENCOUNTER
Pt notified.   Cheli Carlos, RN on 1/12/2024 at 8:05 AM       Medication Question  (Newest Message First)  View All Conversations on this Encounter  Lang Jack S  Karl 14 hours ago (5:00 PM)     JI  Good Afternoon,       Pt has gotten new insurance with the new year. The number provided was not open, so I called the plan and was able to get a override put in. They stated being pt had not filled insulin through them it was declining. Also the stated that they do not cover the pt's Novolog, Humalog is there preferred insulin. Could we get a new script sent over for this so the pt has it on file as [facility] does not have a script. Thank you      [facility] Call Center    Phone Message    May a detailed message be left on voicemail: yes     Reason for Call: Medication Question or concern regarding medication   Prescription Clarification  Name of Medication: Dexcom sensor  Prescribing Provider: Gutierrez   Pharmacy:  [facility] # [id number] [address] [place]     What on the order needs clarification? Patient called today [facility] told her she will need Doctor to call insurance company to verify she is on insulin before they can prescribe Dexcom sensor. Please give her insurance a call at [phone]      Action Taken: Message routed to:  [facility] endo    Travel Screening: Not Applicable

## 2024-01-12 ENCOUNTER — TELEPHONE (OUTPATIENT)
Dept: ENDOCRINOLOGY | Facility: CLINIC | Age: 54
End: 2024-01-12
Payer: COMMERCIAL

## 2024-01-12 RX ORDER — INSULIN LISPRO 100 [IU]/ML
INJECTION, SOLUTION INTRAVENOUS; SUBCUTANEOUS
Qty: 70 ML | Refills: 3 | Status: SHIPPED | OUTPATIENT
Start: 2024-01-12 | End: 2024-01-18

## 2024-01-12 NOTE — TELEPHONE ENCOUNTER
Called patient and left voicemail. Patient has an appointment on  1/16/24 . Need patient to upload their Tandem device to site for provider to review prior to their appointment. Tandem data showing up to 1/2/24  Betty Mcdonough MA

## 2024-01-12 NOTE — TELEPHONE ENCOUNTER
Prior Authorization Approval    Medication: DEXCOM G6 TRANSMITTER MISC  Authorization Effective Date: 12/12/2023  Authorization Expiration Date: 1/10/2027  Reference #:     Insurance Company: [employer] - Phone [phone] Fax [phone]  Expected CoPay: $    CoPay Card Available:      Which Pharmacy is filling the prescription:

## 2024-01-16 ENCOUNTER — VIRTUAL VISIT (OUTPATIENT)
Dept: ENDOCRINOLOGY | Facility: CLINIC | Age: 54
End: 2024-01-16
Payer: COMMERCIAL

## 2024-01-16 VITALS — WEIGHT: 165 LBS | BODY MASS INDEX: 25.09 KG/M2

## 2024-01-16 DIAGNOSIS — E10.8 TYPE 1 DIABETES MELLITUS WITH COMPLICATIONS (H): ICD-10-CM

## 2024-01-16 DIAGNOSIS — E10.9 WELL CONTROLLED TYPE 1 DIABETES MELLITUS (H): Primary | ICD-10-CM

## 2024-01-16 PROCEDURE — 99214 OFFICE O/P EST MOD 30 MIN: CPT | Mod: 95 | Performed by: PHYSICIAN ASSISTANT

## 2024-01-16 RX ORDER — INSULIN ASPART 100 [IU]/ML
INJECTION, SOLUTION INTRAVENOUS; SUBCUTANEOUS
Qty: 80 ML | Refills: 3 | Status: SHIPPED | OUTPATIENT
Start: 2024-01-16 | End: 2024-01-26

## 2024-01-16 NOTE — PATIENT INSTRUCTIONS
Pump Profile Settings - Standard Active at upload  Time Basal Rate (u/hr) Correction Factor (u:mg/dL) Carb Ratio (u:grams) Target BG (mg/dL)  12:00am 0.7 (was 0.75) 1:50 1:15 110  6:00am 0.950 1:50 1:10 110  12:00pm 0.900 1:50 1:15 110  10pm: 0.8 (was 0.9), 1:60 (was 50), 1:15    Let me know if you  the Omnipod 5 starter kit.      I will schedule you with Gilbert Navarro, our pharmacist.      Check in with me 4-6 weeks after starting Omnipod.     Emergency issues: Here are some concerns you should contact us about.  -Vomiting: more than twice.  Please check ketones.  If positive, go to ER. Monitor glucose hourly.   -High glucose (over 300 mg/dL twice in a row) with a pump:  Twice in doubt, take it out.  Change your pump site. Check ketones.  If ketones are negative, take an insulin correction by syringe/pen and recheck glucose in 1 hour.  If glucose is not coming down, please call the clinic. If ketones are moderate or large, drink lots of water, take an insulin correction 1.5 times your usual correction by syringe/pen, and recheck ketones in 1 hour.  If ketones are still present (or you are vomiting), go to the ER.  -Hypoglycemia (low glucose):   If glucose is less than 70 mg/dL, treat with 15g carb (4 glucose tablets), recheck glucose in 15 minutes.  If low again, repeat.   If glucose is less than 54 mg/dL, treat with 30g carb, recheck glucose in 15 minutes.  If low again, repeat.  Keep glucagon in your home in case of severe hypoglycemia and train someone how to use this.    Emergency kit (please ensure you always have these with you):   Glucose tablets  Glucagon  Insulin  Syringes/needles   Extra infusion set (if on a pump)  Ketone strips    Contact information:   If you have concerns, please send me a [employer] message or call the clinic at [phone].  For more urgent concerns, please call [phone] after hours/weekends and ask to speak with the endocrinologist on call.      Please let me know if you  are having low blood sugars less than 70 or over 350 mg/dL.  Do not wait until your next appointment if this is happening.

## 2024-01-16 NOTE — LETTER
1/16/2024       RE: Sera Adkins  [address]   [facility] [address]     Dear Colleague,    Thank you for referring your patient, Sera Adkins, to the [facility] at [facility]. Please see a copy of my visit note below.    Outcome for 01/05/24 8:43 AM: Data uploaded on Dexcom and Tandem  Betty Mcdonough MA  Outcome for 01/12/24 1:17 PM: [employer] message sent- tandem data up to 1/2/24.  Betty Mcdonough MA  Outcome for 01/12/24 1:18 PM: Left Voicemail   Betty Mcdonough MA  Outcome for 01/15/24 10:40 AM: Data obtained via Dexcom and Tandem website  Betty Mcdonough MA    HPI:   Sera is a pleasant 54 yo woman here for follow up of type 1 diabetes since age 15.   She has always been extremely active with high intensity biking as a regular part of her workouts.  In 2022, she developed exertional angina.  She had an angiogram, which was clear and was diagnosed with microvascular angina (epicardial CAD) and HFpEF.  She is improving and is still able to do all of her intense activity.  She has been doing very well this year.   She is no longer requiring nitroglycerine before activity. HIIT 3 days a week.  [employer]- 25 mile race in June.  Fully able to intensely exercise.  Edema is better. Sometimes feels tightness in feet at the end of the day.      Recently she has been running out of areas on abdomen where she is able to absorb insulin.  Sometimes puts it in a site and removes it.  Stings.  Wonders about other options for infusion sites.  Using the steel sites now.   Struggling with new insurance.  Wanted to try omnipod- infuencing absorption.  Highs after eating- fighting to get it down.  Stopped absorbing in the middle of the night- jumps up in the night.  She was quoted a really high price for Omnipod and she is frustrated.     Memory not as good- probably microvascular, per cardiology.      Set sleep  schedule overnight 11pm-7am.     Pump settings     Pump Profile Settings - Standard Active at upload  Time Basal Rate (u/hr) Correction Factor (u:mg/dL) Carb Ratio (u:grams) Target BG (mg/dL)  12:00am 0.750 1:50 1:15 110  6:00am 0.950 1:50 1:10 110  12:00pm 0.900 1:50 1:15 110  Total Daily Basal: 21.000 u Insulin Duration: 5 hrs Carbohydrates: On    Pump Profile Settings - Temp rate  Time Basal Rate (u/hr) Correction Factor (u:mg/dL) Carb Ratio (u:grams) Target BG (mg/dL)  12:00am 0.500 1:100 1:30 115  Total Daily Basal: 12.000 u Insulin Duration: 4 hrs Carbohydrates: On    Pump Profile Settings - Exercise  Time Basal Rate (u/hr) Correction Factor (u:mg/dL) Carb Ratio (u:grams) Target BG (mg/dL)  12:00am 0.135 1:100 1:30 140  Total Daily Basal: 3.240 u Insulin Duration: 5 hrs Carbohydrates: On  Created on Bhavin 15, 2024   2024 [employer] 1 of 1  Control-IQ settings  Control-IQ On  Weight  Total daily insulin  Sleep schedules  Everyday (11:00pm - 7:00am) On  - -  Insulin duration  Target BG  160 lbs  45 u  5 hrs  110 mg/dL    Her overall average glucose over the past two weeks is 171 mg/dL (CV 37%, TIR 59%. Recent glucose:                               GFR has been in the 50's for the past couple years, now improved to the 70s.   She is intentional about avoiding dehydration.     Her mood has been down and anxious.  We have been talking about seeing a therapist for a while and she is scheduled soon with Bob Burgess.  Her boyfriend has been supportive.  She has not told her kids, as she does not worry her.   She is on antidepressant.      She has no other concerns today.     Past Medical History:   Diagnosis Date    Anxiety     DMI     Type 1   Epicardial CAD- diagnosed .   HFpEF- diagnosed .    Past Surgical History:   Procedure Laterality Date    BREAST CYST ASPIRATION Left ?     SECTION      x2    CONIZATION CERVIX,LOOP ELECTRD      Description: Cervical Conization Loop Electrode  Excision;  Recorded: 2008;    CV CORONARY ANGIOGRAM N/A 10/24/2022    Procedure: Coronary Angiogram;  Surgeon: Subha Porter MD;  Location: [facility]    CV FRACTIONAL FLOW RATIO WIRE N/A 10/24/2022    Procedure: Fractional Flow Ratio Wire;  Surgeon: Subha Porter MD;  Location: [facility]    CV LEFT HEART CATH N/A 10/24/2022    Procedure: Left Heart Catheterization;  Surgeon: Subha Porter MD;  Location: [facility]       Family History   Problem Relation Age of Onset    Cancer Father     Breast Cancer Paternal Grandmother        Social History     Socioeconomic History    Marital status:    Tobacco Use    Smoking status: Never    Smokeless tobacco: Never   Substance and Sexual Activity    Alcohol use: Yes    Drug use: No         Past Surgical History:   Procedure Laterality Date    BREAST CYST ASPIRATION Left ?     SECTION      x2    CONIZATION CERVIX,LOOP ELECTRD      Description: Cervical Conization Loop Electrode Excision;  Recorded: 2008;    CV CORONARY ANGIOGRAM N/A 10/24/2022    Procedure: Coronary Angiogram;  Surgeon: Subha Porter MD;  Location: [facility]    CV FRACTIONAL FLOW RATIO WIRE N/A 10/24/2022    Procedure: Fractional Flow Ratio Wire;  Surgeon: Subha Porter MD;  Location: [facility]    CV LEFT HEART CATH N/A 10/24/2022    Procedure: Left Heart Catheterization;  Surgeon: Subha Porter MD;  Location: [facility]       Family History   Problem Relation Age of Onset    Cancer Father     Breast Cancer Paternal Grandmother        Social History   Social Hx:  .  Two teenage children.  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  Enjoys being physically active, mostly biking, but also running and HIIT.     Socioeconomic History    Marital status: other     Spouse name: Not on file    Number of children: Not on file    Years of education: Not on file    Highest education level: Not on file    Occupational History    Not on file   Social Needs    Financial resource strain: Not on file    Food insecurity:     Worry: Not on file     Inability: Not on file    Transportation needs:     Medical: Not on file     Non-medical: Not on file   Tobacco Use    Smoking status: Never Smoker    Smokeless tobacco: Never Used   Substance and Sexual Activity    Alcohol use: No    Drug use: No    Sexual activity: Not on file   Lifestyle    Physical activity:     Days per week: Not on file     Minutes per session: Not on file    Stress: Not on file   Relationships    Social connections:     Talks on phone: Not on file     Gets together: Not on file     Attends Scientology service: Not on file     Active member of club or organization: Not on file     Attends meetings of clubs or organizations: Not on file     Relationship status: Not on file    Intimate partner violence:     Fear of current or ex partner: Not on file     Emotionally abused: Not on file     Physically abused: Not on file     Forced sexual activity: Not on file   Other Topics Concern    Not on file   Social History Narrative    Not on file       Current Outpatient Medications   Medication    amLODIPine (NORVASC) 5 MG tablet    aspirin (ASA) 81 MG EC tablet    atorvastatin (LIPITOR) 40 MG tablet    blood glucose (NO BRAND SPECIFIED) test strip    Calcium Carbonate-Vitamin D (CALCIUM 500+D PO)    cholecalciferol 50 MCG (2000 UT) tablet    Continuous Blood Gluc Sensor (DEXCOM G6 SENSOR) MISC    Continuous Blood Gluc Transmit (DEXCOM G6 TRANSMITTER) MISC    Glucagon (BAQSIMI) 3 MG/DOSE nasal powder    Glucagon, rDNA, (GLUCAGON EMERGENCY) 1 MG KIT    insulin cartridge (T:SLIM 3ML) misc pump supply    Insulin Disposable Pump (OMNIPOD 5 G6 INTRO, GEN 5,) KIT    Insulin Disposable Pump (OMNIPOD 5 G6 POD, GEN 5,) MISC    Insulin Glargine-yfgn (SEMGLEE, YFGN,) 100 UNIT/ML SOLN    Insulin Infusion Pump Supplies (TRUSTEEL INFUSION SET) MISC    insulin lispro (HUMALOG) 100  "UNIT/ML vial    Insulin Pump Accessories MISC    insulin syringe-needle U-100 (30G X 1/2\" 0.3 ML) 30G X 1/2\" 0.3 ML miscellaneous    levonorgestrel (MIRENA) 20 MCG/DAY IUD    losartan (COZAAR) 50 MG tablet    nitroGLYcerin (NITROSTAT) 0.4 MG sublingual tablet    NOVOLOG VIAL 100 UNIT/ML soln     No current facility-administered medications for this visit.          Allergies   Allergen Reactions    Latex      rash    Percocet [Oxycodone-Acetaminophen] Nausea and Nausea and Vomiting       Physical Exam  GENERAL: healthy, alert.   RESP: no audible wheeze, cough, or visible cyanosis.  No visible retractions or increased work of breathing.  Able to speak fully in complete sentences.  PSYCH: mentation appears normal, affect normal/bright, judgement and insight intact, normal speech and appearance well-groomed  RESULTS  Lab Results   Component Value Date    A1C 7.0 (H) 04/11/2022    A1C 7.2 (H) 08/02/2021    A1C 6.8 (A) 04/09/2021    A1C 7.7 (H) 01/20/2020    A1C 7.5 (H) 09/16/2019    A1C 7.8 (H) 03/18/2019    A1C 7.7 (H) 12/13/2016    HEMOGLOBINA1 7.4 (A) 01/20/2020    HEMOGLOBINA1 7.2 (A) 09/16/2019    HEMOGLOBINA1 7.0 (A) 04/16/2018    HEMOGLOBINA1 7.0 (A) 12/11/2017    HEMOGLOBINA1 7.4 (A) 06/05/2017       TSH   Date Value Ref Range Status   10/20/2023 2.45 0.30 - 4.20 uIU/mL Final   10/21/2022 3.52 0.30 - 4.20 uIU/mL Final   08/02/2021 1.84 0.30 - 5.00 uIU/mL Final   01/20/2020 2.07 0.40 - 4.00 mU/L Final   09/16/2019 1.89 0.40 - 4.00 mU/L Final   03/18/2019 2.40 0.40 - 4.00 mU/L Final   12/11/2017 1.69 0.40 - 4.00 mU/L Final   12/13/2016 3.72 0.40 - 4.00 mU/L Final     T4 Free   Date Value Ref Range Status   03/18/2019 0.81 0.76 - 1.46 ng/dL Final       ALT   Date Value Ref Range Status   01/20/2020 24 0 - 50 U/L Final   09/16/2019 25 0 - 50 U/L Final   ]    Recent Labs   Lab Test 10/24/22  1133 10/21/22  0806   CHOL 92 99   HDL 39* 42*   LDL 44 49   TRIG 43 41       Lab Results   Component Value Date     " 10/20/2023     01/20/2020      Lab Results   Component Value Date    POTASSIUM 4.1 10/20/2023    POTASSIUM 3.8 05/06/2022    POTASSIUM 4.0 01/20/2020     Lab Results   Component Value Date    CHLORIDE 102 10/20/2023    CHLORIDE 101 05/06/2022    CHLORIDE 105 01/20/2020     Lab Results   Component Value Date    SCOTTY 9.8 10/20/2023    SCOTTY 9.2 01/20/2020     Lab Results   Component Value Date    CO2 30 10/20/2023    CO2 27 05/06/2022    CO2 32 01/20/2020     Lab Results   Component Value Date    BUN 16.0 10/20/2023    BUN 13 05/06/2022    BUN 18 01/20/2020     Lab Results   Component Value Date    CR 0.97 10/20/2023    CR 0.94 01/20/2020       GFR Estimate   Date Value Ref Range Status   10/20/2023 70 >60 mL/min/1.73m2 Final   10/21/2022 75 >60 mL/min/1.73m2 Final     Comment:     Effective December 21, 2021 eGFRcr in adults is calculated using the 2021 CKD-EPI creatinine equation which includes age and gender (Jackie et al., NEJM, DOI: 10.1056/FOOSfe6424033)   08/13/2022 65 >60 mL/min/1.73m2 Final     Comment:     Effective December 21, 2021 eGFRcr in adults is calculated using the 2021 CKD-EPI creatinine equation which includes age and gender (Jackie et al., NEJ, DOI: 10.1056/DUKErd3814606)   06/07/2021 49 (L) >60 mL/min/1.73m2 Final   10/12/2020 51 (L) >60 mL/min/1.73m2 Final   01/20/2020 72 >60 mL/min/[1.73_m2] Final     Comment:     Non  GFR Calc  Starting 12/18/2018, serum creatinine based estimated GFR (eGFR) will be   calculated using the Chronic Kidney Disease Epidemiology Collaboration   (CKD-EPI) equation.     09/16/2019 63 >60 mL/min/[1.73_m2] Final     Comment:     Non  GFR Calc  Starting 12/18/2018, serum creatinine based estimated GFR (eGFR) will be   calculated using the Chronic Kidney Disease Epidemiology Collaboration   (CKD-EPI) equation.     08/22/2019 47 (L) >60 mL/min/1.73m2 Final   03/18/2019 77 >60 mL/min/[1.73_m2] Final     Comment:     Non   "GFR Calc  Starting 12/18/2018, serum creatinine based estimated GFR (eGFR) will be   calculated using the Chronic Kidney Disease Epidemiology Collaboration   (CKD-EPI) equation.       GFR, ESTIMATED POCT   Date Value Ref Range Status   10/11/2022 >60 >60 mL/min/1.73m2 Final     GFR Estimate If Black   Date Value Ref Range Status   06/07/2021 60 (L) >60 mL/min/1.73m2 Final   10/12/2020 >60 >60 mL/min/1.73m2 Final   01/20/2020 83 >60 mL/min/[1.73_m2] Final     Comment:      GFR Calc  Starting 12/18/2018, serum creatinine based estimated GFR (eGFR) will be   calculated using the Chronic Kidney Disease Epidemiology Collaboration   (CKD-EPI) equation.     09/16/2019 73 >60 mL/min/[1.73_m2] Final     Comment:      GFR Calc  Starting 12/18/2018, serum creatinine based estimated GFR (eGFR) will be   calculated using the Chronic Kidney Disease Epidemiology Collaboration   (CKD-EPI) equation.     08/22/2019 57 (L) >60 mL/min/1.73m2 Final   03/18/2019 89 >60 mL/min/[1.73_m2] Final     Comment:      GFR Calc  Starting 12/18/2018, serum creatinine based estimated GFR (eGFR) will be   calculated using the Chronic Kidney Disease Epidemiology Collaboration   (CKD-EPI) equation.         Lab Results   Component Value Date    MICROL <12.0 10/20/2023    MICROL 6 01/20/2020     No results found for: \"MICROALBUMIN\"  No results found for: \"CPEPT\", \"GADAB\", \"ISCAB\"    No results found for: \"B12\"]    Most recent eye exam date: : Not Found       Assessment/Plan:     1.  Type 1 diabetes- Glucose is fairly well controlled, however she is dropping too much at night going too high after being low.  She is doing better with pre-bolusing.  She is having absorption issues with her Tandem and wants to try Omnipod, but was told it would be quite expensive.  Will refer to [facility] pharmacy to help sort out costs.  Also suggested perhaps adding some basal insulin in case of site issues, but for now, we will " try for Omnipod.  Will lower overnight basal rates.  Likely will need tighter IC ratio to prevent post-prandial hyperglycemia.  We made the following changes today (instructions given to patient):       Pump Profile Settings - Standard Active at upload  Time Basal Rate (u/hr) Correction Factor (u:mg/dL) Carb Ratio (u:grams) Target BG (mg/dL)  12:00am 0.7 (was 0.75) 1:50 1:15 110  6:00am 0.950 1:50 1:10 110  12:00pm 0.900 1:50 1:15 110  10pm: 0.8 (was 0.9), 1:60 (was 50), 1:15    Let me know if you  the Omnipod 5 starter kit.      I will schedule you with Gilbert Navarro, our pharmacist.      Check in with me 4-6 weeks after starting Omnipod.     Emergency issues: Here are some concerns you should contact us about.  -Vomiting: more than twice.  Please check ketones.  If positive, go to ER. Monitor glucose hourly.   -High glucose (over 300 mg/dL twice in a row) with a pump:  Twice in doubt, take it out.  Change your pump site. Check ketones.  If ketones are negative, take an insulin correction by syringe/pen and recheck glucose in 1 hour.  If glucose is not coming down, please call the clinic. If ketones are moderate or large, drink lots of water, take an insulin correction 1.5 times your usual correction by syringe/pen, and recheck ketones in 1 hour.  If ketones are still present (or you are vomiting), go to the ER.  -Hypoglycemia (low glucose):   If glucose is less than 70 mg/dL, treat with 15g carb (4 glucose tablets), recheck glucose in 15 minutes.  If low again, repeat.   If glucose is less than 54 mg/dL, treat with 30g carb, recheck glucose in 15 minutes.  If low again, repeat.  Keep glucagon in your home in case of severe hypoglycemia and train someone how to use this.    Emergency kit (please ensure you always have these with you):   Glucose tablets  Glucagon  Insulin  Syringes/needles   Extra infusion set (if on a pump)  Ketone strips    Contact information:   If you have concerns, please send me a  [facility] message or call the clinic at [phone].  For more urgent concerns, please call [phone] after hours/weekends and ask to speak with the endocrinologist on call.      Please let me know if you are having low blood sugars less than 70 or over 350 mg/dL.  Do not wait until your next appointment if this is happening.    2.  Risk factors-     Retinopathy:  Yes.  Last exam 12/2023.   Nephropathy:  BP well controlled. No history of microalbuminuria. She had SEKOU in 2019.  GFR improved.  Follows with nephrology. Encouraged hydration.   No evidence of diabetic nephropathy.       Neuropathy: decreased vibration sensation in left great toe in the past.  Normal monofilament.   No pain/burning.   Feet: OK, no ulcers.   Lipids:  LDL at target on rosuvastatin.   Celiac screening: negative antibodies 2017.   Thyroid screening: TSH normal 10/2023.  ASA: 81 mg daily.     3.   Heart failure with preserved EF- On amlodipine 5 mg daily and losartan 50 mg.  Edema is improved.  Symptomatically much better now. Exercise tolerance is excellent.    4. F/U in 6 weeks with myself.  Will establish staff endocrinologist in 6 mos, as Dr. Roy is no longer in our clinic.        35 minutes spent on the date of the encounter doing chart review, review of test results, review of continuous glucose sensor, insulin pump data, interpretation of glucose data, patient visit and documentation, counseling/coordination of care, and discussion of follow up plan for worsening hyper and hypoglycemia.  The patient understood and is satisfied with today's visit.     Hannah Whitley PA-C, MPAS   [facility]  Department of Medicine  Division of Endocrinology and Diabetes

## 2024-01-16 NOTE — NURSING NOTE
Is the patient currently in the state of [place]? YES    Visit mode:TELEPHONE    If the visit is dropped, the patient can be reconnected by: TELEPHONE VISIT: Phone number:   Telephone Information:   Mobile [phone]       Will anyone else be joining the visit? NO  (If patient encounters technical issues they should call [phone] :[phone])    How would you like to obtain your AVS? MyChart    Are changes needed to the allergy or medication list? No    Reason for visit: RECHECK and Video Visit    Zuly ARROYO

## 2024-01-17 ENCOUNTER — MYC MEDICAL ADVICE (OUTPATIENT)
Dept: ENDOCRINOLOGY | Facility: CLINIC | Age: 54
End: 2024-01-17
Payer: COMMERCIAL

## 2024-01-17 DIAGNOSIS — E10.9 WELL CONTROLLED TYPE 1 DIABETES MELLITUS (H): ICD-10-CM

## 2024-01-17 NOTE — TELEPHONE ENCOUNTER
Left Voicemail (1st Attempt) and Sent Mychart (1st Attempt) for the patient to call back and schedule the following:    Appointment type: Return diabetes  Provider: Hannah Whitley  Return date: 6 weeks  Specialty phone number: [phone]  Additional appointment(s) needed: NA  Additonal Notes: Return in about 6 weeks (around 2/27/2024), or ok to add on a harishesda

## 2024-01-18 DIAGNOSIS — E10.9 WELL CONTROLLED TYPE 1 DIABETES MELLITUS (H): ICD-10-CM

## 2024-01-18 NOTE — TELEPHONE ENCOUNTER
"  insulin aspart (NOVOLOG VIAL) 100 UNITS/ML vial     Use as directed up to 80 units daily.    Last Written Prescription Date:  1/16/24  Last Fill Quantity: 80 ml ,   # refills: 3  [id number] - [place], [place] - [address]     Last Office Visit : 1/16/24  Future Office visit:  4/24/24 Raghu    Routing refill request to provider for review/approval because:  Change from Novolog to Humalog per insurance coverage  Insulin - refilled per clinic     \"I also forgot to mention that it looks like Novolog was sent to [employer] and my new insurance requires Humalog . I need the amount of insulin per day to say  inject up to 80 units per day  in order to get 7 vials per 90 days filled which is what I was previously receiving. Thanks!\"    Current Medication list:    insulin aspart (NOVOLOG VIAL) 100 UNITS/ML vial     Use as directed up to 80 units daily.    Last Written Prescription Date:  1/16/24 DISP 80 ml/ 3 RF  insulin lispro (HUMALOG) 100 UNIT/ML vial   USE UP TO 68 UNITS PER DAY via INSULIN PUMP FOR BASAL, BOLUS AND PRIMING OF TUBING   Last written 1/12/2024 DISP: 70 ml/ 3 RF  "

## 2024-01-18 NOTE — TELEPHONE ENCOUNTER
"Insulin Protocol Failed   Protocol Details  Has GFR on file in past 12 months and most recent value is normal    Recent (6 mo) or future (90 days) visit within the authorizing provider's specialty    Chart Review Required     Short Acting Insulin Protocol Failed 01/18/2024 02:44 PM   Protocol Details  HgbA1C in past 3 or 6 months    Recent (6 mo) or future (30 days) visit within the authorizing provider's specialty     Routing refill request to provider for review/approval because:   Change from Novolog to Humalog per insurance coverage   Insulin - refilled per clinic     \"I also forgot to mention that it looks like Novolog was sent to [employer]. and my new insurance requires Humalog . I need the amount of insulin per day to say \"inject up to 80 units per day\" in order to get 7 vials per 90 days filled which is what I was previously receiving. Thanks!\"    Current Medication list:     insulin aspart (NOVOLOG VIAL) 100 UNITS/ML vial     Use as directed up to 80 units daily.     Last Written Prescription Date:  1/16/24 DISP 80 ml/ 3 RF   insulin lispro (HUMALOG) 100 UNIT/ML vial   USE UP TO 68 UNITS PER DAY via INSULIN PUMP FOR BASAL, BOLUS AND PRIMING OF TUBING   Last written 1/12/2024 DISP: 70 ml/ 3 RF. Pended as requested       "

## 2024-01-19 ENCOUNTER — TELEPHONE (OUTPATIENT)
Dept: ENDOCRINOLOGY | Facility: CLINIC | Age: 54
End: 2024-01-19
Payer: COMMERCIAL

## 2024-01-19 RX ORDER — INSULIN LISPRO 100 [IU]/ML
INJECTION, SOLUTION INTRAVENOUS; SUBCUTANEOUS
Qty: 90 ML | Refills: 1 | Status: SHIPPED | OUTPATIENT
Start: 2024-01-19 | End: 2024-04-26

## 2024-01-19 NOTE — TELEPHONE ENCOUNTER
Caller spoke with pt to sched follow up with Leeanne Whitley, pt stated that she received a MyC message from Yolis Whitley stating that no follow up is needed, to schedule a follow up with a CDE after pt receives their new pump.      Pt stated they will call back to schedule with a CDE when new pump is received.    1/19/24 BD

## 2024-01-19 NOTE — TELEPHONE ENCOUNTER
Order placed.   Cheli Carlos, RN on 1/19/2024 at 2:24 PM       [facility] Call Center    Phone Message    May a detailed message be left on voicemail: yes     Reason for Call: Medication Question or concern regarding medication   Prescription Clarification  Name of Medication:    insulin lispro (HUMALOG) 100 UNIT/ML vial     Prescribing Provider: Hannah Whitley PA    Pharmacy: [facility] # [address] [place] [address] Av    What on the order needs clarification? Patient calling requesting provider adjust dosing for up to 80 units per day. Sounded like insurance requires the wording for her to get the medication she needs. Many thanks!      Action Taken: Message routed to:  [facility] ENDO    Travel Screening: Not Applicable

## 2024-01-20 ENCOUNTER — HEALTH MAINTENANCE LETTER (OUTPATIENT)
Age: 54
End: 2024-01-20

## 2024-01-26 ENCOUNTER — VIRTUAL VISIT (OUTPATIENT)
Dept: PHARMACY | Facility: CLINIC | Age: 54
End: 2024-01-26
Payer: COMMERCIAL

## 2024-01-26 DIAGNOSIS — E10.9 WELL CONTROLLED TYPE 1 DIABETES MELLITUS (H): Primary | ICD-10-CM

## 2024-01-26 PROCEDURE — 99207 PR NO CHARGE LOS: CPT | Mod: 93

## 2024-01-26 NOTE — PROGRESS NOTES
Clinical Pharmacy Consult:                                                    Sera Adkins is a 53 year old female called for a clinical pharmacist consult.  She was referred to me from Hannah Whitley PA-C.     Reason for Consult: 2024 Medication/Supplies Cost Issues    Discussion: Patient referred to me due to cost concerns with new prescription plan this year.  Per patient, it appears her deductible is $1000, after which she would pay 25% of the drug cost to reach her maximum out-of-pocket of $4000 per year.     There is much confusion and unknown due to patient being quoted a myriad of different prices from her health plan versus [facility].  For example, she was quoted $45 a month for Omnipod pods and $6100 for the intro kit by her insurance if she uses [facility], however when the test claim is ran it appears that this is $143.46 per month at [facility] and $143.46. it appears that therapy pharmacy has similar prices to [facility]. Additionally, her insurance plan quoted her $15/month for using [facility] if she were to continue Tandem. I suspect that this is inaccurate based on the test claims that I ran with the patient today, which are real-time.      Plan:  1. Ran test claims for prescriptions that were ready to be filled and [facility] database:  Dexcom G6 sensors $262/1 mo  Dexcom G6 transmitter $56/3 mo  Humalog 3 mo $75  OP5 pods #10 for 30 day supply: $143.46  #30 for 90 day supply: $411.41    2. Patient will call her insurance and ask the following questions/take the following action:  Are medical/DME and pharmacy benefit separator bundled? If separate, what by the deductibles of each and are diabetes supplies going towards the medical benefit or the pharmacy benefit?  Despite not meeting the $1000 deductible, it appears that patient is already paying 25% of cash price for prescription.  What happens after patient pays $1000 out-of-pocket if she is already paying 25% of cash price  now?  If patient can speak to representative getting a quote for OmniPod or tandem at [facility] or preferred DME such as [facility], recommend recording the call and requesting that documentation so we have something to come back at the plan with if they innacurately advised these prices.    3. Unfortunately there are no patient assistance programs for Dexcom G6 or Omnipod -- patient will reach out to me if there are still confusion points after she calls the plan.     Gilbert Navarro, PharmD, [facility]  Endocrine & Diabetes [facility] Pharmacist  [address] [place] [address], [place] [address]

## 2024-02-22 DIAGNOSIS — E10.9 WELL CONTROLLED TYPE 1 DIABETES MELLITUS (H): ICD-10-CM

## 2024-02-22 DIAGNOSIS — E10.8 TYPE 1 DIABETES MELLITUS WITH COMPLICATIONS (H): ICD-10-CM

## 2024-02-22 RX ORDER — INSULIN GLARGINE-YFGN 100 [IU]/ML
18 INJECTION, SOLUTION SUBCUTANEOUS DAILY
Qty: 10 ML | Refills: 1 | Status: CANCELLED | OUTPATIENT
Start: 2024-02-22

## 2024-02-22 RX ORDER — INSULIN GLARGINE 100 [IU]/ML
INJECTION, SOLUTION SUBCUTANEOUS
Qty: 10 ML | Refills: 3 | Status: SHIPPED | OUTPATIENT
Start: 2024-02-22 | End: 2024-02-23

## 2024-02-22 RX ORDER — INSULIN PMP CART,AUT,G6/7,CNTR
1 EACH SUBCUTANEOUS
Qty: 45 EACH | Refills: 3 | Status: SHIPPED | OUTPATIENT
Start: 2024-02-22

## 2024-02-23 DIAGNOSIS — E10.8 TYPE 1 DIABETES MELLITUS WITH COMPLICATIONS (H): ICD-10-CM

## 2024-02-23 RX ORDER — INSULIN GLARGINE 100 [IU]/ML
INJECTION, SOLUTION SUBCUTANEOUS
Qty: 10 ML | Refills: 3 | Status: SHIPPED | OUTPATIENT
Start: 2024-02-23 | End: 2024-02-23

## 2024-02-23 RX ORDER — INSULIN GLARGINE 100 [IU]/ML
INJECTION, SOLUTION SUBCUTANEOUS
Qty: 10 ML | Refills: 3 | Status: SHIPPED | OUTPATIENT
Start: 2024-02-23

## 2024-02-23 NOTE — TELEPHONE ENCOUNTER
[facility] Call Center    Phone Message    May a detailed message be left on voicemail: yes     Reason for Call: Other: Per pharmacy called stating they are having problems receiving the fill electronically. They are asking if RX refill for insulin glargine (LANTUS VIAL) 100 UNIT/ML vial can be sent as fax to . Please and thank you!     Action Taken: Message routed to:  [facility] ENDO    Travel Screening: Not Applicable

## 2024-03-07 NOTE — TELEPHONE ENCOUNTER
Called and left message. She had her labs done 3/18 does not need to have labs again in Sept.   
[facility] Call Center    Phone Message    May a detailed message be left on voicemail: yes    Reason for Call: Other: Pt made 6 month f/u appt with Dr. Roy in September and wants to know if she needs to have Labs done again prior to this appointment. Please call to let her know.      Action Taken: Message routed to:  [facility] Endo Clinic     
Yes - the patient is able to be screened

## 2024-03-22 ENCOUNTER — VIRTUAL VISIT (OUTPATIENT)
Dept: EDUCATION SERVICES | Facility: CLINIC | Age: 54
End: 2024-03-22
Payer: COMMERCIAL

## 2024-03-22 ENCOUNTER — TRANSCRIBE ORDERS (OUTPATIENT)
Dept: EDUCATION SERVICES | Facility: CLINIC | Age: 54
End: 2024-03-22

## 2024-03-22 DIAGNOSIS — E10.9 WELL CONTROLLED TYPE 1 DIABETES MELLITUS (H): Primary | ICD-10-CM

## 2024-03-22 PROCEDURE — G0108 DIAB MANAGE TRN  PER INDIV: HCPCS | Mod: 95

## 2024-03-22 RX ORDER — ASPIRIN 81 MG/1
81 TABLET ORAL DAILY
COMMUNITY
Start: 2024-03-22

## 2024-03-22 NOTE — PROGRESS NOTES
DIABETES SELF MANAGEMENT EDUCATION:     Sera Adkins presents today for insulin pump review related to Type 1 diabetes.  She is accompanied by self    Referring provider:  Hannah Whitley PA-C    Past Diabetes Education: Yes  Social History: Nurse Practitioner    DIABETES RELATED CONCERNS/COMMENTS: wondering if her ICR needs to come down    ASSESSMENT:  Patient Problem List and Medication List reviewed for relevant medical history, current medical status, and diabetes risk factors.    MEDICATION:    Current Diabetes Management per Patient:  Taking diabetes medications? yes:     Diabetes Medication(s)       Diabetic Other       Glucagon (BAQSIMI) 3 MG/DOSE nasal powder Spray 1 spray (3 mg) in nostril as needed (severe hypoglycemia) in the event of unconscious hypoglycemia or hypoglycemic seizure. May repeat dose if no response after 15 minutes.     Glucagon, rDNA, (GLUCAGON EMERGENCY) 1 MG KIT Inject IM for hypoglycemic event needs 2 pens one for home and one for work       Insulin       insulin glargine (LANTUS VIAL) 100 UNIT/ML vial Inject 18 Units Subcutaneous daily Use ONLY in case of pump failure.     insulin lispro (HUMALOG) 100 UNIT/ML vial USE UP TO 90 UNITS PER DAY via INSULIN PUMP FOR BASAL, BOLUS AND PRIMING OF TUBING          Insulin Pump Type: Omnipod 5 insulin pump with Dexcom G6 continuous glucose monitor    MONITORING:  BG meter: Dexcom G6 continuous glucose monitor  Blood sugar data:    Patient's most recent   Lab Results   Component Value Date    A1C 7.0 04/11/2022    A1C 6.8 04/09/2021      Patient's A1C goal: <7.0    PHYSICAL ACTIVITY:  strenuous regular exercise program which includes mountain biking, intense cardio workouts    NUTRITION:  Carb counts food, does not always bolus 10-15 minutes before eating, does a fair amount of eating out      Problem Solving:    Patient is at risk of hypoglycemia?: YES and Frequency is one to two times per week  Hospitalizations for hyper or hypoglycemia:  No      EDUCATION and INSTRUCTION PROVIDED AT THIS VISIT:    We discussed her [place] reports.  She is spiking after meals.  We did move her ICR 6a 10-->8 12p 15-->13    She loves not having a corded pump.  Has been able to branch out as far as sites go.  We talked about all of the sites she can use and proximity of pump and sensor for good communication.  She is trying to avoid her abdomen due to overuse in the past.    She lost her transmitter in [place] which was a crisis but she did find it.  Reached out to Lavell Shah to see if she can provide a G6 sample so patient could take an extra when she returns to [place] in May.    Pt verbalized understanding of concepts discussed and recommendations provided today.       PLAN:  See AVS      FOLLOW-UP:    Chart routed to referring provider.    Time Spent: 30 minutes  Encounter Type: Individual    Any diabetes medication dose changes were made via the CDE Protocol and Collaborative Practice Agreement with Harry and  Pardeep.  A copy of this encounter was provided to patient's referring provider.

## 2024-03-22 NOTE — NURSING NOTE
Is the patient currently in the state of [place]? YES    Visit mode:VIDEO    If the visit is dropped, the patient can be reconnected by: VIDEO VISIT: Text to cell phone:   Telephone Information:   Mobile [phone]       Will anyone else be joining the visit? NO  (If patient encounters technical issues they should call [phone]    How would you like to obtain your AVS? MyChart    Are changes needed to the allergy or medication list? No patient reported no changes needed to be made to e-check in information for visit. VF did not review this information again with patient due to this.    Reason for visit: RECHECK    Dai ARROYO

## 2024-03-29 ENCOUNTER — TRANSFERRED RECORDS (OUTPATIENT)
Dept: HEALTH INFORMATION MANAGEMENT | Facility: CLINIC | Age: 54
End: 2024-03-29
Payer: COMMERCIAL

## 2024-03-29 LAB — RETINOPATHY: POSITIVE

## 2024-04-25 NOTE — PROGRESS NOTES
Outcome for 04/25/24 10:51 AM: [email or web address] message sent to upload Tandem data.  DASHA Mark  Outcome for 04/25/24 10:53 AM: Data obtained via Dexcom website  DASHA Mark

## 2024-04-26 ENCOUNTER — LAB (OUTPATIENT)
Dept: LAB | Facility: CLINIC | Age: 54
End: 2024-04-26
Payer: COMMERCIAL

## 2024-04-26 ENCOUNTER — OFFICE VISIT (OUTPATIENT)
Dept: ENDOCRINOLOGY | Facility: CLINIC | Age: 54
End: 2024-04-26
Payer: COMMERCIAL

## 2024-04-26 VITALS
BODY MASS INDEX: 24.71 KG/M2 | OXYGEN SATURATION: 97 % | SYSTOLIC BLOOD PRESSURE: 117 MMHG | HEIGHT: 68 IN | WEIGHT: 163 LBS | DIASTOLIC BLOOD PRESSURE: 58 MMHG | HEART RATE: 84 BPM

## 2024-04-26 DIAGNOSIS — E10.8 TYPE 1 DIABETES MELLITUS WITH COMPLICATIONS (H): ICD-10-CM

## 2024-04-26 DIAGNOSIS — N30.00 ACUTE CYSTITIS WITHOUT HEMATURIA: ICD-10-CM

## 2024-04-26 DIAGNOSIS — E10.9 WELL CONTROLLED TYPE 1 DIABETES MELLITUS (H): ICD-10-CM

## 2024-04-26 DIAGNOSIS — E10.8 TYPE 1 DIABETES MELLITUS WITH COMPLICATIONS (H): Primary | ICD-10-CM

## 2024-04-26 LAB
ALBUMIN UR-MCNC: NEGATIVE MG/DL
APPEARANCE UR: CLEAR
BACTERIA #/AREA URNS HPF: ABNORMAL /HPF
BILIRUB UR QL STRIP: NEGATIVE
COLOR UR AUTO: ABNORMAL
GLUCOSE UR STRIP-MCNC: 500 MG/DL
HBA1C MFR BLD: 7.1 %
HGB UR QL STRIP: NEGATIVE
KETONES UR STRIP-MCNC: NEGATIVE MG/DL
LEUKOCYTE ESTERASE UR QL STRIP: NEGATIVE
NITRATE UR QL: NEGATIVE
PH UR STRIP: 6.5 [PH] (ref 5–7)
RBC URINE: <1 /HPF
SP GR UR STRIP: 1 (ref 1–1.03)
SQUAMOUS EPITHELIAL: <1 /HPF
UROBILINOGEN UR STRIP-MCNC: NORMAL MG/DL
WBC URINE: 0 /HPF

## 2024-04-26 PROCEDURE — 99214 OFFICE O/P EST MOD 30 MIN: CPT | Performed by: INTERNAL MEDICINE

## 2024-04-26 PROCEDURE — 81001 URINALYSIS AUTO W/SCOPE: CPT | Performed by: PATHOLOGY

## 2024-04-26 PROCEDURE — 99000 SPECIMEN HANDLING OFFICE-LAB: CPT | Performed by: PATHOLOGY

## 2024-04-26 PROCEDURE — 83036 HEMOGLOBIN GLYCOSYLATED A1C: CPT | Performed by: PATHOLOGY

## 2024-04-26 PROCEDURE — 87086 URINE CULTURE/COLONY COUNT: CPT | Performed by: INTERNAL MEDICINE

## 2024-04-26 RX ORDER — INSULIN LISPRO 100 [IU]/ML
INJECTION, SOLUTION INTRAVENOUS; SUBCUTANEOUS
Qty: 90 ML | Refills: 3 | Status: SHIPPED | OUTPATIENT
Start: 2024-04-26

## 2024-04-26 ASSESSMENT — PAIN SCALES - GENERAL: PAINLEVEL: NO PAIN (0)

## 2024-04-26 NOTE — NURSING NOTE
"Chief Complaint   Patient presents with    Diabetes     Blood pressure 117/58, pulse 84, height 1.733 m (5' 8.23\"), weight 73.9 kg (163 lb), SpO2 97%.    Dottie Ying    "

## 2024-04-26 NOTE — LETTER
4/26/2024       RE: Sera Adkins  [address]   [place] [address]     Dear Colleague,    Thank you for referring your patient, Sera Adkins, to the [facility] [place] at [facility]. Please see a copy of my visit note below.    Outcome for 04/25/24 10:51 AM: [employer] message sent to upload Tandem data.  DASHA Mark  Outcome for 04/25/24 10:53 AM: Data obtained via Dexcom website  DASHA Mark                                     Endocrinology and Diabetes Clinic    Subjective:   Sera Adkins was seen today for follow up of type 1 diabetes mellitus. She usually sees Hannah Whitley and also saw Dr. Juan Carlos Krishnamurthy in the past. This is her first visit with me.     Type 1 diabetes was diagnosed at age 15.     Diabetes care and complications:  Eyes: Retinopathy, exam up to date   Kidneys: She had SEKOU in 2019.  GFR improved.  Follows with nephrology. Encouraged hydration. No evidence of diabetic nephropathy.    Nerves: Mild peripheral neuropathy  Blood Pressure: Controlled   Lipids: On rosuvastatin   Macrovascular: HF with preserved EF, microvascular heart disease. Taking calcium channel blocker, chlorthalidone, ASA  Celiac screening: negative antibodies 2017.     Current treatment strategy:   Now using Omnipod 5 insulin pump, started 2-3 months ago     Current pump settings:   TIME BASAL RATES (units/hour) CORRECTION  (1 unit:_ mg/dl) CARB RATIO  (1 unit: _ g CHO) TARGET  (mg/dl)   12 AM 0.7 units/hr 50 1:8g - 1:15g 110   Active insulin time: 2 hours     Average total daily insulin:  37 units     Blood Glucose Monitoring: Dexcom G6 CGM  CGMS Data: Last 14 days      Exercise:  She is very active, training for a bike race now     ROS:   Lately she has been experiencing urinary urgency. She took cephelexin and this did not help. Now on macrobid day 4, symptoms are somewhat better.   She is perimenopausal but still having  "menses. She uses vaginal estrogen.          No data to display                 Medications:   Current Outpatient Medications   Medication Sig Dispense Refill    amLODIPine (NORVASC) 5 MG tablet Take 5 mg by mouth daily 90 tablet 3    aspirin 81 MG EC tablet Take 1 tablet (81 mg) by mouth daily      blood glucose (NO BRAND SPECIFIED) test strip Test 6 to 8 times daily.  1 Box 12    Calcium Carbonate-Vitamin D (CALCIUM 500+D PO) Take 1 tablet by mouth as needed.      cholecalciferol 50 MCG (2000 UT) tablet Take 1 tablet by mouth daily      Continuous Blood Gluc Sensor (DEXCOM G6 SENSOR) MISC Change 1 each every 10 days. 9 each 3    Continuous Blood Gluc Transmit (DEXCOM G6 TRANSMITTER) MISC Change every 3 months. 1 each 3    Glucagon (BAQSIMI) 3 MG/DOSE nasal powder Spray 1 spray (3 mg) in nostril as needed (severe hypoglycemia) in the event of unconscious hypoglycemia or hypoglycemic seizure. May repeat dose if no response after 15 minutes. 1 each 1    Glucagon, rDNA, (GLUCAGON EMERGENCY) 1 MG KIT Inject IM for hypoglycemic event needs 2 pens one for home and one for work 1 kit 3    Insulin Disposable Pump (OMNIPOD 5 G6 PODS, GEN 5,) MISC 1 each every 3 days 45 each 3    insulin glargine (LANTUS VIAL) 100 UNIT/ML vial Inject 18 Units Subcutaneous daily Use ONLY in case of pump failure. 10 mL 3    insulin lispro (HUMALOG) 100 UNIT/ML vial USE UP TO 90 UNITS PER DAY via INSULIN PUMP FOR BASAL, BOLUS AND PRIMING OF TUBING 90 mL 3    insulin syringe-needle U-100 (30G X 1/2\" 0.3 ML) 30G X 1/2\" 0.3 ML miscellaneous Use 4 syringes daily or as directed. 90 each 1    levonorgestrel (MIRENA) 20 MCG/DAY IUD 1 each by Intrauterine route once      losartan (COZAAR) 50 MG tablet TAKE 1 TABLET DAILY 90 tablet 3    nitroGLYcerin (NITROSTAT) 0.4 MG sublingual tablet One tablet under the tongue every 5 minutes if needed for chest pain. 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  Will be going to [place] soon with her boyfriend.    Social History     Tobacco Use    Smoking status: Never    Smokeless tobacco: Never   Substance Use Topics    Alcohol use: Yes     Physical Examination:  Blood pressure 117/58, pulse 84, height 1.733 m (5' 8.23\"), weight 73.9 kg (163 lb), SpO2 97%.      Body mass index is 24.62 kg/m .    Wt Readings from Last 4 Encounters:   04/26/24 73.9 kg (163 lb)   01/16/24 74.8 kg (165 lb)   12/12/22 75.4 kg (166 lb 3.2 oz)   10/24/22 73.5 kg (162 lb)       BP Readings from Last 3 Encounters:   04/26/24 117/58   12/12/22 130/78   10/24/22 118/67     General: Well appearing and in no distress.   Eyes: EOMI. Sclerae and conjunctivae are clear.    HENT: No thyromegaly or mass.    Cardiovascular: RRR, with normal S1+S2 and no murmurs.   Respiratory: Lungs are clear to auscultation.   Gastrointestinal: Abdomen is soft, non tender, and non-distended.   Extremities: No peripheral edema. Feet are warm and well perfused. No lesions or ulcers.   Neurologic: Intact sensation to monofilament in the feet bilaterally.     Labs and Studies:   Lab Results   Component Value Date    A1C 7.1 (H) 04/26/2024    A1C 7.0 (H) 04/11/2022    A1C 7.2 (H) 08/02/2021    A1C 6.8 (A) 04/09/2021    A1C 7.7 (H) 01/20/2020    A1C 7.5 (H) 09/16/2019    A1C 7.8 (H) 03/18/2019    A1C 7.7 (H) 12/13/2016    HEMOGLOBINA1 7.4 (A) 01/20/2020    HEMOGLOBINA1 7.2 (A) 09/16/2019    HEMOGLOBINA1 7.0 (A) 04/16/2018    HEMOGLOBINA1 7.0 (A) 12/11/2017    HEMOGLOBINA1 7.4 (A) 06/05/2017       Creatinine   Date Value Ref Range Status   10/20/2023 0.97 (H) 0.51 - 0.95 mg/dL Final   01/20/2020 0.94 0.52 - 1.04 mg/dL Final         Assessment:  Type 1 diabetes mellitus which is well controlled.     Plan:   Continue use of the Omnipod 5 insulin pump and Dexcom G6 CGM.   No " changes to pump settings were made today.   We reviewed how Omnipod 5 automated insulin delivery works, and behavior strategies that can be helpful for best glycemic control with this system.   Insulin refill  She has glucagon and back up supplies  Check UA/US and adjust antibiotic regimen if indicated.   Labs ordered for next visit.     Follow up in 4 months with Hannah Whitley and in 8 months with me.     30 minutes spent on the date of the encounter doing chart review, history and exam, documentation and further activities per the note. This includes time spent reviewing CGM data.     Kaye Krishnamurthy MD  Endocrinology and Diabetes   Office: [phone]   Clinic: [phone]

## 2024-04-26 NOTE — PROGRESS NOTES
Endocrinology and Diabetes Clinic    Subjective:   Sera Adkins was seen today for follow up of type 1 diabetes mellitus. She usually sees Hannah Whitley and also saw Dr. Juan Carlos Krishnamurthy in the past. This is her first visit with me.     Type 1 diabetes was diagnosed at age 15.     Diabetes care and complications:  Eyes: Retinopathy, exam up to date   Kidneys: She had SEKOU in 2019.  GFR improved.  Follows with nephrology. Encouraged hydration. No evidence of diabetic nephropathy.    Nerves: Mild peripheral neuropathy  Blood Pressure: Controlled   Lipids: On rosuvastatin   Macrovascular: HF with preserved EF, microvascular heart disease. Taking calcium channel blocker, chlorthalidone, ASA  Celiac screening: negative antibodies 2017.     Current treatment strategy:   Now using Omnipod 5 insulin pump, started 2-3 months ago     Current pump settings:   TIME BASAL RATES (units/hour) CORRECTION  (1 unit:_ mg/dl) CARB RATIO  (1 unit: _ g CHO) TARGET  (mg/dl)   12 AM 0.7 units/hr 50 1:8g - 1:15g 110   Active insulin time: 2 hours     Average total daily insulin:  37 units     Blood Glucose Monitoring: Dexcom G6 CGM  CGMS Data: Last 14 days      Exercise:  She is very active, training for a bike race now     ROS:   Lately she has been experiencing urinary urgency. She took cephelexin and this did not help. Now on macrobid day 4, symptoms are somewhat better.   She is perimenopausal but still having menses. She uses vaginal estrogen.          No data to display                 Medications:   Current Outpatient Medications   Medication Sig Dispense Refill    amLODIPine (NORVASC) 5 MG tablet Take 5 mg by mouth daily 90 tablet 3    aspirin 81 MG EC tablet Take 1 tablet (81 mg) by mouth daily      blood glucose (NO BRAND SPECIFIED) test strip Test 6 to 8 times daily.  1 Box 12    Calcium Carbonate-Vitamin D (CALCIUM 500+D PO) Take 1 tablet by mouth as needed.      cholecalciferol 50 MCG (2000 UT) tablet Take 1 tablet by mouth daily  "     Continuous Blood Gluc Sensor (DEXCOM G6 SENSOR) MISC Change 1 each every 10 days. 9 each 3    Continuous Blood Gluc Transmit (DEXCOM G6 TRANSMITTER) MISC Change every 3 months. 1 each 3    Glucagon (BAQSIMI) 3 MG/DOSE nasal powder Spray 1 spray (3 mg) in nostril as needed (severe hypoglycemia) in the event of unconscious hypoglycemia or hypoglycemic seizure. May repeat dose if no response after 15 minutes. 1 each 1    Glucagon, rDNA, (GLUCAGON EMERGENCY) 1 MG KIT Inject IM for hypoglycemic event needs 2 pens one for home and one for work 1 kit 3    Insulin Disposable Pump (OMNIPOD 5 G6 PODS, GEN 5,) MISC 1 each every 3 days 45 each 3    insulin glargine (LANTUS VIAL) 100 UNIT/ML vial Inject 18 Units Subcutaneous daily Use ONLY in case of pump failure. 10 mL 3    insulin lispro (HUMALOG) 100 UNIT/ML vial USE UP TO 90 UNITS PER DAY via INSULIN PUMP FOR BASAL, BOLUS AND PRIMING OF TUBING 90 mL 3    insulin syringe-needle U-100 (30G X 1/2\" 0.3 ML) 30G X 1/2\" 0.3 ML miscellaneous Use 4 syringes daily or as directed. 90 each 1    levonorgestrel (MIRENA) 20 MCG/DAY IUD 1 each by Intrauterine route once      losartan (COZAAR) 50 MG tablet TAKE 1 TABLET DAILY 90 tablet 3    nitroGLYcerin (NITROSTAT) 0.4 MG sublingual tablet One tablet under the tongue every 5 minutes if needed for chest pain. 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  Will be going to [employer] soon with her boyfriend.    Social History     Tobacco Use    Smoking status: Never    Smokeless tobacco: Never   Substance Use Topics    Alcohol use: Yes     Physical Examination:  Blood pressure 117/58, pulse 84, height 1.733 m (5' 8.23\"), weight 73.9 kg (163 lb), SpO2 97%.      Body mass index is 24.62 kg/m .    Wt Readings from Last 4 Encounters: "   04/26/24 73.9 kg (163 lb)   01/16/24 74.8 kg (165 lb)   12/12/22 75.4 kg (166 lb 3.2 oz)   10/24/22 73.5 kg (162 lb)       BP Readings from Last 3 Encounters:   04/26/24 117/58   12/12/22 130/78   10/24/22 118/67     General: Well appearing and in no distress.   Eyes: EOMI. Sclerae and conjunctivae are clear.    HENT: No thyromegaly or mass.    Cardiovascular: RRR, with normal S1+S2 and no murmurs.   Respiratory: Lungs are clear to auscultation.   Gastrointestinal: Abdomen is soft, non tender, and non-distended.   Extremities: No peripheral edema. Feet are warm and well perfused. No lesions or ulcers.   Neurologic: Intact sensation to monofilament in the feet bilaterally.     Labs and Studies:   Lab Results   Component Value Date    A1C 7.1 (H) 04/26/2024    A1C 7.0 (H) 04/11/2022    A1C 7.2 (H) 08/02/2021    A1C 6.8 (A) 04/09/2021    A1C 7.7 (H) 01/20/2020    A1C 7.5 (H) 09/16/2019    A1C 7.8 (H) 03/18/2019    A1C 7.7 (H) 12/13/2016    HEMOGLOBINA1 7.4 (A) 01/20/2020    HEMOGLOBINA1 7.2 (A) 09/16/2019    HEMOGLOBINA1 7.0 (A) 04/16/2018    HEMOGLOBINA1 7.0 (A) 12/11/2017    HEMOGLOBINA1 7.4 (A) 06/05/2017       Creatinine   Date Value Ref Range Status   10/20/2023 0.97 (H) 0.51 - 0.95 mg/dL Final   01/20/2020 0.94 0.52 - 1.04 mg/dL Final         Assessment:  Type 1 diabetes mellitus which is well controlled.     Plan:   Continue use of the Omnipod 5 insulin pump and Dexcom G6 CGM.   No changes to pump settings were made today.   We reviewed how Omnipod 5 automated insulin delivery works, and behavior strategies that can be helpful for best glycemic control with this system.   Insulin refill  She has glucagon and back up supplies  Check UA/US and adjust antibiotic regimen if indicated.   Labs ordered for next visit.     Follow up in 4 months with Hannah Whitley and in 8 months with me.     30 minutes spent on the date of the encounter doing chart review, history and exam, documentation and further activities per the  note. This includes time spent reviewing CGM data.     Kaye Krishnamurthy MD  Endocrinology and Diabetes   Office: [phone]   Clinic: [phone]

## 2024-04-28 LAB — BACTERIA UR CULT: NO GROWTH

## 2024-07-10 NOTE — PROGRESS NOTES
Outcome for 07/26/24 9:13 AM: Data uploaded on [employer]  Betty Mcdonough MA  Outcome for 08/01/24 1:49 PM: Data obtained via [employer] website  Betty Mcdonough MA    Start time: 0703  End time: 0737  Provider location: off site- home  Patient location: off site- home.    HPI:   Sera is a pleasant 54 yo woman here for follow up of type 1 diabetes since age 15.   She has always been extremely active with high intensity biking as a regular part of her workouts.  In 2022, she developed exertional angina.  She had an angiogram, which was clear and was diagnosed with microvascular angina (epicardial CAD) and HFpEF.  She is improving and is still able to do all of her intense activity.  She has been doing very well this year.   She is no longer requiring nitroglycerine before activity. HIIT 3 days a week.  [employer]- 25 mile race in June.  Fully able to intensely exercise.  Edema is better. Sometimes feels tightness in feet at the end of the day.  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  Very busy.     Turns pump off an hour before exercise.  Back on after an hour in activity mode.  This has been pretty good.      Switched to Omnipod from Tandem.  Pretty good, but struggles with keeping her PDM with her.  Wants to upgrade to InviBox marie and [employer], once available.     Pump settings          Recent glucose:                       GFR has been in the 50's for the past couple years, now improved to the 70s.   She is intentional about avoiding dehydration.     Her mood has been down and anxious.  We have been talking about seeing a therapist for a while and she is scheduled soon with Bob Burgess.  Her boyfriend has been supportive.  She is on antidepressant.      She has no other concerns today.     Past Medical History:   Diagnosis Date    Anxiety     DMI     Type 1   Epicardial CAD- diagnosed 2022.   HFpEF- diagnosed 2022.    Past Surgical History:   Procedure Laterality Date    BREAST CYST ASPIRATION Left ?2005      SECTION      x2    CONIZATION CERVIX,LOOP ELECTRD      Description: Cervical Conization Loop Electrode Excision;  Recorded: 2008;    CV CORONARY ANGIOGRAM N/A 10/24/2022    Procedure: Coronary Angiogram;  Surgeon: Subha Porter MD;  Location: [facility] CATH LAB CV    CV FRACTIONAL FLOW RATIO WIRE N/A 10/24/2022    Procedure: Fractional Flow Ratio Wire;  Surgeon: Subha Porter MD;  Location: [facility] CATH LAB CV    CV LEFT HEART CATH N/A 10/24/2022    Procedure: Left Heart Catheterization;  Surgeon: Subha Porter MD;  Location: [facility] CATH LAB CV       Family History   Problem Relation Age of Onset    Cancer Father     Breast Cancer Paternal Grandmother        Social History     Socioeconomic History    Marital status:    Tobacco Use    Smoking status: Never    Smokeless tobacco: Never   Substance and Sexual Activity    Alcohol use: Yes    Drug use: No         Past Surgical History:   Procedure Laterality Date    BREAST CYST ASPIRATION Left ?     SECTION      x2    CONIZATION CERVIX,LOOP ELECTRD      Description: Cervical Conization Loop Electrode Excision;  Recorded: 2008;    CV CORONARY ANGIOGRAM N/A 10/24/2022    Procedure: Coronary Angiogram;  Surgeon: Subha Porter MD;  Location: [facility] CATH LAB CV    CV FRACTIONAL FLOW RATIO WIRE N/A 10/24/2022    Procedure: Fractional Flow Ratio Wire;  Surgeon: Subha Porter MD;  Location: [facility] CATH LAB CV    CV LEFT HEART CATH N/A 10/24/2022    Procedure: Left Heart Catheterization;  Surgeon: Subha Porter MD;  Location: [facility] CATH LAB CV       Family History   Problem Relation Age of Onset    Cancer Father     Breast Cancer Paternal Grandmother        Social History   Social Hx:  .  Two teenage children.  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  Enjoys being physically active, mostly biking, but also running and HIIT.     Socioeconomic History    Marital status: other     Spouse name: Not on file    Number  of children: Not on file    Years of education: Not on file    Highest education level: Not on file   Occupational History    Not on file   Social Needs    Financial resource strain: Not on file    Food insecurity:     Worry: Not on file     Inability: Not on file    Transportation needs:     Medical: Not on file     Non-medical: Not on file   Tobacco Use    Smoking status: Never Smoker    Smokeless tobacco: Never Used   Substance and Sexual Activity    Alcohol use: No    Drug use: No    Sexual activity: Not on file   Lifestyle    Physical activity:     Days per week: Not on file     Minutes per session: Not on file    Stress: Not on file   Relationships    Social connections:     Talks on phone: Not on file     Gets together: Not on file     Attends Holiness service: Not on file     Active member of club or organization: Not on file     Attends meetings of clubs or organizations: Not on file     Relationship status: Not on file    Intimate partner violence:     Fear of current or ex partner: Not on file     Emotionally abused: Not on file     Physically abused: Not on file     Forced sexual activity: Not on file   Other Topics Concern    Not on file   Social History Narrative    Not on file       Current Outpatient Medications   Medication Sig Dispense Refill    amLODIPine (NORVASC) 5 MG tablet Take 5 mg by mouth daily 90 tablet 3    aspirin 81 MG EC tablet Take 1 tablet (81 mg) by mouth daily      blood glucose (NO BRAND SPECIFIED) test strip Test 6 to 8 times daily.  1 Box 12    Calcium Carbonate-Vitamin D (CALCIUM 500+D PO) Take 1 tablet by mouth as needed.      cholecalciferol 50 MCG (2000 UT) tablet Take 1 tablet by mouth daily      Continuous Blood Gluc Sensor (DEXCOM G6 SENSOR) MISC Change 1 each every 10 days. 9 each 3    Continuous Blood Gluc Transmit (DEXCOM G6 TRANSMITTER) MISC Change every 3 months. 1 each 3    Glucagon (BAQSIMI) 3 MG/DOSE nasal powder Spray 1 spray (3 mg) in nostril as needed (severe  "hypoglycemia) in the event of unconscious hypoglycemia or hypoglycemic seizure. May repeat dose if no response after 15 minutes. 1 each 1    Glucagon, rDNA, (GLUCAGON EMERGENCY) 1 MG KIT Inject IM for hypoglycemic event needs 2 pens one for home and one for work 1 kit 3    Insulin Disposable Pump (OMNIPOD 5 G6 PODS, GEN 5,) MISC 1 each every 3 days 45 each 3    insulin glargine (LANTUS VIAL) 100 UNIT/ML vial Inject 18 Units Subcutaneous daily Use ONLY in case of pump failure. 10 mL 3    insulin lispro (HUMALOG) 100 UNIT/ML vial USE UP TO 90 UNITS PER DAY via INSULIN PUMP FOR BASAL, BOLUS AND PRIMING OF TUBING 90 mL 3    insulin syringe-needle U-100 (30G X 1/2\" 0.3 ML) 30G X 1/2\" 0.3 ML miscellaneous Use 4 syringes daily or as directed. 90 each 1    levonorgestrel (MIRENA) 20 MCG/DAY IUD 1 each by Intrauterine route once      losartan (COZAAR) 50 MG tablet TAKE 1 TABLET DAILY 90 tablet 3    nitroGLYcerin (NITROSTAT) 0.4 MG sublingual tablet One tablet under the tongue every 5 minutes if needed for chest pain. May repeat every 5 minutes for a maximum of 3 doses in 15 minutes\" 25 tablet 3     No current facility-administered medications for this visit.          Allergies   Allergen Reactions    Animal Dander Itching     cats    Latex      rash    Percocet [Oxycodone-Acetaminophen] Nausea and Nausea and Vomiting       Physical Exam  GENERAL: healthy, alert.   RESP: no audible wheeze, cough, or visible cyanosis.  No visible retractions or increased work of breathing.  Able to speak fully in complete sentences.  PSYCH: mentation appears normal, affect normal/bright, judgement and insight intact, normal speech and appearance well-groomed    RESULTS  Lab Results   Component Value Date    A1C 7.1 (H) 04/26/2024    A1C 7.0 (H) 04/11/2022    A1C 7.2 (H) 08/02/2021    A1C 6.8 (A) 04/09/2021    A1C 7.7 (H) 01/20/2020    A1C 7.5 (H) 09/16/2019    A1C 7.8 (H) 03/18/2019    A1C 7.7 (H) 12/13/2016    HEMOGLOBINA1 7.4 (A) 01/20/2020 "    HEMOGLOBINA1 7.2 (A) 09/16/2019    HEMOGLOBINA1 7.0 (A) 04/16/2018    HEMOGLOBINA1 7.0 (A) 12/11/2017    HEMOGLOBINA1 7.4 (A) 06/05/2017       TSH   Date Value Ref Range Status   10/20/2023 2.45 0.30 - 4.20 uIU/mL Final   10/21/2022 3.52 0.30 - 4.20 uIU/mL Final   08/02/2021 1.84 0.30 - 5.00 uIU/mL Final   01/20/2020 2.07 0.40 - 4.00 mU/L Final   09/16/2019 1.89 0.40 - 4.00 mU/L Final   03/18/2019 2.40 0.40 - 4.00 mU/L Final   12/11/2017 1.69 0.40 - 4.00 mU/L Final   12/13/2016 3.72 0.40 - 4.00 mU/L Final     T4 Free   Date Value Ref Range Status   03/18/2019 0.81 0.76 - 1.46 ng/dL Final       ALT   Date Value Ref Range Status   01/20/2020 24 0 - 50 U/L Final   09/16/2019 25 0 - 50 U/L Final   ]    Recent Labs   Lab Test 10/24/22  1133 10/21/22  0806   CHOL 92 99   HDL 39* 42*   LDL 44 49   TRIG 43 41       Lab Results   Component Value Date     10/20/2023     01/20/2020      Lab Results   Component Value Date    POTASSIUM 4.1 10/20/2023    POTASSIUM 3.8 05/06/2022    POTASSIUM 4.0 01/20/2020     Lab Results   Component Value Date    CHLORIDE 102 10/20/2023    CHLORIDE 101 05/06/2022    CHLORIDE 105 01/20/2020     Lab Results   Component Value Date    SCOTTY 9.8 10/20/2023    SCOTTY 9.2 01/20/2020     Lab Results   Component Value Date    CO2 30 10/20/2023    CO2 27 05/06/2022    CO2 32 01/20/2020     Lab Results   Component Value Date    BUN 16.0 10/20/2023    BUN 13 05/06/2022    BUN 18 01/20/2020     Lab Results   Component Value Date    CR 0.97 10/20/2023    CR 0.94 01/20/2020       GFR Estimate   Date Value Ref Range Status   10/20/2023 70 >60 mL/min/1.73m2 Final   10/21/2022 75 >60 mL/min/1.73m2 Final     Comment:     Effective December 21, 2021 eGFRcr in adults is calculated using the 2021 CKD-EPI creatinine equation which includes age and gender (Jackie soares al., NEJM, DOI: 10.1056/VUDSom8209844)   08/13/2022 65 >60 mL/min/1.73m2 Final     Comment:     Effective December 21, 2021 eGFRcr in adults is  calculated using the 2021 CKD-EPI creatinine equation which includes age and gender (Jackie et al., NE, DOI: 10.1056/LNCBsh5082197)   06/07/2021 49 (L) >60 mL/min/1.73m2 Final   10/12/2020 51 (L) >60 mL/min/1.73m2 Final   01/20/2020 72 >60 mL/min/[1.73_m2] Final     Comment:     Non  GFR Calc  Starting 12/18/2018, serum creatinine based estimated GFR (eGFR) will be   calculated using the Chronic Kidney Disease Epidemiology Collaboration   (CKD-EPI) equation.     09/16/2019 63 >60 mL/min/[1.73_m2] Final     Comment:     Non  GFR Calc  Starting 12/18/2018, serum creatinine based estimated GFR (eGFR) will be   calculated using the Chronic Kidney Disease Epidemiology Collaboration   (CKD-EPI) equation.     08/22/2019 47 (L) >60 mL/min/1.73m2 Final   03/18/2019 77 >60 mL/min/[1.73_m2] Final     Comment:     Non  GFR Calc  Starting 12/18/2018, serum creatinine based estimated GFR (eGFR) will be   calculated using the Chronic Kidney Disease Epidemiology Collaboration   (CKD-EPI) equation.       GFR, ESTIMATED POCT   Date Value Ref Range Status   10/11/2022 >60 >60 mL/min/1.73m2 Final     GFR Estimate If Black   Date Value Ref Range Status   06/07/2021 60 (L) >60 mL/min/1.73m2 Final   10/12/2020 >60 >60 mL/min/1.73m2 Final   01/20/2020 83 >60 mL/min/[1.73_m2] Final     Comment:      GFR Calc  Starting 12/18/2018, serum creatinine based estimated GFR (eGFR) will be   calculated using the Chronic Kidney Disease Epidemiology Collaboration   (CKD-EPI) equation.     09/16/2019 73 >60 mL/min/[1.73_m2] Final     Comment:      GFR Calc  Starting 12/18/2018, serum creatinine based estimated GFR (eGFR) will be   calculated using the Chronic Kidney Disease Epidemiology Collaboration   (CKD-EPI) equation.     08/22/2019 57 (L) >60 mL/min/1.73m2 Final   03/18/2019 89 >60 mL/min/[1.73_m2] Final     Comment:      GFR Calc  Starting 12/18/2018,  "serum creatinine based estimated GFR (eGFR) will be   calculated using the Chronic Kidney Disease Epidemiology Collaboration   (CKD-EPI) equation.         Lab Results   Component Value Date    MICROL <12.0 10/20/2023    MICROL 6 01/20/2020     No results found for: \"MICROALBUMIN\"  No results found for: \"CPEPT\", \"GADAB\", \"ISCAB\"    No results found for: \"B12\"]    Most recent eye exam date: : Not Found     Computed FIB-4 Calculation unavailable. One or more values for this score either were not found within the given timeframe or did not fit some other criterion.  A value of FIB-4 below 1.30 is considered as low risk for advanced fibrosis; a value of FIB-4 over 2.67 is considered as high risk for advanced fibrosis; and FIB-4 values between 1.30 and 2.67 are considered as intermediate risk of advanced fibrosis.        Assessment/Plan:     1.  Type 1 diabetes- Glucose is fairly well controlled, however she is going too high after eating and struggling with pre-bolusing.  She is adapting to Omnipod.  Discussed switching from humalog to lyumjev for faster onset of action.  Could also consider afrezza for times she will be active (shorter duration of action).  We made the following changes today (instructions given to patient):     Try activity mode 2 hours before biking.      Suspend 1-1.5 hours before activity.     Change basal rate to 0.8 units/hr (was 0.7)    Change carb ratio to:  Mid- 1/13g (was 1/15g)  6am- 1/7g (was 8g)  12pm- 1/13g (was 1/15g)    Switch from Humalog to Lyumjev.     With hybrid closed loop pumps, it is important to bolus earlier prior to meal (15-20 mins), as delayed bolus will lead to glucose rise and automated basal increases on top of carb boluses and can cause low blood sugars afterwards. More important to pre-bolus than to get the carb count right.  If you forget to bolus, cut your carbs in half.     Treat low blood sugars with only 5-10g of carb to prevent rebound highs (the pump is also " shutting off your basal insulin before you get low).        Emergency issues: Please contact the clinic as soon as you recognize a problem.  Here are some concerns you should contact us about.  -Vomiting: more than twice.  Please check ketones.  If positive, go to ER. Monitor glucose hourly.   -High glucose with a pump:  Twice in doubt, take it out.  If glucose is high x 2, change your pump site and take a correction with a syringe. Check ketones.  If glucose is not coming down, please call the clinic. If ketones are moderate or large, drink lots of water, take an insulin correction, and recheck ketones in 1 hour.  If ketones are still high (or you are vomiting), go to the ER.  -Hypoglycemia (low glucose):   If glucose is less than 70 mg/dL, treat with 15g carb (4 glucose tablets), recheck glucose in 10 minutes.  If low again, repeat.   If glucose is less than 54 mg/dL, treat with 30g carb, recheck glucose in 10 minutes.  If low again, repeat.  Keep glucagon in your home in case of severe hypoglycemia and train someone how to use this.    Emergency kit (please ensure you always have these with you):   Glucose tablets  Glucagon  Insulin  Syringes (if on a pump)  Extra infusion set (if on a pump)  Ketone strips    Contact information:   If you have concerns, please send me a [employer] message or call the clinic at [phone].  For more urgent concerns, please call [phone] after hours/weekends and ask to speak with the endocrinologist on call.      Please let me know if you are having low blood sugars less than 70 or over 350 mg/dL.  Do not wait until your next appointment if this is happening.      2.  Risk factors-     Retinopathy:  Yes.  Last exam 4/24.   Nephropathy:  BP well controlled. No history of microalbuminuria. She had SEKOU in 2019.  GFR improved.  Follows with nephrology. Encouraged hydration.   No evidence of diabetic nephropathy.       Neuropathy: decreased vibration sensation in left great toe in  the past.  Normal monofilament.   No pain/burning.   Feet: OK, no ulcers.   Lipids:  LDL at target on rosuvastatin.   Celiac screening: negative antibodies 2017.   Thyroid screening: TSH normal 10/2023.  ASA: 81 mg daily.     3.   Heart failure with preserved EF- On amlodipine 5 mg daily and losartan 50 mg.  Edema is improved.  Symptomatically much better now. Exercise tolerance is excellent.    4. F/U in 4 mos with Dr. Krishnamurthy, 8 mos with myself.  Will also schedule in DM education for pump review.      The longitudinal plan of care for the diagnosis(es)/condition(s) as documented were addressed during this visit. Due to the added complexity in care, I will continue to support Chadd in the subsequent management and with ongoing continuity of care.      40minutes spent on the date of the encounter doing chart review, review of test results, review of continuous glucose sensor, insulin pump data, interpretation of glucose data, patient visit and documentation, counseling/coordination of care, and discussion of follow up plan for worsening hyper and hypoglycemia.  The patient understood and is satisfied with today's visit.     Hannah Whitley PA-C, MPAS   [facility]  Department of Medicine  Division of Endocrinology and Diabetes

## 2024-08-05 ENCOUNTER — VIRTUAL VISIT (OUTPATIENT)
Dept: ENDOCRINOLOGY | Facility: CLINIC | Age: 54
End: 2024-08-05
Payer: COMMERCIAL

## 2024-08-05 DIAGNOSIS — E10.8 TYPE 1 DIABETES MELLITUS WITH COMPLICATIONS (H): Primary | ICD-10-CM

## 2024-08-05 PROCEDURE — G2211 COMPLEX E/M VISIT ADD ON: HCPCS | Mod: 95 | Performed by: PHYSICIAN ASSISTANT

## 2024-08-05 PROCEDURE — 99215 OFFICE O/P EST HI 40 MIN: CPT | Mod: 95 | Performed by: PHYSICIAN ASSISTANT

## 2024-08-05 RX ORDER — PROCHLORPERAZINE 25 MG/1
SUPPOSITORY RECTAL
Qty: 1 EACH | Refills: 3 | Status: SHIPPED | OUTPATIENT
Start: 2024-08-05

## 2024-08-05 RX ORDER — INSULIN LISPRO-AABC 100 [IU]/ML
INJECTION, SOLUTION INTRAVENOUS; SUBCUTANEOUS
Qty: 30 ML | Refills: 11 | Status: SHIPPED | OUTPATIENT
Start: 2024-08-05

## 2024-08-05 NOTE — NURSING NOTE
Current patient location:  [place]    Is the patient currently in the state of [place]? YES    Visit mode:VIDEO    If the visit is dropped, the patient can be reconnected by: VIDEO VISIT: Text to cell phone:   Telephone Information:   Mobile [phone]       Will anyone else be joining the visit? NO  (If patient encounters technical issues they should call [phone] :[phone])    How would you like to obtain your AVS? MyChart    Are changes needed to the allergy or medication list? No  Chadd did not report any changes to e-check in information for visit. DASHA did not review e-check in information again with Chadd due to this.       Are refills needed on medications prescribed by this physician? YES transmitter    Rooming Documentation:  Not applicable      Reason for visit: RECHECK (Follow up- patient wasn't able to get A1C )    Dai ARROYO

## 2024-08-05 NOTE — PATIENT INSTRUCTIONS
Try activity mode 2 hours before biking.      Suspend 1-1.5 hours before activity.     Change basal rate to 0.8 units/hr (was 0.7)    Change carb ratio to:  Mid- 1/13g (was 1/15g)  6am- 1/7g (was 8g)  12pm- 1/13g (was 1/15g)    Switch from Humalog to Lyumjev.     With hybrid closed loop pumps, it is important to bolus earlier prior to meal (15-20 mins), as delayed bolus will lead to glucose rise and automated basal increases on top of carb boluses and can cause low blood sugars afterwards. More important to pre-bolus than to get the carb count right.  If you forget to bolus, cut your carbs in half.     Treat low blood sugars with only 5-10g of carb to prevent rebound highs (the pump is also shutting off your basal insulin before you get low).        Emergency issues: Please contact the clinic as soon as you recognize a problem.  Here are some concerns you should contact us about.  -Vomiting: more than twice.  Please check ketones.  If positive, go to ER. Monitor glucose hourly.   -High glucose with a pump:  Twice in doubt, take it out.  If glucose is high x 2, change your pump site and take a correction with a syringe. Check ketones.  If glucose is not coming down, please call the clinic. If ketones are moderate or large, drink lots of water, take an insulin correction, and recheck ketones in 1 hour.  If ketones are still high (or you are vomiting), go to the ER.  -Hypoglycemia (low glucose):   If glucose is less than 70 mg/dL, treat with 15g carb (4 glucose tablets), recheck glucose in 10 minutes.  If low again, repeat.   If glucose is less than 54 mg/dL, treat with 30g carb, recheck glucose in 10 minutes.  If low again, repeat.  Keep glucagon in your home in case of severe hypoglycemia and train someone how to use this.    Emergency kit (please ensure you always have these with you):   Glucose tablets  Glucagon  Insulin  Syringes (if on a pump)  Extra infusion set (if on a pump)  Ketone strips    Contact  information:   If you have concerns, please send me a [employer] message or call the clinic at [phone].  For more urgent concerns, please call [phone] after hours/weekends and ask to speak with the endocrinologist on call.      Please let me know if you are having low blood sugars less than 70 or over 350 mg/dL.  Do not wait until your next appointment if this is happening.

## 2024-08-05 NOTE — LETTER
8/5/2024       RE: Sera Adkins  [id number]   [facility] [address]     Dear Colleague,    Thank you for referring your patient, Sera Adkins, to the [facility] [place] at [facility]. Please see a copy of my visit note below.    Outcome for 07/26/24 9:13 AM: Data uploaded on [facility]  Betty Mcdonough MA  Outcome for 08/01/24 1:49 PM: Data obtained via [employer] website  Betty Mcdonough MA    Start time: 0703  End time: 0737  Provider location: off site- home  Patient location: off site- home.    HPI:   Sera is a pleasant 52 yo woman here for follow up of type 1 diabetes since age 15.   She has always been extremely active with high intensity biking as a regular part of her workouts.  In 2022, she developed exertional angina.  She had an angiogram, which was clear and was diagnosed with microvascular angina (epicardial CAD) and HFpEF.  She is improving and is still able to do all of her intense activity.  She has been doing very well this year.   She is no longer requiring nitroglycerine before activity. HIIT 3 days a week.  [place]- 25 mile race in June.  Fully able to intensely exercise.  Edema is better. Sometimes feels tightness in feet at the end of the day.  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  Very busy.     Turns pump off an hour before exercise.  Back on after an hour in activity mode.  This has been pretty good.      Switched to Omnipod from Tandem.  Pretty good, but struggles with keeping her PDM with her.  Wants to upgrade to Red Blue Voice marie and [employer], once available.     Pump settings          Recent glucose:                       GFR has been in the 50's for the past couple years, now improved to the 70s.   She is intentional about avoiding dehydration.     Her mood has been down and anxious.  We have been talking about seeing a therapist for a while and she is scheduled soon with Bob Burgess.  Her  boyfriend has been supportive.  She is on antidepressant.      She has no other concerns today.     Past Medical History:   Diagnosis Date    Anxiety     DMI     Type 1   Epicardial CAD- diagnosed .   HFpEF- diagnosed .    Past Surgical History:   Procedure Laterality Date    BREAST CYST ASPIRATION Left ?2005     SECTION      x2    CONIZATION CERVIX,LOOP ELECTRD      Description: Cervical Conization Loop Electrode Excision;  Recorded: 2008;    CV CORONARY ANGIOGRAM N/A 10/24/2022    Procedure: Coronary Angiogram;  Surgeon: Subha Porter MD;  Location: [facility]    CV FRACTIONAL FLOW RATIO WIRE N/A 10/24/2022    Procedure: Fractional Flow Ratio Wire;  Surgeon: Subha Porter MD;  Location: [facility]    CV LEFT HEART CATH N/A 10/24/2022    Procedure: Left Heart Catheterization;  Surgeon: Subha Porter MD;  Location: [facility]       Family History   Problem Relation Age of Onset    Cancer Father     Breast Cancer Paternal Grandmother        Social History     Socioeconomic History    Marital status:    Tobacco Use    Smoking status: Never    Smokeless tobacco: Never   Substance and Sexual Activity    Alcohol use: Yes    Drug use: No         Past Surgical History:   Procedure Laterality Date    BREAST CYST ASPIRATION Left ?2005     SECTION      x2    CONIZATION CERVIX,LOOP ELECTRD      Description: Cervical Conization Loop Electrode Excision;  Recorded: 2008;    CV CORONARY ANGIOGRAM N/A 10/24/2022    Procedure: Coronary Angiogram;  Surgeon: Subha Porter MD;  Location: [facility]    CV FRACTIONAL FLOW RATIO WIRE N/A 10/24/2022    Procedure: Fractional Flow Ratio Wire;  Surgeon: Subha Porter MD;  Location: [facility]    CV LEFT HEART CATH N/A 10/24/2022    Procedure: Left Heart Catheterization;  Surgeon: Subha Porter MD;  Location: [facility]       Family History   Problem Relation Age of Onset     Cancer Father     Breast Cancer Paternal Grandmother        Social History   Social Hx:  .  Two teenage children.  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  Enjoys being physically active, mostly biking, but also running and HIIT.     Socioeconomic History    Marital status: other     Spouse name: Not on file    Number of children: Not on file    Years of education: Not on file    Highest education level: Not on file   Occupational History    Not on file   Social Needs    Financial resource strain: Not on file    Food insecurity:     Worry: Not on file     Inability: Not on file    Transportation needs:     Medical: Not on file     Non-medical: Not on file   Tobacco Use    Smoking status: Never Smoker    Smokeless tobacco: Never Used   Substance and Sexual Activity    Alcohol use: No    Drug use: No    Sexual activity: Not on file   Lifestyle    Physical activity:     Days per week: Not on file     Minutes per session: Not on file    Stress: Not on file   Relationships    Social connections:     Talks on phone: Not on file     Gets together: Not on file     Attends Sabianism service: Not on file     Active member of club or organization: Not on file     Attends meetings of clubs or organizations: Not on file     Relationship status: Not on file    Intimate partner violence:     Fear of current or ex partner: Not on file     Emotionally abused: Not on file     Physically abused: Not on file     Forced sexual activity: Not on file   Other Topics Concern    Not on file   Social History Narrative    Not on file       Current Outpatient Medications   Medication Sig Dispense Refill    amLODIPine (NORVASC) 5 MG tablet Take 5 mg by mouth daily 90 tablet 3    aspirin 81 MG EC tablet Take 1 tablet (81 mg) by mouth daily      blood glucose (NO BRAND SPECIFIED) test strip Test 6 to 8 times daily.  1 Box 12    Calcium Carbonate-Vitamin D (CALCIUM 500+D PO) Take 1 tablet by mouth as needed.      cholecalciferol 50 MCG (2000 UT)  "tablet Take 1 tablet by mouth daily      Continuous Blood Gluc Sensor (DEXCOM G6 SENSOR) MISC Change 1 each every 10 days. 9 each 3    Continuous Blood Gluc Transmit (DEXCOM G6 TRANSMITTER) MISC Change every 3 months. 1 each 3    Glucagon (BAQSIMI) 3 MG/DOSE nasal powder Spray 1 spray (3 mg) in nostril as needed (severe hypoglycemia) in the event of unconscious hypoglycemia or hypoglycemic seizure. May repeat dose if no response after 15 minutes. 1 each 1    Glucagon, rDNA, (GLUCAGON EMERGENCY) 1 MG KIT Inject IM for hypoglycemic event needs 2 pens one for home and one for work 1 kit 3    Insulin Disposable Pump (OMNIPOD 5 G6 PODS, GEN 5,) MISC 1 each every 3 days 45 each 3    insulin glargine (LANTUS VIAL) 100 UNIT/ML vial Inject 18 Units Subcutaneous daily Use ONLY in case of pump failure. 10 mL 3    insulin lispro (HUMALOG) 100 UNIT/ML vial USE UP TO 90 UNITS PER DAY via INSULIN PUMP FOR BASAL, BOLUS AND PRIMING OF TUBING 90 mL 3    insulin syringe-needle U-100 (30G X 1/2\" 0.3 ML) 30G X 1/2\" 0.3 ML miscellaneous Use 4 syringes daily or as directed. 90 each 1    levonorgestrel (MIRENA) 20 MCG/DAY IUD 1 each by Intrauterine route once      losartan (COZAAR) 50 MG tablet TAKE 1 TABLET DAILY 90 tablet 3    nitroGLYcerin (NITROSTAT) 0.4 MG sublingual tablet One tablet under the tongue every 5 minutes if needed for chest pain. May repeat every 5 minutes for a maximum of 3 doses in 15 minutes\" 25 tablet 3     No current facility-administered medications for this visit.          Allergies   Allergen Reactions    Animal Dander Itching     cats    Latex      rash    Percocet [Oxycodone-Acetaminophen] Nausea and Nausea and Vomiting       Physical Exam  GENERAL: healthy, alert.   RESP: no audible wheeze, cough, or visible cyanosis.  No visible retractions or increased work of breathing.  Able to speak fully in complete sentences.  PSYCH: mentation appears normal, affect normal/bright, judgement and insight intact, normal " speech and appearance well-groomed    RESULTS  Lab Results   Component Value Date    A1C 7.1 (H) 04/26/2024    A1C 7.0 (H) 04/11/2022    A1C 7.2 (H) 08/02/2021    A1C 6.8 (A) 04/09/2021    A1C 7.7 (H) 01/20/2020    A1C 7.5 (H) 09/16/2019    A1C 7.8 (H) 03/18/2019    A1C 7.7 (H) 12/13/2016    HEMOGLOBINA1 7.4 (A) 01/20/2020    HEMOGLOBINA1 7.2 (A) 09/16/2019    HEMOGLOBINA1 7.0 (A) 04/16/2018    HEMOGLOBINA1 7.0 (A) 12/11/2017    HEMOGLOBINA1 7.4 (A) 06/05/2017       TSH   Date Value Ref Range Status   10/20/2023 2.45 0.30 - 4.20 uIU/mL Final   10/21/2022 3.52 0.30 - 4.20 uIU/mL Final   08/02/2021 1.84 0.30 - 5.00 uIU/mL Final   01/20/2020 2.07 0.40 - 4.00 mU/L Final   09/16/2019 1.89 0.40 - 4.00 mU/L Final   03/18/2019 2.40 0.40 - 4.00 mU/L Final   12/11/2017 1.69 0.40 - 4.00 mU/L Final   12/13/2016 3.72 0.40 - 4.00 mU/L Final     T4 Free   Date Value Ref Range Status   03/18/2019 0.81 0.76 - 1.46 ng/dL Final       ALT   Date Value Ref Range Status   01/20/2020 24 0 - 50 U/L Final   09/16/2019 25 0 - 50 U/L Final   ]    Recent Labs   Lab Test 10/24/22  1133 10/21/22  0806   CHOL 92 99   HDL 39* 42*   LDL 44 49   TRIG 43 41       Lab Results   Component Value Date     10/20/2023     01/20/2020      Lab Results   Component Value Date    POTASSIUM 4.1 10/20/2023    POTASSIUM 3.8 05/06/2022    POTASSIUM 4.0 01/20/2020     Lab Results   Component Value Date    CHLORIDE 102 10/20/2023    CHLORIDE 101 05/06/2022    CHLORIDE 105 01/20/2020     Lab Results   Component Value Date    SCOTTY 9.8 10/20/2023    SCOTTY 9.2 01/20/2020     Lab Results   Component Value Date    CO2 30 10/20/2023    CO2 27 05/06/2022    CO2 32 01/20/2020     Lab Results   Component Value Date    BUN 16.0 10/20/2023    BUN 13 05/06/2022    BUN 18 01/20/2020     Lab Results   Component Value Date    CR 0.97 10/20/2023    CR 0.94 01/20/2020       GFR Estimate   Date Value Ref Range Status   10/20/2023 70 >60 mL/min/1.73m2 Final   10/21/2022 75 >60  mL/min/1.73m2 Final     Comment:     Effective December 21, 2021 eGFRcr in adults is calculated using the 2021 CKD-EPI creatinine equation which includes age and gender (Jackie soares al., [facility], DOI: 10.1056/IHWLmo7672652)   08/13/2022 65 >60 mL/min/1.73m2 Final     Comment:     Effective December 21, 2021 eGFRcr in adults is calculated using the 2021 CKD-EPI creatinine equation which includes age and gender (Jackie et al., [facility], DOI: 10.1056/WEFPfe9609687)   06/07/2021 49 (L) >60 mL/min/1.73m2 Final   10/12/2020 51 (L) >60 mL/min/1.73m2 Final   01/20/2020 72 >60 mL/min/[1.73_m2] Final     Comment:     Non  GFR Calc  Starting 12/18/2018, serum creatinine based estimated GFR (eGFR) will be   calculated using the Chronic Kidney Disease Epidemiology Collaboration   (CKD-EPI) equation.     09/16/2019 63 >60 mL/min/[1.73_m2] Final     Comment:     Non  GFR Calc  Starting 12/18/2018, serum creatinine based estimated GFR (eGFR) will be   calculated using the Chronic Kidney Disease Epidemiology Collaboration   (CKD-EPI) equation.     08/22/2019 47 (L) >60 mL/min/1.73m2 Final   03/18/2019 77 >60 mL/min/[1.73_m2] Final     Comment:     Non  GFR Calc  Starting 12/18/2018, serum creatinine based estimated GFR (eGFR) will be   calculated using the Chronic Kidney Disease Epidemiology Collaboration   (CKD-EPI) equation.       GFR, ESTIMATED POCT   Date Value Ref Range Status   10/11/2022 >60 >60 mL/min/1.73m2 Final     GFR Estimate If Black   Date Value Ref Range Status   06/07/2021 60 (L) >60 mL/min/1.73m2 Final   10/12/2020 >60 >60 mL/min/1.73m2 Final   01/20/2020 83 >60 mL/min/[1.73_m2] Final     Comment:      GFR Calc  Starting 12/18/2018, serum creatinine based estimated GFR (eGFR) will be   calculated using the Chronic Kidney Disease Epidemiology Collaboration   (CKD-EPI) equation.     09/16/2019 73 >60 mL/min/[1.73_m2] Final     Comment:      GFR  "Calc  Starting 12/18/2018, serum creatinine based estimated GFR (eGFR) will be   calculated using the Chronic Kidney Disease Epidemiology Collaboration   (CKD-EPI) equation.     08/22/2019 57 (L) >60 mL/min/1.73m2 Final   03/18/2019 89 >60 mL/min/[1.73_m2] Final     Comment:      GFR Calc  Starting 12/18/2018, serum creatinine based estimated GFR (eGFR) will be   calculated using the Chronic Kidney Disease Epidemiology Collaboration   (CKD-EPI) equation.         Lab Results   Component Value Date    MICROL <12.0 10/20/2023    MICROL 6 01/20/2020     No results found for: \"MICROALBUMIN\"  No results found for: \"CPEPT\", \"GADAB\", \"ISCAB\"    No results found for: \"B12\"]    Most recent eye exam date: : Not Found     Computed FIB-4 Calculation unavailable. One or more values for this score either were not found within the given timeframe or did not fit some other criterion.  A value of FIB-4 below 1.30 is considered as low risk for advanced fibrosis; a value of FIB-4 over 2.67 is considered as high risk for advanced fibrosis; and FIB-4 values between 1.30 and 2.67 are considered as intermediate risk of advanced fibrosis.        Assessment/Plan:     1.  Type 1 diabetes- Glucose is fairly well controlled, however she is going too high after eating and struggling with pre-bolusing.  She is adapting to Omnipod.  Discussed switching from humalog to lyumjev for faster onset of action.  Could also consider afrezza for times she will be active (shorter duration of action).  We made the following changes today (instructions given to patient):     Try activity mode 2 hours before biking.      Suspend 1-1.5 hours before activity.     Change basal rate to 0.8 units/hr (was 0.7)    Change carb ratio to:  Mid- 1/13g (was 1/15g)  6am- 1/7g (was 8g)  12pm- 1/13g (was 1/15g)    Switch from Humalog to Lyumjev.     With hybrid closed loop pumps, it is important to bolus earlier prior to meal (15-20 mins), as delayed bolus " will lead to glucose rise and automated basal increases on top of carb boluses and can cause low blood sugars afterwards. More important to pre-bolus than to get the carb count right.  If you forget to bolus, cut your carbs in half.     Treat low blood sugars with only 5-10g of carb to prevent rebound highs (the pump is also shutting off your basal insulin before you get low).        Emergency issues: Please contact the clinic as soon as you recognize a problem.  Here are some concerns you should contact us about.  -Vomiting: more than twice.  Please check ketones.  If positive, go to ER. Monitor glucose hourly.   -High glucose with a pump:  Twice in doubt, take it out.  If glucose is high x 2, change your pump site and take a correction with a syringe. Check ketones.  If glucose is not coming down, please call the clinic. If ketones are moderate or large, drink lots of water, take an insulin correction, and recheck ketones in 1 hour.  If ketones are still high (or you are vomiting), go to the ER.  -Hypoglycemia (low glucose):   If glucose is less than 70 mg/dL, treat with 15g carb (4 glucose tablets), recheck glucose in 10 minutes.  If low again, repeat.   If glucose is less than 54 mg/dL, treat with 30g carb, recheck glucose in 10 minutes.  If low again, repeat.  Keep glucagon in your home in case of severe hypoglycemia and train someone how to use this.    Emergency kit (please ensure you always have these with you):   Glucose tablets  Glucagon  Insulin  Syringes (if on a pump)  Extra infusion set (if on a pump)  Ketone strips    Contact information:   If you have concerns, please send me a [employer] message or call the clinic at [phone].  For more urgent concerns, please call [phone] after hours/weekends and ask to speak with the endocrinologist on call.      Please let me know if you are having low blood sugars less than 70 or over 350 mg/dL.  Do not wait until your next appointment if this is  happening.      2.  Risk factors-     Retinopathy:  Yes.  Last exam 4/24.   Nephropathy:  BP well controlled. No history of microalbuminuria. She had SEKOU in 2019.  GFR improved.  Follows with nephrology. Encouraged hydration.   No evidence of diabetic nephropathy.       Neuropathy: decreased vibration sensation in left great toe in the past.  Normal monofilament.   No pain/burning.   Feet: OK, no ulcers.   Lipids:  LDL at target on rosuvastatin.   Celiac screening: negative antibodies 2017.   Thyroid screening: TSH normal 10/2023.  ASA: 81 mg daily.     3.   Heart failure with preserved EF- On amlodipine 5 mg daily and losartan 50 mg.  Edema is improved.  Symptomatically much better now. Exercise tolerance is excellent.    4. F/U in 4 mos with Dr. Krishnamurthy, 8 mos with myself.  Will also schedule in DM education for pump review.      The longitudinal plan of care for the diagnosis(es)/condition(s) as documented were addressed during this visit. Due to the added complexity in care, I will continue to support Chadd in the subsequent management and with ongoing continuity of care.      40minutes spent on the date of the encounter doing chart review, review of test results, review of continuous glucose sensor, insulin pump data, interpretation of glucose data, patient visit and documentation, counseling/coordination of care, and discussion of follow up plan for worsening hyper and hypoglycemia.  The patient understood and is satisfied with today's visit.     Hannah Whitley PA-C, MPAS   [facility]  Department of Medicine  Division of Endocrinology and Diabetes

## 2024-08-13 ENCOUNTER — TELEPHONE (OUTPATIENT)
Dept: ENDOCRINOLOGY | Facility: CLINIC | Age: 54
End: 2024-08-13
Payer: COMMERCIAL

## 2024-08-13 PROCEDURE — 99207 PR NO BILLABLE SERVICE THIS VISIT: CPT | Performed by: PHYSICIAN ASSISTANT

## 2024-08-13 NOTE — TELEPHONE ENCOUNTER
Called patient and left VM. Pod will still deliver insulin without controller present. Recommend that patient watch glucose on dexcom marie. Left clinic number to call back.

## 2024-08-13 NOTE — TELEPHONE ENCOUNTER
[facility] Call Center    Phone Message    May a detailed message be left on voicemail: yes     Reason for Call: Other: Pt requesting call back, pt called and stated she has an omnipod pump and she left the controler at home today, pt wanting to know if it will still work and pt will get her insulin

## 2024-08-20 ENCOUNTER — TELEPHONE (OUTPATIENT)
Dept: ENDOCRINOLOGY | Facility: CLINIC | Age: 54
End: 2024-08-20
Payer: COMMERCIAL

## 2024-08-20 NOTE — TELEPHONE ENCOUNTER
Left Voicemail (1st Attempt) for the patient to call back and schedule the following:    Appointment type: return diabetes   Provider: Gutierrez   Return date: 8 month follow-up on or near 4/5/25   Specialty phone number: [phone]  Additional appointment(s) needed: NA  Additonal Notes: LVM, MyC x1   COI Notes from appt on 8/5:  Disposition: Return in about 8 months (around 4/5/2025).     Digna Hand on 8/20/2024 at 2:43 PM

## 2024-08-23 ENCOUNTER — TELEPHONE (OUTPATIENT)
Dept: ENDOCRINOLOGY | Facility: CLINIC | Age: 54
End: 2024-08-23
Payer: COMMERCIAL

## 2024-08-23 NOTE — TELEPHONE ENCOUNTER
Left Voicemail (1st Attempt) and Sent Mychart (1st Attempt) for the patient to call back and schedule the following:    Appointment type: 8 month follow up  Provider: Hannah Whitley  Return date: April 2025  Specialty phone number: [phone]  Additional appointment(s) needed: Diab. ED  Additonal Notes:

## 2024-08-30 ENCOUNTER — LAB (OUTPATIENT)
Dept: LAB | Facility: HOSPITAL | Age: 54
End: 2024-08-30
Payer: COMMERCIAL

## 2024-08-30 ENCOUNTER — TELEPHONE (OUTPATIENT)
Dept: FAMILY MEDICINE | Facility: CLINIC | Age: 54
End: 2024-08-30
Payer: COMMERCIAL

## 2024-08-30 ENCOUNTER — E-VISIT (OUTPATIENT)
Dept: FAMILY MEDICINE | Facility: CLINIC | Age: 54
End: 2024-08-30
Payer: COMMERCIAL

## 2024-08-30 DIAGNOSIS — E10.8 TYPE 1 DIABETES MELLITUS WITH COMPLICATIONS (H): Primary | ICD-10-CM

## 2024-08-30 DIAGNOSIS — E10.8 TYPE 1 DIABETES MELLITUS WITH COMPLICATIONS (H): ICD-10-CM

## 2024-08-30 DIAGNOSIS — R25.2 LEG CRAMPS: Primary | ICD-10-CM

## 2024-08-30 LAB
ALBUMIN SERPL BCG-MCNC: 4.6 G/DL (ref 3.5–5.2)
ALP SERPL-CCNC: 69 U/L (ref 40–150)
ALT SERPL W P-5'-P-CCNC: 29 U/L (ref 0–50)
ANION GAP SERPL CALCULATED.3IONS-SCNC: 12 MMOL/L (ref 7–15)
AST SERPL W P-5'-P-CCNC: 38 U/L (ref 0–45)
BILIRUB SERPL-MCNC: 0.6 MG/DL
BUN SERPL-MCNC: 16 MG/DL (ref 6–20)
CALCIUM SERPL-MCNC: 9.7 MG/DL (ref 8.8–10.4)
CHLORIDE SERPL-SCNC: 102 MMOL/L (ref 98–107)
CHOLEST SERPL-MCNC: 102 MG/DL
CREAT SERPL-MCNC: 0.97 MG/DL (ref 0.51–0.95)
CREAT UR-MCNC: 32.8 MG/DL
EGFRCR SERPLBLD CKD-EPI 2021: 70 ML/MIN/1.73M2
FASTING STATUS PATIENT QL REPORTED: NO
FASTING STATUS PATIENT QL REPORTED: NO
GLUCOSE SERPL-MCNC: 92 MG/DL (ref 70–99)
HCO3 SERPL-SCNC: 29 MMOL/L (ref 22–29)
HDLC SERPL-MCNC: 45 MG/DL
LDLC SERPL CALC-MCNC: 50 MG/DL
MICROALBUMIN UR-MCNC: <12 MG/L
MICROALBUMIN/CREAT UR: NORMAL MG/G{CREAT}
NONHDLC SERPL-MCNC: 57 MG/DL
POTASSIUM SERPL-SCNC: 3.8 MMOL/L (ref 3.4–5.3)
PROT SERPL-MCNC: 7.3 G/DL (ref 6.4–8.3)
SODIUM SERPL-SCNC: 143 MMOL/L (ref 135–145)
TRIGL SERPL-MCNC: 37 MG/DL
TSH SERPL DL<=0.005 MIU/L-ACNC: 2.17 UIU/ML (ref 0.3–4.2)

## 2024-08-30 PROCEDURE — 99421 OL DIG E/M SVC 5-10 MIN: CPT | Performed by: FAMILY MEDICINE

## 2024-08-30 PROCEDURE — 82043 UR ALBUMIN QUANTITATIVE: CPT

## 2024-08-30 PROCEDURE — 80061 LIPID PANEL: CPT

## 2024-08-30 PROCEDURE — 80053 COMPREHEN METABOLIC PANEL: CPT

## 2024-08-30 PROCEDURE — 36415 COLL VENOUS BLD VENIPUNCTURE: CPT

## 2024-08-30 PROCEDURE — 84443 ASSAY THYROID STIM HORMONE: CPT

## 2024-08-30 NOTE — PATIENT INSTRUCTIONS
Thank you for choosing us for your care. Given your symptoms, I would like you to do a lab-only visit to determine what is causing them.  I have placed the orders.  Please schedule an appointment with the lab right here in [employer], or call [phone].  I will let you know when the results are back and next steps to take.    Schedule a Lab Only appointment here.

## 2024-08-30 NOTE — TELEPHONE ENCOUNTER
Order/Referral Request    Who is requesting: PATIENT     Orders being requested: BMP     Reason service is needed/diagnosis: LABS     When are orders needed by: PCP    Has this been discussed with Provider: Yes    Does patient have a preference on a Group/Provider/Facility? N/A    Does patient have an appointment scheduled?: No    Where to send orders: Place orders within Epic    Could we send this information to you in [facility] or would you prefer to receive a phone call?:   Patient would prefer a phone call   Okay to leave a detailed message?: Yes at Cell number on file:    Telephone Information:   Mobile [phone]

## 2024-08-30 NOTE — TELEPHONE ENCOUNTER
Patient calling for a BMP lab request. Patient will schedule an e-visit to discuss symptoms and lab order. Also, patient was scheduled for annual wellness appointment on 11/22.     Vj Leonard RN

## 2024-10-25 ENCOUNTER — TELEPHONE (OUTPATIENT)
Dept: EDUCATION SERVICES | Facility: CLINIC | Age: 54
End: 2024-10-25
Payer: COMMERCIAL

## 2024-10-25 NOTE — TELEPHONE ENCOUNTER
Left Voicemail (1st Attempt) for the patient to call back and schedule the following:    Appointment type: Diabetes ed   Provider: Ally   Return date: re-tatiana 11/29 to the next available visit   Specialty phone number: [phone]   Additional appointment(s) needed:   Additonal Notes: LVM, MyC x1   Due to changes in the providers schedule appt on 11/29 needs to get re-scheduled to the next available visit. Thank you.     Digna Hand on 10/25/2024 at 10:23 AM

## 2024-10-28 NOTE — TELEPHONE ENCOUNTER
Left Voicemail (2nd Attempt) for the patient to call back and schedule the following:    Appointment type: Diabetes ed   Provider: Ally   Return date: re-tatiana 11/29 to the next available visit   Specialty phone number: [phone]   Additional appointment(s) needed:   Additonal Notes: LVM x2, MyC, letter sent x1   Due to changes in the providers schedule appt on 11/29 needs to get re-scheduled to the next available visit. Thank you.     Digna Hand on 10/28/2024 at 10:29 AM

## 2024-11-05 ENCOUNTER — TELEPHONE (OUTPATIENT)
Dept: EDUCATION SERVICES | Facility: CLINIC | Age: 54
End: 2024-11-05
Payer: COMMERCIAL

## 2024-11-05 NOTE — TELEPHONE ENCOUNTER
Spoke with Chadd, she refers Friday appointments due to work scheduled. Scheduled her with Ally Mcguire RN [facility] on Friday 12/6 at 10:00 am.    Xiomara Guerrero, RD, LD [facility]  Diabetes Educator

## 2024-11-05 NOTE — TELEPHONE ENCOUNTER
[facility] Call Center    Phone Message    May a detailed message be left on voicemail: yes     Reason for Call: Other: Per pt is wondering if she can have femi from her cancel appt to a Friday? Per pt is available on Fridays. Please call pt back to discuss. Thank you.      Action Taken: Message routed to:  [facility] ENDO    Travel Screening: Not Applicable     Date of Service:

## 2024-11-08 ENCOUNTER — ANCILLARY PROCEDURE (OUTPATIENT)
Dept: MAMMOGRAPHY | Facility: CLINIC | Age: 54
End: 2024-11-08
Attending: FAMILY MEDICINE
Payer: COMMERCIAL

## 2024-11-08 DIAGNOSIS — Z12.31 SCREENING MAMMOGRAM, ENCOUNTER FOR: ICD-10-CM

## 2024-11-08 PROCEDURE — 77063 BREAST TOMOSYNTHESIS BI: CPT

## 2024-11-18 SDOH — HEALTH STABILITY: PHYSICAL HEALTH: ON AVERAGE, HOW MANY MINUTES DO YOU ENGAGE IN EXERCISE AT THIS LEVEL?: 60 MIN

## 2024-11-18 SDOH — HEALTH STABILITY: PHYSICAL HEALTH: ON AVERAGE, HOW MANY DAYS PER WEEK DO YOU ENGAGE IN MODERATE TO STRENUOUS EXERCISE (LIKE A BRISK WALK)?: 4 DAYS

## 2024-11-18 ASSESSMENT — SOCIAL DETERMINANTS OF HEALTH (SDOH): HOW OFTEN DO YOU GET TOGETHER WITH FRIENDS OR RELATIVES?: TWICE A WEEK

## 2024-11-21 DIAGNOSIS — E10.9 WELL CONTROLLED TYPE 1 DIABETES MELLITUS (H): ICD-10-CM

## 2024-11-21 RX ORDER — INSULIN PMP CART,AUT,G6/7,CNTR
EACH SUBCUTANEOUS
Qty: 30 EACH | Refills: 0 | Status: SHIPPED | OUTPATIENT
Start: 2024-11-21

## 2024-11-22 ENCOUNTER — OFFICE VISIT (OUTPATIENT)
Dept: FAMILY MEDICINE | Facility: CLINIC | Age: 54
End: 2024-11-22
Payer: COMMERCIAL

## 2024-11-22 VITALS
RESPIRATION RATE: 20 BRPM | WEIGHT: 161.2 LBS | TEMPERATURE: 97.6 F | SYSTOLIC BLOOD PRESSURE: 109 MMHG | BODY MASS INDEX: 24.43 KG/M2 | HEIGHT: 68 IN | HEART RATE: 66 BPM | DIASTOLIC BLOOD PRESSURE: 62 MMHG | OXYGEN SATURATION: 97 %

## 2024-11-22 DIAGNOSIS — N18.31 STAGE 3A CHRONIC KIDNEY DISEASE (H): ICD-10-CM

## 2024-11-22 DIAGNOSIS — R53.83 OTHER FATIGUE: ICD-10-CM

## 2024-11-22 DIAGNOSIS — N92.6 IRREGULAR MENSTRUAL CYCLE: ICD-10-CM

## 2024-11-22 DIAGNOSIS — R25.2 LEG CRAMPS: ICD-10-CM

## 2024-11-22 DIAGNOSIS — E10.3299 TYPE 1 DIABETES MELLITUS WITH MILD NONPROLIFERATIVE RETINOPATHY, MACULAR EDEMA PRESENCE UNSPECIFIED, UNSPECIFIED LATERALITY (H): ICD-10-CM

## 2024-11-22 DIAGNOSIS — Z12.11 SCREEN FOR COLON CANCER: ICD-10-CM

## 2024-11-22 DIAGNOSIS — I25.119 CORONARY ARTERY DISEASE INVOLVING NATIVE CORONARY ARTERY OF NATIVE HEART WITH ANGINA PECTORIS (H): ICD-10-CM

## 2024-11-22 DIAGNOSIS — Z00.00 ROUTINE GENERAL MEDICAL EXAMINATION AT A HEALTH CARE FACILITY: Primary | ICD-10-CM

## 2024-11-22 DIAGNOSIS — R30.0 DYSURIA: ICD-10-CM

## 2024-11-22 DIAGNOSIS — I10 ESSENTIAL HYPERTENSION: ICD-10-CM

## 2024-11-22 DIAGNOSIS — F41.1 GENERALIZED ANXIETY DISORDER: ICD-10-CM

## 2024-11-22 LAB
ALBUMIN UR-MCNC: NEGATIVE MG/DL
APPEARANCE UR: CLEAR
BILIRUB UR QL STRIP: NEGATIVE
COLOR UR AUTO: YELLOW
FSH SERPL IRP2-ACNC: 79.6 MIU/ML
GLUCOSE UR STRIP-MCNC: NEGATIVE MG/DL
HGB UR QL STRIP: NEGATIVE
KETONES UR STRIP-MCNC: NEGATIVE MG/DL
LEUKOCYTE ESTERASE UR QL STRIP: NEGATIVE
NITRATE UR QL: NEGATIVE
PH UR STRIP: 7.5 [PH] (ref 5–8)
SP GR UR STRIP: 1.02 (ref 1–1.03)
UROBILINOGEN UR STRIP-ACNC: 0.2 E.U./DL

## 2024-11-22 PROCEDURE — 36415 COLL VENOUS BLD VENIPUNCTURE: CPT | Performed by: FAMILY MEDICINE

## 2024-11-22 PROCEDURE — 83001 ASSAY OF GONADOTROPIN (FSH): CPT | Performed by: FAMILY MEDICINE

## 2024-11-22 PROCEDURE — 81003 URINALYSIS AUTO W/O SCOPE: CPT | Performed by: FAMILY MEDICINE

## 2024-11-22 PROCEDURE — 99396 PREV VISIT EST AGE 40-64: CPT | Performed by: FAMILY MEDICINE

## 2024-11-22 PROCEDURE — 99213 OFFICE O/P EST LOW 20 MIN: CPT | Mod: 25 | Performed by: FAMILY MEDICINE

## 2024-11-22 RX ORDER — ROSUVASTATIN CALCIUM 40 MG/1
40 TABLET, COATED ORAL DAILY
Status: SHIPPED
Start: 2024-11-22 | End: 2024-11-22

## 2024-11-22 RX ORDER — ROSUVASTATIN CALCIUM 20 MG/1
20 TABLET, COATED ORAL DAILY
Status: SHIPPED
Start: 2024-11-22

## 2024-11-22 NOTE — PATIENT INSTRUCTIONS
Patient Education   Preventive Care Advice   This is general advice given by our system to help you stay healthy. However, your care team may have specific advice just for you. Please talk to your care team about your preventive care needs.  Nutrition  Eat 5 or more servings of fruits and vegetables each day.  Try wheat bread, brown rice and whole grain pasta (instead of white bread, rice, and pasta).  Get enough calcium and vitamin D. Check the label on foods and aim for 100% of the RDA (recommended daily allowance).  Lifestyle  Exercise at least 150 minutes each week  (30 minutes a day, 5 days a week).  Do muscle strengthening activities 2 days a week. These help control your weight and prevent disease.  No smoking.  Wear sunscreen to prevent skin cancer.  Have a dental exam and cleaning every 6 months.  Yearly exams  See your health care team every year to talk about:  Any changes in your health.  Any medicines your care team has prescribed.  Preventive care, family planning, and ways to prevent chronic diseases.  Shots (vaccines)   HPV shots (up to age 26), if you've never had them before.  Hepatitis B shots (up to age 59), if you've never had them before.  COVID-19 shot: Get this shot when it's due.  Flu shot: Get a flu shot every year.  Tetanus shot: Get a tetanus shot every 10 years.  Pneumococcal, hepatitis A, and RSV shots: Ask your care team if you need these based on your risk.  Shingles shot (for age 50 and up)  General health tests  Diabetes screening:  Starting at age 35, Get screened for diabetes at least every 3 years.  If you are younger than age 35, ask your care team if you should be screened for diabetes.  Cholesterol test: At age 39, start having a cholesterol test every 5 years, or more often if advised.  Bone density scan (DEXA): At age 50, ask your care team if you should have this scan for osteoporosis (brittle bones).  Hepatitis C: Get tested at least once in your life.  STIs (sexually  transmitted infections)  Before age 24: Ask your care team if you should be screened for STIs.  After age 24: Get screened for STIs if you're at risk. You are at risk for STIs (including HIV) if:  You are sexually active with more than one person.  You don't use condoms every time.  You or a partner was diagnosed with a sexually transmitted infection.  If you are at risk for HIV, ask about PrEP medicine to prevent HIV.  Get tested for HIV at least once in your life, whether you are at risk for HIV or not.  Cancer screening tests  Cervical cancer screening: If you have a cervix, begin getting regular cervical cancer screening tests starting at age 21.  Breast cancer scan (mammogram): If you've ever had breasts, begin having regular mammograms starting at age 40. This is a scan to check for breast cancer.  Colon cancer screening: It is important to start screening for colon cancer at age 45.  Have a colonoscopy test every 10 years (or more often if you're at risk) Or, ask your provider about stool tests like a FIT test every year or Cologuard test every 3 years.  To learn more about your testing options, visit:   .  For help making a decision, visit:   https://bit.ly/ms89727.  Prostate cancer screening test: If you have a prostate, ask your care team if a prostate cancer screening test (PSA) at age 55 is right for you.  Lung cancer screening: If you are a current or former smoker ages 50 to 80, ask your care team if ongoing lung cancer screenings are right for you.  For informational purposes only. Not to replace the advice of your health care provider. Copyright   2023 [facility]. All rights reserved. Clinically reviewed by the [facility] Transitions Program. [employer] 471282 - REV 01/24.  Learning About Stress  What is stress?     Stress is your body's response to a hard situation. Your body can have a physical, emotional, or mental response. Stress is a fact of life for most people, and it  affects everyone differently. What causes stress for you may not be stressful for someone else.  A lot of things can cause stress. You may feel stress when you go on a job interview, take a test, or run a race. This kind of short-term stress is normal and even useful. It can help you if you need to work hard or react quickly. For example, stress can help you finish an important job on time.  Long-term stress is caused by ongoing stressful situations or events. Examples of long-term stress include long-term health problems, ongoing problems at work, or conflicts in your family. Long-term stress can harm your health.  How does stress affect your health?  When you are stressed, your body responds as though you are in danger. It makes hormones that speed up your heart, make you breathe faster, and give you a burst of energy. This is called the fight-or-flight stress response. If the stress is over quickly, your body goes back to normal and no harm is done.  But if stress happens too often or lasts too long, it can have bad effects. Long-term stress can make you more likely to get sick, and it can make symptoms of some diseases worse. If you tense up when you are stressed, you may develop neck, shoulder, or low back pain. Stress is linked to high blood pressure and heart disease.  Stress also harms your emotional health. It can make you grajeda, tense, or depressed. Your relationships may suffer, and you may not do well at work or school.  What can you do to manage stress?  You can try these things to help manage stress:   Do something active. Exercise or activity can help reduce stress. Walking is a great way to get started. Even everyday activities such as housecleaning or yard work can help.  Try yoga or ramya chi. These techniques combine exercise and meditation. You may need some training at first to learn them.  Do something you enjoy. For example, listen to music or go to a movie. Practice your hobby or do volunteer  "work.  Meditate. This can help you relax, because you are not worrying about what happened before or what may happen in the future.  Do guided imagery. Imagine yourself in any setting that helps you feel calm. You can use online videos, books, or a teacher to guide you.  Do breathing exercises. For example:  From a standing position, bend forward from the waist with your knees slightly bent. Let your arms dangle close to the floor.  Breathe in slowly and deeply as you return to a standing position. Roll up slowly and lift your head last.  Hold your breath for just a few seconds in the standing position.  Breathe out slowly and bend forward from the waist.  Let your feelings out. Talk, laugh, cry, and express anger when you need to. Talking with supportive friends or family, a counselor, or a kena leader about your feelings is a healthy way to relieve stress. Avoid discussing your feelings with people who make you feel worse.  Write. It may help to write about things that are bothering you. This helps you find out how much stress you feel and what is causing it. When you know this, you can find better ways to cope.  What can you do to prevent stress?  You might try some of these things to help prevent stress:  Manage your time. This helps you find time to do the things you want and need to do.  Get enough sleep. Your body recovers from the stresses of the day while you are sleeping.  Get support. Your family, friends, and community can make a difference in how you experience stress.  Limit your news feed. Avoid or limit time on social media or news that may make you feel stressed.  Do something active. Exercise or activity can help reduce stress. Walking is a great way to get started.  Where can you learn more?  Go to https://www.[employer].net/patiented  Enter N032 in the search box to learn more about \"Learning About Stress.\"  Current as of: October 24, 2023  Content Version: 14.2 2024 [employer]. "   Care instructions adapted under license by your healthcare professional. If you have questions about a medical condition or this instruction, always ask your healthcare professional. [employer] disclaims any warranty or liability for your use of this information.

## 2024-11-22 NOTE — PROGRESS NOTES
Preventive Care Visit  [facility]  Jose Elias Montgomery MD, Family Medicine  Nov 22, 2024  {Provider  Link to SmartSet :886879}    {PROVIDER CHARTING PREFERENCE:317723}    Subjective   Chadd is a 53 year old, presenting for the following:  Physical (Pt is fasting )        11/22/2024     7:16 AM   Additional Questions   Roomed by Katya DAVIS CMA          HPI  ***  {MA/LPN/RN Pre-Provider Visit Orders- hCG/UA/Strep (Optional):599780}  {SUPERLIST (Optional):634710}  {additonal problems for provider to add (Optional):509809}  Health Care Directive  Patient does not have a Health Care Directive: {ADVANCE_DIRECTIVE_STATUS:976195}      11/18/2024   General Health   How would you rate your overall physical health? Good   Feel stress (tense, anxious, or unable to sleep) Rather much      (!) STRESS CONCERN      11/18/2024   Nutrition   Three or more servings of calcium each day? Yes   Diet: Carbohydrate counting   How many servings of fruit and vegetables per day? (!) 0-1   How many sweetened beverages each day? 0-1            11/18/2024   Exercise   Days per week of moderate/strenous exercise 4 days   Average minutes spent exercising at this level 60 min            11/18/2024   Social Factors   Frequency of gathering with friends or relatives Twice a week   Worry food won't last until get money to buy more No   Food not last or not have enough money for food? No   Do you have housing? (Housing is defined as stable permanent housing and does not include staying ouside in a car, in a tent, in an abandoned building, in an overnight shelter, or couch-surfing.) No   Are you worried about losing your housing? No   Lack of transportation? No   Unable to get utilities (heat,electricity)? No   Want help with housing or utility concern? No      (!) HOUSING CONCERN PRESENT      11/18/2024   Fall Risk   Fallen 2 or more times in the past year? No    Trouble with walking or balance? No        Patient-reported           11/18/2024   Dental   Dentist two times every year? (!) NO            11/18/2024   TB Screening   Were you born outside of the [place]? No            Today's PHQ-2 Score:       11/22/2024     6:59 AM   PHQ-2 ( 1999 [employer])   Q1: Little interest or pleasure in doing things 1    Q2: Feeling down, depressed or hopeless 1    PHQ-2 Score 2    Q1: Little interest or pleasure in doing things Several days   Q2: Feeling down, depressed or hopeless Several days   PHQ-2 Score 2       Patient-reported           11/18/2024   Substance Use   Alcohol more than 3/day or more than 7/wk No   Do you use any other substances recreationally? No        Social History     Tobacco Use     Smoking status: Never     Smokeless tobacco: Never   Substance Use Topics     Alcohol use: Yes     Drug use: No     {Provider  If there are gaps in the social history shown above, please follow the link to update and then refresh the note Link to Social and Substance History :469982}      11/8/2024   LAST FHS-7 RESULTS   1st degree relative breast or ovarian cancer No   Any relative bilateral breast cancer No   Any male have breast cancer No   Any ONE woman have BOTH breast AND ovarian cancer No   Any woman with breast cancer before 50yrs Yes   2 or more relatives with breast AND/OR ovarian cancer No   2 or more relatives with breast AND/OR bowel cancer No        {If any of the questions to the FHS7 are answered yes, consider referral for genetic counseling.    Additional indications for genetic referral include personal history of breast or ovarian cancer, genetic mutation in 1st degree relative which increases risk of breast cancer including BRCA1, BRCA2, VICKY, PALB 2, TP53, CHEK2, PTEN, CDH1, STK11 (per ACS) and/or 1st degree relative with history of pancreatic or high-risk prostate cancer (per NCCN):215453}   {Mammogram Decision Support (Optional):047535}        11/18/2024   STI Screening   New sexual partner(s) since last STI/HIV test? No     "    History of abnormal Pap smear: { :931014}        3/2/2020     3:56 PM 4/6/2016    12:00 AM   PAP / HPV   HPV_EXT - HISTORICAL  See Scanned Report    PAP-ABSTRACT See Scanned Document            This result is from an external source.     ASCVD Risk   The ASCVD Risk score (Benny MARIE, et al., 2019) failed to calculate for the following reasons:    The valid total cholesterol range is 130 to 320 mg/dL    {Link to Fracture Risk Assessment Tool (Optional):828396}    {Provider  REQUIRED FOR AWV Use the storyboard to review patient history, after sections have been marked as reviewed, refresh note to capture documentation:965933}   Reviewed and updated as needed this visit by Provider                    {HISTORY OPTIONS (Optional):163920}    {ROS Picklists (Optional):758010}     Objective    Exam  Ht 1.733 m (5' 8.23\")   Wt 73.1 kg (161 lb 3.2 oz)   BMI 24.35 kg/m     Estimated body mass index is 24.35 kg/m  as calculated from the following:    Height as of this encounter: 1.733 m (5' 8.23\").    Weight as of this encounter: 73.1 kg (161 lb 3.2 oz).    Physical Exam  {Exam Choices (Optional):660091}        Signed Electronically by: Jose Elias Montgomery MD  {Email feedback regarding this note to [email or web address]   :179776}  " "Report    PAP-ABSTRACT See Scanned Document            This result is from an external source.     ASCVD Risk   The ASCVD Risk score (Benny MARIE, et al., 2019) failed to calculate for the following reasons:    The valid total cholesterol range is 130 to 320 mg/dL           Reviewed and updated as needed this visit by Provider                    Past Medical History:   Diagnosis Date    Anxiety     DMI     Type 1     Past Surgical History:   Procedure Laterality Date    BREAST CYST ASPIRATION Left ?2005     SECTION      x2    CONIZATION CERVIX,LOOP ELECTRD      Description: Cervical Conization Loop Electrode Excision;  Recorded: 2008;    CV CORONARY ANGIOGRAM N/A 10/24/2022    Procedure: Coronary Angiogram;  Surgeon: Subha Porter MD;  Location: [facility] CATH LAB CV    CV FRACTIONAL FLOW RATIO WIRE N/A 10/24/2022    Procedure: Fractional Flow Ratio Wire;  Surgeon: Subha Porter MD;  Location: [facility] CATH LAB CV    CV LEFT HEART CATH N/A 10/24/2022    Procedure: Left Heart Catheterization;  Surgeon: Subha Porter MD;  Location: [facility] CATH LAB CV         Review of Systems  Constitutional, HEENT, cardiovascular, pulmonary, gi and gu systems are negative, except as otherwise noted.     Objective    Exam  Ht 1.733 m (5' 8.23\")   Wt 73.1 kg (161 lb 3.2 oz)   BMI 24.35 kg/m     Estimated body mass index is 24.35 kg/m  as calculated from the following:    Height as of this encounter: 1.733 m (5' 8.23\").    Weight as of this encounter: 73.1 kg (161 lb 3.2 oz).    Physical Exam  GENERAL: alert and no distress  EYES: Eyes grossly normal to inspection, PERRL and conjunctivae and sclerae normal  HENT: ear canals and TM's normal, nose and mouth without ulcers or lesions  NECK: no adenopathy, no asymmetry, masses, or scars  RESP: lungs clear to auscultation - no rales, rhonchi or wheezes  CV: regular rate and rhythm, normal S1 S2, no S3 or S4, no murmur, click or rub, no peripheral " edema  PSYCH: mentation appears normal, affect normal/bright        Signed Electronically by: Jose Elias Montgomery MD

## 2024-11-25 ENCOUNTER — LAB REQUISITION (OUTPATIENT)
Dept: LAB | Facility: CLINIC | Age: 54
End: 2024-11-25

## 2024-11-25 DIAGNOSIS — Z12.4 ENCOUNTER FOR SCREENING FOR MALIGNANT NEOPLASM OF CERVIX: ICD-10-CM

## 2024-11-25 PROCEDURE — 87624 HPV HI-RISK TYP POOLED RSLT: CPT | Performed by: OBSTETRICS & GYNECOLOGY

## 2024-11-27 LAB
HPV HR 12 DNA CVX QL NAA+PROBE: NEGATIVE
HPV16 DNA CVX QL NAA+PROBE: NEGATIVE
HPV18 DNA CVX QL NAA+PROBE: NEGATIVE
HUMAN PAPILLOMA VIRUS FINAL DIAGNOSIS: NORMAL

## 2024-12-03 LAB
BKR AP ASSOCIATED HPV REPORT: NORMAL
BKR LAB AP GYN ADEQUACY: NORMAL
BKR LAB AP GYN INTERPRETATION: NORMAL
BKR LAB AP LMP: NORMAL
BKR LAB AP PREVIOUS ABNL DX: NORMAL
BKR LAB AP PREVIOUS ABNORMAL: NORMAL
PATH REPORT.COMMENTS IMP SPEC: NORMAL
PATH REPORT.COMMENTS IMP SPEC: NORMAL
PATH REPORT.RELEVANT HX SPEC: NORMAL

## 2024-12-18 NOTE — PROGRESS NOTES
12/18/24 4:15 PM  PATIENT LAB/IMAGING STATUS : Orders completed / No further action needed

## 2024-12-28 ENCOUNTER — HEALTH MAINTENANCE LETTER (OUTPATIENT)
Age: 54
End: 2024-12-28

## 2025-01-16 RX ORDER — DILTIAZEM HYDROCHLORIDE 5 MG/ML
10-15 INJECTION INTRAVENOUS
Status: DISCONTINUED | OUTPATIENT
Start: 2025-01-16 | End: 2025-02-01 | Stop reason: HOSPADM

## 2025-01-16 RX ORDER — METOPROLOL TARTRATE 1 MG/ML
5-20 INJECTION, SOLUTION INTRAVENOUS
Status: DISCONTINUED | OUTPATIENT
Start: 2025-01-16 | End: 2025-02-01 | Stop reason: HOSPADM

## 2025-01-16 RX ORDER — NITROGLYCERIN 0.4 MG/1
0.4 TABLET SUBLINGUAL
Status: DISCONTINUED | OUTPATIENT
Start: 2025-01-16 | End: 2025-02-01 | Stop reason: HOSPADM

## 2025-01-16 NOTE — PROGRESS NOTES
Endocrinology and Diabetes Clinic    Subjective:   Sera Adkins was seen today for follow up of type 1 diabetes mellitus. She usually sees Hannah Whitley and also saw Dr. Juan Carlos Krishnamurthy in the past.     Type 1 diabetes was diagnosed at age 15.     Diabetes care and complications:  Eyes: Retinopathy, exam up to date   Kidneys: She had SEKOU in 2019.  GFR improved.  Follows with nephrology. Encouraged hydration. No evidence of diabetic nephropathy.    Nerves: Mild peripheral neuropathy  Blood Pressure: Controlled   Lipids: On rosuvastatin   Macrovascular: HF with preserved EF, microvascular heart disease. Taking calcium channel blocker, chlorthalidone, ASA  Celiac screening: negative antibodies 2017     Current treatment strategy:   Now using Omnipod 5 insulin pump with Lyumjev insulin.     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          1/14/2025     9:38 PM   including   1. Since your last visit to our clinic (or if this your first visit, since you last saw your primary care provider), have you experienced any of the following symptoms that may be related to low blood sugars? No, I have not experienced any of these symptoms   2. Since your last visit to our clinic (or if this your first visit, since you last saw your primary care provider), have you experienced any of the following symptoms that may be related to prolonged high blood sugars? No, I have not experienced any of these symptoms   3. Have you had any concerns that you would like to discuss today? Other   4. Do you have any female family members who have had heart attacks or strokes before age 60 or male  relatives who have had heart attacks or strokes before age 50? No   5. Do you have any family members who have had complications from diabetes such as kidney disease, heart disease or strokes, retinopathy (a vision problem), or amputations? No   6. Who do you live with?  Partner   Little interest or pleasure in doing things? Not at all   Feeling down, depressed, or hopeless? Not at all   10.  Are you considering a pregnancy or interested in discussing pregnancy prevention today? No        Medications:   Current Outpatient Medications   Medication Sig Dispense Refill    amLODIPine (NORVASC) 5 MG tablet Take 5 mg by mouth daily 90 tablet 3    aspirin 81 MG EC tablet Take  mg by mouth daily.      blood glucose (NO BRAND SPECIFIED) test strip as needed. Sensor Failure. 1 Box 12    Calcium Carbonate-Vitamin D (CALCIUM 500+D PO) Take 1 tablet by mouth as needed.      chlorthalidone (HYGROTON) 25 MG tablet Take 25 mg by mouth daily.      cholecalciferol 50 MCG (2000 UT) tablet Take 1 tablet by mouth daily      Continuous Blood Gluc Sensor (DEXCOM G6 SENSOR) MISC Change 1 each every 10 days. 9 each 3    Continuous Glucose Transmitter (DEXCOM G6 TRANSMITTER) MISC Change every 3 months. 1 each 3    estradiol (ESTRACE) 0.1 MG/GM vaginal cream Place vaginally three times a week.      FLUoxetine (PROZAC) 20 MG capsule Take 1 capsule (20 mg) by mouth daily. 90 capsule 4    furosemide (LASIX) 20 MG tablet Take 10 mg by mouth as needed.      Glucagon (BAQSIMI) 3 MG/DOSE nasal powder Spray 1 spray (3 mg) in nostril as needed (severe hypoglycemia) in the event of unconscious hypoglycemia or hypoglycemic seizure. May repeat dose if no response after 15 minutes. 1 each 1    Glucagon, rDNA, (GLUCAGON EMERGENCY) 1 MG KIT Inject IM for hypoglycemic event needs 2 pens one for home and one for work 1 kit 3    Insulin Disposable Pump (OMNIPOD 5 PODS, GEN 5,) MISC Inject 1 each subcutaneously every 48 hours. 30 each 0    insulin  "glargine (LANTUS VIAL) 100 UNIT/ML vial Inject 18 Units Subcutaneous daily Use ONLY in case of pump failure. 10 mL 3    Insulin Lispro-aabc (LYUMJEV) 100 UNIT/ML SOLN Use as directed up to 90 units daily. 30 mL 11    losartan (COZAAR) 50 MG tablet TAKE 1 TABLET DAILY 90 tablet 3    nitroGLYcerin (NITROSTAT) 0.4 MG sublingual tablet One tablet under the tongue every 5 minutes if needed for chest pain. May repeat every 5 minutes for a maximum of 3 doses in 15 minutes\" 25 tablet 3    rosuvastatin (CRESTOR) 20 MG tablet Take 1 tablet (20 mg) by mouth daily.      spironolactone (ALDACTONE) 25 MG tablet Take 0.5 tablets (12.5 mg) by mouth daily. 45 tablet 11    triamcinolone (KENALOG) 0.1 % external ointment Apply topically as needed for irritation.       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 She has 2 children, young adults.     Social History     Tobacco Use    Smoking status: Never    Smokeless tobacco: Never   Substance Use Topics    Alcohol use: Yes     Physical Examination:  Blood pressure 123/71, pulse 82, weight 73.9 kg (163 lb), SpO2 98%.      Body mass index is 24.62 kg/m .    Wt Readings from Last 4 Encounters:   01/17/25 73.9 kg (163 lb)   01/10/25 73.5 kg (162 lb)   11/22/24 73.1 kg (161 lb 3.2 oz)   04/26/24 73.9 kg (163 lb)       BP Readings from Last 3 Encounters:   01/17/25 123/71   01/10/25 112/56   11/22/24 109/62     General: Well appearing and in no distress.   Eyes: EOMI. Sclerae and conjunctivae are clear.    HENT: No thyromegaly or mass.    Cardiovascular: RRR, with normal S1+S2 and no murmurs.   Respiratory: Lungs are clear to auscultation.   Gastrointestinal: Abdomen is soft, non tender, and non-distended.   Extremities: No peripheral edema. Feet are warm and well perfused. No lesions or ulcers.   Neurologic: Intact sensation to monofilament in the feet bilaterally.     Labs and Studies:   Lab Results "   Component Value Date    A1C 7.2 (H) 01/17/2025    A1C 7.1 (H) 04/26/2024    A1C 7.0 (H) 04/11/2022    A1C 7.2 (H) 08/02/2021    A1C 6.8 (A) 04/09/2021    A1C 7.7 (H) 01/20/2020    A1C 7.5 (H) 09/16/2019    A1C 7.8 (H) 03/18/2019    A1C 7.7 (H) 12/13/2016    HEMOGLOBINA1 7.4 (A) 01/20/2020    HEMOGLOBINA1 7.2 (A) 09/16/2019    HEMOGLOBINA1 7.0 (A) 04/16/2018    HEMOGLOBINA1 7.0 (A) 12/11/2017    HEMOGLOBINA1 7.4 (A) 06/05/2017       Creatinine   Date Value Ref Range Status   08/30/2024 0.97 (H) 0.51 - 0.95 mg/dL Final   01/20/2020 0.94 0.52 - 1.04 mg/dL Final         Assessment:  Type 1 diabetes mellitus,  which is well controlled.     Microvascular heart disease and HFpEF. Her cardiologist suggested adding and SGLT inhibitor, if okay in the setting of type 1 diabetes. See Mychart message. I agree an SGLT-2 inhibitor could be cautiously added for cardiac benefit, with monitoring of ketones. Chadd is aware of the risk of euglycemic DKA and that this risk is increased for people with type 1 diabetes.     Plan:   Continue use of the Omnipod 5 insulin pump and Dexcom G6 CGM.   Increase carbohydrate ratio with dinner to 1/10 g   No other pump setting changes were suggested today   Continue to pre-bolus prior to meals - even with Lyumjev in the pump, she notices pre-bolusing by a few minutes makes a big difference.   She has glucagon and back up supplies    Follow up in 3 months with Hannah Whitley or with me, as scheduled.      30 minutes spent on the date of the encounter doing chart review, history and exam, documentation and further activities per the note. This includes time spent reviewing CGM data.     The longitudinal plan of care for the diagnosis(es)/condition(s) as documented were addressed during this visit. Due to the added complexity in care, I will continue to support Chadd in the subsequent management and with ongoing continuity of care.    Kaye Krishnamurthy MD  Endocrinology and Diabetes   Office: [phone]    [facility]: [phone]

## 2025-01-17 ENCOUNTER — OFFICE VISIT (OUTPATIENT)
Dept: ENDOCRINOLOGY | Facility: CLINIC | Age: 55
End: 2025-01-17
Payer: COMMERCIAL

## 2025-01-17 ENCOUNTER — LAB (OUTPATIENT)
Dept: LAB | Facility: CLINIC | Age: 55
End: 2025-01-17
Payer: COMMERCIAL

## 2025-01-17 VITALS
WEIGHT: 163 LBS | OXYGEN SATURATION: 98 % | DIASTOLIC BLOOD PRESSURE: 71 MMHG | HEART RATE: 82 BPM | BODY MASS INDEX: 24.62 KG/M2 | SYSTOLIC BLOOD PRESSURE: 123 MMHG

## 2025-01-17 DIAGNOSIS — E10.8 TYPE 1 DIABETES MELLITUS WITH COMPLICATIONS (H): ICD-10-CM

## 2025-01-17 DIAGNOSIS — E10.8 TYPE 1 DIABETES MELLITUS WITH COMPLICATIONS (H): Primary | ICD-10-CM

## 2025-01-17 DIAGNOSIS — I50.30 HEART FAILURE WITH PRESERVED EJECTION FRACTION, NYHA CLASS I (H): ICD-10-CM

## 2025-01-17 LAB
ANION GAP SERPL CALCULATED.3IONS-SCNC: 6 MMOL/L (ref 7–15)
BUN SERPL-MCNC: 16.9 MG/DL (ref 6–20)
CALCIUM SERPL-MCNC: 9.8 MG/DL (ref 8.8–10.4)
CHLORIDE SERPL-SCNC: 100 MMOL/L (ref 98–107)
CREAT SERPL-MCNC: 0.95 MG/DL (ref 0.51–0.95)
EGFRCR SERPLBLD CKD-EPI 2021: 71 ML/MIN/1.73M2
EST. AVERAGE GLUCOSE BLD GHB EST-MCNC: 160 MG/DL
GLUCOSE SERPL-MCNC: 143 MG/DL (ref 70–99)
HBA1C MFR BLD: 7.2 %
HCO3 SERPL-SCNC: 31 MMOL/L (ref 22–29)
POTASSIUM SERPL-SCNC: 4.3 MMOL/L (ref 3.4–5.3)
SODIUM SERPL-SCNC: 137 MMOL/L (ref 135–145)

## 2025-01-17 PROCEDURE — 99214 OFFICE O/P EST MOD 30 MIN: CPT | Performed by: INTERNAL MEDICINE

## 2025-01-17 PROCEDURE — 99207 PR FOOT EXAM NO CHARGE: CPT | Performed by: INTERNAL MEDICINE

## 2025-01-17 PROCEDURE — 83036 HEMOGLOBIN GLYCOSYLATED A1C: CPT | Performed by: PATHOLOGY

## 2025-01-17 PROCEDURE — G2211 COMPLEX E/M VISIT ADD ON: HCPCS | Performed by: INTERNAL MEDICINE

## 2025-01-17 PROCEDURE — 80048 BASIC METABOLIC PNL TOTAL CA: CPT | Performed by: PATHOLOGY

## 2025-01-17 PROCEDURE — 36415 COLL VENOUS BLD VENIPUNCTURE: CPT | Performed by: PATHOLOGY

## 2025-01-17 ASSESSMENT — PAIN SCALES - GENERAL: PAINLEVEL_OUTOF10: NO PAIN (0)

## 2025-01-17 NOTE — NURSING NOTE
"Chief Complaint   Patient presents with    Diabetes     Vital signs:      BP: 123/71 Pulse: 82     SpO2: 98 %       Weight: 73.9 kg (163 lb)  Estimated body mass index is 24.62 kg/m  as calculated from the following:    Height as of 1/10/25: 1.733 m (5' 8.23\").    Weight as of this encounter: 73.9 kg (163 lb).        "

## 2025-01-17 NOTE — LETTER
1/17/2025       RE: Sera Adkins  [address]   [place] [address]     Dear Colleague,    Thank you for referring your patient, Sera Adkins, to the [facility] ENDOCRINOLOGY CLINIC [place] at [facility]. Please see a copy of my visit note below.    12/18/24 4:15 PM  PATIENT LAB/IMAGING STATUS : Orders completed / No further action needed                                     Endocrinology and Diabetes Clinic    Subjective:   Sera Adkins was seen today for follow up of type 1 diabetes mellitus. She usually sees Hannah Whitley and also saw Dr. Juan Carlos Krishnamurthy in the past.     Type 1 diabetes was diagnosed at age 15.     Diabetes care and complications:  Eyes: Retinopathy, exam up to date   Kidneys: She had SEKOU in 2019.  GFR improved.  Follows with nephrology. Encouraged hydration. No evidence of diabetic nephropathy.    Nerves: Mild peripheral neuropathy  Blood Pressure: Controlled   Lipids: On rosuvastatin   Macrovascular: HF with preserved EF, microvascular heart disease. Taking calcium channel blocker, chlorthalidone, ASA  Celiac screening: negative antibodies 2017     Current treatment strategy:   Now using Omnipod 5 insulin pump with Lyumjev insulin.     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          1/14/2025     9:38 PM   including   1. Since your last visit to our clinic (or if this your first visit, since you last saw your primary care provider), have you experienced any of the following symptoms that may be related to low blood sugars? No, I have not experienced any of these symptoms    2. Since your last visit to our clinic (or if this your first visit, since you last saw your primary care provider), have you experienced any of the following symptoms that may be related to prolonged high blood sugars? No, I have not experienced any of these symptoms   3. Have you had any concerns that you would like to discuss today? Other   4. Do you have any female family members who have had heart attacks or strokes before age 60 or male relatives who have had heart attacks or strokes before age 50? No   5. Do you have any family members who have had complications from diabetes such as kidney disease, heart disease or strokes, retinopathy (a vision problem), or amputations? No   6. Who do you live with?  Partner   Little interest or pleasure in doing things? Not at all   Feeling down, depressed, or hopeless? Not at all   10.  Are you considering a pregnancy or interested in discussing pregnancy prevention today? No        Medications:   Current Outpatient Medications   Medication Sig Dispense Refill     amLODIPine (NORVASC) 5 MG tablet Take 5 mg by mouth daily 90 tablet 3     aspirin 81 MG EC tablet Take  mg by mouth daily.       blood glucose (NO BRAND SPECIFIED) test strip as needed. Sensor Failure. 1 Box 12     Calcium Carbonate-Vitamin D (CALCIUM 500+D PO) Take 1 tablet by mouth as needed.       chlorthalidone (HYGROTON) 25 MG tablet Take 25 mg by mouth daily.       cholecalciferol 50 MCG (2000 UT) tablet Take 1 tablet by mouth daily       Continuous Blood Gluc Sensor (DEXCOM G6 SENSOR) MISC Change 1 each every 10 days. 9 each 3     Continuous Glucose Transmitter (DEXCOM G6 TRANSMITTER) MISC Change every 3 months. 1 each 3     estradiol (ESTRACE) 0.1 MG/GM vaginal cream Place vaginally three times a week.       FLUoxetine (PROZAC) 20 MG capsule Take 1 capsule (20 mg) by mouth daily. 90 capsule 4     furosemide (LASIX) 20 MG tablet Take 10 mg by mouth as needed.       Glucagon (BAQSIMI) 3 MG/DOSE  "nasal powder Spray 1 spray (3 mg) in nostril as needed (severe hypoglycemia) in the event of unconscious hypoglycemia or hypoglycemic seizure. May repeat dose if no response after 15 minutes. 1 each 1     Glucagon, rDNA, (GLUCAGON EMERGENCY) 1 MG KIT Inject IM for hypoglycemic event needs 2 pens one for home and one for work 1 kit 3     Insulin Disposable Pump (OMNIPOD 5 PODS, GEN 5,) MISC Inject 1 each subcutaneously every 48 hours. 30 each 0     insulin glargine (LANTUS VIAL) 100 UNIT/ML vial Inject 18 Units Subcutaneous daily Use ONLY in case of pump failure. 10 mL 3     Insulin Lispro-aabc (LYUMJEV) 100 UNIT/ML SOLN Use as directed up to 90 units daily. 30 mL 11     losartan (COZAAR) 50 MG tablet TAKE 1 TABLET DAILY 90 tablet 3     nitroGLYcerin (NITROSTAT) 0.4 MG sublingual tablet One tablet under the tongue every 5 minutes if needed for chest pain. May repeat every 5 minutes for a maximum of 3 doses in 15 minutes\" 25 tablet 3     rosuvastatin (CRESTOR) 20 MG tablet Take 1 tablet (20 mg) by mouth daily.       spironolactone (ALDACTONE) 25 MG tablet Take 0.5 tablets (12.5 mg) by mouth daily. 45 tablet 11     triamcinolone (KENALOG) 0.1 % external ointment Apply topically as needed for irritation.       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 She has 2 children, young adults.     Social History     Tobacco Use     Smoking status: Never     Smokeless tobacco: Never   Substance Use Topics     Alcohol use: Yes     Physical Examination:  Blood pressure 123/71, pulse 82, weight 73.9 kg (163 lb), SpO2 98%.      Body mass index is 24.62 kg/m .    Wt Readings from Last 4 Encounters:   01/17/25 73.9 kg (163 lb)   01/10/25 73.5 kg (162 lb)   11/22/24 73.1 kg (161 lb 3.2 oz)   04/26/24 73.9 kg (163 lb)       BP Readings from Last 3 Encounters:   01/17/25 123/71   01/10/25 112/56   11/22/24 109/62     General: Well appearing and in " no distress.   Eyes: EOMI. Sclerae and conjunctivae are clear.    HENT: No thyromegaly or mass.    Cardiovascular: RRR, with normal S1+S2 and no murmurs.   Respiratory: Lungs are clear to auscultation.   Gastrointestinal: Abdomen is soft, non tender, and non-distended.   Extremities: No peripheral edema. Feet are warm and well perfused. No lesions or ulcers.   Neurologic: Intact sensation to monofilament in the feet bilaterally.     Labs and Studies:   Lab Results   Component Value Date    A1C 7.2 (H) 01/17/2025    A1C 7.1 (H) 04/26/2024    A1C 7.0 (H) 04/11/2022    A1C 7.2 (H) 08/02/2021    A1C 6.8 (A) 04/09/2021    A1C 7.7 (H) 01/20/2020    A1C 7.5 (H) 09/16/2019    A1C 7.8 (H) 03/18/2019    A1C 7.7 (H) 12/13/2016    HEMOGLOBINA1 7.4 (A) 01/20/2020    HEMOGLOBINA1 7.2 (A) 09/16/2019    HEMOGLOBINA1 7.0 (A) 04/16/2018    HEMOGLOBINA1 7.0 (A) 12/11/2017    HEMOGLOBINA1 7.4 (A) 06/05/2017       Creatinine   Date Value Ref Range Status   08/30/2024 0.97 (H) 0.51 - 0.95 mg/dL Final   01/20/2020 0.94 0.52 - 1.04 mg/dL Final         Assessment:  Type 1 diabetes mellitus,  which is well controlled.     Microvascular heart disease and HFpEF. Her cardiologist suggested adding and SGLT inhibitor, if okay in the setting of type 1 diabetes. See Mychart message. I agree an SGLT-2 inhibitor could be cautiously added for cardiac benefit, with monitoring of ketones. Chadd is aware of the risk of euglycemic DKA and that this risk is increased for people with type 1 diabetes.     Plan:   Continue use of the Omnipod 5 insulin pump and Dexcom G6 CGM.   Increase carbohydrate ratio with dinner to 1/10 g   No other pump setting changes were suggested today   Continue to pre-bolus prior to meals - even with Lyumjev in the pump, she notices pre-bolusing by a few minutes makes a big difference.   She has glucagon and back up supplies    Follow up in 3 months with Hannah Whitley or with me, as scheduled.      30 minutes spent on the date of  the encounter doing chart review, history and exam, documentation and further activities per the note. This includes time spent reviewing CGM data.     The longitudinal plan of care for the diagnosis(es)/condition(s) as documented were addressed during this visit. Due to the added complexity in care, I will continue to support Chadd in the subsequent management and with ongoing continuity of care.    Kaye Krishnamurthy MD  Endocrinology and Diabetes   Office: [phone]   Clinic: [phone]            Again, thank you for allowing me to participate in the care of your patient.      Sincerely,    Kaye Krishnamurthy MD

## 2025-01-25 DIAGNOSIS — E10.8 TYPE 1 DIABETES MELLITUS WITH COMPLICATIONS (H): ICD-10-CM

## 2025-01-29 ENCOUNTER — MYC REFILL (OUTPATIENT)
Dept: ENDOCRINOLOGY | Facility: CLINIC | Age: 55
End: 2025-01-29
Payer: COMMERCIAL

## 2025-01-29 DIAGNOSIS — E10.8 TYPE 1 DIABETES MELLITUS WITH COMPLICATIONS (H): ICD-10-CM

## 2025-01-29 RX ORDER — PROCHLORPERAZINE 25 MG/1
SUPPOSITORY RECTAL
Qty: 9 EACH | Refills: 3 | Status: CANCELLED | OUTPATIENT
Start: 2025-01-29

## 2025-01-30 DIAGNOSIS — E10.8 TYPE 1 DIABETES MELLITUS WITH COMPLICATIONS (H): ICD-10-CM

## 2025-01-30 RX ORDER — PROCHLORPERAZINE 25 MG/1
SUPPOSITORY RECTAL
Qty: 9 EACH | Refills: 4 | OUTPATIENT
Start: 2025-01-30 | End: 2025-01-30

## 2025-01-30 RX ORDER — PROCHLORPERAZINE 25 MG/1
SUPPOSITORY RECTAL
Qty: 9 EACH | Refills: 4 | Status: SHIPPED | OUTPATIENT
Start: 2025-01-30

## 2025-01-31 ENCOUNTER — HOSPITAL ENCOUNTER (OUTPATIENT)
Dept: CT IMAGING | Facility: CLINIC | Age: 55
Discharge: HOME | End: 2025-01-31
Attending: INTERNAL MEDICINE | Admitting: INTERNAL MEDICINE
Payer: COMMERCIAL

## 2025-01-31 VITALS
SYSTOLIC BLOOD PRESSURE: 110 MMHG | WEIGHT: 163 LBS | DIASTOLIC BLOOD PRESSURE: 58 MMHG | BODY MASS INDEX: 24.71 KG/M2 | HEIGHT: 68 IN | HEART RATE: 88 BPM

## 2025-01-31 DIAGNOSIS — I20.89 EXERTIONAL ANGINA: ICD-10-CM

## 2025-01-31 DIAGNOSIS — I25.118 CORONARY ARTERY DISEASE OF NATIVE ARTERY OF NATIVE HEART WITH STABLE ANGINA PECTORIS: ICD-10-CM

## 2025-01-31 DIAGNOSIS — E10.8 TYPE 1 DIABETES MELLITUS WITH COMPLICATIONS (H): ICD-10-CM

## 2025-01-31 LAB
BSA FOR ECHO PROCEDURE: 0 M2
CREAT BLD-MCNC: 1 MG/DL (ref 0.6–1.1)
EGFRCR SERPLBLD CKD-EPI 2021: >60 ML/MIN/1.73M2

## 2025-01-31 PROCEDURE — 250N000013 HC RX MED GY IP 250 OP 250 PS 637: Performed by: INTERNAL MEDICINE

## 2025-01-31 PROCEDURE — 75574 CT ANGIO HRT W/3D IMAGE: CPT

## 2025-01-31 PROCEDURE — 75574 CT ANGIO HRT W/3D IMAGE: CPT | Mod: 26 | Performed by: INTERNAL MEDICINE

## 2025-01-31 PROCEDURE — 82565 ASSAY OF CREATININE: CPT

## 2025-01-31 PROCEDURE — 250N000011 HC RX IP 250 OP 636: Performed by: INTERNAL MEDICINE

## 2025-01-31 RX ORDER — IOPAMIDOL 755 MG/ML
100 INJECTION, SOLUTION INTRAVASCULAR ONCE
Status: COMPLETED | OUTPATIENT
Start: 2025-01-31 | End: 2025-01-31

## 2025-01-31 RX ADMIN — NITROGLYCERIN 0.4 MG: 0.4 TABLET SUBLINGUAL at 11:17

## 2025-01-31 RX ADMIN — IOPAMIDOL 100 ML: 755 INJECTION, SOLUTION INTRAVENOUS at 11:25

## 2025-02-05 RX ORDER — PROCHLORPERAZINE 25 MG/1
SUPPOSITORY RECTAL
Qty: 9 EACH | Refills: 0 | OUTPATIENT
Start: 2025-02-05

## 2025-02-14 ENCOUNTER — TRANSFERRED RECORDS (OUTPATIENT)
Dept: HEALTH INFORMATION MANAGEMENT | Facility: CLINIC | Age: 55
End: 2025-02-14
Payer: COMMERCIAL

## 2025-02-16 ENCOUNTER — HOSPITAL ENCOUNTER (EMERGENCY)
Facility: HOSPITAL | Age: 55
Discharge: HOME | End: 2025-02-16
Admitting: EMERGENCY MEDICINE
Payer: COMMERCIAL

## 2025-02-16 VITALS
DIASTOLIC BLOOD PRESSURE: 68 MMHG | OXYGEN SATURATION: 99 % | SYSTOLIC BLOOD PRESSURE: 131 MMHG | TEMPERATURE: 97.8 F | HEART RATE: 76 BPM | RESPIRATION RATE: 16 BRPM

## 2025-02-16 DIAGNOSIS — M79.672 LEFT FOOT PAIN: ICD-10-CM

## 2025-02-16 PROCEDURE — 99283 EMERGENCY DEPT VISIT LOW MDM: CPT

## 2025-02-16 RX ORDER — PREDNISONE 20 MG/1
TABLET ORAL
Qty: 10 TABLET | Refills: 0 | Status: SHIPPED | OUTPATIENT
Start: 2025-02-16

## 2025-02-16 ASSESSMENT — COLUMBIA-SUICIDE SEVERITY RATING SCALE - C-SSRS
1. IN THE PAST MONTH, HAVE YOU WISHED YOU WERE DEAD OR WISHED YOU COULD GO TO SLEEP AND NOT WAKE UP?: NO
6. HAVE YOU EVER DONE ANYTHING, STARTED TO DO ANYTHING, OR PREPARED TO DO ANYTHING TO END YOUR LIFE?: NO
2. HAVE YOU ACTUALLY HAD ANY THOUGHTS OF KILLING YOURSELF IN THE PAST MONTH?: NO

## 2025-02-16 ASSESSMENT — ACTIVITIES OF DAILY LIVING (ADL): ADLS_ACUITY_SCORE: 41

## 2025-02-16 NOTE — DISCHARGE INSTRUCTIONS
You were seen in the emergency department for evaluation of left foot pain.  You had negative x-rays a few days ago so we did not repeat these.  No evidence of septic arthritis.  Could be in the very start of a gout flare versus likely developing tendinitis/strain.     Will start you on a burst of prednisone to see how this helps your symptoms.  Please follow-up closely with your primary care provider.  Please titrate your insulin as needed.  Continue icing and elevating your foot and using Tylenol as well.    Return to the emergency department for uncontrollable pain, worsening swelling, inability to move the toe, or any other concerning symptoms.

## 2025-02-16 NOTE — ED TRIAGE NOTES
PT REPORTS LEFT GREAT MTP PAIN SINCE DOING SOME HYPEREXTENSION OF TOES. NOW C/O GENERAL PAIN TO TOP OF FOOT WITH A LITTLE EDEMA.. REPORTS XRAY TOE ON FRIDAY AT [facility].     Triage Assessment (Adult)       Row Name 02/16/25 1341          Triage Assessment    Airway WDL WDL        Respiratory WDL    Respiratory WDL WDL        Skin Circulation/Temperature WDL    Skin Circulation/Temperature WDL WDL        Cardiac WDL    Cardiac WDL WDL        Peripheral/Neurovascular WDL    Peripheral Neurovascular WDL WDL        Cognitive/Neuro/Behavioral WDL    Cognitive/Neuro/Behavioral WDL WDL

## 2025-02-16 NOTE — ED PROVIDER NOTES
Emergency Department Encounter   NAME: Sera Adkins  AGE: 54 year old female  YOB: 1970  MRN: [id number]    PCP: Jose Elias Montgomery  ED PROVIDER: Zahraa Berger PA-C    Evaluation Date & Time:   No admission date for patient encounter.    CHIEF COMPLAINT:  Toe Pain      Impression and Plan   MDM: 55 yo F with a  history of type 1 diabetes, CAD, HTN, and CKD presents for evaluation of left foot/toe pain. On arrival here patient is vitally stable. Afebrile. On my exam patient is in no acute distress. Foot is neurovascularly intact. No evidence of compartment syndrome or DVT. Full range of motion at the first MTP joint.  Mild tenderness to palpation at the MTP joint and diffusely across the forefoot. Minimal erythema and questionable warmth around the first MTP on the left foot. Minimal erythema and questionable warmth around the first MTP on the left foot.     With good range of motion I have low suspicion for septic joint.  Negative x-ray a couple of days ago I do not think that repeating this would change anything today.  I feel reassured that she is able to ambulate.  She has no systemic symptoms that I think would necessitate any lab work today.  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  I think that this is less likely given my exam I think that she is more likely having tendinitis/soft tissue injury.  But given the mild amount of erythema around the first MTP developing gout is certainly still a possibility.  She is a type I diabetic but has tolerated prednisone in the past and feels very comfortable monitoring her blood sugars and adjusting insulin as needed for steroid-induced hyperglycemia.  We discussed trialing small burst of prednisone to see if this improves.  Also recommended she continue Tylenol, ice, elevation.  I did recommend close follow-up with her primary care provider which she is agreeable to.  We reviewed strict return precautions and patient was  discharged home in stable condition.         Medical Decision Making  Obtained supplemental history:Supplemental history obtained?: No  Reviewed external records: External records reviewed?: No  Care impacted by chronic illness:Documented in Chart  Did you consider but not order tests?: Work up considered but not performed and documented in chart, if applicable  Did you interpret images independently?: Independent interpretation of ECG and images noted in documentation, when applicable.  Consultation discussion with other provider:Did you involve another provider (consultant, , pharmacy, etc.)?: No  Discharge. I prescribed additional prescription strength medication(s) as charted. N/A.    MIPS (CTPE, Dental pain, Rivera, Sinusitis, Asthma/COPD, Head Trauma): Not Applicable        FINAL IMPRESSION:    ICD-10-CM    1. Left foot pain  M79.672             MEDICATIONS GIVEN IN THE EMERGENCY DEPARTMENT:  Medications - No data to display      NEW PRESCRIPTIONS STARTED AT TODAY'S ED VISIT:  Discharge Medication List as of 2/16/2025  2:12 PM        START taking these medications    Details   predniSONE (DELTASONE) 20 MG tablet Take two tablets (= 40mg) each day for 5 (five) days, Disp-10 tablet, R-0, E-Prescribe               HPI   Patient information was obtained from: patient   Use of Intrepreter: N/A     Sera Adkins is a 54 year old female with a pertinent history of type 1 diabetes, CAD, hypertension, and CKD who presents to the ED by car for evaluation of left foot pain.  Noticed left first MTP pain last week after doing planks.  Has not noted radiation of the pain with extension of the great toe up her forefoot.  She saw [facility] on Friday and had an x-ray done which was negative for arthritis or fracture.  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  She has never had gout before.  Though did have a few beers a couple days ago and is on chlorthalidone.  She has had no lacerations, trauma,  abrasions, or wounds to the area.  No fevers or chills. No numbness. Has a history of type 1 diabetes.  She has been on prednisone before and feels comfortable with adjusting insulin as needed for hyperglycemia secondary to steroids.       REVIEW OF SYSTEMS:  Pertinent positive and negative symptoms per HPI.       Physical Exam     First Vitals:  Patient Vitals for the past 24 hrs:   BP Temp Temp src Pulse Resp SpO2   02/16/25 1402 -- 97.8  F (36.6  C) Oral -- 16 --   02/16/25 1400 131/68 -- -- -- -- --   02/16/25 1359 -- -- -- 76 -- 99 %       PHYSICAL EXAM:   General Appearance:  Alert, cooperative, no distress, appears stated age  HENT: Normocephalic without obvious deformity, atraumatic. Mucous membranes moist   Eyes: Conjunctiva clear, Lids normal. No discharge.   Cardiovascular: 2+ PT and DP pulses.  Brisk cap refill.  GI: Abdomen soft, nontender, normal bowel sounds  Musculoskeletal: Moving all extremities. No gross deformities  Left lower extremity: Full range of motion at the first MTP joint.  Mild tenderness to palpation at the MTP joint and diffusely across the forefoot. Compartments are soft.   Integument: Warm, dry.  Minimal erythema and questionable warmth around the first MTP on the left foot.  Neurologic: Alert and orientated x3.  Sensation intact to light touch throughout left foot.  Psych: Normal mood and affect      Results     LAB:  All pertinent labs reviewed and interpreted  Labs Ordered and Resulted from Time of ED Arrival to Time of ED Departure - No data to display    RADIOLOGY:  No orders to display         Zahraa Berger PA-C   Emergency Medicine   [facility] EMERGENCY DEPARTMENT       Zahraa Berger PA-C  02/16/25 2712

## 2025-03-04 ENCOUNTER — MYC REFILL (OUTPATIENT)
Dept: FAMILY MEDICINE | Facility: CLINIC | Age: 55
End: 2025-03-04
Payer: COMMERCIAL

## 2025-03-04 DIAGNOSIS — E10.8 TYPE 1 DIABETES MELLITUS WITH COMPLICATIONS (H): ICD-10-CM

## 2025-03-05 RX ORDER — LOSARTAN POTASSIUM 50 MG/1
50 TABLET ORAL DAILY
Qty: 90 TABLET | Refills: 1 | Status: SHIPPED | OUTPATIENT
Start: 2025-03-05

## 2025-03-22 DIAGNOSIS — E10.9 WELL CONTROLLED TYPE 1 DIABETES MELLITUS (H): ICD-10-CM

## 2025-03-27 RX ORDER — INSULIN PMP CART,AUT,G6/7,CNTR
EACH SUBCUTANEOUS
Qty: 30 EACH | Refills: 4 | Status: SHIPPED | OUTPATIENT
Start: 2025-03-27

## 2025-03-27 NOTE — TELEPHONE ENCOUNTER
Insulin Disposable Pump (OMNIPOD 5 PODS, GEN 5,) MISC   Start: 12/06/2024   Disp 30  R 0   Inject 1 each subcutaneously every 48 hours     Kaye Krishnamurthy MD  Endocrinology, Diabetes, and Metabolism  Lv 1/17/25  Nv  4/25/25    Refill decision: Medication unable to be refilled by RN due to: Other: endo triage to fill      Request from pharmacy:  Requested Prescriptions   Pending Prescriptions Disp Refills    Insulin Disposable Pump (OMNIPOD 5 PODS (GEN 5)) MISC [Pharmacy Med Name: Omnipod 5 [id number] Pods Gen 5 Miscellaneous] 30 each 0     Sig: USE ONE POD EVERY 48 HOURS.       There is no refill protocol information for this order

## 2025-04-25 ENCOUNTER — VIRTUAL VISIT (OUTPATIENT)
Dept: ENDOCRINOLOGY | Facility: CLINIC | Age: 55
End: 2025-04-25
Payer: COMMERCIAL

## 2025-04-25 VITALS
HEART RATE: 91 BPM | BODY MASS INDEX: 24.71 KG/M2 | SYSTOLIC BLOOD PRESSURE: 102 MMHG | WEIGHT: 163 LBS | OXYGEN SATURATION: 100 % | HEIGHT: 68 IN | DIASTOLIC BLOOD PRESSURE: 69 MMHG

## 2025-04-25 DIAGNOSIS — E10.8 TYPE 1 DIABETES MELLITUS WITH COMPLICATIONS (H): Primary | ICD-10-CM

## 2025-04-25 PROCEDURE — 1126F AMNT PAIN NOTED NONE PRSNT: CPT | Mod: 95 | Performed by: INTERNAL MEDICINE

## 2025-04-25 PROCEDURE — 3074F SYST BP LT 130 MM HG: CPT | Mod: 95 | Performed by: INTERNAL MEDICINE

## 2025-04-25 PROCEDURE — G2211 COMPLEX E/M VISIT ADD ON: HCPCS | Performed by: INTERNAL MEDICINE

## 2025-04-25 PROCEDURE — 3078F DIAST BP <80 MM HG: CPT | Mod: 95 | Performed by: INTERNAL MEDICINE

## 2025-04-25 PROCEDURE — 98005 SYNCH AUDIO-VIDEO EST LOW 20: CPT | Performed by: INTERNAL MEDICINE

## 2025-04-25 ASSESSMENT — PAIN SCALES - GENERAL: PAINLEVEL_OUTOF10: NO PAIN (0)

## 2025-04-25 NOTE — PROGRESS NOTES
04/07/25 12:33 PM  PATIENT LAB/IMAGING STATUS : Orders completed / No further action needed

## 2025-04-25 NOTE — PROGRESS NOTES
Virtual Visit Details    Type of service:  Video Visit   Start: 2:15 PM  Stop: 2:31 PM  Originating Location (pt. Location): Other Work     Distant Location (provider location):  On-site  Platform used for Video Visit: [facility]      Endocrinology and Diabetes Clinic    Subjective:   Sera Adkins was seen today for follow up of type 1 diabetes mellitus. She usually sees Hannah Whitley and also saw Dr. Juan Carlos Krishnamurthy in the past.     Chadd has no specific issues related to diabetes today and is here for routine follow-up.  She has been feeling more tired, and has been working on titrating her blood pressure medications.  She is staying active, has a bike race planned for June.    Type 1 diabetes mellitus:  Type 1 diabetes was diagnosed at age 15.     Diabetes care and complications:  Eyes: Retinopathy, exam up to date   Kidneys: She had SEKOU in 2019 and GFR has subsequently improved. Follows with nephrology.  No evidence of diabetic nephropathy.    Nerves: Mild peripheral neuropathy  Blood Pressure: Controlled, working with cardiology to titrate medications  Lipids: On rosuvastatin   Macrovascular: HF with preserved EF, microvascular heart disease.   Celiac screening: negative antibodies 2017     Current treatment strategy:   Now using Omnipod 5 insulin pump with Lyumjev insulin.     Current pump settings:   TIME BASAL RATES (units/hour) CORRECTION  (1 unit:_ mg/dl) CARB RATIO  (1 unit: _ g CHO) TARGET  (mg/dl)   12 AM 0.8 units/hr 50 1:9g - 1:12g 110   Active insulin time: 2 hours   Reverse correction: ON    Average total daily insulin: 30 units     She decided not to add Jardiance after her last visit (this was under consideration because of her heart disease).    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  She typically puts her pump into manual mode at a lower basal rate for exercise, and this works well.          1/14/2025     9:38 PM    including   1. Since your last visit to our clinic (or if this your first visit, since you last saw your primary care provider), have you experienced any of the following symptoms that may be related to low blood sugars? No, I have not experienced any of these symptoms   2. Since your last visit to our clinic (or if this your first visit, since you last saw your primary care provider), have you experienced any of the following symptoms that may be related to prolonged high blood sugars? No, I have not experienced any of these symptoms   3. Have you had any concerns that you would like to discuss today? Other   4. Do you have any female family members who have had heart attacks or strokes before age 60 or male relatives who have had heart attacks or strokes before age 50? No   5. Do you have any family members who have had complications from diabetes such as kidney disease, heart disease or strokes, retinopathy (a vision problem), or amputations? No   6. Who do you live with?  Partner   Little interest or pleasure in doing things? Not at all   Feeling down, depressed, or hopeless? Not at all   10.  Are you considering a pregnancy or interested in discussing pregnancy prevention today? No          Medications:   Also see [facility] chart for up-to-date cardiac meds   Current Outpatient Medications   Medication Sig Dispense Refill    amLODIPine (NORVASC) 5 MG tablet Take 5 mg by mouth daily 90 tablet 3    aspirin 81 MG EC tablet Take  mg by mouth daily.      Calcium Carbonate-Vitamin D (CALCIUM 500+D PO) Take 1 tablet by mouth as needed.      chlorthalidone (HYGROTON) 25 MG tablet Take 25 mg by mouth daily.      cholecalciferol 50 MCG (2000 UT) tablet Take 1 tablet by mouth daily      Continuous Glucose Sensor (DEXCOM G6 SENSOR) MISC USE TO MONITOR BLOOD GLUCOSE CONTINUOUSLY, CHANGE SENSOR EVERY 10 DAYS. 9 each 4    Continuous Glucose Transmitter (DEXCOM G6 TRANSMITTER) MISC Change every 3 months. 1 each 3     "estradiol (ESTRACE) 0.1 MG/GM vaginal cream Place vaginally three times a week.      FLUoxetine (PROZAC) 20 MG capsule Take 1 capsule (20 mg) by mouth daily. 90 capsule 4    furosemide (LASIX) 20 MG tablet Take 10 mg by mouth as needed.      Glucagon (BAQSIMI) 3 MG/DOSE nasal powder Spray 1 spray (3 mg) in nostril as needed (severe hypoglycemia) in the event of unconscious hypoglycemia or hypoglycemic seizure. May repeat dose if no response after 15 minutes. 1 each 1    Glucagon, rDNA, (GLUCAGON EMERGENCY) 1 MG KIT Inject IM for hypoglycemic event needs 2 pens one for home and one for work 1 kit 3    Insulin Disposable Pump (OMNIPOD 5 PODS, GEN 5,) MISC USE ONE POD EVERY 48 HOURS. 30 each 4    insulin glargine (LANTUS VIAL) 100 UNIT/ML vial Inject 18 Units Subcutaneous daily Use ONLY in case of pump failure. 10 mL 3    Insulin Lispro-aabc (LYUMJEV) 100 UNIT/ML SOLN Use as directed up to 90 units daily. 30 mL 11    losartan (COZAAR) 50 MG tablet Take 1 tablet (50 mg) by mouth daily. 90 tablet 1    nitroGLYcerin (NITROSTAT) 0.4 MG sublingual tablet One tablet under the tongue every 5 minutes if needed for chest pain. May repeat every 5 minutes for a maximum of 3 doses in 15 minutes\" 25 tablet 3    rosuvastatin (CRESTOR) 20 MG tablet Take 1 tablet (20 mg) by mouth daily.      spironolactone (ALDACTONE) 25 MG tablet Take 0.5 tablets (12.5 mg) by mouth daily. 45 tablet 11    blood glucose (NO BRAND SPECIFIED) test strip as needed. Sensor Failure. (Patient not taking: Reported on 4/25/2025) 1 Box 12    predniSONE (DELTASONE) 20 MG tablet Take two tablets (= 40mg) each day for 5 (five) days (Patient not taking: Reported on 4/25/2025) 10 tablet 0    triamcinolone (KENALOG) 0.1 % external ointment Apply topically as needed for irritation. (Patient not taking: Reported on 4/25/2025)       No current facility-administered medications for this visit.       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 " "She has 2 children, young adults.     Social History     Tobacco Use    Smoking status: Never    Smokeless tobacco: Never   Substance Use Topics    Alcohol use: Yes    Drug use: No       Physical Examination:  Estimated body mass index is 24.78 kg/m  as calculated from the following:    Height as of this encounter: 1.727 m (5' 8\").    Weight as of this encounter: 73.9 kg (163 lb).    Wt Readings from Last 2 Encounters:   04/25/25 73.9 kg (163 lb)   01/31/25 73.9 kg (163 lb)     BP Readings from Last 3 Encounters:   04/25/25 102/69   02/16/25 131/68   01/31/25 110/58     GENERAL: Healthy, alert and no distress  EYES: Eyes grossly normal to inspection.  No discharge or erythema, or obvious scleral/conjunctival abnormalities.  NECK: No visible goiter.   RESP: No audible wheeze, cough, or visible cyanosis.  No increased work of breathing.    SKIN: Visible skin clear. No significant rash, abnormal pigmentation or lesions.  NEURO: Cranial nerves grossly intact.  Mentation and speech appropriate.  PSYCH: Affect normal/bright, judgement and insight intact, normal speech and appearance well-groomed.    Labs and Studies:   Lab Results   Component Value Date    A1C 7.2 (H) 01/17/2025    A1C 7.1 (H) 04/26/2024    A1C 7.0 (H) 04/11/2022    A1C 7.2 (H) 08/02/2021    A1C 6.8 (A) 04/09/2021    A1C 7.7 (H) 01/20/2020    A1C 7.5 (H) 09/16/2019    A1C 7.8 (H) 03/18/2019    A1C 7.7 (H) 12/13/2016    HEMOGLOBINA1 7.4 (A) 01/20/2020    HEMOGLOBINA1 7.2 (A) 09/16/2019    HEMOGLOBINA1 7.0 (A) 04/16/2018    HEMOGLOBINA1 7.0 (A) 12/11/2017    HEMOGLOBINA1 7.4 (A) 06/05/2017         Assessment:  Type 1 diabetes mellitus,  which is well controlled.     Microvascular heart disease and HFpEF. Her cardiologist suggested adding an SGLT inhibitor, if okay in the setting of type 1 diabetes. See Mychart message. I agree an SGLT-2 inhibitor could be cautiously added for cardiac benefit, with monitoring of ketones. Chadd is aware of the risk of " euglycemic DKA and that this risk is increased for people with type 1 diabetes.  This medication is currently on hold but could be considered in the future.    Plan:   Continue use of the Omnipod 5 insulin pump and Dexcom G6 CGM. She uses the [employer] Omnipod marie and should not upgrade to the Dexcom G7.   Continue use of Lyumjev in the pump.   She uses manual mode with a decreased basal rate for exercise, and can continue. She had a low this morning biking to work, she could try decreasing the basal rate further during exercise to prevent hypoglycemia.  It would also help to turn on the lower basal rate sooner before activity, but this can be difficult to remember.  No other pump setting changes were suggested today   Continue to pre-bolus prior to meals - even with Lyumjev in the pump, she notices pre-bolusing by a few minutes makes a big difference.   She has glucagon and back up supplies  Labs next visit, include celiac screening     Follow up in 6 months with Hannah Whitley or with me, as scheduled.      25 minutes spent on the date of the encounter doing chart review, history and exam, documentation and further activities per the note. This includes time spent reviewing CGM data.     The longitudinal plan of care for the diagnosis(es)/condition(s) as documented were addressed during this visit. Due to the added complexity in care, I will continue to support Chadd in the subsequent management and with ongoing continuity of care.    Kaye Krishnamurthy MD  Endocrinology and Diabetes   Office: [phone]   Clinic: [phone]

## 2025-04-25 NOTE — LETTER
4/25/2025       RE: Sera Adkins  [address]   [place] [address]     Dear Colleague,    Thank you for referring your patient, Sera Adkins, to the [facility] ENDOCRINOLOGY [facility] [place] [facility]. Please see a copy of my visit note below.    04/07/25 12:33 PM  PATIENT LAB/IMAGING STATUS : Orders completed / No further action needed                                                     Virtual Visit Details    Type of service:  Video Visit   Start: 2:15 PM  Stop: 2:31 PM  Originating Location (pt. Location): Other Work     Distant Location (provider location):  On-site  Platform used for Video Visit: [facility]      Endocrinology and Diabetes Clinic    Subjective:   Sera Adkins was seen today for follow up of type 1 diabetes mellitus. She usually sees Hannah Whitley and also saw Dr. Juan Carlos Krishnamurthy in the past.     Chadd has no specific issues related to diabetes today and is here for routine follow-up.  She has been feeling more tired, and has been working on titrating her blood pressure medications.  She is staying active, has a bike race planned for June.    Type 1 diabetes mellitus:  Type 1 diabetes was diagnosed at age 15.     Diabetes care and complications:  Eyes: Retinopathy, exam up to date   Kidneys: She had SEKOU in 2019 and GFR has subsequently improved. Follows with nephrology.  No evidence of diabetic nephropathy.    Nerves: Mild peripheral neuropathy  Blood Pressure: Controlled, working with cardiology to titrate medications  Lipids: On rosuvastatin   Macrovascular: HF with preserved EF, microvascular heart disease.   Celiac screening: negative antibodies 2017     Current treatment strategy:   Now using Omnipod 5 insulin pump with Lyumjev insulin.     Current pump settings:   TIME BASAL RATES (units/hour) CORRECTION  (1 unit:_ mg/dl) CARB RATIO  (1 unit: _ g CHO) TARGET  (mg/dl)   12 AM 0.8 units/hr 50 1:9g - 1:12g 110    Active insulin time: 2 hours   Reverse correction: ON    Average total daily insulin: 30 units     She decided not to add Jardiance after her last visit (this was under consideration because of her heart disease).    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  She typically puts her pump into manual mode at a lower basal rate for exercise, and this works well.          1/14/2025     9:38 PM   including   1. Since your last visit to our clinic (or if this your first visit, since you last saw your primary care provider), have you experienced any of the following symptoms that may be related to low blood sugars? No, I have not experienced any of these symptoms   2. Since your last visit to our clinic (or if this your first visit, since you last saw your primary care provider), have you experienced any of the following symptoms that may be related to prolonged high blood sugars? No, I have not experienced any of these symptoms   3. Have you had any concerns that you would like to discuss today? Other   4. Do you have any female family members who have had heart attacks or strokes before age 60 or male relatives who have had heart attacks or strokes before age 50? No   5. Do you have any family members who have had complications from diabetes such as kidney disease, heart disease or strokes, retinopathy (a vision problem), or amputations? No   6. Who do you live with?  Partner   Little interest or pleasure in doing things? Not at all   Feeling down, depressed, or hopeless? Not at all   10.  Are you considering a pregnancy or interested in discussing pregnancy prevention today? No          Medications:   Also see [facility] chart for up-to-date cardiac meds   Current Outpatient Medications   Medication Sig Dispense Refill     amLODIPine (NORVASC) 5 MG tablet Take 5 mg by mouth daily 90 tablet 3     aspirin 81 MG EC tablet Take  mg by mouth  "daily.       Calcium Carbonate-Vitamin D (CALCIUM 500+D PO) Take 1 tablet by mouth as needed.       chlorthalidone (HYGROTON) 25 MG tablet Take 25 mg by mouth daily.       cholecalciferol 50 MCG (2000 UT) tablet Take 1 tablet by mouth daily       Continuous Glucose Sensor (DEXCOM G6 SENSOR) MISC USE TO MONITOR BLOOD GLUCOSE CONTINUOUSLY, CHANGE SENSOR EVERY 10 DAYS. 9 each 4     Continuous Glucose Transmitter (DEXCOM G6 TRANSMITTER) MISC Change every 3 months. 1 each 3     estradiol (ESTRACE) 0.1 MG/GM vaginal cream Place vaginally three times a week.       FLUoxetine (PROZAC) 20 MG capsule Take 1 capsule (20 mg) by mouth daily. 90 capsule 4     furosemide (LASIX) 20 MG tablet Take 10 mg by mouth as needed.       Glucagon (BAQSIMI) 3 MG/DOSE nasal powder Spray 1 spray (3 mg) in nostril as needed (severe hypoglycemia) in the event of unconscious hypoglycemia or hypoglycemic seizure. May repeat dose if no response after 15 minutes. 1 each 1     Glucagon, rDNA, (GLUCAGON EMERGENCY) 1 MG KIT Inject IM for hypoglycemic event needs 2 pens one for home and one for work 1 kit 3     Insulin Disposable Pump (OMNIPOD 5 PODS, GEN 5,) MISC USE ONE POD EVERY 48 HOURS. 30 each 4     insulin glargine (LANTUS VIAL) 100 UNIT/ML vial Inject 18 Units Subcutaneous daily Use ONLY in case of pump failure. 10 mL 3     Insulin Lispro-aabc (LYUMJEV) 100 UNIT/ML SOLN Use as directed up to 90 units daily. 30 mL 11     losartan (COZAAR) 50 MG tablet Take 1 tablet (50 mg) by mouth daily. 90 tablet 1     nitroGLYcerin (NITROSTAT) 0.4 MG sublingual tablet One tablet under the tongue every 5 minutes if needed for chest pain. May repeat every 5 minutes for a maximum of 3 doses in 15 minutes\" 25 tablet 3     rosuvastatin (CRESTOR) 20 MG tablet Take 1 tablet (20 mg) by mouth daily.       spironolactone (ALDACTONE) 25 MG tablet Take 0.5 tablets (12.5 mg) by mouth daily. 45 tablet 11     blood glucose (NO BRAND SPECIFIED) test strip as needed. Sensor " "Failure. (Patient not taking: Reported on 4/25/2025) 1 Box 12     predniSONE (DELTASONE) 20 MG tablet Take two tablets (= 40mg) each day for 5 (five) days (Patient not taking: Reported on 4/25/2025) 10 tablet 0     triamcinolone (KENALOG) 0.1 % external ointment Apply topically as needed for irritation. (Patient not taking: Reported on 4/25/2025)       No current facility-administered medications for this visit.       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     Social History     Tobacco Use     Smoking status: Never     Smokeless tobacco: Never   Substance Use Topics     Alcohol use: Yes     Drug use: No       Physical Examination:  Estimated body mass index is 24.78 kg/m  as calculated from the following:    Height as of this encounter: 1.727 m (5' 8\").    Weight as of this encounter: 73.9 kg (163 lb).    Wt Readings from Last 2 Encounters:   04/25/25 73.9 kg (163 lb)   01/31/25 73.9 kg (163 lb)     BP Readings from Last 3 Encounters:   04/25/25 102/69   02/16/25 131/68   01/31/25 110/58     GENERAL: Healthy, alert and no distress  EYES: Eyes grossly normal to inspection.  No discharge or erythema, or obvious scleral/conjunctival abnormalities.  NECK: No visible goiter.   RESP: No audible wheeze, cough, or visible cyanosis.  No increased work of breathing.    SKIN: Visible skin clear. No significant rash, abnormal pigmentation or lesions.  NEURO: Cranial nerves grossly intact.  Mentation and speech appropriate.  PSYCH: Affect normal/bright, judgement and insight intact, normal speech and appearance well-groomed.    Labs and Studies:   Lab Results   Component Value Date    A1C 7.2 (H) 01/17/2025    A1C 7.1 (H) 04/26/2024    A1C 7.0 (H) 04/11/2022    A1C 7.2 (H) 08/02/2021    A1C 6.8 (A) 04/09/2021    A1C 7.7 (H) 01/20/2020    A1C 7.5 (H) 09/16/2019    A1C 7.8 (H) 03/18/2019    A1C 7.7 (H) 12/13/2016    HEMOGLOBINA1 7.4 (A) 01/20/2020    HEMOGLOBINA1 " 7.2 (A) 09/16/2019    HEMOGLOBINA1 7.0 (A) 04/16/2018    HEMOGLOBINA1 7.0 (A) 12/11/2017    HEMOGLOBINA1 7.4 (A) 06/05/2017         Assessment:  Type 1 diabetes mellitus,  which is well controlled.     Microvascular heart disease and HFpEF. Her cardiologist suggested adding an SGLT inhibitor, if okay in the setting of type 1 diabetes. See Mychart message. I agree an SGLT-2 inhibitor could be cautiously added for cardiac benefit, with monitoring of ketones. Chadd is aware of the risk of euglycemic DKA and that this risk is increased for people with type 1 diabetes.  This medication is currently on hold but could be considered in the future.    Plan:   Continue use of the Omnipod 5 insulin pump and Dexcom G6 CGM. She uses the [employer] Omnipod marie and should not upgrade to the Dexcom G7.   Continue use of Lyumjev in the pump.   She uses manual mode with a decreased basal rate for exercise, and can continue. She had a low this morning biking to work, she could try decreasing the basal rate further during exercise to prevent hypoglycemia.  It would also help to turn on the lower basal rate sooner before activity, but this can be difficult to remember.  No other pump setting changes were suggested today   Continue to pre-bolus prior to meals - even with Lyumjev in the pump, she notices pre-bolusing by a few minutes makes a big difference.   She has glucagon and back up supplies  Labs next visit, include celiac screening     Follow up in 6 months with Hannah Whitley or with me, as scheduled.      25 minutes spent on the date of the encounter doing chart review, history and exam, documentation and further activities per the note. This includes time spent reviewing CGM data.     The longitudinal plan of care for the diagnosis(es)/condition(s) as documented were addressed during this visit. Due to the added complexity in care, I will continue to support Chadd in the subsequent management and with ongoing continuity of  care.    Kaye Krishnamurthy MD  Endocrinology and Diabetes   Office: [phone]   Clinic: [phone]            Again, thank you for allowing me to participate in the care of your patient.      Sincerely,    Kaye Krishnamurthy MD

## 2025-04-25 NOTE — NURSING NOTE
Current patient location: Patient declined to provide     Is the patient currently in the state of [place]? YES    Visit mode: VIDEO    If the visit is dropped, the patient can be reconnected by:VIDEO VISIT: Text to cell phone:   Telephone Information:   Mobile [phone]       Will anyone else be joining the visit? NO  (If patient encounters technical issues they should call [phone] :[id number])    Are changes needed to the allergy or medication list? No    Are refills needed on medications prescribed by this physician? NO    Rooming Documentation:  Questionnaire(s) completed    Reason for visit: RECHECK    Rosalina Wild VVF    A1C 7.0 - 04/25/2025

## 2025-05-09 ENCOUNTER — TRANSFERRED RECORDS (OUTPATIENT)
Dept: HEALTH INFORMATION MANAGEMENT | Facility: CLINIC | Age: 55
End: 2025-05-09
Payer: COMMERCIAL

## 2025-05-09 LAB — RETINOPATHY: POSITIVE

## 2025-05-23 ENCOUNTER — MYC MEDICAL ADVICE (OUTPATIENT)
Dept: CARDIOLOGY | Facility: CLINIC | Age: 55
End: 2025-05-23
Payer: COMMERCIAL

## 2025-05-23 DIAGNOSIS — E10.8 TYPE 1 DIABETES MELLITUS WITH COMPLICATIONS (H): ICD-10-CM

## 2025-05-23 DIAGNOSIS — E10.3299 TYPE 1 DIABETES MELLITUS WITH MILD NONPROLIFERATIVE RETINOPATHY, MACULAR EDEMA PRESENCE UNSPECIFIED, UNSPECIFIED LATERALITY (H): ICD-10-CM

## 2025-05-23 DIAGNOSIS — I50.30 HEART FAILURE WITH PRESERVED EJECTION FRACTION, NYHA CLASS I (H): Primary | ICD-10-CM

## 2025-05-23 DIAGNOSIS — I25.118 CORONARY ARTERY DISEASE OF NATIVE ARTERY OF NATIVE HEART WITH STABLE ANGINA PECTORIS: ICD-10-CM

## 2025-05-23 DIAGNOSIS — E10.9 WELL CONTROLLED TYPE 1 DIABETES MELLITUS (H): ICD-10-CM

## 2025-05-27 RX ORDER — CHLORTHALIDONE 25 MG/1
25 TABLET ORAL DAILY
Qty: 90 TABLET | Refills: 1 | Status: SHIPPED | OUTPATIENT
Start: 2025-05-27

## 2025-05-27 RX ORDER — AMLODIPINE BESYLATE 5 MG/1
5 TABLET ORAL DAILY
Qty: 90 TABLET | Refills: 1 | Status: SHIPPED | OUTPATIENT
Start: 2025-05-27

## 2025-05-27 RX ORDER — ROSUVASTATIN CALCIUM 20 MG/1
20 TABLET, COATED ORAL DAILY
Qty: 90 TABLET | Refills: 1 | Status: SHIPPED | OUTPATIENT
Start: 2025-05-27

## 2025-05-27 RX ORDER — LOSARTAN POTASSIUM 50 MG/1
50 TABLET ORAL DAILY
Qty: 90 TABLET | Refills: 1 | Status: SHIPPED | OUTPATIENT
Start: 2025-05-27

## 2025-05-27 NOTE — TELEPHONE ENCOUNTER
From: Davy Rod MD  Sent: 5/27/2025  11:18 AM CDT  To: [facility] R[facility] Team [facility]  Subject: FW: Meds                                         Yes -ok to refill  ----- Message -----  From: Laurel Cesar  Sent: 5/23/2025   2:24 PM CDT  To: Davy Rod MD  Subject: FW: Meds                                         Hi Dr. Rod,    Ok to fill under you? Thanks-Laurel

## 2025-06-11 DIAGNOSIS — Z00.6 EXAMINATION OF PARTICIPANT OR CONTROL IN CLINICAL RESEARCH: Primary | ICD-10-CM

## 2025-06-11 NOTE — PROGRESS NOTES
The patient conveyed interest in participating in the Kidney Precision Medicine Project (KPMP) to Sofia Shaikh. She is a resilient diabetic. Based this discussion, an order is made for a research biopsy to be performed as part of the study's requirements.    Orders Placed This Encounter   Procedures    IR Referral

## 2025-07-27 ENCOUNTER — HEALTH MAINTENANCE LETTER (OUTPATIENT)
Age: 55
End: 2025-07-27

## 2025-08-08 ENCOUNTER — OFFICE VISIT (OUTPATIENT)
Dept: ENDOCRINOLOGY | Facility: CLINIC | Age: 55
End: 2025-08-08
Payer: COMMERCIAL

## 2025-08-08 VITALS
BODY MASS INDEX: 25.46 KG/M2 | HEIGHT: 68 IN | OXYGEN SATURATION: 100 % | DIASTOLIC BLOOD PRESSURE: 71 MMHG | SYSTOLIC BLOOD PRESSURE: 115 MMHG | HEART RATE: 61 BPM | WEIGHT: 168 LBS

## 2025-08-08 DIAGNOSIS — E10.8 TYPE 1 DIABETES MELLITUS WITH COMPLICATIONS (H): Primary | ICD-10-CM

## 2025-08-08 DIAGNOSIS — Z13.820 SCREENING FOR OSTEOPOROSIS: ICD-10-CM

## 2025-08-08 LAB
EST. AVERAGE GLUCOSE BLD GHB EST-MCNC: 180 MG/DL
HBA1C MFR BLD: 7.9 %

## 2025-08-08 PROCEDURE — 99214 OFFICE O/P EST MOD 30 MIN: CPT | Mod: 25 | Performed by: INTERNAL MEDICINE

## 2025-08-08 PROCEDURE — 99207 PR FOOT EXAM NO CHARGE: CPT | Performed by: INTERNAL MEDICINE

## 2025-08-08 PROCEDURE — 1126F AMNT PAIN NOTED NONE PRSNT: CPT | Performed by: INTERNAL MEDICINE

## 2025-08-08 PROCEDURE — 3078F DIAST BP <80 MM HG: CPT | Performed by: INTERNAL MEDICINE

## 2025-08-08 PROCEDURE — 83036 HEMOGLOBIN GLYCOSYLATED A1C: CPT | Performed by: PATHOLOGY

## 2025-08-08 PROCEDURE — 3074F SYST BP LT 130 MM HG: CPT | Performed by: INTERNAL MEDICINE

## 2025-08-08 PROCEDURE — 95251 CONT GLUC MNTR ANALYSIS I&R: CPT | Performed by: INTERNAL MEDICINE

## 2025-08-08 ASSESSMENT — PAIN SCALES - GENERAL: PAINLEVEL_OUTOF10: NO PAIN (0)

## 2025-08-20 ENCOUNTER — HOSPITAL ENCOUNTER (EMERGENCY)
Facility: HOSPITAL | Age: 55
Discharge: HOME | End: 2025-08-21
Attending: EMERGENCY MEDICINE
Payer: COMMERCIAL

## 2025-08-20 ENCOUNTER — VIRTUAL VISIT (OUTPATIENT)
Dept: FAMILY MEDICINE | Facility: CLINIC | Age: 55
End: 2025-08-20
Payer: COMMERCIAL

## 2025-08-20 ENCOUNTER — TELEPHONE (OUTPATIENT)
Dept: FAMILY MEDICINE | Facility: CLINIC | Age: 55
End: 2025-08-20

## 2025-08-20 DIAGNOSIS — T88.7XXA MEDICATION SIDE EFFECTS: Primary | ICD-10-CM

## 2025-08-20 DIAGNOSIS — T50.905A MEDICATION REACTION, INITIAL ENCOUNTER: Primary | ICD-10-CM

## 2025-08-20 DIAGNOSIS — R11.2 NAUSEA AND VOMITING, UNSPECIFIED VOMITING TYPE: ICD-10-CM

## 2025-08-20 LAB
ALBUMIN SERPL BCG-MCNC: 4.6 G/DL (ref 3.5–5.2)
ALP SERPL-CCNC: 86 U/L (ref 40–150)
ALT SERPL W P-5'-P-CCNC: 22 U/L (ref 0–50)
ANION GAP SERPL CALCULATED.3IONS-SCNC: 15 MMOL/L (ref 7–15)
AST SERPL W P-5'-P-CCNC: 34 U/L (ref 0–45)
BASOPHILS # BLD AUTO: 0.04 10E3/UL (ref 0–0.2)
BASOPHILS NFR BLD AUTO: 0.4 %
BILIRUB DIRECT SERPL-MCNC: 0.22 MG/DL (ref 0–0.3)
BILIRUB SERPL-MCNC: 1 MG/DL
BUN SERPL-MCNC: 21.8 MG/DL (ref 6–20)
CALCIUM SERPL-MCNC: 10 MG/DL (ref 8.8–10.4)
CHLORIDE SERPL-SCNC: 94 MMOL/L (ref 98–107)
CREAT SERPL-MCNC: 0.98 MG/DL (ref 0.51–0.95)
EGFRCR SERPLBLD CKD-EPI 2021: 69 ML/MIN/1.73M2
EOSINOPHIL # BLD AUTO: 0.11 10E3/UL (ref 0–0.7)
EOSINOPHIL NFR BLD AUTO: 1 %
ERYTHROCYTE [DISTWIDTH] IN BLOOD BY AUTOMATED COUNT: 13.3 % (ref 10–15)
GLUCOSE SERPL-MCNC: 193 MG/DL (ref 70–99)
HCO3 SERPL-SCNC: 26 MMOL/L (ref 22–29)
HCT VFR BLD AUTO: 44.5 % (ref 35–47)
HGB BLD-MCNC: 14.7 G/DL (ref 11.7–15.7)
IMM GRANULOCYTES # BLD: 0.03 10E3/UL
IMM GRANULOCYTES NFR BLD: 0.3 %
LIPASE SERPL-CCNC: 45 U/L (ref 13–60)
LYMPHOCYTES # BLD AUTO: 1.24 10E3/UL (ref 0.8–5.3)
LYMPHOCYTES NFR BLD AUTO: 11.5 %
MAGNESIUM SERPL-MCNC: 1.7 MG/DL (ref 1.7–2.3)
MCH RBC QN AUTO: 29.4 PG (ref 26.5–33)
MCHC RBC AUTO-ENTMCNC: 33 G/DL (ref 31.5–36.5)
MCV RBC AUTO: 89 FL (ref 78–100)
MONOCYTES # BLD AUTO: 0.81 10E3/UL (ref 0–1.3)
MONOCYTES NFR BLD AUTO: 7.5 %
NEUTROPHILS # BLD AUTO: 8.54 10E3/UL (ref 1.6–8.3)
NEUTROPHILS NFR BLD AUTO: 79.3 %
NRBC # BLD AUTO: <0.03 10E3/UL
NRBC BLD AUTO-RTO: 0 /100
PLATELET # BLD AUTO: 252 10E3/UL (ref 150–450)
POTASSIUM SERPL-SCNC: 4.5 MMOL/L (ref 3.4–5.3)
PROT SERPL-MCNC: 7.7 G/DL (ref 6.4–8.3)
RBC # BLD AUTO: 5 10E6/UL (ref 3.8–5.2)
SODIUM SERPL-SCNC: 135 MMOL/L (ref 135–145)
WBC # BLD AUTO: 10.77 10E3/UL (ref 4–11)

## 2025-08-20 PROCEDURE — 96374 THER/PROPH/DIAG INJ IV PUSH: CPT

## 2025-08-20 PROCEDURE — 83735 ASSAY OF MAGNESIUM: CPT | Performed by: EMERGENCY MEDICINE

## 2025-08-20 PROCEDURE — 99284 EMERGENCY DEPT VISIT MOD MDM: CPT | Mod: 25 | Performed by: EMERGENCY MEDICINE

## 2025-08-20 PROCEDURE — 96361 HYDRATE IV INFUSION ADD-ON: CPT

## 2025-08-20 PROCEDURE — 83690 ASSAY OF LIPASE: CPT | Performed by: EMERGENCY MEDICINE

## 2025-08-20 PROCEDURE — 80048 BASIC METABOLIC PNL TOTAL CA: CPT | Performed by: EMERGENCY MEDICINE

## 2025-08-20 PROCEDURE — 250N000011 HC RX IP 250 OP 636: Performed by: EMERGENCY MEDICINE

## 2025-08-20 PROCEDURE — 85025 COMPLETE CBC W/AUTO DIFF WBC: CPT | Performed by: EMERGENCY MEDICINE

## 2025-08-20 PROCEDURE — 82248 BILIRUBIN DIRECT: CPT | Performed by: EMERGENCY MEDICINE

## 2025-08-20 PROCEDURE — 36415 COLL VENOUS BLD VENIPUNCTURE: CPT | Performed by: EMERGENCY MEDICINE

## 2025-08-20 PROCEDURE — 258N000003 HC RX IP 258 OP 636: Performed by: EMERGENCY MEDICINE

## 2025-08-20 RX ORDER — METOCLOPRAMIDE HYDROCHLORIDE 5 MG/ML
10 INJECTION INTRAMUSCULAR; INTRAVENOUS ONCE
Status: COMPLETED | OUTPATIENT
Start: 2025-08-20 | End: 2025-08-20

## 2025-08-20 RX ORDER — ONDANSETRON 4 MG/1
4 TABLET, ORALLY DISINTEGRATING ORAL EVERY 6 HOURS PRN
Qty: 15 TABLET | Refills: 0 | Status: SHIPPED | OUTPATIENT
Start: 2025-08-20

## 2025-08-20 RX ADMIN — SODIUM CHLORIDE, POTASSIUM CHLORIDE, SODIUM LACTATE AND CALCIUM CHLORIDE 1000 ML: 600; 310; 30; 20 INJECTION, SOLUTION INTRAVENOUS at 23:12

## 2025-08-20 RX ADMIN — METOCLOPRAMIDE 10 MG: 5 INJECTION, SOLUTION INTRAMUSCULAR; INTRAVENOUS at 23:51

## 2025-08-20 ASSESSMENT — COLUMBIA-SUICIDE SEVERITY RATING SCALE - C-SSRS
6. HAVE YOU EVER DONE ANYTHING, STARTED TO DO ANYTHING, OR PREPARED TO DO ANYTHING TO END YOUR LIFE?: NO
1. IN THE PAST MONTH, HAVE YOU WISHED YOU WERE DEAD OR WISHED YOU COULD GO TO SLEEP AND NOT WAKE UP?: NO
2. HAVE YOU ACTUALLY HAD ANY THOUGHTS OF KILLING YOURSELF IN THE PAST MONTH?: NO

## 2025-08-21 VITALS
WEIGHT: 159.83 LBS | TEMPERATURE: 97.8 F | HEART RATE: 88 BPM | BODY MASS INDEX: 24.22 KG/M2 | HEIGHT: 68 IN | OXYGEN SATURATION: 98 % | DIASTOLIC BLOOD PRESSURE: 66 MMHG | SYSTOLIC BLOOD PRESSURE: 122 MMHG | RESPIRATION RATE: 18 BRPM

## 2025-08-21 LAB
GLUCOSE BLDC GLUCOMTR-MCNC: 346 MG/DL (ref 70–99)
HOLD SPECIMEN: NORMAL

## 2025-08-21 PROCEDURE — 96361 HYDRATE IV INFUSION ADD-ON: CPT

## 2025-08-21 PROCEDURE — 82962 GLUCOSE BLOOD TEST: CPT

## 2025-08-21 PROCEDURE — 258N000003 HC RX IP 258 OP 636: Performed by: EMERGENCY MEDICINE

## 2025-08-21 PROCEDURE — 93005 ELECTROCARDIOGRAM TRACING: CPT | Performed by: EMERGENCY MEDICINE

## 2025-08-21 PROCEDURE — 96375 TX/PRO/DX INJ NEW DRUG ADDON: CPT

## 2025-08-21 PROCEDURE — 250N000011 HC RX IP 250 OP 636: Performed by: EMERGENCY MEDICINE

## 2025-08-21 RX ORDER — DIPHENHYDRAMINE HYDROCHLORIDE 50 MG/ML
25 INJECTION, SOLUTION INTRAMUSCULAR; INTRAVENOUS ONCE
Status: COMPLETED | OUTPATIENT
Start: 2025-08-21 | End: 2025-08-21

## 2025-08-21 RX ORDER — METOCLOPRAMIDE 10 MG/1
10 TABLET ORAL
Qty: 12 TABLET | Refills: 0 | Status: SHIPPED | OUTPATIENT
Start: 2025-08-21 | End: 2025-08-24

## 2025-08-21 RX ORDER — DROPERIDOL 2.5 MG/ML
1.25 INJECTION, SOLUTION INTRAMUSCULAR; INTRAVENOUS ONCE
Status: COMPLETED | OUTPATIENT
Start: 2025-08-21 | End: 2025-08-21

## 2025-08-21 RX ADMIN — FAMOTIDINE 20 MG: 10 INJECTION, SOLUTION INTRAVENOUS at 01:59

## 2025-08-21 RX ADMIN — DIPHENHYDRAMINE HYDROCHLORIDE 25 MG: 50 INJECTION, SOLUTION INTRAMUSCULAR; INTRAVENOUS at 01:48

## 2025-08-21 RX ADMIN — DROPERIDOL 1.25 MG: 2.5 INJECTION, SOLUTION INTRAMUSCULAR; INTRAVENOUS at 01:51

## 2025-08-21 RX ADMIN — SODIUM CHLORIDE, SODIUM LACTATE, POTASSIUM CHLORIDE, AND CALCIUM CHLORIDE 1000 ML: .6; .31; .03; .02 INJECTION, SOLUTION INTRAVENOUS at 02:07

## 2025-08-21 ASSESSMENT — ACTIVITIES OF DAILY LIVING (ADL)
ADLS_ACUITY_SCORE: 41

## 2025-08-22 LAB
ATRIAL RATE - MUSE: 83 BPM
DIASTOLIC BLOOD PRESSURE - MUSE: NORMAL MMHG
INTERPRETATION ECG - MUSE: NORMAL
P AXIS - MUSE: 84 DEGREES
PR INTERVAL - MUSE: 132 MS
QRS DURATION - MUSE: 84 MS
QT - MUSE: 398 MS
QTC - MUSE: 467 MS
R AXIS - MUSE: 77 DEGREES
SYSTOLIC BLOOD PRESSURE - MUSE: NORMAL MMHG
T AXIS - MUSE: 25 DEGREES
VENTRICULAR RATE- MUSE: 83 BPM

## (undated) DEVICE — CATH DIAG 4FR JR 5.0 538423

## (undated) DEVICE — 0.035IN X 260CM INQWIRE DIAGNOSTIC GUIDEWIRE, FIXED-CORE PTFE COATED, 3MM J-TIP

## (undated) DEVICE — CATH ANGIO INFINITI JL3.5 4FRX100CM 538418

## (undated) DEVICE — MANIFOLD KIT ANGIO AUTOMATED 014613

## (undated) DEVICE — GUIDEWIRE STARTER .035INX150CM

## (undated) DEVICE — GUIDEWIRE VASCULAR COMET II 185CML PRESSURE H74939359110

## (undated) DEVICE — SHEATH GLIDE RADIAL 4FR 25CM 0.021

## (undated) DEVICE — ELECTRODE ADULT PACING MULTI P-211-M1

## (undated) DEVICE — SYR ANGIOGRAPHY MULTIUSE KIT ACIST 014612

## (undated) DEVICE — INTRO MICRO MINI STICK 4FR STD NITINOL

## (undated) DEVICE — KIT HAND CONTROL ACIST 016795

## (undated) DEVICE — CUSTOM PACK CORONARY SAN5BCRHEA

## (undated) DEVICE — SLEEVE TR BAND RADIAL COMPRESSION DEVICE 24CM TRB24-REG

## (undated) RX ORDER — ADENOSINE 3 MG/ML
INJECTION, SOLUTION INTRAVENOUS
Status: DISPENSED
Start: 2022-10-24

## (undated) RX ORDER — FENTANYL CITRATE 50 UG/ML
INJECTION, SOLUTION INTRAMUSCULAR; INTRAVENOUS
Status: DISPENSED
Start: 2022-10-24

## (undated) RX ORDER — ONDANSETRON 2 MG/ML
INJECTION INTRAMUSCULAR; INTRAVENOUS
Status: DISPENSED
Start: 2022-10-24

## (undated) RX ORDER — DIAZEPAM 10 MG
TABLET ORAL
Status: DISPENSED
Start: 2022-10-24